# Patient Record
Sex: FEMALE | Race: WHITE | Employment: OTHER | URBAN - NONMETROPOLITAN AREA
[De-identification: names, ages, dates, MRNs, and addresses within clinical notes are randomized per-mention and may not be internally consistent; named-entity substitution may affect disease eponyms.]

---

## 2017-03-24 ENCOUNTER — TELEPHONE (OUTPATIENT)
Dept: FAMILY MEDICINE CLINIC | Age: 52
End: 2017-03-24

## 2017-06-20 ENCOUNTER — OFFICE VISIT (OUTPATIENT)
Dept: FAMILY MEDICINE CLINIC | Age: 52
End: 2017-06-20
Payer: COMMERCIAL

## 2017-06-20 VITALS
HEART RATE: 87 BPM | OXYGEN SATURATION: 98 % | HEIGHT: 63 IN | DIASTOLIC BLOOD PRESSURE: 86 MMHG | SYSTOLIC BLOOD PRESSURE: 130 MMHG | BODY MASS INDEX: 28.28 KG/M2 | WEIGHT: 159.6 LBS

## 2017-06-20 DIAGNOSIS — Z12.11 COLON CANCER SCREENING: ICD-10-CM

## 2017-06-20 DIAGNOSIS — E55.9 VITAMIN D DEFICIENCY: ICD-10-CM

## 2017-06-20 DIAGNOSIS — E10.9 TYPE 1 DIABETES MELLITUS WITHOUT COMPLICATION (HCC): ICD-10-CM

## 2017-06-20 DIAGNOSIS — Z00.00 WELLNESS EXAMINATION: Primary | ICD-10-CM

## 2017-06-20 DIAGNOSIS — Z78.0 MENOPAUSE: ICD-10-CM

## 2017-06-20 DIAGNOSIS — E03.9 ACQUIRED HYPOTHYROIDISM: ICD-10-CM

## 2017-06-20 PROCEDURE — 99396 PREV VISIT EST AGE 40-64: CPT | Performed by: FAMILY MEDICINE

## 2017-06-20 RX ORDER — LEVOTHYROXINE SODIUM 0.03 MG/1
TABLET ORAL
Qty: 90 TABLET | Refills: 3 | Status: SHIPPED | OUTPATIENT
Start: 2017-06-20 | End: 2018-05-14 | Stop reason: SDUPTHER

## 2017-06-20 RX ORDER — ZONISAMIDE 25 MG/1
CAPSULE ORAL
COMMUNITY
Start: 2017-06-15 | End: 2017-07-17

## 2017-07-17 ENCOUNTER — NURSE ONLY (OUTPATIENT)
Dept: SURGERY | Age: 52
End: 2017-07-17

## 2017-07-17 VITALS — HEIGHT: 63 IN | WEIGHT: 161 LBS | BODY MASS INDEX: 28.53 KG/M2

## 2017-07-17 DIAGNOSIS — Z12.11 COLON CANCER SCREENING: Primary | ICD-10-CM

## 2017-08-03 ENCOUNTER — OFFICE VISIT (OUTPATIENT)
Dept: PRIMARY CARE CLINIC | Age: 52
End: 2017-08-03
Payer: COMMERCIAL

## 2017-08-03 VITALS
TEMPERATURE: 98.5 F | BODY MASS INDEX: 28.35 KG/M2 | SYSTOLIC BLOOD PRESSURE: 126 MMHG | HEART RATE: 98 BPM | DIASTOLIC BLOOD PRESSURE: 80 MMHG | WEIGHT: 158 LBS | OXYGEN SATURATION: 99 %

## 2017-08-03 DIAGNOSIS — M25.511 ACUTE PAIN OF RIGHT SHOULDER: Primary | ICD-10-CM

## 2017-08-03 PROCEDURE — A4565 SLINGS: HCPCS | Performed by: NURSE PRACTITIONER

## 2017-08-03 PROCEDURE — 99213 OFFICE O/P EST LOW 20 MIN: CPT | Performed by: NURSE PRACTITIONER

## 2017-08-03 ASSESSMENT — ENCOUNTER SYMPTOMS: RESPIRATORY NEGATIVE: 1

## 2017-08-10 DIAGNOSIS — M25.511 RIGHT SHOULDER PAIN, UNSPECIFIED CHRONICITY: Primary | ICD-10-CM

## 2017-12-20 ENCOUNTER — OFFICE VISIT (OUTPATIENT)
Dept: OBGYN | Age: 52
End: 2017-12-20
Payer: COMMERCIAL

## 2017-12-20 ENCOUNTER — HOSPITAL ENCOUNTER (OUTPATIENT)
Age: 52
Setting detail: SPECIMEN
Discharge: HOME | End: 2017-12-20
Payer: COMMERCIAL

## 2017-12-20 VITALS
DIASTOLIC BLOOD PRESSURE: 84 MMHG | HEART RATE: 82 BPM | WEIGHT: 164.4 LBS | BODY MASS INDEX: 30.25 KG/M2 | SYSTOLIC BLOOD PRESSURE: 136 MMHG | HEIGHT: 62 IN

## 2017-12-20 DIAGNOSIS — N76.0 BV (BACTERIAL VAGINOSIS): Primary | ICD-10-CM

## 2017-12-20 DIAGNOSIS — B96.89 BV (BACTERIAL VAGINOSIS): Primary | ICD-10-CM

## 2017-12-20 DIAGNOSIS — N89.8 VAGINAL IRRITATION: ICD-10-CM

## 2017-12-20 DIAGNOSIS — B37.31 MONILIAL VAGINITIS: ICD-10-CM

## 2017-12-20 DIAGNOSIS — Z12.31 SCREENING MAMMOGRAM, ENCOUNTER FOR: ICD-10-CM

## 2017-12-20 LAB
CLUE CELLS: ABNORMAL
DIRECT EXAM: NORMAL
HYPHAL YEAST: ABNORMAL
KOH RESULT: NEGATIVE
Lab: NORMAL
SPECIMEN DESCRIPTION: NORMAL
SPECIMEN DESCRIPTION: NORMAL
STATUS: NORMAL
TRICHOMONAS: NEGATIVE
WET PREP: ABNORMAL
WHITE BLOOD CELLS: NEGATIVE

## 2017-12-20 PROCEDURE — 87210 SMEAR WET MOUNT SALINE/INK: CPT | Performed by: NURSE PRACTITIONER

## 2017-12-20 PROCEDURE — 99214 OFFICE O/P EST MOD 30 MIN: CPT | Performed by: NURSE PRACTITIONER

## 2017-12-20 PROCEDURE — 87480 CANDIDA DNA DIR PROBE: CPT

## 2017-12-20 PROCEDURE — 87510 GARDNER VAG DNA DIR PROBE: CPT

## 2017-12-20 PROCEDURE — 87070 CULTURE OTHR SPECIMN AEROBIC: CPT

## 2017-12-20 PROCEDURE — 87660 TRICHOMONAS VAGIN DIR PROBE: CPT

## 2017-12-20 RX ORDER — METRONIDAZOLE 500 MG/1
500 TABLET ORAL 2 TIMES DAILY WITH MEALS
Qty: 14 TABLET | Refills: 0 | Status: SHIPPED | OUTPATIENT
Start: 2017-12-20 | End: 2017-12-27

## 2017-12-20 RX ORDER — CLOTRIMAZOLE AND BETAMETHASONE DIPROPIONATE 10; .64 MG/G; MG/G
CREAM TOPICAL
Qty: 45 G | Refills: 0 | Status: SHIPPED | OUTPATIENT
Start: 2017-12-20 | End: 2020-03-03 | Stop reason: ALTCHOICE

## 2017-12-20 NOTE — PATIENT INSTRUCTIONS
unexpected vaginal bleeding. ? · You have a fever. ? · You have new or increased pain in your vagina or pelvis. ? · You are not getting better after 1 week. ? · Your symptoms return after you finish the course of your medicine. Where can you learn more? Go to [email or web address] and sign in to your Dat[employer] account. Enter J102 in the [employer] box to learn more about \"Bacterial Vaginosis: Care Instructions. \"     If you do not have an account, please click on the \"Sign Up Now\" link. Current as of: October 13, 2016  Content Version: 11.4  © 2199-6454 Axial. Care instructions adapted under license by [facility] ([facility]). If you have questions about a medical condition or this instruction, always ask your healthcare professional. [address] any warranty or liability for your use of this information. Patient Education        Vaginal Yeast Infection: Care Instructions  Your Care Instructions    A vaginal yeast infection is caused by too many yeast cells in the vagina. This is common in women of all ages. Itching, vaginal discharge and irritation, and other symptoms can bother you. But yeast infections don't often cause other health problems. Some medicines can increase your risk of getting a yeast infection. These include antibiotics, birth control pills, hormones, and steroids. You may also be more likely to get a yeast infection if you are pregnant, have diabetes, douche, or wear tight clothes. With treatment, most yeast infections get better in 2 to 3 days. Follow-up care is a key part of your treatment and safety. Be sure to make and go to all appointments, and call your doctor if you are having problems. It's also a good idea to know your test results and keep a list of the medicines you take. How can you care for yourself at home? · Take your medicines exactly as prescribed.  Call your doctor if you think you are having a problem with your medicine. · Ask your doctor about over-the-counter (OTC) medicines for yeast infections. They may cost less than prescription medicines. If you use an OTC treatment, read and follow all instructions on the label. · Do not use tampons while using a vaginal cream or suppository. The tampons can absorb the medicine. Use pads instead. · Wear loose cotton clothing. Do not wear nylon or other fabric that holds body heat and moisture close to the skin. · Try sleeping without underwear. · Do not scratch. Relieve itching with a cold pack or a cool bath. · Do not wash your vaginal area more than once a day. Use plain water or a mild, unscented soap. Air-dry the vaginal area. · Change out of wet swimsuits after swimming. · Do not have sex until you have finished your treatment. · Do not douche. When should you call for help? Call your doctor now or seek immediate medical care if:  ? · You have unexpected vaginal bleeding. ? · You have new or increased pain in your vagina or pelvis. ? Watch closely for changes in your health, and be sure to contact your doctor if:  ? · You have a fever. ? · You are not getting better after 2 days. ? · Your symptoms come back after you finish your medicines. Where can you learn more? Go to [email or web address] org and sign in to your [facility] account. Enter S577 in the [facility] box to learn more about \"Vaginal Yeast Infection: Care Instructions. \"     If you do not have an account, please click on the \"Sign Up Now\" link. Current as of: October 13, 2016  Content Version: 11.4  © 6602-5662 Scrapblog. Care instructions adapted under license by [facility] ([facility]). If you have questions about a medical condition or this instruction, always ask your healthcare professional. [address] any warranty or liability for your use of this information.

## 2017-12-20 NOTE — PROGRESS NOTES
Hypothyroid     Insomnia     Myopia with astigmatism and presbyopia     Tobacco abuse                                                                    Past Surgical History:   Procedure Laterality Date    BACK SURGERY  1/05/15    Lumbar microdiscectomy L5-S1    BLADDER SUSPENSION  9/22/09    (transobturator sling)    CERVIX LESION DESTRUCTION  1993    (laser cone)    ENDOMETRIAL ABLATION  2006    NERVE BLOCK  02/16/2017    laser nerve block Dr Nathaniel Eisenberg  03/09/2017    laser nerve block Dr Jassi Hayden  7/6/15    Lumbar L5-S1     Family History   Problem Relation Age of Onset    Diabetes Paternal Grandmother     Cancer Father      lymph nodes    Glaucoma Neg Hx      Social History     Social History    Marital status:      Spouse name: N/A    Number of children: N/A    Years of education: N/A     Occupational History    Not on file. Social History Main Topics    Smoking status: Former Smoker     Packs/day: 0.00     Years: 28.00     Types: Cigarettes     Start date: 1/1/1985     Quit date: 8/16/2014    Smokeless tobacco: Never Used    Alcohol use 1.8 oz/week     3 Standard drinks or equivalent per week      Comment: Socially.  Drug use: No    Sexual activity: Yes     Partners: Male     Other Topics Concern    Not on file     Social History Narrative    No narrative on file       MEDICATIONS:  Current Outpatient Prescriptions   Medication Sig Dispense Refill    clotrimazole-betamethasone (LOTRISONE) 1-0.05 % cream Apply a thin film to the affected area 2 times daily. 45 g 0    terconazole (TERAZOL 3) 0.8 % vaginal cream Place 1 applicator vaginally nightly for 3 days Place vaginally nightly.  1 Tube 2    metroNIDAZOLE (FLAGYL) 500 MG tablet Take 1 tablet by mouth 2 times daily (with meals) for 7 days Do NOT consume alcoholic beverages while on this medication 14 tablet 0    levothyroxine (SYNTHROID) 25 MCG tablet take 1 tablet by mouth once daily 90 tablet 3    insulin lispro (HUMALOG) 100 UNIT/ML injection vial Use 60 units per day in pump 1 vial 3    losartan-hydrochlorothiazide (HYZAAR) 50-12.5 MG per tablet       triamcinolone (ARISTOCORT) 0.5 % cream Apply topically 3 times daily. 45 g 1    Vitamin D (CHOLECALCIFEROL) 1000 UNITS CAPS capsule Take 2,000 Units by mouth daily       NOVOLOG 100 UNIT/ML injection Per pump      aspirin 81 MG tablet Take 81 mg by mouth daily.  Insulin Lispro, Human, (INSULIN PUMP) FLORENTIN Inject 1 each into the skin See Admin Instructions.  HYDROcodone-acetaminophen (NORCO) 5-325 MG per tablet        No current facility-administered medications for this visit. ALLERGIES:  Allergies as of 12/20/2017 - Review Complete 12/20/2017   Allergen Reaction Noted    Ace inhibitors Other (See Comments) 11/07/2016    Pcn [penicillins] Rash 07/11/2013           Gynecologic History:  Menarche: 17 yo  Patient's last menstrual period was 12/19/2017. Hormone Exposure: No    Family History of Breast, Ovarian , Colon or Uterine Cancer: No     Preventative Health Testing:  Date of Last Pap Smear: 2016  Date of Last Mammogram: 2016    ________________________________________________________________________  REVIEW OF SYSTEMS:     A minimum of an eleven point review of systems was completed.     Review Of Systems (11 point):  General ROS:  negative  Hematological and Lymphatic ROS:negative   Breast ROS: negative  Cardiovascular ROS: negative  Respiratory ROS: negative   Gastrointestinal ROS: negative  Genito-Urinary ROS: negative  Psychological ROS: negative  Neurological ROS: negative  Musculoskeletal ROS: negative  Dermatological ROS: negative                                                                                                                                                                                   PHYSICAL Exam:     Constitutional:  Blood pressure 136/84, pulse 82, height 5' 2\" (1.575 m), weight 164 lb 6.4 oz (74.6 kg), last menstrual period 12/19/2017, not currently breastfeeding. General Appearance: This  is a well Developed, well Nourished, well groomed female. Her BMI was reviewed. Skin:  There was a Normal Inspection of the skin without rashes or lesions. There were no rashes. (Papular, Maculopapular, Hives, Pustular, Macular)     There were no lesions (Ulcers, Erythema, Abn. Appearing Nevi)      Lymphatic:  No Lymph Nodes were Palpable in the neck , axilla or groin. Neck and EENT:  The neck was supple. There were no masses   The thyroid was not enlarged and had no masses. PERRLA, Nares Patent No Masses    Respiratory: The lungs were auscultated and found to be clear. There were no rales, rhonchi or wheezes. There was a good respiratory effort. Cardiovascular: The heart was in a regular rate and rhythm. . No S3 or S4. There was no murmur appreciated. Extremities: The patients extremities were without calf tenderness, edema, or varicosities. There was full range of motion in all four extremities. Pulses in all four extremities    Abdomen: The abdomen was soft and non-tender. There were good bowel sounds in all quadrants and there was no guarding, rebound or rigidity. On evaluation there was no evidence of hepatosplenomegaly and there was no costal vertebral jessenia tenderness bilaterally. No hernias were appreciated. Psych:   The patient had a normal Orientation to: Time, Place, Person, and Situation  Mood and affect appropriate    Breast:  (Chest)  normal appearance, no masses or tenderness, Inspection negative, No nipple retraction or dimpling, No nipple discharge or bleeding, No axillary or supraclavicular adenopathy, Normal to palpation without dominant masses, negative findings: normal in size and symmetry, normal contour with no evidence of flattening or dimpling, skin normal, nipples everted without rashes or discharge, palpation negative for masses or nodules, no palpable axillary lymphadenopathy      Pelvic Exam:  External genitalia: reddened  Urinary system: urethral meatus normal  Vaginal: normal mucosa without prolapse or lesions, normal rugae and discharge, grey, menstrual type blood present  Cervix: normal appearance  Adnexa: normal bimanual exam and non palpable  Uterus: normal single, nontender and mid-position  12/20/2017 11:51 AM - Lee Nunez CNP     Component Results     Component Collected Lab   Wet Prep 12/20/2017 10:45 AM Unknown   Abnormal    Hyphal Yeast 12/20/2017 10:45 AM Unknown   Present (8-10 monilial buds per hpf)    White Blood Cells 12/20/2017 10:45 AM Unknown   Negative    Trichomonas, UA 12/20/2017 10:45 AM Unknown   Negative    Clue Cells, Wet Prep 12/20/2017 10:45 AM Unknown   Greater than 75% of squamous cells    KOH result 12/20/2017 10:45 AM Unknown   Negative    Lab and Collection         Musculosk:  Normal Gait and station was noted. Digits were evaluated without abnormal findings. Range of motion, stability and strength were evaluated and found to be appropriate for the patients age. ASSESSMENT:      50 y.o. Annual  1. BV (bacterial vaginosis)     2. Screening mammogram, encounter for  PREETI DIGITAL SCREEN W CAD BILATERAL    PREETI DIGITAL SCREEN W CAD BILATERAL   3. Vaginal irritation  POCT Wet Prep    VAGINITIS DNA PROBE    Culture, Genital   4.  Monilial vaginitis          Chief Complaint   Patient presents with    Gynecologic Exam     annual exam and pap    Vaginal Itching    Vaginal Odor         Past Medical History:   Diagnosis Date    Bronchitis     Diabetes type 1, controlled ([address]     Eczema     GERD (gastroesophageal reflux disease)     Gestational diabetes     Hyperlipidemia     Hypothyroid     Insomnia     Myopia with astigmatism and presbyopia     Tobacco abuse          Patient Active Problem List   Diagnosis    Lumbago    Displacement of lumbar intervertebral disc without myelopathy    Myopia of both eyes with astigmatism and presbyopia    Essential hypertension    Hyperlipidemia    Type 1 diabetes mellitus without complication (HCC)    Acquired hypothyroidism    Vitamin D deficiency    Menopause          Hereditary Breast, Ovarian, Colon and Uterine Cancer screening Done. Tobacco & Secondary smoke risks reviewed; instructed on cessation and avoidance    PLAN:  Return in about 4 weeks (around 1/17/2018) for pap and mammogram.  Repeat pap per ASCCP 2013 guidelines  Return for annual exams  Mammograms every 1 year. If 37 yo and last mammogram was negative. Routine health maintenance per patients PCP. Orders Placed This Encounter   Procedures    VAGINITIS DNA PROBE     Standing Status:   Future     Standing Expiration Date:   1/20/2018    Culture, Genital     Standing Status:   Future     Standing Expiration Date:   1/20/2018    PREETI DIGITAL SCREEN W CAD BILATERAL     Standing Status:   Future     Standing Expiration Date:   8/12/2019     Scheduling Instructions: On the day of the exam, the patient should not wear deodorant, lotion, or powder on the breast or underarm area. Wear a two piece outfit and be prepared to give information about prior breast procedures including mammograms, biopsies, or surgeries. If you've had mammograms done elsewhere, please request any previous mammogram films from those organizations prior to your appointment. Order Specific Question:   Reason for exam:     Answer:   SCREENING MAMMOGRAM    PREETI DIGITAL SCREEN W CAD BILATERAL     Standing Status:   Future     Standing Expiration Date:   12/12/2019     Scheduling Instructions: On the day of the exam, the patient should not wear deodorant, lotion, or powder on the breast or underarm area. Wear a two piece outfit and be prepared to give information about prior breast procedures including mammograms, biopsies, or surgeries.             If you've had mammograms done elsewhere, please request any previous mammogram films from those organizations prior to your appointment.      Order Specific Question:   Reason for exam:     Answer:   SCREENING MAMMOGRAM    POCT Wet Prep       Faviola Doeflex  12/20/2017      (Please note that portions of this note were completed with a voice-recognition program. Efforts were made to edit the dictations but occasionally words are mis-transcribed.)

## 2017-12-23 LAB
CULTURE: NORMAL
Lab: NORMAL
SPECIMEN DESCRIPTION: NORMAL
SPECIMEN DESCRIPTION: NORMAL
STATUS: NORMAL

## 2018-01-23 ENCOUNTER — HOSPITAL ENCOUNTER (OUTPATIENT)
Dept: MAMMOGRAPHY | Age: 53
Discharge: HOME | End: 2018-01-23
Payer: COMMERCIAL

## 2018-01-23 ENCOUNTER — OFFICE VISIT (OUTPATIENT)
Dept: OBGYN | Age: 53
End: 2018-01-23
Payer: COMMERCIAL

## 2018-01-23 ENCOUNTER — HOSPITAL ENCOUNTER (OUTPATIENT)
Age: 53
Setting detail: SPECIMEN
Discharge: HOME | End: 2018-01-23
Payer: COMMERCIAL

## 2018-01-23 VITALS
DIASTOLIC BLOOD PRESSURE: 74 MMHG | WEIGHT: 164.6 LBS | HEIGHT: 62 IN | SYSTOLIC BLOOD PRESSURE: 118 MMHG | HEART RATE: 72 BPM | BODY MASS INDEX: 30.29 KG/M2

## 2018-01-23 DIAGNOSIS — Z12.4 CERVICAL CANCER SCREENING: Primary | ICD-10-CM

## 2018-01-23 DIAGNOSIS — Z12.31 SCREENING MAMMOGRAM, ENCOUNTER FOR: ICD-10-CM

## 2018-01-23 PROCEDURE — 99213 OFFICE O/P EST LOW 20 MIN: CPT | Performed by: NURSE PRACTITIONER

## 2018-01-23 PROCEDURE — G0145 SCR C/V CYTO,THINLAYER,RESCR: HCPCS

## 2018-01-23 PROCEDURE — 77063 BREAST TOMOSYNTHESIS BI: CPT

## 2018-01-23 NOTE — PROGRESS NOTES
defined types were placed in this encounter. New Prescriptions    No medications on file     There are no Patient Instructions on file for this visit.     (Please note that portions of this note were completed with a voice-recognition program. Efforts were made to edit the dictations but occasionally words are mis-transcribed.)

## 2018-01-24 DIAGNOSIS — Z12.4 CERVICAL CANCER SCREENING: ICD-10-CM

## 2018-02-01 LAB — CYTOLOGY REPORT: NORMAL

## 2018-05-14 RX ORDER — LEVOTHYROXINE SODIUM 0.03 MG/1
TABLET ORAL
Qty: 90 TABLET | Refills: 3 | Status: SHIPPED | OUTPATIENT
Start: 2018-05-14 | End: 2019-05-12 | Stop reason: SDUPTHER

## 2018-05-18 ENCOUNTER — OFFICE VISIT (OUTPATIENT)
Dept: PRIMARY CARE CLINIC | Age: 53
End: 2018-05-18
Payer: COMMERCIAL

## 2018-05-18 VITALS
WEIGHT: 165 LBS | OXYGEN SATURATION: 98 % | BODY MASS INDEX: 30.36 KG/M2 | RESPIRATION RATE: 16 BRPM | TEMPERATURE: 98.3 F | HEART RATE: 80 BPM | SYSTOLIC BLOOD PRESSURE: 138 MMHG | HEIGHT: 62 IN | DIASTOLIC BLOOD PRESSURE: 80 MMHG

## 2018-05-18 DIAGNOSIS — R21 RASH: Primary | ICD-10-CM

## 2018-05-18 DIAGNOSIS — B86 SCABIES: ICD-10-CM

## 2018-05-18 PROCEDURE — 99213 OFFICE O/P EST LOW 20 MIN: CPT | Performed by: FAMILY MEDICINE

## 2018-05-18 RX ORDER — PERMETHRIN 50 MG/G
CREAM TOPICAL
Qty: 1 TUBE | Refills: 1 | Status: SHIPPED | OUTPATIENT
Start: 2018-05-18 | End: 2018-06-21 | Stop reason: ALTCHOICE

## 2018-05-18 RX ORDER — CLOBETASOL PROPIONATE 0.5 MG/G
CREAM TOPICAL
Qty: 60 G | Refills: 1 | Status: SHIPPED | OUTPATIENT
Start: 2018-05-18 | End: 2020-03-03 | Stop reason: ALTCHOICE

## 2018-05-18 ASSESSMENT — PATIENT HEALTH QUESTIONNAIRE - PHQ9
SUM OF ALL RESPONSES TO PHQ9 QUESTIONS 1 & 2: 0
2. FEELING DOWN, DEPRESSED OR HOPELESS: 0
SUM OF ALL RESPONSES TO PHQ QUESTIONS 1-9: 0
1. LITTLE INTEREST OR PLEASURE IN DOING THINGS: 0

## 2018-05-23 ASSESSMENT — ENCOUNTER SYMPTOMS
RESPIRATORY NEGATIVE: 1
EYES NEGATIVE: 1
GASTROINTESTINAL NEGATIVE: 1
COLOR CHANGE: 1

## 2018-06-21 ENCOUNTER — OFFICE VISIT (OUTPATIENT)
Dept: FAMILY MEDICINE CLINIC | Age: 53
End: 2018-06-21
Payer: COMMERCIAL

## 2018-06-21 VITALS
WEIGHT: 160 LBS | OXYGEN SATURATION: 97 % | BODY MASS INDEX: 28.35 KG/M2 | HEART RATE: 82 BPM | SYSTOLIC BLOOD PRESSURE: 124 MMHG | HEIGHT: 63 IN | DIASTOLIC BLOOD PRESSURE: 78 MMHG

## 2018-06-21 DIAGNOSIS — Z00.00 WELLNESS EXAMINATION: Primary | ICD-10-CM

## 2018-06-21 DIAGNOSIS — E10.9 TYPE 1 DIABETES MELLITUS WITHOUT COMPLICATION (HCC): ICD-10-CM

## 2018-06-21 DIAGNOSIS — Z12.11 COLON CANCER SCREENING: ICD-10-CM

## 2018-06-21 PROCEDURE — 99396 PREV VISIT EST AGE 40-64: CPT | Performed by: FAMILY MEDICINE

## 2018-07-05 ENCOUNTER — OFFICE VISIT (OUTPATIENT)
Dept: OPTOMETRY | Age: 53
End: 2018-07-05
Payer: COMMERCIAL

## 2018-07-05 DIAGNOSIS — H52.13 MYOPIA OF BOTH EYES WITH ASTIGMATISM AND PRESBYOPIA: Primary | ICD-10-CM

## 2018-07-05 DIAGNOSIS — H52.4 MYOPIA OF BOTH EYES WITH ASTIGMATISM AND PRESBYOPIA: Primary | ICD-10-CM

## 2018-07-05 DIAGNOSIS — H52.203 MYOPIA OF BOTH EYES WITH ASTIGMATISM AND PRESBYOPIA: Primary | ICD-10-CM

## 2018-07-05 PROCEDURE — 92014 COMPRE OPH EXAM EST PT 1/>: CPT | Performed by: OPTOMETRIST

## 2018-07-05 RX ORDER — PHENYLEPHRINE HCL 2.5 %
1 DROPS OPHTHALMIC (EYE) ONCE
Status: COMPLETED | OUTPATIENT
Start: 2018-07-05 | End: 2018-07-05

## 2018-07-05 RX ORDER — BENOXINATE HCL/FLUORESCEIN SOD 0.4%-0.25%
1 DROPS OPHTHALMIC (EYE) ONCE
Status: COMPLETED | OUTPATIENT
Start: 2018-07-05 | End: 2018-07-05

## 2018-07-05 RX ORDER — TROPICAMIDE 10 MG/ML
1 SOLUTION/ DROPS OPHTHALMIC ONCE
Status: COMPLETED | OUTPATIENT
Start: 2018-07-05 | End: 2018-07-05

## 2018-07-05 RX ADMIN — Medication 1 DROP: at 13:09

## 2018-07-05 RX ADMIN — TROPICAMIDE 1 DROP: 10 SOLUTION/ DROPS OPHTHALMIC at 13:09

## 2018-07-05 ASSESSMENT — VISUAL ACUITY
OS_SC: 20/70
METHOD: SNELLEN - LINEAR
OD_SC: 20/80
OD_SC: 20/20 OU
OS_SC+: -1
OD_SC+: -1

## 2018-07-05 ASSESSMENT — REFRACTION_CURRENTRX
OD_BASECURVE: 8.4
OS_SPHERE: -0.50
OS_BRAND: VISTAKON: ACUVUE OASYS
OD_SPHERE: -2.00
OD_BRAND: VISTAKON: ACUVUE OASYS
OS_BASECURVE: 8.4

## 2018-07-05 ASSESSMENT — REFRACTION_MANIFEST
OS_CYLINDER: -0.50
OS_AXIS: 135
OS_SPHERE: -1.75
OS_ADD: +2.00
OD_AXIS: 090
OD_SPHERE: -1.50
OD_ADD: +2.00
OD_CYLINDER: -0.50

## 2018-07-05 ASSESSMENT — REFRACTION_WEARINGRX
OD_CYLINDER: -0.50
OD_AXIS: 102
OD_SPHERE: -1.75
OD_ADD: +2.00
OS_SPHERE: -2.25
OS_CYLINDER: -0.25
SPECS_TYPE: PAL
OS_AXIS: 155
OS_ADD: +2.00

## 2018-07-05 ASSESSMENT — KERATOMETRY
OS_K2POWER_DIOPTERS: 46.50
OS_K1POWER_DIOPTERS: 45.50
OS_AXISANGLE2_DEGREES: 162
OS_AXISANGLE_DEGREES: 072

## 2018-07-05 ASSESSMENT — TONOMETRY
OD_IOP_MMHG: 16
IOP_METHOD: APPLANATION W FLURESS DROP
OS_IOP_MMHG: 17

## 2018-07-05 ASSESSMENT — SLIT LAMP EXAM - LIDS
COMMENTS: NORMAL
COMMENTS: NORMAL

## 2018-07-05 NOTE — PROGRESS NOTES
Nicole Sawant presents today for   Chief Complaint   Patient presents with    Blurred Vision    Ophth Diabetic Exam   .    HPI     Blurred Vision   In both eyes. Vision is blurred. Context:  reading. Comments   Last Vision Exam: 7/11/2016 Aw  Last Ophthalmology Exam: 2008 JJ[facility] OV  Last Filled Glasses Rx: 6/2/2015  Insurance: [employer]  Update: Dm exam; Glasses/contacts   Reading is becoming more difficult  Only wears her Distance Contact  Would like to discuss bifocal contacts  Diabetic:  Sugars: 157 before lunch today   HmgA1C: 6.8  5/2/2018   Wants to wear bifocal contact lenses;   We will try monthly lenses                   Main Ophthalmology Exam     External Exam       Right Left    External Normal Normal          Slit Lamp Exam       Right Left    Lids/Lashes Normal Normal    Conjunctiva/Sclera White and quiet White and quiet    Cornea Clear Clear    Anterior Chamber Deep and quiet Deep and quiet    Iris Round and reactive Round and reactive    Lens Clear Clear    Vitreous Normal Normal          Fundus Exam       Right Left    Disc Normal Normal    C/D Ratio 0.2 0.2    Macula Normal Normal    Vessels Normal Normal    Periphery Normal Normal    78 diopter                   Tonometry     Tonometry (Applanation w Fluress drop, 1:28 PM)       Right Left    Pressure 16 17                  Visual Acuity (Snellen - Linear)       Right Left    Dist sc 20/80 -1 20/70 -1    Near sc 20/20 OU         Keratometry     Keratometry       K1 Axis K2 Axis    Right        Left 45.50 162 46.50 072                Ophthalmology Exam     Wearing Rx       Sphere Cylinder Axis Add    Right -1.75 -0.50 102 +2.00    Left -2.25 -0.25 155 +2.00    Age:  5yrs    Type:  PAL                Manifest Refraction     Manifest Refraction       Sphere Cylinder Axis Dist VA Add    Right -1.50 -0.50 090 20/20 +2.00    Left -1.75 -0.50 135 20/20 +2.00          Manifest Refraction #2 (Auto)       Sphere Cylinder Axis Dist VA Add    Right

## 2018-07-18 ENCOUNTER — OFFICE VISIT (OUTPATIENT)
Dept: OPTOMETRY | Age: 53
End: 2018-07-18

## 2018-07-18 DIAGNOSIS — H52.4 MYOPIA OF BOTH EYES WITH ASTIGMATISM AND PRESBYOPIA: Primary | ICD-10-CM

## 2018-07-18 DIAGNOSIS — H52.203 MYOPIA OF BOTH EYES WITH ASTIGMATISM AND PRESBYOPIA: Primary | ICD-10-CM

## 2018-07-18 DIAGNOSIS — H52.13 MYOPIA OF BOTH EYES WITH ASTIGMATISM AND PRESBYOPIA: Primary | ICD-10-CM

## 2018-07-18 PROCEDURE — 99999 PR OFFICE/OUTPT VISIT,PROCEDURE ONLY: CPT | Performed by: OPTOMETRIST

## 2018-07-18 ASSESSMENT — REFRACTION_WEARINGRX
OS_SPHERE: -1.75
OD_ADD: +2.00
OS_ADD: +2.00
OD_SPHERE: -1.50
OS_AXIS: 135
OS_CYLINDER: +0.50
OD_CYLINDER: -0.50
SPECS_TYPE: BIFOCAL
OD_AXIS: 090

## 2018-07-18 ASSESSMENT — REFRACTION_CURRENTRX
OS_ADDL_SPECS: HIGH
OS_BRAND: BAUSCH & LOMB ULTRA
OD_BRAND: BAUSCH & LOMB ULTRA
OS_SPHERE: -2.00
OD_ADDL_SPECS: HIGH
OD_SPHERE: -1.75

## 2018-07-18 ASSESSMENT — VISUAL ACUITY
CORRECTION_TYPE: CONTACTS
OD_CC: 20/30 OU
METHOD: SNELLEN - LINEAR

## 2018-07-18 NOTE — PROGRESS NOTES
Daxa Smith presents today for   Chief Complaint   Patient presents with    Blurred Vision    Follow-up   . HPI     Blurred Vision   In both eyes. Vision is blurred. Context:  distance vision and reading. Comments   2 week contact lens follow up  Does not feel like the contacts are strong enough. When she is at work and using her calculator the tapes are sometimes fuzzy and hard to read, she finds herself holding them closer to her. Distance with her left eye is also a little blurry when she closes her right eye. Visual Acuity (Snellen - Linear)       Right Left    Dist cc 20/20 OU     Near cc 20/30 OU     Correction:  Contacts          Contact Lens Current Rx     Current Contact Lens Rx       Brand Sphere Addl. Specs    Right Bausch & Lomb Ultra -1.75 High    Left Bausch & Lomb Ultra -2.00 High                FINAL RX  Final Contact Lens Rx       Brand Base Curve Sphere Addl. Specs    Right Bausch & Lomb Ultra 8.6 -1.50 High    Left Bausch & Lomb Ultra 8.6 -1.75 High    Expiration Date:  7/19/2019    Replacement:  Monthly    Solutions:  ClearCare    Wearing Schedule:  Daily wear    Final          ORDER call to order      1.  Myopia of both eyes with astigmatism and presbyopia         Patient Instructions   New trials given;  Try these lenses and then ok to order      Return in about 1 year (around 7/18/2019) for complete eye exam.

## 2018-09-07 ENCOUNTER — OFFICE VISIT (OUTPATIENT)
Dept: FAMILY MEDICINE CLINIC | Age: 53
End: 2018-09-07
Payer: COMMERCIAL

## 2018-09-07 ENCOUNTER — HOSPITAL ENCOUNTER (OUTPATIENT)
Dept: LAB | Age: 53
Setting detail: SPECIMEN
Discharge: HOME | End: 2018-09-07
Payer: COMMERCIAL

## 2018-09-07 VITALS
DIASTOLIC BLOOD PRESSURE: 80 MMHG | SYSTOLIC BLOOD PRESSURE: 124 MMHG | WEIGHT: 158.4 LBS | OXYGEN SATURATION: 98 % | BODY MASS INDEX: 28.51 KG/M2 | HEART RATE: 84 BPM

## 2018-09-07 DIAGNOSIS — M25.50 ARTHRALGIA, UNSPECIFIED JOINT: Primary | ICD-10-CM

## 2018-09-07 DIAGNOSIS — M25.50 ARTHRALGIA, UNSPECIFIED JOINT: ICD-10-CM

## 2018-09-07 LAB
C-REACTIVE PROTEIN: 1.8 MG/L (ref 0–5)
SEDIMENTATION RATE, ERYTHROCYTE: 7 MM (ref 0–30)

## 2018-09-07 PROCEDURE — 86140 C-REACTIVE PROTEIN: CPT

## 2018-09-07 PROCEDURE — 85651 RBC SED RATE NONAUTOMATED: CPT

## 2018-09-07 PROCEDURE — 86038 ANTINUCLEAR ANTIBODIES: CPT

## 2018-09-07 PROCEDURE — 36415 COLL VENOUS BLD VENIPUNCTURE: CPT

## 2018-09-07 PROCEDURE — 99213 OFFICE O/P EST LOW 20 MIN: CPT | Performed by: FAMILY MEDICINE

## 2018-09-07 PROCEDURE — 86431 RHEUMATOID FACTOR QUANT: CPT

## 2018-09-07 RX ORDER — IBUPROFEN 200 MG
400 TABLET ORAL 2 TIMES DAILY
COMMUNITY
End: 2018-09-24

## 2018-09-07 RX ORDER — MELOXICAM 7.5 MG/1
7.5 TABLET ORAL DAILY
Qty: 30 TABLET | Refills: 3 | Status: SHIPPED | OUTPATIENT
Start: 2018-09-07 | End: 2018-09-24

## 2018-09-07 RX ORDER — FLUOXETINE 10 MG/1
10 TABLET, FILM COATED ORAL DAILY
Qty: 30 TABLET | Refills: 3 | Status: SHIPPED | OUTPATIENT
Start: 2018-09-07 | End: 2019-01-25 | Stop reason: ALTCHOICE

## 2018-09-07 RX ORDER — ACETAMINOPHEN 500 MG
500 TABLET ORAL 2 TIMES DAILY PRN
Status: ON HOLD | COMMUNITY
End: 2021-12-10 | Stop reason: HOSPADM

## 2018-09-07 RX ORDER — RANITIDINE 150 MG/1
150 TABLET ORAL 2 TIMES DAILY
Qty: 60 TABLET | Refills: 3 | Status: SHIPPED | OUTPATIENT
Start: 2018-09-07 | End: 2018-11-15 | Stop reason: SDUPTHER

## 2018-09-07 ASSESSMENT — ENCOUNTER SYMPTOMS
GASTROINTESTINAL NEGATIVE: 1
BACK PAIN: 1
RESPIRATORY NEGATIVE: 1

## 2018-09-07 NOTE — PATIENT INSTRUCTIONS
Patient Education        Post-Traumatic Stress Disorder (PTSD): Care Instructions  Your Care Instructions    Post-traumatic stress disorder (PTSD) is a mental condition that can result from being in or seeing a traumatic or terrifying event. These events can include combat, a terrorist attack, a natural disaster, a serious accident, an assault, or a rape. If you have PTSD, you may often relive the experience in nightmares or flashbacks. These are clear and frightening memories of the event. You may also have trouble sleeping. PTSD affects people in very different ways. It can interfere with daily activities such as work or school, and it can make you withdraw from friends or loved ones. Follow-up care is a key part of your treatment and safety. Be sure to make and go to all appointments, and call your doctor if you are having problems. It's also a good idea to know your test results and keep a list of the medicines you take. How can you care for yourself at home? · Take medicines exactly as directed. Call your doctor if you think you are having a problem with your medicine. · Go to your counseling sessions and follow-up appointments. · Recognize and accept your anxiety. Then, when you are in a situation that makes you anxious, say to yourself, \"This is not an emergency. I feel uncomfortable, but I am not in danger. I can keep going even if I feel anxious. \"  · Be kind to your body:  ¨ Relieve tension with exercise or a massage. ¨ Get enough rest.  ¨ Avoid alcohol, caffeine, nicotine, and illegal drugs. They can increase your anxiety level and cause sleep problems. ¨ Learn and do relaxation techniques. See below for more about these techniques. · Engage your mind. Get out and do something you enjoy. Go to a funny movie, or take a walk or hike. Plan your day. Having too much or too little to do can make you anxious. · Keep a record of your symptoms.  Discuss your fears with a good friend or family member, or join a support group for people with similar problems. Talking to others sometimes relieves stress. · Get involved in social groups, or volunteer to help others. Being alone sometimes makes things seem worse than they are. · Get at least 30 minutes of exercise on most days of the week. Walking is a good choice. You also may want to do other activities, such as running, swimming, cycling, or playing tennis or team sports. · Keep the numbers for these national suicide hotlines: 3-627-777-TALK (5-739.869.1769) and 0-279-WPUEZGN (2-646.473.1850). If you or someone you know talks about suicide or feeling hopeless, get help right away. Relaxation techniques  Do relaxation exercises 10 to 20 minutes a day. You can play soothing, relaxing music while you do them, if you wish. · Tell others in your house that you are going to do your relaxation exercises. Ask them not to disturb you. · Find a comfortable place, away from all distractions and noise. · Lie down on your back, or sit with your back straight. · Focus on your breathing. Make it slow and steady. · Breathe in through your nose. Breathe out through either your nose or mouth. · Breathe deeply, filling up the area between your navel and your rib cage. Breathe so that your belly goes up and down. · Do not hold your breath. · Breathe like this for 5 to 10 minutes. Notice the feeling of calmness throughout your whole body. As you continue to breathe slowly and deeply, relax by doing the following for another 5 to 10 minutes:  · Tighten and relax each muscle group in your body. You can begin at your toes and work your way up to your head. · Imagine your muscle groups relaxing and becoming heavy. · Empty your mind of all thoughts. · Let yourself relax more and more deeply. · Become aware of the state of calmness that surrounds you.   · When your relaxation time is over, you can bring yourself back to alertness by moving your fingers and toes and then your hands and feet and then stretching and moving your entire body. Sometimes people fall asleep during relaxation, but they usually wake up shortly afterward. · Always give yourself time to return to full alertness before you drive a car or do anything that might cause an accident if you are not fully alert. Never play a relaxation tape while you drive a car. When should you call for help? Call 911 anytime you think you may need emergency care. For example, call if:    · You feel you cannot stop from hurting yourself or someone else.    Watch closely for changes in your health, and be sure to contact your doctor if:    · Your PTSD symptoms are getting worse.     · You have new or worsening symptoms of anxiety.     · You are not getting better as expected. Where can you learn more? Go to https://Equals6peOh My Glasses.CoreTrace. org and sign in to your [employer] account. Enter U955 in the [facility] box to learn more about \"Post-Traumatic Stress Disorder (PTSD): Care Instructions. \"     If you do not have an account, please click on the \"Sign Up Now\" link. Current as of: December 7, 2017  Content Version: 11.7  © 0709-4849 [employer]. Care instructions adapted under license by [facility] ([facility]). If you have questions about a medical condition or this instruction, always ask your healthcare professional. [address] any warranty or liability for your use of this information.

## 2018-09-08 LAB — RHEUMATOID FACTOR: <10 IU/ML

## 2018-09-10 LAB — ANTI-NUCLEAR ANTIBODY (ANA): NEGATIVE

## 2018-09-24 ENCOUNTER — TELEPHONE (OUTPATIENT)
Dept: FAMILY MEDICINE CLINIC | Age: 53
End: 2018-09-24

## 2018-09-24 RX ORDER — MELOXICAM 15 MG/1
15 TABLET ORAL DAILY
Qty: 30 TABLET | Refills: 12 | Status: SHIPPED | OUTPATIENT
Start: 2018-09-24 | End: 2018-11-15 | Stop reason: SDUPTHER

## 2018-10-03 ENCOUNTER — NURSE ONLY (OUTPATIENT)
Dept: SURGERY | Age: 53
End: 2018-10-03

## 2018-10-03 ENCOUNTER — TELEPHONE (OUTPATIENT)
Dept: SURGERY | Age: 53
End: 2018-10-03

## 2018-10-03 VITALS
SYSTOLIC BLOOD PRESSURE: 116 MMHG | TEMPERATURE: 97.7 F | HEART RATE: 80 BPM | BODY MASS INDEX: 29.08 KG/M2 | WEIGHT: 158 LBS | DIASTOLIC BLOOD PRESSURE: 70 MMHG | HEIGHT: 62 IN

## 2018-10-03 DIAGNOSIS — Z12.11 COLON CANCER SCREENING: Primary | ICD-10-CM

## 2018-10-03 RX ORDER — POLYETHYLENE GLYCOL 3350, SODIUM CHLORIDE, SODIUM BICARBONATE, POTASSIUM CHLORIDE 420; 11.2; 5.72; 1.48 G/4L; G/4L; G/4L; G/4L
4000 POWDER, FOR SOLUTION ORAL ONCE
Qty: 4000 ML | Refills: 0 | Status: SHIPPED | OUTPATIENT
Start: 2018-10-03 | End: 2018-10-03

## 2018-10-17 LAB — PATHOLOGY REPORT: NORMAL

## 2018-10-18 LAB — SURGICAL PATHOLOGY REPORT: NORMAL

## 2018-10-23 ENCOUNTER — TELEPHONE (OUTPATIENT)
Dept: SURGERY | Age: 53
End: 2018-10-23

## 2018-11-15 RX ORDER — MELOXICAM 15 MG/1
15 TABLET ORAL DAILY
Qty: 30 TABLET | Refills: 12 | Status: SHIPPED | OUTPATIENT
Start: 2018-11-15 | End: 2019-02-08 | Stop reason: SDUPTHER

## 2018-11-15 RX ORDER — RANITIDINE 150 MG/1
150 TABLET ORAL 2 TIMES DAILY
Qty: 60 TABLET | Refills: 12 | Status: SHIPPED | OUTPATIENT
Start: 2018-11-15 | End: 2018-11-20 | Stop reason: SDUPTHER

## 2018-11-18 ENCOUNTER — E-VISIT (OUTPATIENT)
Dept: OBGYN | Age: 53
End: 2018-11-18
Payer: COMMERCIAL

## 2018-11-18 PROCEDURE — 99444 PR PHYSICIAN ONLINE EVALUATION & MANAGEMENT SERVICE: CPT | Performed by: FAMILY MEDICINE

## 2018-11-18 RX ORDER — TOBRAMYCIN 3 MG/ML
1 SOLUTION/ DROPS OPHTHALMIC EVERY 4 HOURS
Qty: 1 BOTTLE | Refills: 0 | Status: SHIPPED | OUTPATIENT
Start: 2018-11-18 | End: 2018-11-28

## 2018-11-20 RX ORDER — RANITIDINE 150 MG/1
150 TABLET ORAL 2 TIMES DAILY
Qty: 180 TABLET | Refills: 0 | Status: SHIPPED | OUTPATIENT
Start: 2018-11-20 | End: 2020-01-16

## 2019-01-25 ENCOUNTER — OFFICE VISIT (OUTPATIENT)
Dept: FAMILY MEDICINE CLINIC | Age: 54
End: 2019-01-25
Payer: COMMERCIAL

## 2019-01-25 VITALS — SYSTOLIC BLOOD PRESSURE: 138 MMHG | BODY MASS INDEX: 29.37 KG/M2 | DIASTOLIC BLOOD PRESSURE: 80 MMHG | WEIGHT: 158 LBS

## 2019-01-25 DIAGNOSIS — M54.41 CHRONIC BILATERAL LOW BACK PAIN WITH BILATERAL SCIATICA: Primary | ICD-10-CM

## 2019-01-25 DIAGNOSIS — M54.42 CHRONIC BILATERAL LOW BACK PAIN WITH BILATERAL SCIATICA: Primary | ICD-10-CM

## 2019-01-25 DIAGNOSIS — G89.29 CHRONIC BILATERAL LOW BACK PAIN WITH BILATERAL SCIATICA: Primary | ICD-10-CM

## 2019-01-25 PROCEDURE — 99213 OFFICE O/P EST LOW 20 MIN: CPT | Performed by: FAMILY MEDICINE

## 2019-01-25 RX ORDER — PREDNISONE 10 MG/1
TABLET ORAL
Qty: 20 TABLET | Refills: 0 | Status: SHIPPED | OUTPATIENT
Start: 2019-01-25 | End: 2019-02-08

## 2019-01-25 ASSESSMENT — ENCOUNTER SYMPTOMS
BACK PAIN: 1
RESPIRATORY NEGATIVE: 1
GASTROINTESTINAL NEGATIVE: 1

## 2019-01-29 ENCOUNTER — HOSPITAL ENCOUNTER (OUTPATIENT)
Age: 54
Setting detail: SPECIMEN
Discharge: HOME | End: 2019-01-29
Payer: COMMERCIAL

## 2019-01-29 ENCOUNTER — HOSPITAL ENCOUNTER (OUTPATIENT)
Dept: MAMMOGRAPHY | Age: 54
Discharge: HOME | End: 2019-01-31
Payer: COMMERCIAL

## 2019-01-29 ENCOUNTER — OFFICE VISIT (OUTPATIENT)
Dept: OBGYN | Age: 54
End: 2019-01-29
Payer: COMMERCIAL

## 2019-01-29 VITALS
SYSTOLIC BLOOD PRESSURE: 138 MMHG | DIASTOLIC BLOOD PRESSURE: 80 MMHG | WEIGHT: 156 LBS | HEIGHT: 62 IN | BODY MASS INDEX: 28.71 KG/M2 | HEART RATE: 84 BPM

## 2019-01-29 DIAGNOSIS — Z12.31 SCREENING MAMMOGRAM, ENCOUNTER FOR: ICD-10-CM

## 2019-01-29 DIAGNOSIS — Z01.419 WELL FEMALE EXAM WITH ROUTINE GYNECOLOGICAL EXAM: Primary | ICD-10-CM

## 2019-01-29 DIAGNOSIS — Z12.4 SCREENING FOR CERVICAL CANCER: ICD-10-CM

## 2019-01-29 DIAGNOSIS — Z12.31 VISIT FOR SCREENING MAMMOGRAM: ICD-10-CM

## 2019-01-29 PROCEDURE — G0145 SCR C/V CYTO,THINLAYER,RESCR: HCPCS

## 2019-01-29 PROCEDURE — 77067 SCR MAMMO BI INCL CAD: CPT

## 2019-01-29 PROCEDURE — 99396 PREV VISIT EST AGE 40-64: CPT | Performed by: NURSE PRACTITIONER

## 2019-01-30 LAB — COMMENT: NORMAL

## 2019-02-08 ENCOUNTER — OFFICE VISIT (OUTPATIENT)
Dept: FAMILY MEDICINE CLINIC | Age: 54
End: 2019-02-08
Payer: COMMERCIAL

## 2019-02-08 VITALS
OXYGEN SATURATION: 100 % | BODY MASS INDEX: 29.45 KG/M2 | DIASTOLIC BLOOD PRESSURE: 84 MMHG | SYSTOLIC BLOOD PRESSURE: 124 MMHG | WEIGHT: 161 LBS | HEART RATE: 83 BPM

## 2019-02-08 DIAGNOSIS — G89.29 CHRONIC BILATERAL LOW BACK PAIN WITH BILATERAL SCIATICA: Primary | ICD-10-CM

## 2019-02-08 DIAGNOSIS — M54.41 CHRONIC BILATERAL LOW BACK PAIN WITH BILATERAL SCIATICA: Primary | ICD-10-CM

## 2019-02-08 DIAGNOSIS — M54.42 CHRONIC BILATERAL LOW BACK PAIN WITH BILATERAL SCIATICA: Primary | ICD-10-CM

## 2019-02-08 PROCEDURE — 99213 OFFICE O/P EST LOW 20 MIN: CPT | Performed by: FAMILY MEDICINE

## 2019-02-08 RX ORDER — GABAPENTIN 300 MG/1
300 CAPSULE ORAL 3 TIMES DAILY
Qty: 90 CAPSULE | Refills: 5 | Status: SHIPPED | OUTPATIENT
Start: 2019-02-08 | End: 2019-03-01 | Stop reason: ALTCHOICE

## 2019-02-08 RX ORDER — MELOXICAM 15 MG/1
15 TABLET ORAL DAILY
Qty: 90 TABLET | Refills: 3 | Status: SHIPPED | OUTPATIENT
Start: 2019-02-08 | End: 2020-07-20

## 2019-02-08 ASSESSMENT — ENCOUNTER SYMPTOMS
GASTROINTESTINAL NEGATIVE: 1
RESPIRATORY NEGATIVE: 1
BACK PAIN: 1

## 2019-02-08 ASSESSMENT — PATIENT HEALTH QUESTIONNAIRE - PHQ9
SUM OF ALL RESPONSES TO PHQ QUESTIONS 1-9: 0
SUM OF ALL RESPONSES TO PHQ QUESTIONS 1-9: 0
SUM OF ALL RESPONSES TO PHQ9 QUESTIONS 1 & 2: 0
2. FEELING DOWN, DEPRESSED OR HOPELESS: 0
1. LITTLE INTEREST OR PLEASURE IN DOING THINGS: 0

## 2019-02-12 LAB — CYTOLOGY REPORT: NORMAL

## 2019-03-01 ENCOUNTER — TELEPHONE (OUTPATIENT)
Dept: PAIN MANAGEMENT | Age: 54
End: 2019-03-01

## 2019-03-01 ENCOUNTER — OFFICE VISIT (OUTPATIENT)
Dept: PAIN MANAGEMENT | Age: 54
End: 2019-03-01
Payer: COMMERCIAL

## 2019-03-01 VITALS
HEART RATE: 81 BPM | RESPIRATION RATE: 16 BRPM | SYSTOLIC BLOOD PRESSURE: 122 MMHG | WEIGHT: 163 LBS | DIASTOLIC BLOOD PRESSURE: 72 MMHG | HEIGHT: 61 IN | OXYGEN SATURATION: 99 % | BODY MASS INDEX: 30.78 KG/M2

## 2019-03-01 DIAGNOSIS — M54.42 CHRONIC BILATERAL LOW BACK PAIN WITH BILATERAL SCIATICA: ICD-10-CM

## 2019-03-01 DIAGNOSIS — M54.16 LUMBAR RADICULOPATHY: ICD-10-CM

## 2019-03-01 DIAGNOSIS — G89.29 CHRONIC BILATERAL LOW BACK PAIN WITH BILATERAL SCIATICA: ICD-10-CM

## 2019-03-01 DIAGNOSIS — M54.42 CHRONIC BILATERAL LOW BACK PAIN WITH BILATERAL SCIATICA: Primary | ICD-10-CM

## 2019-03-01 DIAGNOSIS — M96.1 POST LAMINECTOMY SYNDROME: ICD-10-CM

## 2019-03-01 DIAGNOSIS — M54.41 CHRONIC BILATERAL LOW BACK PAIN WITH BILATERAL SCIATICA: Primary | ICD-10-CM

## 2019-03-01 DIAGNOSIS — G89.29 CHRONIC BILATERAL LOW BACK PAIN WITH BILATERAL SCIATICA: Primary | ICD-10-CM

## 2019-03-01 DIAGNOSIS — M54.41 CHRONIC BILATERAL LOW BACK PAIN WITH BILATERAL SCIATICA: ICD-10-CM

## 2019-03-01 PROCEDURE — 99213 OFFICE O/P EST LOW 20 MIN: CPT | Performed by: NURSE PRACTITIONER

## 2019-03-01 RX ORDER — PREGABALIN 50 MG/1
50 CAPSULE ORAL 2 TIMES DAILY
Qty: 60 CAPSULE | Refills: 3 | Status: SHIPPED | OUTPATIENT
Start: 2019-03-01 | End: 2019-04-02

## 2019-03-01 RX ORDER — PREGABALIN 50 MG/1
50 CAPSULE ORAL 2 TIMES DAILY
Qty: 60 CAPSULE | Refills: 3 | Status: CANCELLED | OUTPATIENT
Start: 2019-03-01 | End: 2019-03-31

## 2019-03-01 ASSESSMENT — ENCOUNTER SYMPTOMS
RESPIRATORY NEGATIVE: 1
BACK PAIN: 1

## 2019-03-13 ENCOUNTER — PROCEDURE VISIT (OUTPATIENT)
Dept: PAIN MANAGEMENT | Age: 54
End: 2019-03-13
Payer: COMMERCIAL

## 2019-03-13 DIAGNOSIS — G89.29 CHRONIC BILATERAL LOW BACK PAIN WITH BILATERAL SCIATICA: ICD-10-CM

## 2019-03-13 DIAGNOSIS — M54.41 CHRONIC BILATERAL LOW BACK PAIN WITH BILATERAL SCIATICA: ICD-10-CM

## 2019-03-13 DIAGNOSIS — M54.42 CHRONIC BILATERAL LOW BACK PAIN WITH BILATERAL SCIATICA: ICD-10-CM

## 2019-03-13 PROCEDURE — 64483 NJX AA&/STRD TFRM EPI L/S 1: CPT | Performed by: PHYSICAL MEDICINE & REHABILITATION

## 2019-04-02 ENCOUNTER — TELEPHONE (OUTPATIENT)
Dept: PAIN MANAGEMENT | Age: 54
End: 2019-04-02

## 2019-04-02 ENCOUNTER — OFFICE VISIT (OUTPATIENT)
Dept: PAIN MANAGEMENT | Age: 54
End: 2019-04-02
Payer: COMMERCIAL

## 2019-04-02 VITALS
SYSTOLIC BLOOD PRESSURE: 136 MMHG | DIASTOLIC BLOOD PRESSURE: 74 MMHG | WEIGHT: 159 LBS | HEART RATE: 76 BPM | HEIGHT: 62 IN | BODY MASS INDEX: 29.26 KG/M2

## 2019-04-02 DIAGNOSIS — M96.1 POST LAMINECTOMY SYNDROME: ICD-10-CM

## 2019-04-02 DIAGNOSIS — G57.01 PIRIFORMIS SYNDROME OF RIGHT SIDE: ICD-10-CM

## 2019-04-02 DIAGNOSIS — M54.42 CHRONIC BILATERAL LOW BACK PAIN WITH BILATERAL SCIATICA: ICD-10-CM

## 2019-04-02 DIAGNOSIS — M54.41 CHRONIC BILATERAL LOW BACK PAIN WITH BILATERAL SCIATICA: ICD-10-CM

## 2019-04-02 DIAGNOSIS — G89.29 CHRONIC BILATERAL LOW BACK PAIN WITH BILATERAL SCIATICA: ICD-10-CM

## 2019-04-02 DIAGNOSIS — M53.3 PAIN OF RIGHT SACROILIAC JOINT: Primary | ICD-10-CM

## 2019-04-02 PROCEDURE — 99214 OFFICE O/P EST MOD 30 MIN: CPT | Performed by: NURSE PRACTITIONER

## 2019-04-02 ASSESSMENT — ENCOUNTER SYMPTOMS
RESPIRATORY NEGATIVE: 1
BACK PAIN: 1

## 2019-04-02 NOTE — PROGRESS NOTES
Subjective:      Patient ID: Marina Block is a 48 y.o. female. Chief Complaint   Patient presents with    Back Pain     follow up post TFE in [place]. The patient stated that she recieved relief from injection     HPI Follow up after bilateral L5 TFE, burning radicular pain improved afterwards. Did not start Lyrica due to cost, remains on gabapentin. Now complaining right hip pain, questioning chiropractic care. Pain Assessment  Location of Pain: Back  Location Modifiers: Right(low back and right hip pain)  Severity of Pain: 3  Quality of Pain: Sharp, Aching  Duration of Pain: Persistent  Frequency of Pain: Constant  Aggravating Factors: (sitting)  Limiting Behavior: No  Relieving Factors: Rest(mobic)  Are there other pain locations you wish to document?: No    Allergies   Allergen Reactions    Ace Inhibitors Other (See Comments)     Cough      Pcn [Penicillins] Rash       Outpatient Medications Marked as Taking for the 4/2/19 encounter (Office Visit) with AARON Hernandes CNP   Medication Sig Dispense Refill    meloxicam (MOBIC) 15 MG tablet Take 1 tablet by mouth daily 90 tablet 3    ranitidine (ZANTAC) 150 MG tablet Take 1 tablet by mouth 2 times daily 180 tablet 0    acetaminophen (TYLENOL) 500 MG tablet Take 500 mg by mouth 2 times daily      clobetasol (TEMOVATE) 0.05 % cream Apply topically 2 times daily to affected area. 60 g 1    levothyroxine (SYNTHROID) 25 MCG tablet TAKE 1 TABLET DAILY 90 tablet 3    clotrimazole-betamethasone (LOTRISONE) 1-0.05 % cream Apply a thin film to the affected area 2 times daily. 45 g 0    insulin lispro (HUMALOG) 100 UNIT/ML injection vial Use 60 units per day in pump 1 vial 3    losartan-hydrochlorothiazide (HYZAAR) 50-12.5 MG per tablet       Vitamin D (CHOLECALCIFEROL) 1000 UNITS CAPS capsule Take 2,000 Units by mouth daily       aspirin 81 MG tablet Take 81 mg by mouth daily.       Insulin Lispro, Human, (INSULIN PUMP) FLORENTIN Inject 1 each into 1.8 oz     Types: 3 Standard drinks or equivalent per week     Comment: Socially.  Drug use: No    Sexual activity: Yes     Partners: Male   Lifestyle    Physical activity:     Days per week: None     Minutes per session: None    Stress: None   Relationships    Social connections:     Talks on phone: None     Gets together: None     Attends Yarsanism service: None     Active member of club or organization: None     Attends meetings of clubs or organizations: None     Relationship status: None    Intimate partner violence:     Fear of current or ex partner: None     Emotionally abused: None     Physically abused: None     Forced sexual activity: None   Other Topics Concern    None   Social History Narrative    None     Review of Systems   Constitutional: Positive for activity change. Respiratory: Negative. Cardiovascular: Negative. Genitourinary: Negative. Musculoskeletal: Positive for arthralgias, back pain and myalgias. Skin: Negative. Neurological:        Radiculopathy   Psychiatric/Behavioral: Positive for sleep disturbance. Objective:   Physical Exam   Constitutional: She is oriented to person, place, and time. Vital signs are normal. She appears well-developed and well-nourished. She is active and cooperative. Non-toxic appearance. She does not have a sickly appearance. She does not appear ill. No distress. She is not intubated. HENT:   Head: Normocephalic and atraumatic. Cardiovascular: Normal rate. Pulmonary/Chest: Effort normal. No accessory muscle usage. No apnea, no tachypnea and no bradypnea. She is not intubated. No respiratory distress. Musculoskeletal:        Lumbar back: She exhibits bony tenderness (right PSIS). Neurological: She is alert and oriented to person, place, and time. No cranial nerve deficit. GCS eye subscore is 4. GCS verbal subscore is 5. GCS motor subscore is 6. Skin: Skin is warm, dry and intact. Capillary refill takes less than 2 seconds. She is not diaphoretic. No cyanosis. No pallor. Nails show no clubbing. Psychiatric: She has a normal mood and affect. Her speech is normal.   Vitals reviewed. Assessment:      1. Pain of right sacroiliac joint    2. Piriformis syndrome of right side    3. Post laminectomy syndrome    4. Chronic bilateral low back pain with bilateral sciatica          Plan:    Schedule right piriformis/sacroiliac joint injection. Ok for chiropractic care    Informed consent has not been obtained for procedure. Loretta Benitez on blood thinners. Medications to hold have been reviewed with patient. Blood thinners must be held with permission from your cardiologist or primary care physician. A letter is not required to besent to PCP/Cardiologist regarding holding medications for procedure to decrease bleeding risk.            AARON Anders - CNP

## 2019-04-12 NOTE — TELEPHONE ENCOUNTER
Fax came through from [facility] and the procedure got denied because the criteria for injection does not meet the requirements, how would you like to proceed     Scanned into media tab

## 2019-05-09 ENCOUNTER — OFFICE VISIT (OUTPATIENT)
Dept: PAIN MANAGEMENT | Age: 54
End: 2019-05-09
Payer: COMMERCIAL

## 2019-05-09 ENCOUNTER — TELEPHONE (OUTPATIENT)
Dept: PAIN MANAGEMENT | Age: 54
End: 2019-05-09

## 2019-05-09 VITALS
DIASTOLIC BLOOD PRESSURE: 90 MMHG | BODY MASS INDEX: 30.02 KG/M2 | SYSTOLIC BLOOD PRESSURE: 160 MMHG | RESPIRATION RATE: 14 BRPM | WEIGHT: 159 LBS | HEIGHT: 61 IN

## 2019-05-09 DIAGNOSIS — M53.3 CHRONIC RIGHT SACROILIAC JOINT PAIN: Primary | ICD-10-CM

## 2019-05-09 DIAGNOSIS — G89.29 CHRONIC RIGHT SACROILIAC JOINT PAIN: Primary | ICD-10-CM

## 2019-05-09 DIAGNOSIS — G57.01 PIRIFORMIS SYNDROME OF RIGHT SIDE: ICD-10-CM

## 2019-05-09 PROCEDURE — 99214 OFFICE O/P EST MOD 30 MIN: CPT | Performed by: NURSE PRACTITIONER

## 2019-05-09 RX ORDER — CYCLOBENZAPRINE HCL 10 MG
10 TABLET ORAL 2 TIMES DAILY PRN
Qty: 20 TABLET | Refills: 2 | Status: SHIPPED | OUTPATIENT
Start: 2019-05-09 | End: 2019-05-19

## 2019-05-09 RX ORDER — GABAPENTIN 300 MG/1
300 CAPSULE ORAL DAILY
Refills: 0 | COMMUNITY
Start: 2019-05-07 | End: 2019-06-25

## 2019-05-09 ASSESSMENT — ENCOUNTER SYMPTOMS
RESPIRATORY NEGATIVE: 1
BACK PAIN: 1

## 2019-05-09 NOTE — PATIENT INSTRUCTIONS
Pain Management Plan:   [facility] [phone]      Pain clinic phone numbers  Refills  - Call [phone]. Please leave your name, a working phone number, date of birth, the name, dose, quantity, and last refill date of the prescription, and the pharmacy. Medication or Procedure Questions - Call [phone]. This is the direct line to the [facility] Pain Management Nurses. Appointment or General Questions - Call  [phone]. This is the direct line the  [address] [place] [address] can also use MyChart. Is your MyChart Activated? How to dispose of unused pain medications safely:  · Flush all unused pain medications. This is the FDA's recommended way of disposing these medications. · All other medications can be disposed in the trash mixed in undesirable contents (used coffee grounds, used [employer], etc). [facility]  [address] [place] [address] Rd, [place]    PROCEDURE INSTRUCTIONS FOR PAIN MANAGEMENT PROCEDURES    You are scheduled to see Dr. Zarina Chowdhury to undergo the following procedure: Right Sacroliac and Piriformis Injection       Procedure Date: __5/29/19______  Arrival Time: You will receive a call from [facility] to inform you of your procedure time    Enter the facility through the ER doors and take the elevator to the 2nd floor and check in at same day surgery. 1. Stop the following medications prior to the procedure:   None take as normal   Stop blood thinners as directed before the injection, with permission from your cardiologist or primary care physician. We will send a letter to them requesting permission to hold the blood thinners. 2.  Take all routine medications unless otherwise instructed. Ok to take vitamins and antiinflammatory medications    3. EATING & DRINKING:  YOUR PROCEDURE MAY REQUIRE ANESTHESIA, NO FOOD OR LIQUIDS FOR 8 HOURS PRIOR TO THE PROCEDURE    4.  If you are allergic

## 2019-05-09 NOTE — TELEPHONE ENCOUNTER
Patient was seen in the office today with Lynda Hurd.   Pateint would like injections to be performed in [facility] was submitted to [employer] [facility]

## 2019-05-09 NOTE — PROGRESS NOTES
Subjective:      Patient ID: Viridiana Bonilla is a 48 y.o. female. Chief Complaint   Patient presents with    Lower Back Pain     all the way across, getting worse; not sure if it is joint pain or nerve pain    Hip Pain     right side still hurting    Medication Management     wondering if the Lyrica would help any better, it was a cost issue though at $75.00 per copay, however she has never used a copay card before is willing to try that    Other     patient has stopped taking her blood pressure med (was only using it for preventative for DM) patient will keep an eye on it at home with her cuff and call PCP     HPI   Here today for routine pain clinic recheck. Burning pain down legs still improved post transforaminal. Continues to complain pain across low back, by the end of the day, can barely walk. Sits, stands all day long    Mobic providing joint pain relief, not pain relief in her back. Pain Assessment  Location of Pain: (hip)  Location Modifiers: Right  Severity of Pain: 3  Quality of Pain: Sharp  Duration of Pain: Persistent  Frequency of Pain: Constant(feels better in the morning if she hasn't been laying on jamie)  Aggravating Factors: Bending, Stretching, Straightening, Exercise, Kneeling, Squatting, Standing, Walking, Stairs(weather)  Limiting Behavior: Yes  Relieving Factors: Rest  Result of Injury: No  Work-Related Injury: No  Are there other pain locations you wish to document?: No    Allergies   Allergen Reactions    Ace Inhibitors Other (See Comments)     Cough      Pcn [Penicillins] Rash       Outpatient Medications Marked as Taking for the 5/9/19 encounter (Office Visit) with Shanda No, APRN - CNP   Medication Sig Dispense Refill    gabapentin (NEURONTIN) 300 MG capsule Take 300 mg by mouth daily.   0    cyclobenzaprine (FLEXERIL) 10 MG tablet Take 1 tablet by mouth 2 times daily as needed for Muscle spasms 20 tablet 2    meloxicam (MOBIC) 15 MG tablet Take 1 tablet by mouth daily 90 tablet 3    ranitidine (ZANTAC) 150 MG tablet Take 1 tablet by mouth 2 times daily 180 tablet 0    acetaminophen (TYLENOL) 500 MG tablet Take 500 mg by mouth 2 times daily      clobetasol (TEMOVATE) 0.05 % cream Apply topically 2 times daily to affected area. 60 g 1    levothyroxine (SYNTHROID) 25 MCG tablet TAKE 1 TABLET DAILY 90 tablet 3    clotrimazole-betamethasone (LOTRISONE) 1-0.05 % cream Apply a thin film to the affected area 2 times daily. 45 g 0    insulin lispro (HUMALOG) 100 UNIT/ML injection vial Use 60 units per day in pump 1 vial 3    losartan-hydrochlorothiazide (HYZAAR) 50-12.5 MG per tablet       Vitamin D (CHOLECALCIFEROL) 1000 UNITS CAPS capsule Take 2,000 Units by mouth daily       aspirin 81 MG tablet Take 81 mg by mouth daily.  Insulin Lispro, Human, (INSULIN PUMP) FLORENTIN Inject 1 each into the skin See Admin Instructions.          Past Medical History:   Diagnosis Date    Bronchitis     Diabetes type 1, controlled ([address]     Eczema     GERD (gastroesophageal reflux disease)     Gestational diabetes     Hyperlipidemia     Hypothyroid     Insomnia     Myopia with astigmatism and presbyopia     Tobacco abuse        Past Surgical History:   Procedure Laterality Date    BACK SURGERY  1/05/15    Lumbar microdiscectomy L5-S1    BLADDER SUSPENSION  9/22/09    (transobturator sling)    CERVIX LESION DESTRUCTION  1993    (laser cone)    COLONOSCOPY  10/17/2018    hyperplastic polyp, Dr. Barbara Pretty at [facility]  2006   [address]  09/06/2017    Dr Daniella Cha  02/16/2017    laser nerve block Dr Jing Gallegos- [address]  03/09/2017    laser nerve block Dr Jocelyne Lopez Bilateral 03/13/2019    BL5 transforaminal radiculogram- [facility] per Dr Matthew Going  7/6/15    Lumbar L5-S1       Family History   Problem Relation Age of Onset    Diabetes Paternal Grandmother     Cancer Father         lymph Constitutional: She is oriented to person, place, and time. Vital signs are normal. She appears well-developed and well-nourished. She is active and cooperative. Non-toxic appearance. She does not have a sickly appearance. She does not appear ill. No distress. She is not intubated. HENT:   Head: Normocephalic and atraumatic. Cardiovascular: Normal rate. Pulmonary/Chest: Effort normal. No accessory muscle usage. No apnea, no tachypnea and no bradypnea. She is not intubated. No respiratory distress. Musculoskeletal:        Lumbar back: She exhibits bony tenderness (right PSIS). Positive distraction, Fabers, compression and thigh thrust test   Neurological: She is alert and oriented to person, place, and time. No cranial nerve deficit. GCS eye subscore is 4. GCS verbal subscore is 5. GCS motor subscore is 6. Skin: Skin is warm, dry and intact. Capillary refill takes less than 2 seconds. She is not diaphoretic. No cyanosis. No pallor. Nails show no clubbing. Psychiatric: She has a normal mood and affect. Her speech is normal.   Vitals reviewed. Assessment:      1. Chronic right sacroiliac joint pain    2. Piriformis syndrome of right side          Plan:    Schedule right piriformis/sacroiliac joint injection. Positive for 4 sacroiliac joint dysfunction tests. Pain has been present for 3 months and failed at least 6 weeks of treatment, pain is limiting activities as she can barely walk by the end of the day. Informed consent has not been obtained for procedure. Loretta Benitez on blood thinners. Medications to hold have been reviewed with patient. Blood thinners must be held with permission from your cardiologist or primary care physician. A letter is not required to besent to PCP/Cardiologist regarding holding medications for procedure to decrease bleeding risk.            Cheryl Breen, APRN - CNP

## 2019-05-13 RX ORDER — LEVOTHYROXINE SODIUM 0.03 MG/1
TABLET ORAL
Qty: 90 TABLET | Refills: 3 | Status: SHIPPED | OUTPATIENT
Start: 2019-05-13 | End: 2020-05-18 | Stop reason: SDUPTHER

## 2019-05-15 NOTE — TELEPHONE ENCOUNTER
The patients SIJ and piriformis injection was denied by [employer]. The representative who called stated that the patient does not meet medical necessity. She states that she will be faxing over a copy of the denial and that the provider can call within 10 days to speak with the reviewer at #[phone].

## 2019-05-20 NOTE — TELEPHONE ENCOUNTER
Fax came through from [address] [facility], scanned into media tab

## 2019-05-20 NOTE — TELEPHONE ENCOUNTER
Will you be doing an appeal for the patients injections or should the patient be notified that she will not be able to have them done per insurance.

## 2019-05-21 NOTE — TELEPHONE ENCOUNTER
Approved #[id number] 5/29-6/11 at [facility].  In the future, please note spine procedures are done under fluoroscopy, not ultrasound

## 2019-05-21 NOTE — TELEPHONE ENCOUNTER
Writer called to inform the nurse reviewer of the fluoroscopy. She states that the case was closed and a peer to peer with a physician will need to be done or an entire new precert will need submitted. She also stated that is a new precert is submitted informaiton about other provocative tests will need to be submitted including tests related to spine compression and documentation of the left SIJ as it was mentioned in the last office note. Please advise if will to do a peer to peer to speak with a physician.

## 2019-05-29 ENCOUNTER — PROCEDURE VISIT (OUTPATIENT)
Dept: PAIN MANAGEMENT | Age: 54
End: 2019-05-29
Payer: COMMERCIAL

## 2019-05-29 DIAGNOSIS — M53.3 CHRONIC RIGHT SACROILIAC JOINT PAIN: ICD-10-CM

## 2019-05-29 DIAGNOSIS — G89.29 CHRONIC RIGHT SACROILIAC JOINT PAIN: ICD-10-CM

## 2019-05-29 DIAGNOSIS — G57.01 PIRIFORMIS SYNDROME OF RIGHT SIDE: ICD-10-CM

## 2019-05-29 PROCEDURE — 20552 NJX 1/MLT TRIGGER POINT 1/2: CPT | Performed by: PHYSICAL MEDICINE & REHABILITATION

## 2019-05-29 PROCEDURE — 27093 INJECTION FOR HIP X-RAY: CPT | Performed by: PHYSICAL MEDICINE & REHABILITATION

## 2019-05-29 PROCEDURE — 77002 NEEDLE LOCALIZATION BY XRAY: CPT | Performed by: PHYSICAL MEDICINE & REHABILITATION

## 2019-05-29 NOTE — PROGRESS NOTES
SACROILIAC JOINT INJECTION    5/29/19  dd  Surgeon: Reshma Mccall MD    Pre-operative Diagnosis:   Patient Active Problem List   Diagnosis Code    Piriformis syndrome of right side G57.01    Chronic bilateral low back pain with bilateral sciatica M54.42, M54.41, G89.29    Displacement of lumbar intervertebral disc without myelopathy M51.26    Myopia of both eyes with astigmatism and presbyopia H52.13, H52.203, H52.4    Essential hypertension I10    Hyperlipidemia E78.5    Type 1 diabetes mellitus without complication ([facility] [address] X44.0    Acquired hypothyroidism E03.9    Vitamin D deficiency E55.9    Menopause Z78.0    Lumbar radiculopathy M54.16    Post laminectomy syndrome M96.1    Chronic right sacroiliac joint pain M53.3, G89.29       Post-operative Diagnosis: Same    Assistants: none    INDICATION::Please see H&P for details on previous treatments, examination findings, and work up. Right  sacroiliac joint injection with arthrography is requested for diagnostic reasons. Conservative treatment was ineffective i.e.: ice, NSAIDS, rest, narcotic medication, chiropractic care, physical therapy and message therapy. Patient is unable to perform the following ADL's: ambulating and grooming                         Last Plain films: 2018    EXAMINATION: Right sacroiliac joint injection with arthrography. CONSENT:  Written consent was obtained from the patient on preprinted consent form after explaining the procedure, indications, potential complications and outcomes. Alternative treatments were also discussed. DISCUSSION:  The patient was sterilely prepped and draped in the usual fashion in the prone position.  Time out was verified for correct patient, side, level and procedure.     SEDATION:   No conscious sedation was performed during the procedure.  The patient remained awake and conversed throughout the procedure.  The patient underwent pulse oximetry and blood pressure monitoring independently

## 2019-06-25 ENCOUNTER — OFFICE VISIT (OUTPATIENT)
Dept: FAMILY MEDICINE CLINIC | Age: 54
End: 2019-06-25
Payer: COMMERCIAL

## 2019-06-25 VITALS
WEIGHT: 159 LBS | SYSTOLIC BLOOD PRESSURE: 136 MMHG | DIASTOLIC BLOOD PRESSURE: 84 MMHG | HEIGHT: 63 IN | BODY MASS INDEX: 28.17 KG/M2 | HEART RATE: 80 BPM | OXYGEN SATURATION: 98 %

## 2019-06-25 DIAGNOSIS — Z00.00 WELLNESS EXAMINATION: Primary | ICD-10-CM

## 2019-06-25 PROCEDURE — 99396 PREV VISIT EST AGE 40-64: CPT | Performed by: FAMILY MEDICINE

## 2019-06-25 RX ORDER — TRIAMCINOLONE ACETONIDE 5 MG/G
CREAM TOPICAL
Qty: 15 G | Refills: 5 | Status: SHIPPED | OUTPATIENT
Start: 2019-06-25 | End: 2020-03-03 | Stop reason: ALTCHOICE

## 2019-06-25 RX ORDER — CYCLOBENZAPRINE HCL 10 MG
10 TABLET ORAL 2 TIMES DAILY PRN
Refills: 0 | COMMUNITY
Start: 2019-06-13 | End: 2019-07-24 | Stop reason: SDUPTHER

## 2019-06-25 NOTE — PATIENT INSTRUCTIONS
gone.  ? Avoid chewing gum that contains sorbitol. ? Avoid dairy products (except for yogurt with Lactobacillus) while you have diarrhea and for 3 days after symptoms are gone. · The doctor may recommend that you take over-the-counter medicine, such as loperamide (Imodium), if you still have diarrhea after 6 hours. Read and follow all instructions on the label. Do not use this medicine if you have bloody diarrhea, a high fever, or other signs of serious illness. Call your doctor if you think you are having a problem with your medicine. When should you call for help? Call 911 anytime you think you may need emergency care. For example, call if:    · You passed out (lost consciousness).     · Your stools are maroon or very bloody.    Call your doctor now or seek immediate medical care if:    · You are dizzy or lightheaded, or you feel like you may faint.     · Your stools are black and look like tar, or they have streaks of blood.     · You have new or worse belly pain.     · You have symptoms of dehydration, such as:  ? Dry eyes and a dry mouth. ? Passing only a little dark urine. ? Feeling thirstier than usual.     · You have a new or higher fever.    Watch closely for changes in your health, and be sure to contact your doctor if:    · Your diarrhea is getting worse.     · You see pus in the diarrhea.     · You are not getting better after 2 days (48 hours). Where can you learn more? Go to [email or web address] org and sign in to your [employer] account. Enter Z338 in the [employer] box to learn more about \"Diarrhea: Care Instructions. \"     If you do not have an account, please click on the \"Sign Up Now\" link. Current as of: September 23, 2018  Content Version: 12.0  © 6295-6884 [employer]. Care instructions adapted under license by [facility] ([facility]).  If you have questions about a medical condition or this instruction, always ask your healthcare professional. Brandt العراقي

## 2019-06-25 NOTE — PROGRESS NOTES
History and Physical      Loretta Moy  YOB: 1965    Date of Service:  6/25/2019    Chief Complaint:   Anna Alvarez is a 48 y.o. female who presents for complete physical examination. HPI: has been seeing cecilia who is managing her diabetes.   She has not tolerated statins in the past  Her sugars have been in good in control      Wt Readings from Last 3 Encounters:   06/25/19 159 lb (72.1 kg)   05/09/19 159 lb (72.1 kg)   04/02/19 159 lb (72.1 kg)     BP Readings from Last 3 Encounters:   06/25/19 136/84   05/09/19 (!) 160/90   04/02/19 136/74       Patient Active Problem List   Diagnosis    Piriformis syndrome of right side    Chronic bilateral low back pain with bilateral sciatica    Displacement of lumbar intervertebral disc without myelopathy    Myopia of both eyes with astigmatism and presbyopia    Essential hypertension    Hyperlipidemia    Type 1 diabetes mellitus without complication ([address]    Acquired hypothyroidism    Vitamin D deficiency    Menopause    Lumbar radiculopathy    Post laminectomy syndrome    Chronic right sacroiliac joint pain       Preventive Care:  Health Maintenance   Topic Date Due    Hepatitis B Vaccine (1 of 3 - Risk 3-dose series) 06/22/1986    Diabetic microalbuminuria test  02/16/2017    Shingles Vaccine (1 of 2) 06/22/2017    Diabetic foot exam  06/21/2019    A1C test (Diabetic or Prediabetic)  06/21/2019    TSH testing  06/21/2019    Potassium monitoring  06/21/2019    Creatinine monitoring  06/21/2019    Lipid screen  06/21/2019    Diabetic retinal exam  07/05/2019    Breast cancer screen  01/29/2021    Cervical cancer screen  01/29/2022    Colon cancer screen colonoscopy  10/17/2023    DTaP/Tdap/Td vaccine (2 - Td) 11/07/2026    Flu vaccine  Completed    Pneumococcal 0-64 years Vaccine  Completed    HIV screen  Addressed      Hx abnormal PAP:   Sexual activity: has sex with males   Self-breast exams: yes  Previous DEXA scan: no  Last eye exam: 2018, normal  Exercise: daily walks   Seatbelt use: yes  Lipid panel:   Lab Results   Component Value Date    CHOL 234 (H) 06/14/2016    TRIG 72 06/14/2016     06/14/2016        Living will:  no,   additional information provided has paperwork and reviewed     Immunization History   Administered Date(s) Administered    Influenza Virus Vaccine 09/21/2011, 10/06/2014, 12/29/2015, 09/23/2017, 11/04/2018    Influenza, Denise Poon, 3 yrs and older, IM, PF (Fluzone 3 yrs and older or Afluria 5 yrs and older) 11/07/2016    Pneumococcal Polysaccharide ([id number]) 09/23/2017, 11/04/2018    Td, unspecified formulation 01/17/2008    Tdap (Boostrix, Adacel) 11/07/2016       Allergies   Allergen Reactions    Ace Inhibitors Other (See Comments)     Cough      Pcn [Penicillins] Rash     Outpatient Medications Marked as Taking for the 6/25/19 encounter (Office Visit) with Hair Casey MD   Medication Sig Dispense Refill    cyclobenzaprine (FLEXERIL) 10 MG tablet Take 10 mg by mouth 2 times daily as needed  0    levothyroxine (SYNTHROID) 25 MCG tablet TAKE 1 TABLET DAILY 90 tablet 3    meloxicam (MOBIC) 15 MG tablet Take 1 tablet by mouth daily 90 tablet 3    ranitidine (ZANTAC) 150 MG tablet Take 1 tablet by mouth 2 times daily 180 tablet 0    acetaminophen (TYLENOL) 500 MG tablet Take 500 mg by mouth 2 times daily as needed       clobetasol (TEMOVATE) 0.05 % cream Apply topically 2 times daily to affected area. 60 g 1    clotrimazole-betamethasone (LOTRISONE) 1-0.05 % cream Apply a thin film to the affected area 2 times daily. 45 g 0    insulin lispro (HUMALOG) 100 UNIT/ML injection vial Use 60 units per day in pump 1 vial 3    losartan-hydrochlorothiazide (HYZAAR) 50-12.5 MG per tablet       Vitamin D (CHOLECALCIFEROL) 1000 UNITS CAPS capsule Take 4,000 Units by mouth daily       aspirin 81 MG tablet Take 81 mg by mouth daily.       Insulin Lispro, Human, (INSULIN PUMP) FLORENTIN Inject 1 each JVD present. Carotid bruit is not present. No mass and no thyromegaly present. Cardiovascular: Normal rate, regular rhythm, normal heart sounds and intact distal pulses. Exam reveals no gallop and no friction rub. No murmur heard. Pulmonary/Chest: Effort normal and breath sounds normal. No respiratory distress. She has no wheezes, rhonchi or rales. Abdominal: Soft, non-tender. Bowel sounds and aorta are normal. She exhibits no organomegaly, mass or bruit. Genitourinary: per gyn. Breast exam:  not examined. Musculoskeletal: Normal range of motion, no synovitis. She exhibits no edema. Neurological: She is alert and oriented to person, place, and time. She has normal reflexes. No cranial nerve deficit. Coordination normal.   Skin: Skin is warm and dry. Has poison ivy on her face. No suspicious lesions noted. Psychiatric: She has a normal mood and affect.  Her speech is normal and behavior is normal. Judgment, cognition and memory are normal.     Assessment/Plan:    Patient Active Problem List   Diagnosis    Piriformis syndrome of right side    Chronic bilateral low back pain with bilateral sciatica    Displacement of lumbar intervertebral disc without myelopathy    Myopia of both eyes with astigmatism and presbyopia    Essential hypertension    Hyperlipidemia    Type 1 diabetes mellitus without complication ([address]    Acquired hypothyroidism    Vitamin D deficiency    Menopause    Lumbar radiculopathy    Post laminectomy syndrome    Chronic right sacroiliac joint pain   poison ivy  Loose stools    Will await labs from cecilia who does foot exam   Has eye exam scheduled  Informed of shingrix   Reviewed advanced directives  Steroid cream for rash  Healthy diet and fitness  Diet changes and bulking agent for stools and see if beneficial if not improving let me know   As long as well follow up 1 year

## 2019-07-08 ENCOUNTER — OFFICE VISIT (OUTPATIENT)
Dept: OPTOMETRY | Age: 54
End: 2019-07-08
Payer: COMMERCIAL

## 2019-07-08 DIAGNOSIS — H52.13 MYOPIA OF BOTH EYES WITH ASTIGMATISM AND PRESBYOPIA: Primary | ICD-10-CM

## 2019-07-08 DIAGNOSIS — H52.4 MYOPIA OF BOTH EYES WITH ASTIGMATISM AND PRESBYOPIA: Primary | ICD-10-CM

## 2019-07-08 DIAGNOSIS — H52.203 MYOPIA OF BOTH EYES WITH ASTIGMATISM AND PRESBYOPIA: Primary | ICD-10-CM

## 2019-07-08 PROCEDURE — 92014 COMPRE OPH EXAM EST PT 1/>: CPT | Performed by: OPTOMETRIST

## 2019-07-08 RX ORDER — PHENYLEPHRINE HCL 2.5 %
1 DROPS OPHTHALMIC (EYE) ONCE
Status: COMPLETED | OUTPATIENT
Start: 2019-07-08 | End: 2019-07-08

## 2019-07-08 RX ORDER — TROPICAMIDE 10 MG/ML
1 SOLUTION/ DROPS OPHTHALMIC ONCE
Status: COMPLETED | OUTPATIENT
Start: 2019-07-08 | End: 2019-07-08

## 2019-07-08 RX ADMIN — TROPICAMIDE 1 DROP: 10 SOLUTION/ DROPS OPHTHALMIC at 10:13

## 2019-07-08 RX ADMIN — Medication 1 DROP: at 10:13

## 2019-07-08 ASSESSMENT — KERATOMETRY
OD_AXISANGLE_DEGREES: 084
OS_AXISANGLE2_DEGREES: 168
OS_K2POWER_DIOPTERS: 46.00
OD_K2POWER_DIOPTERS: 46.25
OS_K1POWER_DIOPTERS: 45.2
OD_AXISANGLE2_DEGREES: 174
OS_AXISANGLE_DEGREES: 078
OD_K1POWER_DIOPTERS: 45.25

## 2019-07-08 ASSESSMENT — TONOMETRY
IOP_METHOD: NON-CONTACT AIR PUFF
OD_IOP_MMHG: 22
OS_IOP_MMHG: 19

## 2019-07-08 ASSESSMENT — SLIT LAMP EXAM - LIDS
COMMENTS: NORMAL
COMMENTS: NORMAL

## 2019-07-08 ASSESSMENT — REFRACTION_MANIFEST
OD_AXIS: 090
OS_CYLINDER: -0.50
OS_ADD: +2.00
OD_ADD: +2.00
OD_CYLINDER: -0.50
OS_SPHERE: -1.25
OS_AXIS: 135
OD_SPHERE: -1.25

## 2019-07-08 ASSESSMENT — REFRACTION_WEARINGRX
OS_CYLINDER: -0.50
OS_SPHERE: -1.75
OD_AXIS: 090
OD_CYLINDER: -0.50
OD_ADD: +2.00
OS_AXIS: 135
OS_ADD: +2.00
SPECS_TYPE: PAL
OD_SPHERE: -1.50

## 2019-07-08 ASSESSMENT — REFRACTION_CURRENTRX
OD_BASECURVE: 8.4
OD_SPHERE: -2.00
OD_BRAND: VISTAKON: ACUVUE OASYS

## 2019-07-08 ASSESSMENT — VISUAL ACUITY
OS_CC: 20/20
METHOD: SNELLEN - LINEAR
OD_CC: 20/20 OU
CORRECTION_TYPE: GLASSES

## 2019-07-22 ENCOUNTER — OFFICE VISIT (OUTPATIENT)
Dept: OPTOMETRY | Age: 54
End: 2019-07-22

## 2019-07-22 DIAGNOSIS — H52.4 MYOPIA OF BOTH EYES WITH ASTIGMATISM AND PRESBYOPIA: Primary | ICD-10-CM

## 2019-07-22 DIAGNOSIS — H52.203 MYOPIA OF BOTH EYES WITH ASTIGMATISM AND PRESBYOPIA: Primary | ICD-10-CM

## 2019-07-22 DIAGNOSIS — H52.13 MYOPIA OF BOTH EYES WITH ASTIGMATISM AND PRESBYOPIA: Primary | ICD-10-CM

## 2019-07-22 PROCEDURE — 99999 PR OFFICE/OUTPT VISIT,PROCEDURE ONLY: CPT | Performed by: OPTOMETRIST

## 2019-07-22 ASSESSMENT — REFRACTION_WEARINGRX
SPECS_TYPE: PAL
OS_SPHERE: -1.25
OS_AXIS: 135
OS_ADD: +2.00
OD_SPHERE: -1.25
OD_CYLINDER: -0.50
OS_CYLINDER: -0.50
OD_AXIS: 090
OD_ADD: +2.00

## 2019-07-22 ASSESSMENT — VISUAL ACUITY
CORRECTION_TYPE: CONTACTS
OD_CC: 20/25 OU
METHOD: SNELLEN - LINEAR

## 2019-07-22 ASSESSMENT — REFRACTION_CURRENTRX
OD_SPHERE: -1.25
OS_SPHERE: -1.00
OD_ADDL_SPECS: MED
OS_ADDL_SPECS: MED

## 2019-07-24 ENCOUNTER — OFFICE VISIT (OUTPATIENT)
Dept: PAIN MANAGEMENT | Age: 54
End: 2019-07-24
Payer: COMMERCIAL

## 2019-07-24 ENCOUNTER — TELEPHONE (OUTPATIENT)
Dept: PAIN MANAGEMENT | Age: 54
End: 2019-07-24

## 2019-07-24 VITALS
WEIGHT: 153.8 LBS | BODY MASS INDEX: 27.25 KG/M2 | RESPIRATION RATE: 14 BRPM | DIASTOLIC BLOOD PRESSURE: 78 MMHG | SYSTOLIC BLOOD PRESSURE: 110 MMHG | HEIGHT: 63 IN

## 2019-07-24 DIAGNOSIS — M54.16 LUMBAR RADICULOPATHY: ICD-10-CM

## 2019-07-24 DIAGNOSIS — M96.1 POST LAMINECTOMY SYNDROME: Primary | ICD-10-CM

## 2019-07-24 DIAGNOSIS — M54.42 CHRONIC BILATERAL LOW BACK PAIN WITH BILATERAL SCIATICA: ICD-10-CM

## 2019-07-24 DIAGNOSIS — G89.29 CHRONIC BILATERAL LOW BACK PAIN WITH BILATERAL SCIATICA: ICD-10-CM

## 2019-07-24 DIAGNOSIS — M54.41 CHRONIC BILATERAL LOW BACK PAIN WITH BILATERAL SCIATICA: ICD-10-CM

## 2019-07-24 PROCEDURE — 99214 OFFICE O/P EST MOD 30 MIN: CPT | Performed by: NURSE PRACTITIONER

## 2019-07-24 RX ORDER — CYCLOBENZAPRINE HCL 10 MG
10 TABLET ORAL 2 TIMES DAILY PRN
Qty: 60 TABLET | Refills: 5 | Status: SHIPPED | OUTPATIENT
Start: 2019-07-24 | End: 2020-10-16 | Stop reason: SDUPTHER

## 2019-07-24 ASSESSMENT — ENCOUNTER SYMPTOMS
BACK PAIN: 1
RESPIRATORY NEGATIVE: 1

## 2019-07-24 NOTE — PROGRESS NOTES
Inability: None    Transportation needs:     Medical: None     Non-medical: None   Tobacco Use    Smoking status: Former Smoker     Packs/day: 0.00     Years: 28.00     Pack years: 0.00     Types: Cigarettes     Start date: 1985     Last attempt to quit: 2014     Years since quittin.9    Smokeless tobacco: Never Used   Substance and Sexual Activity    Alcohol use: Yes     Alcohol/week: 3.0 standard drinks     Types: 3 Standard drinks or equivalent per week     Comment: Socially.  Drug use: No    Sexual activity: Yes     Partners: Male   Lifestyle    Physical activity:     Days per week: None     Minutes per session: None    Stress: None   Relationships    Social connections:     Talks on phone: None     Gets together: None     Attends Restoration service: None     Active member of club or organization: None     Attends meetings of clubs or organizations: None     Relationship status: None    Intimate partner violence:     Fear of current or ex partner: None     Emotionally abused: None     Physically abused: None     Forced sexual activity: None   Other Topics Concern    None   Social History Narrative    None     Review of Systems   Constitutional: Positive for activity change. Respiratory: Negative. Cardiovascular: Negative. Genitourinary: Negative. Musculoskeletal: Positive for arthralgias, back pain and myalgias. Skin: Negative. Neurological:        Radiculopathy   Psychiatric/Behavioral: Positive for sleep disturbance. Objective:   Physical Exam   Constitutional: She is oriented to person, place, and time. She appears well-developed and well-nourished. She is cooperative. No distress. She is not intubated. HENT:   Head: Normocephalic and atraumatic. Cardiovascular: Normal rate. Pulmonary/Chest: Effort normal. No accessory muscle usage. No apnea, no tachypnea and no bradypnea. She is not intubated. No respiratory distress.    Musculoskeletal:        Lumbar back: She exhibits pain. Neurological: She is alert and oriented to person, place, and time. No cranial nerve deficit. GCS eye subscore is 4. GCS verbal subscore is 5. GCS motor subscore is 6. Skin: Skin is warm, dry and intact. Capillary refill takes less than 2 seconds. She is not diaphoretic. No cyanosis. No pallor. Nails show no clubbing. Psychiatric: She has a normal mood and affect. Her speech is normal and behavior is normal. Judgment normal. She is not agitated, not aggressive, not withdrawn and not combative. She does not express impulsivity or inappropriate judgment. Vitals reviewed. Assessment:      1. Post laminectomy syndrome    2. Chronic bilateral low back pain with bilateral sciatica    3. Lumbar radiculopathy          Plan:    Schedule bilateral L5 transforaminal  Also refer to PT for dry needling    Informed consent has not been obtained for procedure. Loretta Benitez  on blood thinners. Medications to hold have been reviewed with patient. Blood thinners must be held with permission from your cardiologist or primary care physician. A letter is  required to besent to PCP/Cardiologist regarding holding medications for procedure to decrease bleeding risk.            Araceli Madison, APRN - CNP

## 2019-07-24 NOTE — TELEPHONE ENCOUNTER
7/24/19                                                                                                                                                                         To: Samantha Gar MD   [address] / [address] [place] [address]       From:  Lisy Williamson MD   Interventional Pain Mgmt Physician, [facility]    Re: Medication Hiatus for Interventional Pain/Spine Procedure for [facility]    Dear Samantha Gar MD                   I am recommending an injection for [facility] to decrease their spine related pain. In order to perform this procedure, antiplatelet/anticoagulant medications will need to be held prior to the procedure. Please respond with one of the options below if you feel that it is safe for [facility] to hold this medication and return this letter to me. [facility] is taking: ASA    Medications that must be held prior to injections:     Antiplatelets Aspirin, Effient, Plavix,Ticlid,  Brilinta, Savaysa for 3-5 days   Anticoagulant: Coumadin, Eliquis, Lovenox, Heparin, Pradaxa, Xarelto for 5-7 days. All antiplatelets/anticoagulants must be held for 7 days for a spinal cord stimulator. .    Sincerely,       Electronically signed by AARON Beltran CNP on 7/24/2019 at 7:39 AM    Lisy Williamson MD      · x I agree with holding this medication for the time described above. ·  I agree with holding this medication for ____ days only. ·  I agree with holding this medication for ____ days, however pt must be on another anticoagulant, _______________.   · I  DO NOT agree with holding this medication

## 2019-07-31 ENCOUNTER — PROCEDURE VISIT (OUTPATIENT)
Dept: PAIN MANAGEMENT | Age: 54
End: 2019-07-31
Payer: COMMERCIAL

## 2019-07-31 DIAGNOSIS — M51.26 DISPLACEMENT OF LUMBAR INTERVERTEBRAL DISC WITHOUT MYELOPATHY: ICD-10-CM

## 2019-07-31 DIAGNOSIS — M54.16 LUMBAR RADICULOPATHY: ICD-10-CM

## 2019-07-31 PROCEDURE — 64483 NJX AA&/STRD TFRM EPI L/S 1: CPT | Performed by: PHYSICAL MEDICINE & REHABILITATION

## 2019-08-13 ENCOUNTER — OFFICE VISIT (OUTPATIENT)
Dept: PAIN MANAGEMENT | Age: 54
End: 2019-08-13
Payer: COMMERCIAL

## 2019-08-13 VITALS
BODY MASS INDEX: 27.57 KG/M2 | SYSTOLIC BLOOD PRESSURE: 110 MMHG | RESPIRATION RATE: 14 BRPM | HEIGHT: 63 IN | DIASTOLIC BLOOD PRESSURE: 80 MMHG | WEIGHT: 155.6 LBS

## 2019-08-13 DIAGNOSIS — M54.42 CHRONIC BILATERAL LOW BACK PAIN WITH BILATERAL SCIATICA: ICD-10-CM

## 2019-08-13 DIAGNOSIS — G89.29 CHRONIC BILATERAL LOW BACK PAIN WITH BILATERAL SCIATICA: ICD-10-CM

## 2019-08-13 DIAGNOSIS — G89.29 CHRONIC RIGHT SACROILIAC JOINT PAIN: ICD-10-CM

## 2019-08-13 DIAGNOSIS — M54.41 CHRONIC BILATERAL LOW BACK PAIN WITH BILATERAL SCIATICA: ICD-10-CM

## 2019-08-13 DIAGNOSIS — M79.18 MYOFASCIAL PAIN: Primary | ICD-10-CM

## 2019-08-13 DIAGNOSIS — M53.3 CHRONIC RIGHT SACROILIAC JOINT PAIN: ICD-10-CM

## 2019-08-13 DIAGNOSIS — M96.1 POST LAMINECTOMY SYNDROME: ICD-10-CM

## 2019-08-13 PROCEDURE — 99214 OFFICE O/P EST MOD 30 MIN: CPT | Performed by: NURSE PRACTITIONER

## 2019-08-13 ASSESSMENT — ENCOUNTER SYMPTOMS
BACK PAIN: 1
RESPIRATORY NEGATIVE: 1

## 2019-08-13 NOTE — PROGRESS NOTES
 Insulin Lispro, Human, (INSULIN PUMP) FLORENTIN Inject 1 each into the skin See Admin Instructions.          Past Medical History:   Diagnosis Date    Bronchitis     Diabetes type 1, controlled ([address]     Eczema     GERD (gastroesophageal reflux disease)     Gestational diabetes     Hyperlipidemia     Hypothyroid     Insomnia     Myopia with astigmatism and presbyopia     Tobacco abuse        Past Surgical History:   Procedure Laterality Date    BACK SURGERY  1/05/15    Lumbar microdiscectomy L5-S1    BLADDER SUSPENSION  9/22/09    (transobturator sling)    CERVIX LESION DESTRUCTION  1993    (laser cone)    COLONOSCOPY  10/17/2018    hyperplastic polyp, Dr. Padmini Hemphill at [facility]  2006   [address]  09/06/2017    Dr Re Weaver  02/16/2017    laser nerve block Dr Laquita Wiggins- [facility]  03/09/2017    laser nerve block Dr Rubia Flood Bilateral 03/13/2019    BL5 transforaminal radiculogram- [facility] per Dr Brian Leo Right 05/29/2019    Right sacroiliac joint injection with arthrography    OTHER SURGICAL HISTORY Bilateral 07/31/2019    BL5 transforaminal radiculogram/epidurogram    SPINAL FUSION  7/6/15    Lumbar L5-S1       Family History   Problem Relation Age of Onset    Diabetes Paternal Grandmother     Cancer Father         lymph nodes    Ovarian Cancer Maternal Aunt 79        mother's half sister (same mother)    Glaucoma Neg Hx     Cataracts Neg Hx        Social History     Socioeconomic History    Marital status:      Spouse name: None    Number of children: None    Years of education: None    Highest education level: None   Occupational History    None   Social Needs    Financial resource strain: None    Food insecurity:     Worry: None     Inability: None    Transportation needs:     Medical: None     Non-medical: None   Tobacco Use    Smoking status: Former Smoker     Packs/day: 0.00     Years: 28.00     Pack years: 0.00     Types: Cigarettes     Start date: 1985     Last attempt to quit: 2014     Years since quittin.9    Smokeless tobacco: Never Used   Substance and Sexual Activity    Alcohol use: Yes     Alcohol/week: 3.0 standard drinks     Types: 3 Standard drinks or equivalent per week     Comment: Socially.  Drug use: No    Sexual activity: Yes     Partners: Male   Lifestyle    Physical activity:     Days per week: None     Minutes per session: None    Stress: None   Relationships    Social connections:     Talks on phone: None     Gets together: None     Attends Caodaism service: None     Active member of club or organization: None     Attends meetings of clubs or organizations: None     Relationship status: None    Intimate partner violence:     Fear of current or ex partner: None     Emotionally abused: None     Physically abused: None     Forced sexual activity: None   Other Topics Concern    None   Social History Narrative    None     Review of Systems   Constitutional: Positive for activity change. Respiratory: Negative. Cardiovascular: Negative. Genitourinary: Negative. Musculoskeletal: Positive for arthralgias, back pain and myalgias. Skin: Negative. Neurological:        Radiculopathy   Psychiatric/Behavioral: Positive for sleep disturbance. Objective:   Physical Exam   Constitutional: She is oriented to person, place, and time. She appears well-developed and well-nourished. She is cooperative. No distress. She is not intubated. HENT:   Head: Normocephalic and atraumatic. Cardiovascular: Normal rate. Pulmonary/Chest: Effort normal. No accessory muscle usage. No apnea, no tachypnea and no bradypnea. She is not intubated. No respiratory distress. Musculoskeletal:        Lumbar back: She exhibits pain. Neurological: She is alert and oriented to person, place, and time. No cranial nerve deficit. GCS eye subscore is 4.  GCS verbal subscore is 5. GCS motor subscore is 6. Skin: Skin is warm, dry and intact. Capillary refill takes less than 2 seconds. She is not diaphoretic. No cyanosis. No pallor. Nails show no clubbing. Psychiatric: She has a normal mood and affect. Her speech is normal and behavior is normal. Judgment normal. She is not agitated, not aggressive, not withdrawn and not combative. She does not express impulsivity or inappropriate judgment. Vitals reviewed. Assessment:      1. Myofascial pain    2. Chronic bilateral low back pain with bilateral sciatica    3. Chronic right sacroiliac joint pain    4. Post laminectomy syndrome          Plan:    Pursue dry needling as previously planned, consider repeating right SIJ injection. Follow up 6 weeks.          Adrian Rivas, AARON - CNP

## 2019-09-16 ENCOUNTER — TELEPHONE (OUTPATIENT)
Dept: PAIN MANAGEMENT | Age: 54
End: 2019-09-16

## 2019-09-16 NOTE — TELEPHONE ENCOUNTER
Clarified that Jim Fatima does except her insurance they just do not offer Dry needling for the lower back. And that [facility] will not allow her to have dry needling done because it is billed through the hospital. Please advise on other options aside from dry needling.

## 2019-10-01 ENCOUNTER — OFFICE VISIT (OUTPATIENT)
Dept: PAIN MANAGEMENT | Age: 54
End: 2019-10-01
Payer: COMMERCIAL

## 2019-10-01 VITALS
HEIGHT: 63 IN | WEIGHT: 161.6 LBS | DIASTOLIC BLOOD PRESSURE: 86 MMHG | SYSTOLIC BLOOD PRESSURE: 130 MMHG | RESPIRATION RATE: 16 BRPM | BODY MASS INDEX: 28.63 KG/M2

## 2019-10-01 DIAGNOSIS — G57.01 PIRIFORMIS SYNDROME OF RIGHT SIDE: ICD-10-CM

## 2019-10-01 DIAGNOSIS — M96.1 POST LAMINECTOMY SYNDROME: ICD-10-CM

## 2019-10-01 DIAGNOSIS — M79.18 MYOFASCIAL PAIN: Primary | ICD-10-CM

## 2019-10-01 DIAGNOSIS — G89.29 CHRONIC RIGHT SACROILIAC JOINT PAIN: ICD-10-CM

## 2019-10-01 DIAGNOSIS — M53.3 CHRONIC RIGHT SACROILIAC JOINT PAIN: ICD-10-CM

## 2019-10-01 DIAGNOSIS — M54.16 LUMBAR RADICULOPATHY: ICD-10-CM

## 2019-10-01 PROCEDURE — 99214 OFFICE O/P EST MOD 30 MIN: CPT | Performed by: NURSE PRACTITIONER

## 2019-10-01 ASSESSMENT — ENCOUNTER SYMPTOMS
RESPIRATORY NEGATIVE: 1
BACK PAIN: 1

## 2019-10-18 ENCOUNTER — OFFICE VISIT (OUTPATIENT)
Dept: FAMILY MEDICINE CLINIC | Age: 54
End: 2019-10-18
Payer: COMMERCIAL

## 2019-10-18 VITALS
WEIGHT: 157 LBS | TEMPERATURE: 97.4 F | BODY MASS INDEX: 28.03 KG/M2 | OXYGEN SATURATION: 99 % | HEART RATE: 92 BPM | DIASTOLIC BLOOD PRESSURE: 100 MMHG | SYSTOLIC BLOOD PRESSURE: 170 MMHG

## 2019-10-18 DIAGNOSIS — R05.9 COUGH: Primary | ICD-10-CM

## 2019-10-18 DIAGNOSIS — M54.2 NECK PAIN: ICD-10-CM

## 2019-10-18 DIAGNOSIS — I10 ESSENTIAL HYPERTENSION: ICD-10-CM

## 2019-10-18 PROCEDURE — 99214 OFFICE O/P EST MOD 30 MIN: CPT | Performed by: FAMILY MEDICINE

## 2019-10-18 RX ORDER — LOSARTAN POTASSIUM AND HYDROCHLOROTHIAZIDE 12.5; 5 MG/1; MG/1
1 TABLET ORAL DAILY
Qty: 90 TABLET | Refills: 3 | Status: SHIPPED | OUTPATIENT
Start: 2019-10-18 | End: 2019-10-18 | Stop reason: SDUPTHER

## 2019-10-18 RX ORDER — LOSARTAN POTASSIUM AND HYDROCHLOROTHIAZIDE 12.5; 5 MG/1; MG/1
1 TABLET ORAL DAILY
Qty: 30 TABLET | Refills: 3 | Status: SHIPPED | OUTPATIENT
Start: 2019-10-18 | End: 2020-10-16 | Stop reason: SDUPTHER

## 2019-10-18 RX ORDER — FLUTICASONE FUROATE AND VILANTEROL 100; 25 UG/1; UG/1
1 POWDER RESPIRATORY (INHALATION) DAILY
Qty: 1 EACH | Refills: 12 | Status: SHIPPED | OUTPATIENT
Start: 2019-10-18 | End: 2020-01-16

## 2019-10-18 RX ORDER — AZITHROMYCIN 250 MG/1
TABLET, FILM COATED ORAL
Qty: 1 PACKET | Refills: 0 | Status: SHIPPED | OUTPATIENT
Start: 2019-10-18 | End: 2020-01-16

## 2019-10-18 ASSESSMENT — ENCOUNTER SYMPTOMS
COUGH: 1
GASTROINTESTINAL NEGATIVE: 1
BACK PAIN: 1
SHORTNESS OF BREATH: 0

## 2019-11-09 LAB
AVERAGE GLUCOSE: NORMAL
HBA1C MFR BLD: 6.9 %

## 2020-01-13 ENCOUNTER — OFFICE VISIT (OUTPATIENT)
Dept: OPTOMETRY | Age: 55
End: 2020-01-13
Payer: COMMERCIAL

## 2020-01-13 PROCEDURE — 99213 OFFICE O/P EST LOW 20 MIN: CPT | Performed by: OPTOMETRIST

## 2020-01-13 RX ORDER — PREDNISOLONE ACETATE 10 MG/ML
1 SUSPENSION/ DROPS OPHTHALMIC 4 TIMES DAILY
Qty: 1 BOTTLE | Refills: 1 | Status: SHIPPED | OUTPATIENT
Start: 2020-01-13 | End: 2020-03-03 | Stop reason: ALTCHOICE

## 2020-01-13 ASSESSMENT — VISUAL ACUITY
OD_CC+: -1
CORRECTION_TYPE: GLASSES
METHOD: SNELLEN - LINEAR
OS_CC+: -1
OS_CC: 20/25

## 2020-01-13 ASSESSMENT — SLIT LAMP EXAM - LIDS: COMMENTS: SAME

## 2020-01-13 ASSESSMENT — ENCOUNTER SYMPTOMS
EYES NEGATIVE: 0
GASTROINTESTINAL NEGATIVE: 0
RESPIRATORY NEGATIVE: 0
ALLERGIC/IMMUNOLOGIC NEGATIVE: 0

## 2020-01-13 NOTE — PATIENT INSTRUCTIONS
Do not wear the contact lenses while using the drops. Use the medication as prescribed   - prednisoLONE acetate (PRED FORTE) 1 % ophthalmic suspension; Place 1 drop into both eyes 4 times daily Place 1 drop in both eys 4x daily for one week, then 2x daily for one week, then discontinue  Dispense: 1 Bottle;  Refill: 1

## 2020-01-13 NOTE — PROGRESS NOTES
Roman Marquez presents today for   Chief Complaint   Patient presents with    Dry Eye    Eye Problem   . HPI     Both eyes have been really dry, by end of the day her vision is blurry, hard to read and eyes feel like they have sandpaper in them, they burn and itch. Eyes water all the time, sometimes her eyes will water and it makes it look like she is crying. It has gotten worse over the last few months  Allergy drops have not helped. Eyes feel better when she wears contacts, but when she takes them out they burn really bad. Current Outpatient Medications   Medication Sig Dispense Refill    prednisoLONE acetate (PRED FORTE) 1 % ophthalmic suspension Place 1 drop into both eyes 4 times daily Place 1 drop in both eys 4x daily for one week, then 2x daily for one week, then discontinue 1 Bottle 1    losartan-hydrochlorothiazide (HYZAAR) 50-12.5 MG per tablet Take 1 tablet by mouth daily 30 tablet 3    azithromycin (ZITHROMAX) 250 MG tablet Z-Pack as directed 1 packet 0    fluticasone-vilanterol (BREO ELLIPTA) 100-25 MCG/INH AEPB inhaler Inhale 1 puff into the lungs daily 1 each 12    cyclobenzaprine (FLEXERIL) 10 MG tablet Take 1 tablet by mouth 2 times daily as needed for Muscle spasms 60 tablet 5    triamcinolone (ARISTOCORT) 0.5 % cream Apply topically 3 times daily. 15 g 5    levothyroxine (SYNTHROID) 25 MCG tablet TAKE 1 TABLET DAILY 90 tablet 3    meloxicam (MOBIC) 15 MG tablet Take 1 tablet by mouth daily 90 tablet 3    ranitidine (ZANTAC) 150 MG tablet Take 1 tablet by mouth 2 times daily 180 tablet 0    acetaminophen (TYLENOL) 500 MG tablet Take 500 mg by mouth 2 times daily as needed       clobetasol (TEMOVATE) 0.05 % cream Apply topically 2 times daily to affected area. 60 g 1    clotrimazole-betamethasone (LOTRISONE) 1-0.05 % cream Apply a thin film to the affected area 2 times daily.  45 g 0    insulin lispro (HUMALOG) 100 UNIT/ML injection vial Use 60 units per day in pump 1

## 2020-01-16 ENCOUNTER — OFFICE VISIT (OUTPATIENT)
Dept: PRIMARY CARE CLINIC | Age: 55
End: 2020-01-16
Payer: COMMERCIAL

## 2020-01-16 VITALS
DIASTOLIC BLOOD PRESSURE: 74 MMHG | SYSTOLIC BLOOD PRESSURE: 124 MMHG | BODY MASS INDEX: 29.56 KG/M2 | TEMPERATURE: 98.2 F | HEART RATE: 74 BPM | WEIGHT: 165.6 LBS | OXYGEN SATURATION: 98 %

## 2020-01-16 PROCEDURE — 99214 OFFICE O/P EST MOD 30 MIN: CPT | Performed by: FAMILY MEDICINE

## 2020-01-16 RX ORDER — SULFAMETHOXAZOLE AND TRIMETHOPRIM 800; 160 MG/1; MG/1
1 TABLET ORAL 2 TIMES DAILY
Qty: 20 TABLET | Refills: 0 | Status: SHIPPED | OUTPATIENT
Start: 2020-01-16 | End: 2020-03-03 | Stop reason: ALTCHOICE

## 2020-01-16 ASSESSMENT — ENCOUNTER SYMPTOMS
WHEEZING: 0
COLOR CHANGE: 0
CHEST TIGHTNESS: 0
DIARRHEA: 0
NAUSEA: 0
SHORTNESS OF BREATH: 0
ABDOMINAL PAIN: 1

## 2020-01-16 ASSESSMENT — PATIENT HEALTH QUESTIONNAIRE - PHQ9
1. LITTLE INTEREST OR PLEASURE IN DOING THINGS: 0
SUM OF ALL RESPONSES TO PHQ QUESTIONS 1-9: 0
2. FEELING DOWN, DEPRESSED OR HOPELESS: 0
SUM OF ALL RESPONSES TO PHQ9 QUESTIONS 1 & 2: 0
SUM OF ALL RESPONSES TO PHQ QUESTIONS 1-9: 0

## 2020-01-16 NOTE — PROGRESS NOTES
Constitutional:       General: She is not in acute distress. Appearance: She is well-developed. Eyes:      Conjunctiva/sclera: Conjunctivae normal.      Pupils: Pupils are equal, round, and reactive to light. Neck:      Musculoskeletal: Normal range of motion and neck supple. Thyroid: No thyromegaly. Cardiovascular:      Rate and Rhythm: Normal rate and regular rhythm. Heart sounds: Normal heart sounds. No murmur. Pulmonary:      Effort: Pulmonary effort is normal. No respiratory distress. Breath sounds: Normal breath sounds. No wheezing. Abdominal:       Lymphadenopathy:      Cervical: No cervical adenopathy. Skin:     General: Skin is warm and dry. Findings: No erythema or rash. Neurological:      Mental Status: She is alert and oriented to person, place, and time. /74   Pulse 74   Temp 98.2 °F (36.8 °C) (Tympanic)   Wt 165 lb 9.6 oz (75.1 kg)   SpO2 98%   BMI 29.56 kg/m²     Assessment:       Diagnosis Orders   1. Abdominal wall cellulitis               Plan:        Cellulitis: new; I will treat with bactrim. Currently there is nothing to do a culture on so I will treat with bactrim to cover for MRSA. She will call if there is no improvement or if she sees the redness worsening. Return if symptoms worsen or fail to improve. Orders Placed This Encounter   Medications    sulfamethoxazole-trimethoprim (BACTRIM DS) 800-160 MG per tablet     Sig: Take 1 tablet by mouth 2 times daily     Dispense:  20 tablet     Refill:  0       Patientgiven educational materials - see patient instructions. Discussed use, benefit,and side effects of prescribed medications. All patient questions answered. Ptvoiced understanding. Reviewed health maintenance. Instructed to continue currentmedications, diet and exercise. Patient agreed with treatment plan. Follow up asdirected.      Electronically signed by Sudhakar Rodriguez MD on 1/16/2020 at 3:23 PM

## 2020-01-24 ENCOUNTER — TELEPHONE (OUTPATIENT)
Dept: OPTOMETRY | Age: 55
End: 2020-01-24

## 2020-01-29 ENCOUNTER — TELEPHONE (OUTPATIENT)
Dept: OPTOMETRY | Age: 55
End: 2020-01-29

## 2020-02-14 ENCOUNTER — TELEPHONE (OUTPATIENT)
Dept: OPTOMETRY | Age: 55
End: 2020-02-14

## 2020-02-24 NOTE — TELEPHONE ENCOUNTER
Pt said that the recent medicine that was prescribed is not working, she is still having trouble, wonders if there is something else you can offer up?
Scheduled patient for an appointment for March 25th. Patient states vision is ok, blurs a little bit but with the drops clears right up. She is on a wait list to be called as soon as something comes open.
never

## 2020-03-03 ENCOUNTER — HOSPITAL ENCOUNTER (OUTPATIENT)
Age: 55
Setting detail: SPECIMEN
Discharge: HOME | End: 2020-03-03
Payer: COMMERCIAL

## 2020-03-03 ENCOUNTER — HOSPITAL ENCOUNTER (OUTPATIENT)
Dept: MAMMOGRAPHY | Age: 55
Discharge: HOME | End: 2020-03-05
Payer: COMMERCIAL

## 2020-03-03 ENCOUNTER — OFFICE VISIT (OUTPATIENT)
Dept: OBGYN | Age: 55
End: 2020-03-03
Payer: COMMERCIAL

## 2020-03-03 VITALS
HEIGHT: 63 IN | SYSTOLIC BLOOD PRESSURE: 136 MMHG | BODY MASS INDEX: 29.77 KG/M2 | WEIGHT: 168 LBS | DIASTOLIC BLOOD PRESSURE: 82 MMHG | HEART RATE: 96 BPM

## 2020-03-03 PROCEDURE — G0145 SCR C/V CYTO,THINLAYER,RESCR: HCPCS

## 2020-03-03 PROCEDURE — 77063 BREAST TOMOSYNTHESIS BI: CPT

## 2020-03-03 PROCEDURE — 99396 PREV VISIT EST AGE 40-64: CPT | Performed by: NURSE PRACTITIONER

## 2020-03-03 RX ORDER — PIMECROLIMUS 10 MG/G
CREAM TOPICAL PRN
COMMUNITY
Start: 2019-12-09 | End: 2021-07-21

## 2020-03-03 NOTE — PROGRESS NOTES
Yogi Bustamante  3/3/2020              48 y.o. Chief Complaint   Patient presents with    Gynecologic Exam     pap         No LMP recorded. Patient has had an ablation. No referring provider defined for this encounter. HPI :Annual Exam  Patient presents for annual exam.  Counseling on healthy lifestyle reviewed, as well as the need for self breast exam. We reviewed the need for Kegal exercises. We discussed and reviewed the need for routine screenings and immunization updates when appropriate. Still having night sweats, but they are improving. Has had a few hot flashes. Denies vaginal bleeding or spotting and is aware of the need to report any she would note in the future. Complains of fatigue, brittle nails, and dry skin. Concerned about thyroid. Does have low vitamin D levels. Taking 9150-2554 IU daily. Will start taking with food and repeat levels in 4 months. Performs monthly self breast exams. The patient is sexually active. last pap: was normal, last mammogram: was normal  The patient has regular exercise: yes .  We did review the need for and frequency of both weight bearing and strengthening as tolerated by the patient. ________________________________________________________________________  OB History    Para Term  AB Living   5 4 4 0 1 4   SAB TAB Ectopic Molar Multiple Live Births   1 0 0 0 0 0      # Outcome Date GA Lbr Christopher/2nd Weight Sex Delivery Anes PTL Lv   5 Term 2000 40w0d  7 lb 3 oz (3.26 kg) M Vag-Spont         Birth Comments: in [employer] - Gestational Diabetes      Name: Trevin Sos   4 Term 1997 40w0d  8 lb 1 oz (3.657 kg) F Vag-Spont         Birth Comments: PEV delivered      Name: Eliezer Sherwood   3 Term 1993 40w0d  8 lb 11 oz (3.941 kg) M Vag-Spont         Birth Comments: PEV delivered      Name: Zhao Ortiz   2 Term 1987 40w0d  6 lb 8 oz (2.948 kg) M Vag-Spont         Birth Comments: Dr. Zackary Cunningham delivered      Name: Gualberto Bishop   1 SAB              Past Medical History:   Diagnosis Date    Bronchitis     Diabetes type 1, controlled ([facility] [address]     Eczema     GERD (gastroesophageal reflux disease)     Gestational diabetes     Hyperlipidemia     Hypothyroid     Insomnia     Myopia with astigmatism and presbyopia     Tobacco abuse                                                                    Past Surgical History:   Procedure Laterality Date    BACK SURGERY  1/05/15    Lumbar microdiscectomy L5-S1    BLADDER SUSPENSION  9/22/09    (transobturator sling)    CERVIX LESION DESTRUCTION  1993    (laser cone)    COLONOSCOPY  10/17/2018    hyperplastic polyp, [facility] Shandra Dubois at [facility]  2006   [address]  09/06/2017    [facility]    laser nerve block Dr Gentile [facility] [address] [facility]  03/09/2017    laser nerve block Dr Gissel Marie Bilateral 03/13/2019    BL5 transforaminal radiculogram- [facility] per Dr Neisha Teran Right 05/29/2019    Right sacroiliac joint injection with arthrography    OTHER SURGICAL HISTORY Bilateral 07/31/2019    BL5 transforaminal radiculogram/epidurogram    SPINAL FUSION  7/6/15    Lumbar L5-S1     Family History   Problem Relation Age of Onset    Diabetes Paternal Grandmother     Cancer Father         lymph nodes    Ovarian Cancer Maternal Aunt 79        mother's half sister (same mother)    Glaucoma Neg Hx     Cataracts Neg Hx      Social History     Socioeconomic History    Marital status:      Spouse name: Not on file    Number of children: Not on file    Years of education: Not on file    Highest education level: Not on file   Occupational History    Not on file   Social Needs    Financial resource strain: Not on file    Food insecurity:     Worry: Not on file     Inability: Not on file    Transportation needs:     Medical: Not on file     Non-medical: Not on file   Tobacco Use    Smoking status: Former Smoker , axilla or groin. Neck and EENT:  The neck was supple. There were no masses   The thyroid was not enlarged and had no masses. PERRLA, Nares Patent No Masses    Respiratory: The lungs were auscultated and found to be clear. There were no rales, rhonchi or wheezes. There was a good respiratory effort. Cardiovascular: The heart was in a regular rate and rhythm. . No S3 or S4. There was no murmur appreciated. Extremities: The patients extremities were without calf tenderness, edema, or varicosities. There was full range of motion in all four extremities. Pulses in all four extremities    Abdomen: The abdomen was soft and non-tender. There were good bowel sounds in all quadrants and there was no guarding, rebound or rigidity. On evaluation there was no evidence of hepatosplenomegaly and there was no costal vertebral jessenia tenderness bilaterally. No hernias were appreciated. Psych: The patient had a normal Orientation to: Time, Place, Person, and Situation  Mood and affect appropriate    Breast:  (Chest)  normal appearance, no masses or tenderness, Inspection negative, No nipple retraction or dimpling, No nipple discharge or bleeding, No axillary or supraclavicular adenopathy, Normal to palpation without dominant masses, positive findings: dense tissue bilaterally (education regarding dense breast tissue done)      Pelvic Exam:  External genitalia: normal general appearance  Urinary system: urethral meatus normal  Vaginal: normal mucosa without prolapse or lesions, normal without tenderness, induration or masses and normal rugae  Cervix: normal appearance and thin prep PAP obtained  Adnexa: normal bimanual exam and non palpable  Uterus: normal single, nontender    Musculosk:  Normal Gait and station was noted. Digits were evaluated without abnormal findings. Range of motion, stability and strength were evaluated and found to be appropriate for the patients age. ASSESSMENT:      48 y.o.  Annual Diagnosis Orders   1. Well female exam with routine gynecological exam     2. Cervical cancer screening  PAP SMEAR   3. Other screening mammogram     4. Vitamin D deficiency  Vitamin D 25 Hydroxy   5. Dense breast tissue          Chief Complaint   Patient presents with    Gynecologic Exam     pap         Past Medical History:   Diagnosis Date    Bronchitis     Diabetes type 1, controlled ([address]     Eczema     GERD (gastroesophageal reflux disease)     Gestational diabetes     Hyperlipidemia     Hypothyroid     Insomnia     Myopia with astigmatism and presbyopia     Tobacco abuse          Patient Active Problem List   Diagnosis    Piriformis syndrome of right side    Chronic bilateral low back pain with bilateral sciatica    Displacement of lumbar intervertebral disc without myelopathy    Myopia of both eyes with astigmatism and presbyopia    Essential hypertension    Hyperlipidemia    Type 1 diabetes mellitus without complication ([address]    Acquired hypothyroidism    Vitamin D deficiency    Menopause    Lumbar radiculopathy    Post laminectomy syndrome    Chronic right sacroiliac joint pain          Hereditary Breast, Ovarian, Colon and Uterine Cancer screening Done. Tobacco & Secondary smoke risks reviewed; instructed on cessation and avoidance    PLAN:  Return in about 1 year (around 3/3/2021) for Mammogram, Annual Exam.  Return for annual exams  Mammograms every 1 year. If 39 yo and last mammogram was negative. Routine health maintenance per patients PCP. Orders Placed This Encounter   Procedures    PAP SMEAR     Patient History:    No LMP recorded. Patient has had an ablation.   OBGYN Status: Ablation  Past Surgical History:  1/05/15: BACK SURGERY      Comment:  Lumbar microdiscectomy L5-S1  9/22/09: BLADDER SUSPENSION      Comment:  (transobturator sling)  1993: CERVIX LESION DESTRUCTION      Comment:  (laser cone)  10/17/2018: COLONOSCOPY      Comment:  hyperplastic polyp [facility] Dobbins at [facility]  2006: ENDOMETRIAL ABLATION  2017: LUMBAR LAMINECTOMY      Comment:  Dr Kajal Giraldo  2017: NERVE BLOCK      [facility]  [facility]      Comment:  laser nerve block Dr Azalea Reyes  2019: OTHER SURGICAL HISTORY; Bilateral      Comment:  BL5 transforaminal radiculogram- [facility] per Dr Clyde Tello   2019: OTHER SURGICAL HISTORY; Right      Comment:  Right sacroiliac joint injection with arthrography  2019: OTHER SURGICAL HISTORY; Bilateral      Comment:  BL5 transforaminal radiculogram/epidurogram  7/6/15: SPINAL FUSION      Comment:  Lumbar L5-S1      Social History    Tobacco Use      Smoking status: Former Smoker        Packs/day: 0.00        Years: 28.00        Pack years: 0        Types: Cigarettes        Start date: 1985        Quit date: 2014        Years since quittin.5      Smokeless tobacco: Never Used       Standing Status:   Future     Number of Occurrences:   1     Standing Expiration Date:   3/3/2021     Order Specific Question:   Collection Type     Answer: Thin Prep     Order Specific Question:   Prior Abnormal Pap Test     Answer:   No     Order Specific Question:   Screening or Diagnostic     Answer:   Screening     Order Specific Question:   HPV Requested?      Answer:   Yes - If Abnormal Reflex HPV     Order Specific Question:   High Risk Patient     Answer:   N/A    Vitamin D 25 Hydroxy     Standing Status:   Future     Standing Expiration Date:   9/3/2020       Walter Nunez  3/3/2020

## 2020-03-12 LAB — CYTOLOGY REPORT: NORMAL

## 2020-05-13 ENCOUNTER — OFFICE VISIT (OUTPATIENT)
Dept: OPTOMETRY | Age: 55
End: 2020-05-13
Payer: COMMERCIAL

## 2020-05-13 PROCEDURE — 99213 OFFICE O/P EST LOW 20 MIN: CPT | Performed by: OPTOMETRIST

## 2020-05-13 RX ORDER — CYCLOSPORINE 0 G/ML
1 SOLUTION/ DROPS OPHTHALMIC; TOPICAL 2 TIMES DAILY
Qty: 90 EACH | Refills: 3 | Status: SHIPPED | OUTPATIENT
Start: 2020-05-13 | End: 2020-10-27

## 2020-05-13 ASSESSMENT — REFRACTION_WEARINGRX
OD_CYLINDER: -0.50
SPECS_TYPE: PAL
OS_ADD: +2.00
OS_AXIS: 135
OS_CYLINDER: -0.50
OD_SPHERE: -1.50
OD_AXIS: 090
OS_SPHERE: -1.75
OD_ADD: +2.00

## 2020-05-13 ASSESSMENT — VISUAL ACUITY
OS_CC: 20/25
CORRECTION_TYPE: GLASSES
METHOD: SNELLEN - LINEAR

## 2020-05-13 ASSESSMENT — SLIT LAMP EXAM - LIDS
COMMENTS: NORMAL
COMMENTS: NORMAL

## 2020-05-13 ASSESSMENT — TONOMETRY
OD_IOP_MMHG: 26
OS_IOP_MMHG: 24
IOP_METHOD: NON-CONTACT AIR PUFF

## 2020-05-13 NOTE — PROGRESS NOTES
Ivet Bloom presents today for   Chief Complaint   Patient presents with    Eye Problem   . HPI     Eyes are still bothering her. States eyes are dry, watery, irritated, feels like sandpaper  Last used her Prednisolone late February  Last wore contact lenses since January and being treated with the Pred. The eyes felt better using the drops but comes back after using the drops . Current Outpatient Medications   Medication Sig Dispense Refill    cycloSPORINE, PF, (CEQUA) 0.09 % SOLN Apply 1 drop to eye 2 times daily 90 each 3    pimecrolimus (ELIDEL) 1 % cream       losartan-hydrochlorothiazide (HYZAAR) 50-12.5 MG per tablet Take 1 tablet by mouth daily 30 tablet 3    cyclobenzaprine (FLEXERIL) 10 MG tablet Take 1 tablet by mouth 2 times daily as needed for Muscle spasms 60 tablet 5    levothyroxine (SYNTHROID) 25 MCG tablet TAKE 1 TABLET DAILY 90 tablet 3    meloxicam (MOBIC) 15 MG tablet Take 1 tablet by mouth daily 90 tablet 3    acetaminophen (TYLENOL) 500 MG tablet Take 500 mg by mouth 2 times daily as needed       insulin lispro (HUMALOG) 100 UNIT/ML injection vial Use 60 units per day in pump 1 vial 3    Vitamin D (CHOLECALCIFEROL) 1000 UNITS CAPS capsule Take 4,000 Units by mouth daily       aspirin 81 MG tablet Take 81 mg by mouth daily.  Insulin Lispro, Human, (INSULIN PUMP) FLORENTIN Inject 1 each into the skin See Admin Instructions. No current facility-administered medications for this visit.           Family History   Problem Relation Age of Onset    Diabetes Paternal Grandmother     Cancer Father         lymph nodes    Ovarian Cancer Maternal Aunt 79        mother's half sister (same mother)    Glaucoma Neg Hx     Cataracts Neg Hx      Social History     Socioeconomic History    Marital status:      Spouse name: None    Number of children: None    Years of education: None    Highest education level: None   Occupational History    None   Social Needs   

## 2020-05-18 RX ORDER — LEVOTHYROXINE SODIUM 0.03 MG/1
TABLET ORAL
Qty: 90 TABLET | Refills: 3 | Status: SHIPPED | OUTPATIENT
Start: 2020-05-18 | End: 2020-10-16 | Stop reason: SDUPTHER

## 2020-05-18 NOTE — TELEPHONE ENCOUNTER
Duane Henderson is requesting a refill on the following medication(s):  Requested Prescriptions     Pending Prescriptions Disp Refills    levothyroxine (SYNTHROID) 25 MCG tablet 90 tablet 3       Last Visit Date (If Applicable):  79/29/8554    Next Visit Date:    6/26/2020

## 2020-06-11 ENCOUNTER — TELEPHONE (OUTPATIENT)
Dept: OBGYN | Age: 55
End: 2020-06-11

## 2020-06-18 ENCOUNTER — TELEPHONE (OUTPATIENT)
Dept: OPTOMETRY | Age: 55
End: 2020-06-18

## 2020-06-23 NOTE — TELEPHONE ENCOUNTER
Please call and tell her that after talking to the rep, she can call Nan Guzman Neda at [phone]. They will send a 3 month supply for $180 out of pocket (no insurance)  It will last 6 months if a vial is used for the morning and the night dosage. I can also set out some samples to be used until the supply come.

## 2020-07-20 ENCOUNTER — OFFICE VISIT (OUTPATIENT)
Dept: FAMILY MEDICINE CLINIC | Age: 55
End: 2020-07-20
Payer: COMMERCIAL

## 2020-07-20 VITALS
OXYGEN SATURATION: 98 % | RESPIRATION RATE: 16 BRPM | SYSTOLIC BLOOD PRESSURE: 126 MMHG | WEIGHT: 168 LBS | DIASTOLIC BLOOD PRESSURE: 82 MMHG | HEART RATE: 93 BPM | BODY MASS INDEX: 30.24 KG/M2

## 2020-07-20 PROCEDURE — 99396 PREV VISIT EST AGE 40-64: CPT | Performed by: FAMILY MEDICINE

## 2020-07-20 NOTE — PROGRESS NOTES
History and Physical      Loretta Feliz  YOB: 1965    Date of Service:  7/20/2020    Chief Complaint:   Grady Hays is a 50 y.o. female who presents for complete physical examination.     HPI: sees endo for diabetes   Has low back pain     Wt Readings from Last 3 Encounters:   07/20/20 168 lb (76.2 kg)   03/03/20 168 lb (76.2 kg)   01/16/20 165 lb 9.6 oz (75.1 kg)     BP Readings from Last 3 Encounters:   07/20/20 126/82   03/03/20 136/82   01/16/20 124/74       Patient Active Problem List   Diagnosis    Piriformis syndrome of right side    Chronic bilateral low back pain with bilateral sciatica    Displacement of lumbar intervertebral disc without myelopathy    Myopia of both eyes with astigmatism and presbyopia    Essential hypertension    Hyperlipidemia    Type 1 diabetes mellitus without complication ([facility] [address]    Acquired hypothyroidism    Vitamin D deficiency    Menopause    Lumbar radiculopathy    Post laminectomy syndrome    Chronic right sacroiliac joint pain       Preventive Care:  Health Maintenance   Topic Date Due    Hepatitis B vaccine (1 of 3 - Risk 3-dose series) 06/22/1986    Diabetic microalbuminuria test  02/16/2017    Shingles Vaccine (1 of 2) 06/22/2017    Diabetic foot exam  06/21/2019    Lipid screen  06/21/2019    TSH testing  06/21/2019    Potassium monitoring  06/21/2019    Creatinine monitoring  06/21/2019    Diabetic retinal exam  07/08/2020    Flu vaccine (1) 09/01/2020    A1C test (Diabetic or Prediabetic)  11/09/2020    Breast cancer screen  03/03/2022    Cervical cancer screen  03/03/2023    Colon cancer screen colonoscopy  10/17/2023    DTaP/Tdap/Td vaccine (2 - Td) 11/07/2026    Pneumococcal 0-64 years Vaccine  Completed    HIV screen  Addressed    Hepatitis A vaccine  Aged Out    Hib vaccine  Aged Out    Meningococcal (ACWY) vaccine  Aged Out      Hx abnormal PAP: remote   Sexual activity: has sex with females   Self-breast exams: yes  Previous DEXA scan: no  Last eye exam: 2019, normal  Exercise: stretching daily and walks   Seatbelt use: yes   Lipid panel:   Lab Results   Component Value Date    CHOL 234 (H) 06/14/2016    TRIG 72 06/14/2016     06/14/2016        Living will:  no,   additional information provided reviewed     Immunization History   Administered Date(s) Administered    Influenza Virus Vaccine 09/21/2011, 10/06/2014, 12/29/2015, 09/23/2017, 11/04/2018    Influenza, Quadv, IM, PF (6 mo and older Fluzone, Flulaval, Fluarix, and 3 yrs and older Afluria) 11/07/2016    Pneumococcal Polysaccharide (Czflztusp18) 09/23/2017, 11/04/2018    Td, unspecified formulation 01/17/2008    Tdap (Boostrix, Adacel) 11/07/2016       Allergies   Allergen Reactions    Ace Inhibitors Other (See Comments)     Cough      Pcn [Penicillins] Rash     Outpatient Medications Marked as Taking for the 7/20/20 encounter (Office Visit) with Deyvi Sprague MD   Medication Sig Dispense Refill    levothyroxine (SYNTHROID) 25 MCG tablet TAKE 1 TABLET DAILY 90 tablet 3    cycloSPORINE, PF, (CEQUA) 0.09 % SOLN Apply 1 drop to eye 2 times daily 90 each 3    losartan-hydrochlorothiazide (HYZAAR) 50-12.5 MG per tablet Take 1 tablet by mouth daily 30 tablet 3    cyclobenzaprine (FLEXERIL) 10 MG tablet Take 1 tablet by mouth 2 times daily as needed for Muscle spasms 60 tablet 5    acetaminophen (TYLENOL) 500 MG tablet Take 500 mg by mouth 2 times daily as needed       insulin lispro (HUMALOG) 100 UNIT/ML injection vial Use 60 units per day in pump 1 vial 3    Vitamin D (CHOLECALCIFEROL) 1000 UNITS CAPS capsule Take 4,000 Units by mouth daily       aspirin 81 MG tablet Take 81 mg by mouth daily.  Insulin Lispro, Human, (INSULIN PUMP) FLORENTIN Inject 1 each into the skin See Admin Instructions.          Past Medical History:   Diagnosis Date    Bronchitis     Diabetes type 1, controlled ([facility] [address]     Eczema     GERD (gastroesophageal reflux disease)  Gestational diabetes     Hyperlipidemia     Hypothyroid     Insomnia     Myopia with astigmatism and presbyopia     Tobacco abuse      Past Surgical History:   Procedure Laterality Date    BACK SURGERY  1/05/15    Lumbar microdiscectomy L5-S1    BLADDER SUSPENSION  09    (transobturator sling)    CERVIX LESION DESTRUCTION      (laser cone)    COLONOSCOPY  10/17/2018    [facility]plastic [facility], [facility] Delmi [facility]  2006   [address]  2017    Dr Sheeba Corado  2017    laser nerve block Dr Regalado Pock[facility] [address] [facility]  2017    laser nerve block Dr Tyler Mean Bilateral 2019    BL5 transforaminal radiculogram- [facility] per Dr Carlene Simeon Right 2019    Right sacroiliac joint injection with arthrography    OTHER SURGICAL HISTORY Bilateral 2019    BL5 transforaminal radiculogram/epidurogram    SPINAL FUSION  7/6/15    Lumbar L5-S1     Family History   Problem Relation Age of Onset    Diabetes Paternal Grandmother     Cancer Father         lymph nodes    Ovarian Cancer Maternal Aunt 79        mother's half sister (same mother)    Glaucoma Neg Hx     Cataracts Neg Hx      Social History     Socioeconomic History    Marital status:      Spouse name: Not on file    Number of children: Not on file    Years of education: Not on file    Highest education level: Not on file   Occupational History    Not on file   Social Needs    Financial resource strain: Not on file    Food insecurity     Worry: Not on file     Inability: Not on file    Transportation needs     Medical: Not on file     Non-medical: Not on file   Tobacco Use    Smoking status: Former Smoker     Packs/day: 0.00     Years: 28.00     Pack years: 0.00     Types: Cigarettes     Start date: 1985     Last attempt to quit: 2014     Years since quittin.9    Smokeless tobacco: Never Used normal and breath sounds normal. No respiratory distress. She has no wheezes, rhonchi or rales. Abdominal: Soft, non-tender. Bowel sounds and aorta are normal. She exhibits no organomegaly, mass or bruit. Genitourinary: . Breast exam:  not examined. Musculoskeletal: Normal range of motion, no synovitis. She exhibits no edema. Neurological: She is alert and oriented to person, place, and time. She has normal reflexes. No cranial nerve deficit. Coordination normal.   Skin: Skin is warm and dry. There is no rash or erythema. No suspicious lesions noted. Psychiatric: She has a normal mood and affect.  Her speech is normal and behavior is normal. Judgment, cognition and memory are normal.   Visual inspection:  Deformity/amputation: absent  Skin lesions/pre-ulcerative calluses: absent  Edema: right- negative, left- negative    Sensory exam:  Monofilament sensation: normal  (minimum of 5 random plantar locations tested, avoiding callused areas - > 1 area with absence of sensation is + for neuropathy)    Plus at least one of the following:  Pulses: normal,   Pinprick: N/A  Proprioception: N/A  Vibration (128 Hz): N/A    Assessment/Plan:    Patient Active Problem List   Diagnosis    Piriformis syndrome of right side    Chronic bilateral low back pain with bilateral sciatica    Displacement of lumbar intervertebral disc without myelopathy    Myopia of both eyes with astigmatism and presbyopia    Essential hypertension    Hyperlipidemia    Type 1 diabetes mellitus without complication ([facility] [address]    Acquired hypothyroidism    Vitamin D deficiency    Menopause    Lumbar radiculopathy    Post laminectomy syndrome    Chronic right sacroiliac joint pain       Labs per orders  Reviewed advanced directives   Reviewed shingles shot  To work on fitness and improving diet   To take her vitamin d 3  Flu shot this fall  As long as well follow up 1 year and prn

## 2020-07-20 NOTE — PATIENT INSTRUCTIONS
Patient Education        Learning About Diabetes Food Guidelines  Your Care Instructions     Meal planning is important to manage diabetes. It helps keep your blood sugar at a target level (which you set with your doctor). You don't have to eat special foods. You can eat what your family eats, including sweets once in a while. But you do have to pay attention to how often you eat and how much you eat of certain foods. You may want to work with a dietitian or a certified diabetes educator (CDE) to help you plan meals and snacks. A dietitian or CDE can also help you lose weight if that is one of your goals. What should you know about eating carbs? Managing the amount of carbohydrate (carbs) you eat is an important part of healthy meals when you have diabetes. Carbohydrate is found in many foods. · Learn which foods have carbs. And learn the amounts of carbs in different foods. ? Bread, cereal, pasta, and rice have about 15 grams of carbs in a serving. A serving is 1 slice of bread (1 ounce), ½ cup of cooked cereal, or 1/3 cup of cooked pasta or rice. ? Fruits have 15 grams of carbs in a serving. A serving is 1 small fresh fruit, such as an apple or orange; ½ of a banana; ½ cup of cooked or canned fruit; ½ cup of fruit juice; 1 cup of melon or raspberries; or 2 tablespoons of dried fruit. ? Milk and no-sugar-added yogurt have 15 grams of carbs in a serving. A serving is 1 cup of milk or 2/3 cup of no-sugar-added yogurt. ? Starchy vegetables have 15 grams of carbs in a serving. A serving is ½ cup of mashed potatoes or sweet potato; 1 cup winter squash; ½ of a small baked potato; ½ cup of cooked beans; or ½ cup cooked corn or green peas. · Learn how much carbs to eat each day and at each meal. A dietitian or CDE can teach you how to keep track of the amount of carbs you eat. This is called carbohydrate counting. · If you are not sure how to count carbohydrate grams, use the Plate Method to plan meals.  It is a when cooking. · Don't skip meals. Your blood sugar may drop too low if you skip meals and take insulin or certain medicines for diabetes. · Check with your doctor before you drink alcohol. Alcohol can cause your blood sugar to drop too low. Alcohol can also cause a bad reaction if you take certain diabetes medicines. Follow-up care is a key part of your treatment and safety. Be sure to make and go to all appointments, and call your doctor if you are having problems. It's also a good idea to know your test results and keep a list of the medicines you take. Where can you learn more? Go to https://[email or web address].[email or web address] org and sign in to your AT Internet account. Enter J814 in the Haoguihua box to learn more about \"Learning About Diabetes Food Guidelines. \"     If you do not have an account, please click on the \"Sign Up Now\" link. Current as of: December 20, 2019               Content Version: 12.5  © 0444-0004 [email or web address]. Care instructions adapted under license by [facility] [place] ([facility]). If you have questions about a medical condition or this instruction, always ask your healthcare professional. [address] any warranty or liability for your use of this information. Patient Education        Learning About Diabetes and Exercise  Can you exercise if you have diabetes? When you have diabetes, it's important to get regular exercise. This helps control your blood sugar level. You can still play sports, run, ride a bike, go swimming, and do other activities when you have diabetes. How can exercise help you manage diabetes? Your body turns the food you eat into glucose, a type of sugar. You need this sugar for energy. When you have diabetes, the sugar builds up in your blood. But when you exercise, your body uses sugar. This helps keep it from building up in your blood and results in lower blood sugar and better control of diabetes.   Exercise may help you in other ways too. It can help you reach and stay at a healthy weight. It also helps improve blood pressure and cholesterol, which can reduce the risk of heart disease. Exercise can make you feel stronger and happier. It can help you relax and sleep better, and give you confidence in other things you do. How can you exercise safely? Before you start a new exercise program, talk to your doctor about how and when to exercise. You may need to have a medical exam and tests before you begin. Some types of exercise can be harmful if your diabetes is causing other problems, such as problems with your feet. Your doctor can tell you what types of exercise are good choices for you. These tips can help you exercise safely when you have diabetes. If your diabetes is controlled by diet or medicine that doesn't lower your blood sugar, you don't need to eat a snack before you exercise. · Check your blood sugar before you exercise. And be careful about what you eat. ? If your blood sugar is less than 100, eat a carbohydrate snack before you exercise. ? Be careful when you exercise if your blood sugar is over 300. High blood sugar can make you dehydrated. And that makes your blood sugar levels go even higher. If you have ketones in your blood or urine and your blood sugar is over 300, do not exercise. · Don't try to do too much at first. Build up your exercise program bit by bit. Try to get at least 30 minutes of exercise on most days of the week. Walking is a good choice. You also may want to do other activities, such as riding a bike or swimming. You might try running or gardening. Try to include muscle-strengthening exercises at least 2 times a week. These exercises include push-ups and weight training. You can also use rubber tubing or stretch bands. You stretch or pull the tubing or band to build muscle strength. If you want to exercise more, slowly increase how hard or long you exercise.   · You may get symptoms of low blood sugar during exercise or up to 24 hours later. Some symptoms of low blood sugar, such as sweating, a fast heartbeat, or feeling tired, can be confused with what can happen anytime you exercise. Other symptoms may include feeling anxious, dizzy, weak, or shaky. So it's a good idea to check your blood sugar again. · You can treat low blood sugar by eating or drinking something that has 15 grams of carbohydrate. These should be quick-sugar foods. Quick-sugar foods such as glucose tablets, table sugar, honey, fruit juice, regular (not diet) soda pop, or hard candy can help raise blood sugar. Check your blood sugar level again 15 minutes after having a quick-sugar food to make sure your level is getting back to your target range. · Drink plenty of water before, during, and after you exercise. · Wear medical alert jewelry that says you have diabetes. You can buy this at most drugstores. · Pay attention to your body. If you are used to exercise and notice that you can't do as much as usual, talk to your doctor. Follow-up care is a key part of your treatment and safety. Be sure to make and go to all appointments, and call your doctor if you are having problems. It's also a good idea to know your test results and keep a list of the medicines you take. Where can you learn more? Go to [email or web address] [email or web address] and sign in to your Fundrise account. Enter G364 in the Helmedix box to learn more about \"Learning About Diabetes and Exercise. \"     If you do not have an account, please click on the \"Sign Up Now\" link. Current as of: December 20, 2019               Content Version: 12.5  © 9517-5176 Healthwise[employer]orporated. Care instructions adapted under license by [facility] ([facility]). If you have questions about a medical condition or this instruction, always ask your healthcare professional. [address] any warranty or liability for your use of this information.

## 2020-07-28 RX ORDER — CRISABOROLE 20 MG/G
1 OINTMENT TOPICAL 2 TIMES DAILY
Qty: 1 TUBE | Refills: 5 | Status: SHIPPED | OUTPATIENT
Start: 2020-07-28 | End: 2022-09-09

## 2020-08-13 ENCOUNTER — HOSPITAL ENCOUNTER (OUTPATIENT)
Dept: LAB | Age: 55
Discharge: HOME | End: 2020-08-13
Payer: COMMERCIAL

## 2020-08-13 LAB
ABSOLUTE EOS #: 0.14 K/UL (ref 0–0.44)
ABSOLUTE IMMATURE GRANULOCYTE: <0.03 K/UL (ref 0–0.3)
ABSOLUTE LYMPH #: 1.91 K/UL (ref 1.1–3.7)
ABSOLUTE MONO #: 0.56 K/UL (ref 0.1–1.2)
ALBUMIN SERPL-MCNC: 4.7 G/DL (ref 3.5–5.2)
ALBUMIN/GLOBULIN RATIO: 1.4 (ref 1–2.5)
ALP BLD-CCNC: 79 U/L (ref 35–104)
ALT SERPL-CCNC: 13 U/L (ref 5–33)
ANION GAP SERPL CALCULATED.3IONS-SCNC: 12 MMOL/L (ref 9–17)
AST SERPL-CCNC: 21 U/L
BASOPHILS # BLD: 1 % (ref 0–2)
BASOPHILS ABSOLUTE: 0.05 K/UL (ref 0–0.2)
BILIRUB SERPL-MCNC: 0.59 MG/DL (ref 0.3–1.2)
BUN BLDV-MCNC: 7 MG/DL (ref 6–20)
BUN/CREAT BLD: 9 (ref 9–20)
CALCIUM SERPL-MCNC: 10 MG/DL (ref 8.6–10.4)
CHLORIDE BLD-SCNC: 95 MMOL/L (ref 98–107)
CHOLESTEROL/HDL RATIO: 2.3
CHOLESTEROL: 262 MG/DL
CO2: 28 MMOL/L (ref 20–31)
CREAT SERPL-MCNC: 0.74 MG/DL (ref 0.5–0.9)
CREATININE URINE: 41.3 MG/DL (ref 28–217)
DIFFERENTIAL TYPE: ABNORMAL
EOSINOPHILS RELATIVE PERCENT: 2 % (ref 1–4)
ESTIMATED AVERAGE GLUCOSE: 171 MG/DL
GFR AFRICAN AMERICAN: >60 ML/MIN
GFR NON-AFRICAN AMERICAN: >60 ML/MIN
GFR SERPL CREATININE-BSD FRML MDRD: ABNORMAL ML/MIN/{1.73_M2}
GFR SERPL CREATININE-BSD FRML MDRD: ABNORMAL ML/MIN/{1.73_M2}
GLUCOSE BLD-MCNC: 127 MG/DL (ref 70–99)
HBA1C MFR BLD: 7.6 % (ref 4.8–5.9)
HCT VFR BLD CALC: 40.1 % (ref 36.3–47.1)
HDLC SERPL-MCNC: 114 MG/DL
HEMOGLOBIN: 14.1 G/DL (ref 11.9–15.1)
IMMATURE GRANULOCYTES: 0 %
LDL CHOLESTEROL: 135 MG/DL (ref 0–130)
LYMPHOCYTES # BLD: 30 % (ref 24–43)
MCH RBC QN AUTO: 32.9 PG (ref 25.2–33.5)
MCHC RBC AUTO-ENTMCNC: 35.2 G/DL (ref 25.2–33.5)
MCV RBC AUTO: 93.7 FL (ref 82.6–102.9)
MICROALBUMIN/CREAT 24H UR: <12 MG/L
MICROALBUMIN/CREAT UR-RTO: NORMAL MCG/MG CREAT
MONOCYTES # BLD: 9 % (ref 3–12)
NRBC AUTOMATED: 0 PER 100 WBC
PDW BLD-RTO: 12.7 % (ref 11.8–14.4)
PLATELET # BLD: 358 K/UL (ref 138–453)
PLATELET ESTIMATE: ABNORMAL
PMV BLD AUTO: 8.9 FL (ref 8.1–13.5)
POTASSIUM SERPL-SCNC: 4.4 MMOL/L (ref 3.7–5.3)
RBC # BLD: 4.28 M/UL (ref 3.95–5.11)
RBC # BLD: ABNORMAL 10*6/UL
SEG NEUTROPHILS: 58 % (ref 36–65)
SEGMENTED NEUTROPHILS ABSOLUTE COUNT: 3.71 K/UL (ref 1.5–8.1)
SODIUM BLD-SCNC: 135 MMOL/L (ref 135–144)
THYROXINE, FREE: 1.04 NG/DL (ref 0.93–1.7)
TOTAL PROTEIN: 8.1 G/DL (ref 6.4–8.3)
TRIGL SERPL-MCNC: 64 MG/DL
TSH SERPL DL<=0.05 MIU/L-ACNC: 1.04 MIU/L (ref 0.3–5)
VITAMIN D 25-HYDROXY: 43.8 NG/ML (ref 30–100)
VLDLC SERPL CALC-MCNC: ABNORMAL MG/DL (ref 1–30)
WBC # BLD: 6.4 K/UL (ref 3.5–11.3)
WBC # BLD: ABNORMAL 10*3/UL

## 2020-08-13 PROCEDURE — 80061 LIPID PANEL: CPT

## 2020-08-13 PROCEDURE — 84443 ASSAY THYROID STIM HORMONE: CPT

## 2020-08-13 PROCEDURE — 82306 VITAMIN D 25 HYDROXY: CPT

## 2020-08-13 PROCEDURE — 85025 COMPLETE CBC W/AUTO DIFF WBC: CPT

## 2020-08-13 PROCEDURE — 80053 COMPREHEN METABOLIC PANEL: CPT

## 2020-08-13 PROCEDURE — 36415 COLL VENOUS BLD VENIPUNCTURE: CPT

## 2020-08-13 PROCEDURE — 82570 ASSAY OF URINE CREATININE: CPT

## 2020-08-13 PROCEDURE — 82043 UR ALBUMIN QUANTITATIVE: CPT

## 2020-08-13 PROCEDURE — 84439 ASSAY OF FREE THYROXINE: CPT

## 2020-08-13 PROCEDURE — 83036 HEMOGLOBIN GLYCOSYLATED A1C: CPT

## 2020-09-16 ENCOUNTER — OFFICE VISIT (OUTPATIENT)
Dept: OPTOMETRY | Age: 55
End: 2020-09-16
Payer: COMMERCIAL

## 2020-09-16 PROCEDURE — 99213 OFFICE O/P EST LOW 20 MIN: CPT | Performed by: OPTOMETRIST

## 2020-09-16 ASSESSMENT — ENCOUNTER SYMPTOMS
EYES NEGATIVE: 0
RESPIRATORY NEGATIVE: 0
GASTROINTESTINAL NEGATIVE: 0
ALLERGIC/IMMUNOLOGIC NEGATIVE: 0

## 2020-09-16 ASSESSMENT — VISUAL ACUITY
OS_CC: 20/25
METHOD: SNELLEN - LINEAR
OD_CC: 20/20 OU
OD_PH_CC: 20/20 OU

## 2020-09-16 ASSESSMENT — SLIT LAMP EXAM - LIDS
COMMENTS: NORMAL
COMMENTS: NORMAL

## 2020-09-16 NOTE — PATIENT INSTRUCTIONS
Try using the artificial tears 4x per day an no more and use the Cequa 10 minutes after instilling the tears 1 or 2x per day and see if they are more comfortable

## 2020-09-16 NOTE — PROGRESS NOTES
Jessica Henderson presents today for   Chief Complaint   Patient presents with   [facility] Dry Eye   . HPI     Patient is here for follow up from starting the [address] for Dry Eye Syndrome of both eyes. Patient states she was only able to use the [address] for one month. States it made her OD feel painful. Like sharp pain when she blinked. States that did stop after she stopped using the [address]. Using lubrication from the [employer] food store 4x per day          Current Outpatient Medications   Medication Sig Dispense Refill    Crisaborole (EUCRISA) 2 % OINT Apply 1 applicator topically 2 times daily 1 Tube 5    levothyroxine (SYNTHROID) 25 MCG tablet TAKE 1 TABLET DAILY 90 tablet 3    pimecrolimus (ELIDEL) 1 % cream       losartan-hydrochlorothiazide (HYZAAR) 50-12.5 MG per tablet Take 1 tablet by mouth daily 30 tablet 3    cyclobenzaprine (FLEXERIL) 10 MG tablet Take 1 tablet by mouth 2 times daily as needed for Muscle spasms 60 tablet 5    acetaminophen (TYLENOL) 500 MG tablet Take 500 mg by mouth 2 times daily as needed       insulin lispro (HUMALOG) 100 UNIT/ML injection vial Use 60 units per day in pump 1 vial 3    Vitamin D (CHOLECALCIFEROL) 1000 UNITS CAPS capsule Take 4,000 Units by mouth daily       aspirin 81 MG tablet Take 81 mg by mouth daily.  Insulin Lispro, Human, (INSULIN PUMP) FLORENTIN Inject 1 each into the skin See Admin Instructions.  cycloSPORINE, PF, (CEQUA) 0.09 % SOLN Apply 1 drop to eye 2 times daily (Patient not taking: Reported on 9/16/2020) 90 each 3     No current facility-administered medications for this visit.           Family History   Problem Relation Age of Onset    Diabetes Paternal Grandmother     Cancer Father         lymph nodes    Ovarian Cancer Maternal Aunt 79        mother's half sister (same mother)    Glaucoma Neg Hx     Cataracts Neg Hx      Social History     Socioeconomic History    Marital status:      Spouse name: Not on file    Number of children: Not on file    Years of education: Not on file    Highest education level: Not on file   Occupational History    Not on file   Social Needs    Financial resource strain: Not on file    Food insecurity     Worry: Not on file     Inability: Not on file    Transportation needs     Medical: Not on file     Non-medical: Not on file   Tobacco Use    Smoking status: Former Smoker     Packs/day: 0.00     Years: 28.00     Pack years: 0.00     Types: Cigarettes     Start date: 1985     Last attempt to quit: 2014     Years since quittin.0    Smokeless tobacco: Never Used   Substance and Sexual Activity    Alcohol use: Yes     Alcohol/week: 3.0 standard drinks     Types: 3 Standard drinks or equivalent per week     Comment: Socially.     Drug use: No    Sexual activity: Yes     Partners: Male   Lifestyle    Physical activity     Days per week: Not on file     Minutes per session: Not on file    Stress: Not on file   Relationships    Social connections     Talks on phone: Not on file     Gets together: Not on file     Attends Yazidism service: Not on file     Active member of club or organization: Not on file     Attends meetings of clubs or organizations: Not on file     Relationship status: Not on file    Intimate partner violence     Fear of current or ex partner: Not on file     Emotionally abused: Not on file     Physically abused: Not on file     Forced sexual activity: Not on file   Other Topics Concern    Not on file   Social History Narrative    Not on file     Past Medical History:   Diagnosis Date    Bronchitis     Diabetes type 1, controlled ([facility] [address]     Eczema     GERD (gastroesophageal reflux disease)     Gestational diabetes     Hyperlipidemia     Hypothyroid     Insomnia     Myopia with astigmatism and presbyopia     Tobacco abuse        ROS     Negative for: Constitutional, Gastrointestinal, Neurological, Skin, Genitourinary, Musculoskeletal, HENT, Endocrine, Cardiovascular, Eyes, Respiratory, Psychiatric, Allergic/Imm, Heme/Lymph          Main Ophthalmology Exam     Slit Lamp Exam       Right Left    Lids/Lashes Normal Normal    Conjunctiva/Sclera trace chemosis ; 1+ injection  1+ chemosis; 1+ injection     Cornea No staining TBUT 8  no staining TBUT 8    Anterior Chamber Deep and quiet Deep and quiet                    Not recorded         Not recorded         Not recorded          Visual Acuity (Snellen - Linear)       Right Left    Dist cc 20/20 20/25    Dist ph cc 20/20 OU     Near cc 20/20 OU           Pupils     Pupils       Pupils    Right PERRL    Left PERRL                        1. Dry eye syndrome of both eyes           Patient Instructions   Try using the artificial tears 4x per day an no more and use the Cequa 10 minutes after instilling the tears 1 or 2x per day and see if they are more comfortable      Return in about 2 months (around 11/16/2020) for complete eye exam; DM .

## 2020-10-16 RX ORDER — CYCLOBENZAPRINE HCL 10 MG
10 TABLET ORAL 2 TIMES DAILY PRN
Qty: 60 TABLET | Refills: 5 | Status: ON HOLD | OUTPATIENT
Start: 2020-10-16 | End: 2021-12-10 | Stop reason: HOSPADM

## 2020-10-16 NOTE — TELEPHONE ENCOUNTER
Flexeril requested.      Last Appt:  10/1/2019  Next Appt:   Visit date not found  Med verified in [address]

## 2020-10-27 ENCOUNTER — NURSE ONLY (OUTPATIENT)
Dept: FAMILY MEDICINE CLINIC | Age: 55
End: 2020-10-27
Payer: COMMERCIAL

## 2020-10-27 ENCOUNTER — VIRTUAL VISIT (OUTPATIENT)
Dept: FAMILY MEDICINE CLINIC | Age: 55
End: 2020-10-27
Payer: COMMERCIAL

## 2020-10-27 ENCOUNTER — HOSPITAL ENCOUNTER (OUTPATIENT)
Age: 55
Setting detail: SPECIMEN
Discharge: HOME | End: 2020-10-27
Payer: COMMERCIAL

## 2020-10-27 LAB
THYROXINE, FREE: 1.03 NG/DL (ref 0.93–1.7)
TSH SERPL DL<=0.05 MIU/L-ACNC: 1.1 MIU/L (ref 0.3–5)

## 2020-10-27 PROCEDURE — 84439 ASSAY OF FREE THYROXINE: CPT

## 2020-10-27 PROCEDURE — 84443 ASSAY THYROID STIM HORMONE: CPT

## 2020-10-27 PROCEDURE — 99213 OFFICE O/P EST LOW 20 MIN: CPT | Performed by: FAMILY MEDICINE

## 2020-10-27 PROCEDURE — 36415 COLL VENOUS BLD VENIPUNCTURE: CPT

## 2020-10-27 PROCEDURE — 90471 IMMUNIZATION ADMIN: CPT | Performed by: FAMILY MEDICINE

## 2020-10-27 PROCEDURE — 90686 IIV4 VACC NO PRSV 0.5 ML IM: CPT | Performed by: FAMILY MEDICINE

## 2020-10-27 ASSESSMENT — PATIENT HEALTH QUESTIONNAIRE - PHQ9
SUM OF ALL RESPONSES TO PHQ9 QUESTIONS 1 & 2: 0
1. LITTLE INTEREST OR PLEASURE IN DOING THINGS: 0
SUM OF ALL RESPONSES TO PHQ QUESTIONS 1-9: 0
2. FEELING DOWN, DEPRESSED OR HOPELESS: 0

## 2020-10-27 NOTE — PROGRESS NOTES
Have you had an allergic reaction to the flu (influenza) shot? no  Are you allergic to eggs or any component of the flu vaccine? no  Do you have a history of Guillain[place] Syndrome (GBS), which is paralysis after receiving the flu vaccine? no  Are you feeling well today? yes  Flu vaccine given as ordered. Patient tolerated it well. No questions re: VIS information.

## 2020-10-27 NOTE — PROGRESS NOTES
Subjective:      Patient ID: Juliet Zapata is a 50 y.o. female. Has been having problems with sleep falls asleep easily but when wakes up is unable to get back to sleep. Does drink caffeine late afternoon  Has noticed some hair loss for the past couple of months and her she does have dry hair  She has been working on her diet but is not losing weight  She is gaining weight around 15 pounds since last visit  She is increasing activity       Review of Systems   Constitutional: Positive for fatigue. Psychiatric/Behavioral: Positive for sleep disturbance. Objective:   Physical Exam  Vitals signs and nursing note reviewed. Constitutional:       Appearance: Normal appearance. HENT:      Head: Normocephalic and atraumatic. Mouth/Throat:      Mouth: Mucous membranes are moist.   Eyes:      Extraocular Movements: Extraocular movements intact. Pulmonary:      Effort: Pulmonary effort is normal.   Neurological:      General: No focal deficit present. Mental Status: She is alert and oriented to person, place, and time. Assessment:      Hypothyroidism  Weight gain   Hair loss  Sleep disturbance        Plan: Will recheck her thyroid   No caffeine after 10 AM  To keep active   Her sugars have been running good  Further recommendations depending on her test results and her response to change    Juliet Zapata is a 50 y.o. female being evaluated by a Virtual Visit (video visit) encounter to address concerns as mentioned above. A caregiver was present when appropriate. Due to this being a TeleHealth encounter (During [facility] public health emergency), evaluation of the following organ systems was limited: Vitals/Constitutional/EENT/Resp/CV/GI//MS/Neuro/Skin/Heme-Lymph-Imm.   Pursuant to the emergency declaration under the [address] [place] [address] and the [employer] and [employer] General Act, this Virtual Visit was conducted with patient's (and/or legal guardian's) consent, to reduce the patient's risk of exposure to COVID-19 and provide necessary medical care. The patient (and/or legal guardian) has also been advised to contact this office for worsening conditions or problems, and seek emergency medical treatment and/or call 911 if deemed necessary. Patient identification was verified at the start of the visit: Yes    Total time spent for this encounter: Not billed by time    Services were provided through a video synchronous discussion virtually to substitute for in-person clinic visit. Patient and provider were located at their individual homes. --Anthony Carrero MD on 10/27/2020 at 1:40 PM    An electronic signature was used to authenticate this note.           Anthony Carrero MD

## 2020-10-29 ENCOUNTER — TELEPHONE (OUTPATIENT)
Dept: FAMILY MEDICINE CLINIC | Age: 55
End: 2020-10-29

## 2020-10-29 RX ORDER — LEVOTHYROXINE SODIUM 0.05 MG/1
50 TABLET ORAL DAILY
Qty: 30 TABLET | Refills: 5 | Status: SHIPPED | OUTPATIENT
Start: 2020-10-29

## 2020-11-10 ENCOUNTER — OFFICE VISIT (OUTPATIENT)
Dept: OPTOMETRY | Age: 55
End: 2020-11-10
Payer: COMMERCIAL

## 2020-11-10 PROCEDURE — 92014 COMPRE OPH EXAM EST PT 1/>: CPT | Performed by: OPTOMETRIST

## 2020-11-10 ASSESSMENT — REFRACTION_WEARINGRX
OD_ADD: +2.00
OS_ADD: +2.00
SPECS_TYPE: PAL
OD_AXIS: 090
OS_SPHERE: -1.75
OS_CYLINDER: +0.50
OS_AXIS: 135
OD_SPHERE: -1.50
OD_CYLINDER: -0.50

## 2020-11-10 ASSESSMENT — REFRACTION_MANIFEST
OD_AXIS: 090
OD_SPHERE: -1.50
OS_CYLINDER: -0.50
OD_CYLINDER: -0.50
OS_SPHERE: -1.50
OS_AXIS: 130
OD_ADD: +2.00
OS_ADD: +2.00

## 2020-11-10 ASSESSMENT — VISUAL ACUITY
OD_SC: 20/20 OU
OS_CC: 20/20
METHOD: SNELLEN - LINEAR
CORRECTION_TYPE: GLASSES
OD_CC: 20/30 OU

## 2020-11-10 ASSESSMENT — KERATOMETRY
OS_AXISANGLE2_DEGREES: 159
METHOD_AUTO_MANUAL: AUTOMATED
OD_AXISANGLE_DEGREES: 068
OS_AXISANGLE_DEGREES: 069
OS_K2POWER_DIOPTERS: 46.00
OS_K1POWER_DIOPTERS: 45.50
OD_K2POWER_DIOPTERS: 46.00
OD_AXISANGLE2_DEGREES: 158
OD_K1POWER_DIOPTERS: 45.25

## 2020-11-10 ASSESSMENT — TONOMETRY
IOP_METHOD: NON-CONTACT AIR PUFF
OS_IOP_MMHG: 21
OD_IOP_MMHG: 22

## 2020-11-10 ASSESSMENT — REFRACTION_CURRENTRX
OS_CYLINDER: DS
OS_SPHERE: -1.25
OD_CYLINDER: DS
OD_ADDL_SPECS: MED
OD_SPHERE: -1.25
OS_ADDL_SPECS: MED

## 2020-11-10 NOTE — PROGRESS NOTES
 Stress: None   Relationships    Social connections     Talks on phone: None     Gets together: None     Attends Restorationism service: None     Active member of club or organization: None     Attends meetings of clubs or organizations: None     Relationship status: None    Intimate partner violence     Fear of current or ex partner: None     Emotionally abused: None     Physically abused: None     Forced sexual activity: None   Other Topics Concern    None   Social History Narrative    None     Past Medical History:   Diagnosis Date    Bronchitis     Diabetes type 1, controlled ([address]     Eczema     GERD (gastroesophageal reflux disease)     Gestational diabetes     Hyperlipidemia     Hypothyroid     Insomnia     Myopia with astigmatism and presbyopia     Tobacco abuse        Neuro/Psych     Neuro/Psych     Oriented x3:  Yes    Mood/Affect:  Normal                Main Ophthalmology Exam     External Exam       Right Left    External Normal Normal                  Tonometry     Tonometry (Non-contact air puff, 3:08 PM)       Right Left    Pressure 22 21   IOP.4              22.9  CH:  10.4          12.0  WS: 3.4          8.0                     Visual Acuity (Snellen - Linear)       Right Left    Dist cc 20/25 20/20    Near sc 20/20 OU     Near cc 20/30 OU     Correction:  Glasses        Keratometry     Keratometry (Automated)       K1 Axis K2 Axis    Right 45.25 158 46.00 068    Left 45.50 159 46.00 069                Ophthalmology Exam     Wearing Rx       Sphere Cylinder Axis Add    Right -1.50 -0.50 090 +2.00    Left -1.75 +0.50 135 +2.00    Age:  4yrs    Type:  PAL                Manifest Refraction     Manifest Refraction       Sphere Cylinder Axis Dist VA Add    Right -1.50 -0.50 090 20/25 +2.00    Left -1.50 -0.50 130 20/25 +2.00          Manifest Refraction #2 (Auto)       Sphere Cylinder Axis Dist VA Add    Right -1.25 -0.50 092      Left -1.00 -0.50 132                   Final Rx

## 2020-11-10 NOTE — PATIENT INSTRUCTIONS
New glasses and contact lenses     Trials given in the new rx. To try;   May order if desired   The left eye is the same rx as previous     Recommend PF (preservative free)

## 2020-12-03 ENCOUNTER — NURSE ONLY (OUTPATIENT)
Dept: FAMILY MEDICINE CLINIC | Age: 55
End: 2020-12-03
Payer: COMMERCIAL

## 2020-12-03 ENCOUNTER — HOSPITAL ENCOUNTER (OUTPATIENT)
Age: 55
Setting detail: SPECIMEN
Discharge: HOME | End: 2020-12-03
Payer: COMMERCIAL

## 2020-12-03 ENCOUNTER — VIRTUAL VISIT (OUTPATIENT)
Dept: FAMILY MEDICINE CLINIC | Age: 55
End: 2020-12-03
Payer: COMMERCIAL

## 2020-12-03 PROCEDURE — 99213 OFFICE O/P EST LOW 20 MIN: CPT | Performed by: FAMILY MEDICINE

## 2020-12-03 PROCEDURE — 99999 PR OFFICE/OUTPT VISIT,PROCEDURE ONLY: CPT | Performed by: FAMILY MEDICINE

## 2020-12-03 PROCEDURE — U0003 INFECTIOUS AGENT DETECTION BY NUCLEIC ACID (DNA OR RNA); SEVERE ACUTE RESPIRATORY SYNDROME CORONAVIRUS 2 (SARS-COV-2) (CORONAVIRUS DISEASE [COVID-19]), AMPLIFIED PROBE TECHNIQUE, MAKING USE OF HIGH THROUGHPUT TECHNOLOGIES AS DESCRIBED BY CMS-2020-01-R: HCPCS

## 2020-12-03 ASSESSMENT — ENCOUNTER SYMPTOMS
ABDOMINAL PAIN: 0
SHORTNESS OF BREATH: 0
COUGH: 1
NAUSEA: 0
ABDOMINAL DISTENTION: 0
SORE THROAT: 1
VOMITING: 0
DIARRHEA: 0

## 2020-12-03 NOTE — PATIENT INSTRUCTIONS
Increase fluids except coffee, tea and alcohol. May use tylenol every 4-6 hours or ibuprofen every 8 hours as needed for comfort. If severe SOB or other breathing issues encourage to present to [facility] in [place] especially with Type 1 DM hx  Encourage other ill contacts to act as though they have covid until testing returns.   Reviewed if positive 10 day quarantine from beginning of symptoms as long as fever not present off medication and other symptoms improving

## 2020-12-03 NOTE — PROGRESS NOTES
 Bronchitis     Diabetes type 1, controlled ([facility] [address]     Eczema     GERD (gastroesophageal reflux disease)     Gestational diabetes     Hyperlipidemia     Hypothyroid     Insomnia     Myopia with astigmatism and presbyopia     Tobacco abuse      Past Surgical History:   Procedure Laterality Date    BACK SURGERY  1/05/15    Lumbar microdiscectomy L5-S1    BLADDER SUSPENSION  09    (transobturator sling)    CERVIX LESION DESTRUCTION      (laser cone)    COLONOSCOPY  10/17/2018    hyperplastic polyp, Dr. Luevano Donovan at [facility]  2006   [address]  2017    Dr Tamara Castano [place]  2017    laser nerve block Dr Balderrama Leatha- [address] [facility]  2017    laser nerve block Dr Cameron Cross Bilateral 2019    BL5 transforaminal radiculogram- [facility] per Dr Juanito Bocanegra Right 2019    Right sacroiliac joint injection with arthrography    OTHER SURGICAL HISTORY Bilateral 2019    BL5 transforaminal radiculogram/epidurogram    SPINAL FUSION  7/6/15    Lumbar L5-S1     Social History     Socioeconomic History    Marital status:      Spouse name: Not on file    Number of children: Not on file    Years of education: Not on file    Highest education level: Not on file   Occupational History    Not on file   Social Needs    Financial resource strain: Not on file    Food insecurity     Worry: Not on file     Inability: Not on file    Transportation needs     Medical: Not on file     Non-medical: Not on file   Tobacco Use    Smoking status: Former Smoker     Packs/day: 0.00     Years: 28.00     Pack years: 0.00     Types: Cigarettes     Start date: 1985     Last attempt to quit: 2014     Years since quittin.3    Smokeless tobacco: Never Used   Substance and Sexual Activity    Alcohol use:  Yes     Alcohol/week: 3.0 standard drinks     Types: 3 Standard drinks or equivalent per week Comment: Socially.  Drug use: No    Sexual activity: Yes     Partners: Male   Lifestyle    Physical activity     Days per week: Not on file     Minutes per session: Not on file    Stress: Not on file   Relationships    Social connections     Talks on phone: Not on file     Gets together: Not on file     Attends Latter day service: Not on file     Active member of club or organization: Not on file     Attends meetings of clubs or organizations: Not on file     Relationship status: Not on file    Intimate partner violence     Fear of current or ex partner: Not on file     Emotionally abused: Not on file     Physically abused: Not on file     Forced sexual activity: Not on file   Other Topics Concern    Not on file   Social History Narrative    Not on file     Family History   Problem Relation Age of Onset    Diabetes Paternal Grandmother     Cancer Father         lymph nodes    Ovarian Cancer Maternal Aunt 79        mother's half sister (same mother)    Glaucoma Neg Hx     Cataracts Neg Hx        Subjective:      Review of Systems   Constitutional: Negative for fatigue and fever. HENT: Positive for sore throat. Negative for congestion. Respiratory: Positive for cough. Negative for shortness of breath. Gastrointestinal: Negative for abdominal distention, abdominal pain, diarrhea, nausea and vomiting. Neurological: Positive for headaches. Negative for dizziness and light-headedness. Objective: There were no vitals taken for this visit. Physical Exam  Constitutional:       General: She is not in acute distress. Appearance: She is not ill-appearing or toxic-appearing. HENT:      Head: Normocephalic. Eyes:      Conjunctiva/sclera: Conjunctivae normal.   Pulmonary:      Effort: Pulmonary effort is normal. No respiratory distress. Neurological:      Mental Status: She is alert. Psychiatric:         Thought Content:  Thought content normal.         Judgment: Judgment normal. Assessment:      Diagnosis Orders   1. Viral syndrome  COVID-19 Ambulatory   2. Encounter by telehealth for suspected COVID-19  COVID-19 Ambulatory     No results found for this visit on 12/03/20.    :     No follow-ups on file. Patient Instructions   Increase fluids except coffee, tea and alcohol. May use tylenol every 4-6 hours or ibuprofen every 8 hours as needed for comfort. If severe SOB or other breathing issues encourage to present to [facility]'s in [place] especially with Type 1 DM hx  Encourage other ill contacts to act as though they have covid until testing returns. Reviewed if positive 10 day quarantine from beginning of symptoms as long as fever not present off medication and other symptoms improving          Orders Placed This Encounter   Procedures    COVID-19 Ambulatory     Standing Status:   Future     Standing Expiration Date:   12/3/2021     Scheduling Instructions:      Saline media preferred given current shortage of viral transport media but both acceptable     Order Specific Question:   Is this test for diagnosis or screening? Answer:   Diagnosis of ill patient     Order Specific Question:   Symptomatic for COVID-19 as defined by CDC? Answer:   Yes     Order Specific Question:   Date of Symptom Onset     Answer:   12/2/2020     Order Specific Question:   Hospitalized for COVID-19? Answer:   No     Order Specific Question:   Admitted to ICU for COVID-19? Answer:   No     Order Specific Question:   Employed in healthcare setting? Answer:   No     Order Specific Question:   Resident in a congregate (group) care setting? Answer:   No     Order Specific Question:   Pregnant? Answer:   No     Order Specific Question:   Previously tested for COVID-19?      Answer:   No     Due to this being a TeleHealth encounter (During OMPAC-86 public health emergency), evaluation of the following organ systems was limited: Vitals/Constitutional/EENT/Resp/CV/GI//MS/Neuro/Skin/Heme-Lymph-Imm. Pursuant to the emergency declaration under the [address] [place] [address], Community [facility] waiver authority and the [employer] Act, this Virtual Visit was conducted with patient's consent, to reduce the patient's risk of exposure to COVID-19 and provide necessary medical care. Services were provided through a video synchronous discussion virtually to substitute for in-person clinic visit. Patient located at their individual home.       Electronically signed by William Murray MD on 12/3/2020 at10:42 AM

## 2020-12-06 LAB — SARS-COV-2, NAA: NOT DETECTED

## 2021-03-11 ENCOUNTER — OFFICE VISIT (OUTPATIENT)
Dept: OBGYN | Age: 56
End: 2021-03-11
Payer: COMMERCIAL

## 2021-03-11 ENCOUNTER — HOSPITAL ENCOUNTER (OUTPATIENT)
Age: 56
Setting detail: SPECIMEN
Discharge: HOME | End: 2021-03-11
Payer: COMMERCIAL

## 2021-03-11 ENCOUNTER — OFFICE VISIT (OUTPATIENT)
Dept: PAIN MANAGEMENT | Age: 56
End: 2021-03-11
Payer: COMMERCIAL

## 2021-03-11 VITALS
BODY MASS INDEX: 29.81 KG/M2 | HEART RATE: 84 BPM | SYSTOLIC BLOOD PRESSURE: 136 MMHG | HEIGHT: 62 IN | WEIGHT: 162 LBS | DIASTOLIC BLOOD PRESSURE: 84 MMHG

## 2021-03-11 VITALS
BODY MASS INDEX: 29.81 KG/M2 | WEIGHT: 162 LBS | SYSTOLIC BLOOD PRESSURE: 130 MMHG | HEART RATE: 68 BPM | DIASTOLIC BLOOD PRESSURE: 72 MMHG | HEIGHT: 62 IN

## 2021-03-11 DIAGNOSIS — M54.41 CHRONIC BILATERAL LOW BACK PAIN WITH BILATERAL SCIATICA: Primary | ICD-10-CM

## 2021-03-11 DIAGNOSIS — M54.42 CHRONIC BILATERAL LOW BACK PAIN WITH BILATERAL SCIATICA: Primary | ICD-10-CM

## 2021-03-11 DIAGNOSIS — Z12.4 SCREENING FOR CERVICAL CANCER: ICD-10-CM

## 2021-03-11 DIAGNOSIS — M96.1 POST LAMINECTOMY SYNDROME: ICD-10-CM

## 2021-03-11 DIAGNOSIS — M54.16 LUMBAR RADICULOPATHY: ICD-10-CM

## 2021-03-11 DIAGNOSIS — G89.29 CHRONIC BILATERAL LOW BACK PAIN WITH BILATERAL SCIATICA: Primary | ICD-10-CM

## 2021-03-11 DIAGNOSIS — Z01.419 WELL FEMALE EXAM WITH ROUTINE GYNECOLOGICAL EXAM: Primary | ICD-10-CM

## 2021-03-11 DIAGNOSIS — Z12.31 OTHER SCREENING MAMMOGRAM: ICD-10-CM

## 2021-03-11 PROCEDURE — 99214 OFFICE O/P EST MOD 30 MIN: CPT | Performed by: NURSE PRACTITIONER

## 2021-03-11 PROCEDURE — 3017F COLORECTAL CA SCREEN DOC REV: CPT | Performed by: NURSE PRACTITIONER

## 2021-03-11 PROCEDURE — G8417 CALC BMI ABV UP PARAM F/U: HCPCS | Performed by: NURSE PRACTITIONER

## 2021-03-11 PROCEDURE — G8482 FLU IMMUNIZE ORDER/ADMIN: HCPCS | Performed by: NURSE PRACTITIONER

## 2021-03-11 PROCEDURE — 99396 PREV VISIT EST AGE 40-64: CPT | Performed by: NURSE PRACTITIONER

## 2021-03-11 PROCEDURE — G8427 DOCREV CUR MEDS BY ELIG CLIN: HCPCS | Performed by: NURSE PRACTITIONER

## 2021-03-11 PROCEDURE — G0145 SCR C/V CYTO,THINLAYER,RESCR: HCPCS

## 2021-03-11 PROCEDURE — 1036F TOBACCO NON-USER: CPT | Performed by: NURSE PRACTITIONER

## 2021-03-11 RX ORDER — METHYLPREDNISOLONE 4 MG/1
TABLET ORAL
Qty: 1 KIT | Refills: 0 | Status: SHIPPED | OUTPATIENT
Start: 2021-03-11 | End: 2021-03-17

## 2021-03-11 RX ORDER — IBUPROFEN 800 MG/1
800 TABLET ORAL EVERY 8 HOURS PRN
Qty: 120 TABLET | Refills: 3 | Status: ON HOLD | OUTPATIENT
Start: 2021-03-11 | End: 2021-12-09

## 2021-03-11 ASSESSMENT — ENCOUNTER SYMPTOMS
RESPIRATORY NEGATIVE: 1
BACK PAIN: 1

## 2021-03-11 ASSESSMENT — PATIENT HEALTH QUESTIONNAIRE - PHQ9
2. FEELING DOWN, DEPRESSED OR HOPELESS: 0
SUM OF ALL RESPONSES TO PHQ QUESTIONS 1-9: 0

## 2021-03-11 NOTE — PROGRESS NOTES
Insomnia     Myopia with astigmatism and presbyopia     Tobacco abuse                                                                    Past Surgical History:   Procedure Laterality Date    BACK SURGERY  1/05/15    Lumbar microdiscectomy L5-S1    BLADDER SUSPENSION  09    (transobturator sling)    CERVIX LESION DESTRUCTION      (laser cone)    COLONOSCOPY  10/17/2018    hyperplastic polyp, Dr. Brigida Montes at [facility]  2006   [address]  2017    Dr Ryley Perry- [place]  2017    laser nerve block Dr Gillian Peng- [address] [facility]  2017    laser nerve block Dr Lance Duke Bilateral 2019    BL5 transforaminal radiculogram- [facility] per Dr Stephon Oro Right 2019    Right sacroiliac joint injection with arthrography    OTHER SURGICAL HISTORY Bilateral 2019    BL5 transforaminal radiculogram/epidurogram    SPINAL FUSION  7/6/15    Lumbar L5-S1     Family History   Problem Relation Age of Onset    Diabetes Paternal Grandmother     Cancer Father         lymph nodes    Ovarian Cancer Maternal Aunt 79        mother's half sister (same mother)    Glaucoma Neg Hx     Cataracts Neg Hx      Social History     Socioeconomic History    Marital status:      Spouse name: Not on file    Number of children: Not on file    Years of education: Not on file    Highest education level: Not on file   Occupational History    Not on file   Social Needs    Financial resource strain: Not on file    Food insecurity     Worry: Not on file     Inability: Not on file    Transportation needs     Medical: Not on file     Non-medical: Not on file   Tobacco Use    Smoking status: Former Smoker     Packs/day: 0.00     Years: 28.00     Pack years: 0.00     Types: Cigarettes     Start date: 1985     Quit date: 2014     Years since quittin.5    Smokeless tobacco: Never Used   Substance and Sexual Activity    Alcohol use: Yes     Alcohol/week: 3.0 standard drinks     Types: 3 Standard drinks or equivalent per week     Comment: Socially.  Drug use: No    Sexual activity: Yes     Partners: Male   Lifestyle    Physical activity     Days per week: Not on file     Minutes per session: Not on file    Stress: Not on file   Relationships    Social connections     Talks on phone: Not on file     Gets together: Not on file     Attends [employer] service: Not on file     Active member of club or organization: Not on file     Attends meetings of clubs or organizations: Not on file     Relationship status: Not on file    Intimate partner violence     Fear of current or ex partner: Not on file     Emotionally abused: Not on file     Physically abused: Not on file     Forced sexual activity: Not on file   Other Topics Concern    Not on file   Social History Narrative    Not on file       MEDICATIONS:  Current Outpatient Medications   Medication Sig Dispense Refill    methylPREDNISolone (MEDROL DOSEPACK) 4 MG tablet Take by mouth. 1 kit 0    ibuprofen (ADVIL;MOTRIN) 800 MG tablet Take 1 tablet by mouth every 8 hours as needed for Pain 120 tablet 3    levothyroxine (SYNTHROID) 50 MCG tablet Take 1 tablet by mouth daily 30 tablet 5    losartan-hydroCHLOROthiazide (HYZAAR) 50-12.5 MG per tablet Take 1 tablet by mouth daily 90 tablet 3    cyclobenzaprine (FLEXERIL) 10 MG tablet Take 1 tablet by mouth 2 times daily as needed for Muscle spasms 60 tablet 5    Crisaborole (EUCRISA) 2 % OINT Apply 1 applicator topically 2 times daily 1 Tube 5    pimecrolimus (ELIDEL) 1 % cream       acetaminophen (TYLENOL) 500 MG tablet Take 500 mg by mouth 2 times daily as needed       insulin lispro (HUMALOG) 100 UNIT/ML injection vial Use 60 units per day in pump 1 vial 3    Vitamin D (CHOLECALCIFEROL) 1000 UNITS CAPS capsule Take 4,000 Units by mouth daily       aspirin 81 MG tablet Take 81 mg by mouth daily.       Insulin Lispro, Human, (INSULIN PUMP) FLORENTIN Inject 1 each into the skin See Admin Instructions. No current facility-administered medications for this visit. ALLERGIES:  Allergies as of 03/11/2021 - Review Complete 03/11/2021   Allergen Reaction Noted    Ace inhibitors Other (See Comments) 11/07/2016    Pcn [penicillins] Rash 07/11/2013       Gynecologic History:  Menarche: 14   Menopause at 52     No LMP recorded. Patient has had an ablation. Hormone Exposure: No    Family History of Breast, Ovarian , Colon or Uterine Cancer: Yes      Preventative Health Testing:  Date of Last Pap Smear: 2020  Date of Last Mammogram: 2020  Date of Last Colonoscopy: 2018  Date of Last Bone Density: n/a  ________________________________________________________________________      Review Of Systems:  General ROS:  negative  Hematological and Lymphatic ROS:negative   Breast ROS: negative  Cardiovascular ROS: negative  Respiratory ROS: negative   Gastrointestinal ROS: negative  Genito-Urinary ROS: negative  Psychological ROS: negative  Neurological ROS: negative  Musculoskeletal ROS: negative  Dermatological ROS: negative                                                                                                                                                                                   PHYSICAL Exam:     Constitutional:  Blood pressure 130/72, pulse 68, height 5' 1.75\" (1.568 m), weight 162 lb (73.5 kg), not currently breastfeeding. General Appearance: This  is a well Developed, well Nourished, well groomed female. Her BMI was reviewed. Skin:  There was a Normal Inspection of the skin without rashes or lesions. Lymphatic:  No Lymph Nodes were Palpable in the neck , axilla or groin. Neck and EENT:  The neck was supple. There were no masses   The thyroid was not enlarged and had no masses. PERRLA, Nares Patent No Masses    Respiratory: The lungs were auscultated and found to be clear.  There were no rales, rhonchi or wheezes. There was a good respiratory effort. Cardiovascular: The heart was in a regular rate and rhythm. There was no murmur appreciated. Extremities: The patients extremities were without calf tenderness, edema, or varicosities. There was full range of motion in all four extremities. Pulses in all four extremities    Abdomen: The abdomen was soft and non-tender. There was no guarding, rebound or rigidity. On evaluation there was no evidence of hepatosplenomegaly and there was no costal vertebral jessenia tenderness bilaterally. No hernias were appreciated. Psych: The patient had a normal Orientation to: Time, Place, Person, and Situation  Mood and affect appropriate    Breast:  (Chest)  normal appearance, no masses or tenderness, Inspection negative, No nipple retraction or dimpling, No nipple discharge or bleeding, No axillary or supraclavicular adenopathy, Normal to palpation without dominant masses      Pelvic Exam:  External genitalia: normal general appearance  Urinary system: urethral meatus normal  Vaginal: normal mucosa without prolapse or lesions, normal without tenderness, induration or masses and normal rugae  Cervix: normal appearance and thin prep PAP obtained  Adnexa: normal bimanual exam and non palpable  Uterus: normal single, nontender    Musculosk:  Normal Gait and station was noted. Digits were evaluated without abnormal findings. Range of motion, stability and strength were evaluated and found to be appropriate for the patients age. ASSESSMENT:      50 y.o. Annual   Diagnosis Orders   1. Well female exam with routine gynecological exam     2. Screening for cervical cancer  PAP SMEAR   3.  Other screening mammogram  PREETI HEBER DIGITAL SCREEN BILATERAL        Chief Complaint   Patient presents with    Gynecologic Exam         Past Medical History:   Diagnosis Date    Bronchitis     Diabetes type 1, controlled ([address]     Eczema     GERD (gastroesophageal reflux disease)     Gestational diabetes     Hyperlipidemia     Hypothyroid     Insomnia     Myopia with astigmatism and presbyopia     Tobacco abuse          Patient Active Problem List   Diagnosis    Piriformis syndrome of right side    Chronic bilateral low back pain with bilateral sciatica    Displacement of lumbar intervertebral disc without myelopathy    Myopia of both eyes with astigmatism and presbyopia    Essential hypertension    Hyperlipidemia    Type 1 diabetes mellitus without complication (HCC)    Acquired hypothyroidism    Vitamin D deficiency    Menopause    Lumbar radiculopathy    Post laminectomy syndrome    Chronic right sacroiliac joint pain          Hereditary Breast, Ovarian, Colon and Uterine Cancer screening Done. Tobacco & Secondary smoke risks reviewed; instructed on cessation and avoidance    PLAN:  Return for Annual Exam, Mammogram.  Return for annual exams  Mammograms every 1 year. If 37 yo and last mammogram was negative. Routine health maintenance per patients PCP. Orders Placed This Encounter   Procedures    PREETI HEBER DIGITAL SCREEN BILATERAL     Standing Status:   Future     Standing Expiration Date:   9/11/2022    PAP SMEAR     Standing Status:   Future     Number of Occurrences:   1     Standing Expiration Date:   5/11/2021     Order Specific Question:   Collection Type     Answer: Thin Prep     Order Specific Question:   Prior Abnormal Pap Test     Answer:   No     Order Specific Question:   Screening or Diagnostic     Answer:   Screening     Order Specific Question:   HPV Requested?      Answer:   Yes - If Abnormal Reflex HPV     Order Specific Question:   High Risk Patient     Answer:   N/A       Ellie Nunez  3/11/2021

## 2021-03-11 NOTE — PROGRESS NOTES
Subjective:      Patient ID: Marino Benson is a 50 y.o. female. Chief Complaint   Patient presents with    Lower Back Pain     and neck     Other  Associated symptoms include arthralgias and myalgias. Hip Pain     Returns for increased low back pain, ROM decreased. Difficult to do exercises previously learned in PT. Taking tylenol and motrin, taking total 800mg per day. She has a Rx for flexeril, has not been taking. She has been to chiropractor, tried massage. Pain Assessment  Location of Pain: Back  Location Modifiers: Inferior, Posterior  Severity of Pain: 4  Quality of Pain: Other (Comment)(deep burning)  Duration of Pain: Persistent  Frequency of Pain: Constant  Aggravating Factors: Exercise, Other (Comment)(sitting)  Limiting Behavior: Yes  Relieving Factors: Rest, Other (Comment), Ice(lying down helps)  Result of Injury: No  Work-Related Injury: No  Are there other pain locations you wish to document?: Yes(shayna groin, \"like hot pokers\"/ neck)    Allergies   Allergen Reactions    Ace Inhibitors Other (See Comments)     Cough      Pcn [Penicillins] Rash       Outpatient Medications Marked as Taking for the 3/11/21 encounter (Office Visit) with AARON Anand CNP   Medication Sig Dispense Refill    methylPREDNISolone (MEDROL DOSEPACK) 4 MG tablet Take by mouth.  1 kit 0    ibuprofen (ADVIL;MOTRIN) 800 MG tablet Take 1 tablet by mouth every 8 hours as needed for Pain 120 tablet 3    levothyroxine (SYNTHROID) 50 MCG tablet Take 1 tablet by mouth daily 30 tablet 5    losartan-hydroCHLOROthiazide (HYZAAR) 50-12.5 MG per tablet Take 1 tablet by mouth daily 90 tablet 3    cyclobenzaprine (FLEXERIL) 10 MG tablet Take 1 tablet by mouth 2 times daily as needed for Muscle spasms 60 tablet 5    acetaminophen (TYLENOL) 500 MG tablet Take 500 mg by mouth 2 times daily as needed       insulin lispro (HUMALOG) 100 UNIT/ML injection vial Use 60 units per day in pump 1 vial 3    Vitamin D (CHOLECALCIFEROL) 1000 UNITS CAPS capsule Take 4,000 Units by mouth daily       aspirin 81 MG tablet Take 81 mg by mouth daily.  Insulin Lispro, Human, (INSULIN PUMP) FLORENTIN Inject 1 each into the skin See Admin Instructions.          Past Medical History:   Diagnosis Date    Bronchitis     Diabetes type 1, controlled ([address]     Eczema     GERD (gastroesophageal reflux disease)     Gestational diabetes     Hyperlipidemia     Hypothyroid     Insomnia     Myopia with astigmatism and presbyopia     Tobacco abuse        Past Surgical History:   Procedure Laterality Date    BACK SURGERY  1/05/15    Lumbar microdiscectomy L5-S1    BLADDER SUSPENSION  9/22/09    (transobturator sling)    CERVIX LESION DESTRUCTION  1993    (laser cone)    COLONOSCOPY  10/17/2018    hyperplastic polyp, Dr. Stefany Bragg at [facility]  2006   [address]  09/06/2017    Dr Deane Leyden  02/16/2017    laser nerve block Dr Demetrice Costa- [facility]  03/09/2017    laser nerve block Dr Kraig Boxer Bilateral 03/13/2019    BL5 transforaminal radiculogram- [facility] per Dr Denton Howell Right 05/29/2019    Right sacroiliac joint injection with arthrography    OTHER SURGICAL HISTORY Bilateral 07/31/2019    BL5 transforaminal radiculogram/epidurogram    SPINAL FUSION  7/6/15    Lumbar L5-S1       Family History   Problem Relation Age of Onset    Diabetes Paternal Grandmother     Cancer Father         lymph nodes    Ovarian Cancer Maternal Aunt 79        mother's half sister (same mother)    Glaucoma Neg Hx     Cataracts Neg Hx        Social History     Socioeconomic History    Marital status:      Spouse name: None    Number of children: None    Years of education: None    Highest education level: None   Occupational History    None   Social Needs    Financial resource strain: None    Food insecurity     Worry: None     Inability: None  Transportation needs     Medical: None     Non-medical: None   Tobacco Use    Smoking status: Former Smoker     Packs/day: 0.00     Years: 28.00     Pack years: 0.00     Types: Cigarettes     Start date: 1985     Quit date: 2014     Years since quittin.5    Smokeless tobacco: Never Used   Substance and Sexual Activity    Alcohol use: Yes     Alcohol/week: 3.0 standard drinks     Types: 3 Standard drinks or equivalent per week     Comment: Socially.  Drug use: No    Sexual activity: Yes     Partners: Male   Lifestyle    Physical activity     Days per week: None     Minutes per session: None    Stress: None   Relationships    Social connections     Talks on phone: None     Gets together: None     Attends Congregational service: None     Active member of club or organization: None     Attends meetings of clubs or organizations: None     Relationship status: None    Intimate partner violence     Fear of current or ex partner: None     Emotionally abused: None     Physically abused: None     Forced sexual activity: None   Other Topics Concern    None   Social History Narrative    None     Review of Systems   Constitutional: Positive for activity change. Respiratory: Negative. Cardiovascular: Negative. Genitourinary: Negative. Musculoskeletal: Positive for arthralgias, back pain and myalgias. Skin: Negative. Neurological:        Radiculopathy   Psychiatric/Behavioral: Positive for sleep disturbance. Objective:   Physical Exam  Vitals signs reviewed. Constitutional:       General: She is not in acute distress. Appearance: She is well-developed. She is not diaphoretic. Interventions: She is not intubated. HENT:      Head: Normocephalic and atraumatic. Cardiovascular:      Rate and Rhythm: Normal rate. Pulmonary:      Effort: Pulmonary effort is normal. No tachypnea, bradypnea, accessory muscle usage or respiratory distress. She is not intubated.    Musculoskeletal: Lumbar back: She exhibits pain. Skin:     General: Skin is warm and dry. Capillary Refill: Capillary refill takes less than 2 seconds. Coloration: Skin is not pale. Nails: There is no clubbing. Neurological:      Mental Status: She is alert and oriented to person, place, and time. GCS: GCS eye subscore is 4. GCS verbal subscore is 5. GCS motor subscore is 6. Cranial Nerves: No cranial nerve deficit. Psychiatric:         Speech: Speech normal.         Behavior: Behavior normal. Behavior is not agitated, aggressive, withdrawn or combative. Behavior is cooperative. Judgment: Judgment normal. Judgment is not impulsive or inappropriate. Assessment:      1. Chronic bilateral low back pain with bilateral sciatica    2. Post laminectomy syndrome    3. Lumbar radiculopathy          Plan:    Recommend she start taking Flexeril. PT eval and treat. Medrol dose pack followed by ibuprofen 800mg tid.  Follow up one month        AARON Washington - CNP

## 2021-03-11 NOTE — PATIENT INSTRUCTIONS
Fit simplify resistance bands ([employer])  Patient Education        Kegel Exercises: Care Instructions  Overview     Kegel exercises strengthen muscles around the bladder. These muscles control the flow of urine. Kegel exercises are sometime called \"pelvic floor\" exercises. They can help prevent urine leakage and keep the pelvic organs in place. Kegel exercises can strengthen pelvic muscles that have been weakened by age, pregnancy, childbirth, and surgery. They may help prevent or treat urine leakage. You do Kegel exercises by tightening the muscles you use when you urinate. You will likely need to do these exercises for several weeks to get better. Follow-up care is a key part of your treatment and safety. Be sure to make and go to all appointments, and call your doctor if you are having problems. It's also a good idea to know your test results and keep a list of the medicines you take. How can you care for yourself at home? · Do Kegel exercises. ? Find the muscles you need to strengthen. To do this, tighten the muscles that stop your urine while you are going to the bathroom. These are the same muscles you squeeze during Kegel exercises. ? Squeeze the muscles as hard as you can. Your belly and thighs should not move. ? Hold the squeeze for 3 seconds. Then relax for 3 seconds. ? Start with 3 seconds, and then add 1 second each week until you are able to squeeze for 10 seconds. ? Repeat the exercise 10 to 15 times for each session. Do three or more sessions each day. · You can check to see if you are using the right muscles. ? Tighten the muscles that help you stop passing gas or keep you from urinating. Make sure you aren't using your stomach, leg, or buttock muscles. ? Place a finger in your vagina and squeeze around it. You are doing them right when you feel pressure around your finger. Your doctor may also suggest that you put special weights in your vagina while you do the exercises.   · Check with

## 2021-03-11 NOTE — PATIENT INSTRUCTIONS
TRY FLEXERIL AS NEEDED    MEDROL DOSE PACK, WHEN COMPLETE MOTRIN 800 3X'S PER DAY    PHYSICAL THERAPY

## 2021-03-16 ENCOUNTER — HOSPITAL ENCOUNTER (OUTPATIENT)
Dept: PHYSICAL THERAPY | Age: 56
Setting detail: THERAPIES SERIES
Discharge: HOME | End: 2021-03-16
Payer: COMMERCIAL

## 2021-03-16 PROCEDURE — 97161 PT EVAL LOW COMPLEX 20 MIN: CPT

## 2021-03-16 PROCEDURE — 97110 THERAPEUTIC EXERCISES: CPT

## 2021-03-16 ASSESSMENT — PAIN DESCRIPTION - DESCRIPTORS: DESCRIPTORS: ACHING;HEAVINESS

## 2021-03-16 ASSESSMENT — PAIN DESCRIPTION - PAIN TYPE: TYPE: CHRONIC PAIN

## 2021-03-16 ASSESSMENT — PAIN SCALES - GENERAL: PAINLEVEL_OUTOF10: 4

## 2021-03-16 ASSESSMENT — PAIN DESCRIPTION - LOCATION: LOCATION: BACK;BUTTOCKS;HIP

## 2021-03-16 NOTE — PROGRESS NOTES
Physical Therapy  Initial Assessment  Date: 3/16/2021  Patient Name: Lima Campbell  MRN: [id number]  : 1965          Restrictions       Subjective   General  Chart Reviewed: Yes  Patient assessed for rehabilitation services?: Yes  Referring Practitioner: AARON Cardozo CNP  Referral Date : 21  General Comment  Comments: 3 Back Surgeies-Discectomy, Lumbar Fusion, and Laminectomy L5-S1  PT Visit Information  PT Insurance Information: [employer]  Subjective  Subjective: Returns for increased low back pain, ROM decreased. Difficult to do exercises previously learned in PT. Taking tylenol and motrin, taking total 800mg per day. She has a Rx for flexeril, has not been taking. She has been to chiropractor, tried massage. Decreased Trunk ROM since. Pain Screening  Patient Currently in Pain: Yes  Pain Assessment  Pain Assessment: 0-10  Pain Level: 4(6/10 at worst in AM or after work, 2/10 at best)  Pain Type: Chronic pain  Pain Location: Back;Buttocks; Hip  Pain Orientation: Right;Left;Lower  Pain Descriptors: Aching;Heaviness(Deap Heavy Ache)  Pain Frequency: Continuous  Pain Onset: Awakened from sleep  Vital Signs  Patient Currently in Pain: Yes    Vision/Hearing  Vision  Vision: Impaired  Hearing  Hearing: Within functional limits    Orientation  Orientation  Overall Orientation Status: Within Functional Limits    Social/Functional History  Social/Functional History  Occupation: Full time employment  Type of occupation: [employer] between 2 jobs    Objective     Observation/Palpation  Posture: Fair    Spine  Lumbar: Trunk Flexion to ankles, ext WFL, bilateral Rotation WFL    Strength RLE  Strength RLE: Exception  R Hip Flexion: 4/5  R Hip ABduction: 4/5  R Hip ADduction: 4+/5  R Knee Flexion: 4+/5  R Knee Extension: 4/5  Strength LLE  Strength LLE: Exception  L Hip Flexion: 5/5  L Hip ABduction: 5/5  L Hip ADduction: 5/5  L Knee Flexion: 5/5  L Knee Extension: 5/5     Additional Measures  Special Tests: SKTC/SLR-, Centralization with prone/prop/pressup                Ambulation  Ambulation?: Yes  Ambulation 1  Assistance: Independent  Quality of Gait: slight hip drop, decreased knee flexion-slight in swing on the Right  Balance  Posture: Good  Sitting - Static: Good  Sitting - Dynamic: Good  Standing - Static: Good  Standing - Dynamic: Good                         Assessment   Conditions Requiring Skilled Therapeutic Intervention  Body structures, Functions, Activity limitations: Decreased functional mobility ; Decreased ADL status; Decreased ROM; Decreased balance; Increased pain;Decreased strength;Decreased posture  Assessment: Patient to the clinic with low back pain wtih radicular symptoms. Patient has Right LE weakness and symptoms to the Right LE. PT for pain reduction and improvement for sitting and standing tolerance. Prognosis: Good  Decision Making: Low Complexity  REQUIRES PT FOLLOW UP: Yes  Activity Tolerance  Activity Tolerance: Patient Tolerated treatment well         Plan   Plan  Times per week: 2-3x/week  Plan weeks: 6 weeks  Current Treatment Recommendations: Strengthening, Neuromuscular Re-education, Home Exercise Program, ROM, Safety Education & Training, Manual Therapy - Soft Tissue Mobilization, Balance Training, Functional Mobility Training, Manual Therapy - Joint Manipulation, Transfer Training, Gait Training, Modalities, Pain Management, Integrated Dry Needling    G-Code       OutComes Score                                                  AM-PAC Score             Goals  Short term goals  Time Frame for Short term goals: 3 weeks  Short term goal 1: Initiate HEP  Long term goals  Time Frame for Long term goals : 6 weeks  Long term goal 1: Indpendent in HEP  Long term goal 2: Improve LE strength to 4+/5 on the Right to allow for increased tolerance to sitting and standing. Long term goal 3: Patient to have a 50% reduction in Radicular symptoms.   Long term goal 4: Trunk AROM WFL  Patient Goals   Patient goals : Decrease Pain       Therapy Time   Individual Concurrent Group Co-treatment   Time In 0235         Time Out 0320         Minutes 45         Timed Code Treatment Minutes: 15 Harleen Luna, PT

## 2021-03-16 NOTE — FLOWSHEET NOTE
Physical Therapy Daily Treatment Note    Date:  3/16/2021    Patient Name:  Jayden Tolentino    :  1965  MRN: [id number]  Restrictions/Precautions:     Medical/Treatment Diagnosis Information:   · Diagnosis: M54.42, M54.41, G89.29 (ICD-10-CM) - Chronic bilateral low back pain with bilateral sciatica  ·    Insurance/Certification information:  PT Insurance Information: [employer]  Physician Information:  Referring Practitioner: AARON Ag CNP  Plan of care signed (Y/N):  n  Visit# / total visits: 1 /10  Pain level: 4/10       Time In:235   Time Out:320  Progress Note: [x]  Yes  []  No  Next due by: Visit #10      Subjective:   See Eval   Objective: See Eval    Observation:    Test measurements:      Exercises:   Exercise/Equipment Resistance/Repetitions Other comments        Prone prop 5' HEP    Prone Pressup 10x HEP    PKB  10x HEP    Prone Hip Ext          PPT      Bridging      LTR            Standing Trunk Ext 10x HEP    Modified Trunk Ext 10x HEP              [x] Provided verbal/tactile cueing for activities related to strengthening, flexibility, endurance, ROM. (61731)  [] Provided verbal/tactile cueing for activities related to improving balance, coordination, kinesthetic sense, posture, motor skill, proprioception. (86458)    Therapeutic Activities:     [] Therapeutic activities, direct (one-on-one) patient contact (use of dynamic activities to improve functional performance). (73957)    Gait:   [] Provided training and instruction to the patient for ambulation re-education. (22358)    Self-Care/ADL's  [] Self-care/home management training and compensatory training, meal preparation, safety procedures, and instructions in use of assistive technology devices/adaptive equipment, direct one-on-one contact.  (63657)    Home Exercise Program:     [] Reviewed/Progressed HEP activities related to strengthening, flexibility, endurance, ROM. (34907)  [] Reviewed/Progressed HEP activities related to improving balance, coordination, kinesthetic sense, posture, motor skill, proprioception.  (28962)    Manual Treatments:    [] Provided manual therapy to mobilize soft tissue/joints for the purpose of modulating pain, promoting relaxation,  increasing ROM, reducing/eliminating soft tissue swelling/inflammation/restriction, improving soft tissue extensibility. (41755)    Service Based Modalities:      Timed Code Treatment Minutes:   10' ex    Total Treatment Minutes:   45    Treatment/Activity Tolerance:  [x] Patient tolerated treatment well [] Patient limited by fatique  [] Patient limited by pain  [] Patient limited by other medical complications  [] Other:     Prognosis: [x] Good [] Fair  [] Poor    Patient Requires Follow-up: [x] Yes  [] No      Goals:  Short term goals  Time Frame for Short term goals: 3 weeks  Short term goal 1: Initiate HEP    Long term goals  Time Frame for Long term goals : 6 weeks  Long term goal 1: Indpendent in HEP  Long term goal 2: Improve LE strength to 4+/5 on the Right to allow for increased tolerance to sitting and standing. Long term goal 3: Patient to have a 50% reduction in Radicular symptoms.   Long term goal 4: Trunk AROM WFL          Plan:   [] Continue per plan of care [] Alter current plan (see comments)  [x] Plan of care initiated [] Hold pending MD visit [] Discharge  Plan for Next Session:      Electronically signed by:  Mamta Lara

## 2021-03-16 NOTE — PLAN OF CARE
[facility]    [x] [place]  Phone: [phone]  Fax: [phone]      [] [place]  Phone: [phone]  Fax: [phone]        To: Referring Practitioner: AARON Cornejo CNP      Patient: Abdifatah Larson  : 1965   MRN: [id number]  Evaluation Date: 3/16/2021      Diagnosis Information:  · Diagnosis: M54.42, M54.41, G89.29 (ICD-10-CM) - Chronic bilateral low back pain with bilateral sciatica    Physical Therapy Certification  Dear AARON Cornejo CNP  The following patient has been evaluated for physical therapy services and for therapy to continue, Medicare requires monthly physician review of the treatment plan. Please review the attached evaluation and/or summary of the patient's plan of care, and verify that you agree therapy should continue by signing the attached document and sending it back to our office. Plan of Care/Treatment to date:  [x] Therapeutic Exercise    [x] Modalities:  [x] Therapeutic Activity     [] Ultrasound  [] Electrical Stimulation  [x] Gait Training      [] Cervical Traction [] Lumbar Traction  [x] Neuromuscular Re-education    [] Cold/hotpack [] Iontophoresis   [x] Instruction in HEP     Other:  [x] Manual Therapy      []             [] Aquatic Therapy      []           ? Goals:  Short term goals  Time Frame for Short term goals: 3 weeks  Short term goal 1: Initiate HEP    Long term goals  Time Frame for Long term goals : 6 weeks  Long term goal 1: Indpendent in HEP  Long term goal 2: Improve LE strength to 4+/5 on the Right to allow for increased tolerance to sitting and standing. Long term goal 3: Patient to have a 50% reduction in Radicular symptoms. Long term goal 4: Trunk AROM WFL    Frequency/Duration:3/16/21-21  # Days per week: [] 1 day # Weeks: [] 1 week [] 5 weeks     [x] 2 days?    [] 2 weeks [x] 6 weeks     [x] 3 days   [] 3 weeks [] 7 weeks     [] 4 days   [] 4 weeks [] 8 weeks Rehab Potential: [] Excellent [x] Good [] Fair  [] Poor     Electronically signed by:  Margaret Mc PT    If you have any questions or concerns, please don't hesitate to call.   Thank you for your referral.      Physician Signature:________________________________Date:__________________  By signing above, therapists plan is approved by physician

## 2021-03-18 ENCOUNTER — HOSPITAL ENCOUNTER (OUTPATIENT)
Dept: PHYSICAL THERAPY | Age: 56
Setting detail: THERAPIES SERIES
Discharge: HOME | End: 2021-03-18
Payer: COMMERCIAL

## 2021-03-18 PROCEDURE — 97110 THERAPEUTIC EXERCISES: CPT | Performed by: PHYSICAL THERAPY ASSISTANT

## 2021-03-18 NOTE — FLOWSHEET NOTE
Physical Therapy Daily Treatment Note    Date:  3/18/2021    Patient Name:  Francisca Weeks    :  1965  MRN: [id number]  Restrictions/Precautions:     Medical/Treatment Diagnosis Information:   · Diagnosis: M54.42, M54.41, G89.29 (ICD-10-CM) - Chronic bilateral low back pain with bilateral sciatica     Insurance/Certification information:  PT Insurance Information: [employer]  Physician Information:  Referring Practitioner: AARON Adkins - CNP  Plan of care signed (Y/N):  Y  Visit# / total visits: 2 /10  Pain level: 2-310       Time In:220   Time Out:251    Progress Note: []  Yes  [x]  No  Next due by: Visit #10      Subjective:   Pt. States overall improvement in pain since last session. Verbalizes use of current HEP. Pain this date rated 2-3/10 through right LB with radicular symptoms into right gluteal region. Objective: KAYLA complete per flow chart to facilitate motion and strength to improve pain and ease with work duties. Initiated and advanced several exercises to improve motion and strength. Discussed use of lumbar roll to improve motion and decrease pain. Verbal cuing for progression and technique with exercises. Difficulty with end range of motion remains.  Observation: Able to centralize and decrease overall pain.     Test measurements:      Exercises:   Exercise/Equipment Resistance/Repetitions Other comments        Prone prop 5' HEP    Prone Pressup 10x HEP    PKB  10x HEP    Prone Hip Ext 10x         PPT  10x 5''    Bridging  10x    LTR  10x          Standing Trunk Ext 10x2 HEP    Modified Trunk Ext 10x2 HEP    Hip abd,ext, HS curl 10x ea         [x] Provided verbal/tactile cueing for activities related to strengthening, flexibility, endurance, ROM. (05125)  [] Provided verbal/tactile cueing for activities related to improving balance, coordination, kinesthetic sense, posture, motor skill, proprioception. (28751)    Therapeutic Activities:     [] Therapeutic activities, direct (one-on-one) patient contact (use of dynamic activities to improve functional performance). (72467)    Gait:   [] Provided training and instruction to the patient for ambulation re-education. (43354)    Self-Care/ADL's  [] Self-care/home management training and compensatory training, meal preparation, safety procedures, and instructions in use of assistive technology devices/adaptive equipment, direct one-on-one contact. (55707)    Home Exercise Program:    Reviewed  [x] Reviewed/Progressed HEP activities related to strengthening, flexibility, endurance, ROM. (67835)  [] Reviewed/Progressed HEP activities related to improving balance, coordination, kinesthetic sense, posture, motor skill, proprioception.  (67782)    Manual Treatments:    [] Provided manual therapy to mobilize soft tissue/joints for the purpose of modulating pain, promoting relaxation,  increasing ROM, reducing/eliminating soft tissue swelling/inflammation/restriction, improving soft tissue extensibility. (94906)    Service Based Modalities:      Timed Code Treatment Minutes:   32' ex    Total Treatment Minutes:   31    Treatment/Activity Tolerance:  [x] Patient tolerated treatment well [] Patient limited by fatique  [] Patient limited by pain  [] Patient limited by other medical complications  [] Other:     Prognosis: [x] Good [] Fair  [] Poor    Patient Requires Follow-up: [x] Yes  [] No      Goals:  Short term goals  Time Frame for Short term goals: 3 weeks  Short term goal 1: Initiate HEP (Initiated     Long term goals  Time Frame for Long term goals : 6 weeks  Long term goal 1: Indpendent in HEP  Long term goal 2: Improve LE strength to 4+/5 on the Right to allow for increased tolerance to sitting and standing. Long term goal 3: Patient to have a 50% reduction in Radicular symptoms.   Long term goal 4: Trunk AROM WFL     Plan:   [x] Continue per plan of care [] Alter current plan (see comments)  [] Plan of care initiated [] Hold pending

## 2021-03-19 LAB — CYTOLOGY REPORT: NORMAL

## 2021-03-22 ENCOUNTER — HOSPITAL ENCOUNTER (OUTPATIENT)
Dept: PHYSICAL THERAPY | Age: 56
Setting detail: THERAPIES SERIES
Discharge: HOME | End: 2021-03-22
Payer: COMMERCIAL

## 2021-03-22 PROCEDURE — G0283 ELEC STIM OTHER THAN WOUND: HCPCS | Performed by: PHYSICAL THERAPY ASSISTANT

## 2021-03-22 PROCEDURE — 97110 THERAPEUTIC EXERCISES: CPT | Performed by: PHYSICAL THERAPY ASSISTANT

## 2021-03-22 NOTE — FLOWSHEET NOTE
kinesthetic sense, posture, motor skill, proprioception. (03678)    Therapeutic Activities:     [] Therapeutic activities, direct (one-on-one) patient contact (use of dynamic activities to improve functional performance). (77055)    Gait:   [] Provided training and instruction to the patient for ambulation re-education. (83613)    Self-Care/ADL's  [] Self-care/home management training and compensatory training, meal preparation, safety procedures, and instructions in use of assistive technology devices/adaptive equipment, direct one-on-one contact. (80404)    Home Exercise Program:    Reviewed  [x] Reviewed/Progressed HEP activities related to strengthening, flexibility, endurance, ROM. (33002)  [] Reviewed/Progressed HEP activities related to improving balance, coordination, kinesthetic sense, posture, motor skill, proprioception.  (03263)    Manual Treatments:    [] Provided manual therapy to mobilize soft tissue/joints for the purpose of modulating pain, promoting relaxation,  increasing ROM, reducing/eliminating soft tissue swelling/inflammation/restriction, improving soft tissue extensibility. (15301)    Service Based Modalities:  15' IFC    Timed Code Treatment Minutes:   32' ex    Total Treatment Minutes:  42    Treatment/Activity Tolerance:  [x] Patient tolerated treatment well [] Patient limited by fatique  [] Patient limited by pain  [] Patient limited by other medical complications  [] Other:     Prognosis: [x] Good [] Fair  [] Poor    Patient Requires Follow-up: [x] Yes  [] No      Goals:  Short term goals  Time Frame for Short term goals: 3 weeks  Short term goal 1: Initiate HEP (Initiated     Long term goals  Time Frame for Long term goals : 6 weeks  Long term goal 1: Indpendent in HEP  Long term goal 2: Improve LE strength to 4+/5 on the Right to allow for increased tolerance to sitting and standing. Long term goal 3: Patient to have a 50% reduction in Radicular symptoms.   Long term goal 4: Trunk AROM WFL     Plan:   [x] Continue per plan of care [] Alter current plan (see comments)  [] Plan of care initiated [] Hold pending MD visit [] Discharge    Plan for Next Session:  Monitor tolerance and advance motion and pain as able. Electronically signed by:   Oneta Gilford

## 2021-03-25 ENCOUNTER — HOSPITAL ENCOUNTER (OUTPATIENT)
Dept: PHYSICAL THERAPY | Age: 56
Setting detail: THERAPIES SERIES
Discharge: HOME | End: 2021-03-25
Payer: COMMERCIAL

## 2021-03-25 PROCEDURE — G0283 ELEC STIM OTHER THAN WOUND: HCPCS

## 2021-03-25 PROCEDURE — 97110 THERAPEUTIC EXERCISES: CPT

## 2021-03-25 NOTE — FLOWSHEET NOTE
Physical Therapy Daily Treatment Note    Date:  3/25/2021    Patient Name:  Marino Benson    :  1965  MRN: [id number]  Restrictions/Precautions:     Medical/Treatment Diagnosis Information:   · Diagnosis: M54.42, M54.41, G89.29 (ICD-10-CM) - Chronic bilateral low back pain with bilateral sciatica     Insurance/Certification information:  PT Insurance Information: [employer]  Physician Information:  Referring Practitioner: AARON Anand - CNP  Plan of care signed (Y/N):  Y  Visit# / total visits: 3 /10  Pain level: 2-3/10       Time In: 225  Time Out:0315    Progress Note: []  Yes  [x]  No  Next due by: Visit #10      Subjective:   Pt. Relates pain this date rated 2/10 through bilateral low back, R>L. Notes intermittent gluteal pain but improved from previous, but has had an increase in nerve pain with the new added exs. Objective: KAYLA complete per flow chart to facilitate motion and strength to improve pain and ease with work duties. Held briding this date to see if ex that is causing and increase in radicaulr pain in the evening. Verbal cuing for progression and technique with exercises. Difficulty with end range of motion remains. Ends with IFC to decrease pain and discomfort.  Observation: Able to centralize and decrease overall pain.      Test measurements:      Exercises:   Exercise/Equipment Resistance/Repetitions Other comments        Prone prop 5' HEP    Prone Pressup 10x2 HEP    PKB  10x2 HEP    Prone Hip Ext 10x         PPT  10x 5''    Bridging     LTR  10x    Hip abd/add 15x 5'' GR/Ball                   Standing Trunk Ext 10x2 HEP    Modified Trunk Ext 10x2 HEP    Hip abd,ext, HS curl 10x ea         [x] Provided verbal/tactile cueing for activities related to strengthening, flexibility, endurance, ROM. (76653)  [] Provided verbal/tactile cueing for activities related to improving balance, coordination, kinesthetic sense, posture, motor skill, proprioception. (54018) Therapeutic Activities:     [] Therapeutic activities, direct (one-on-one) patient contact (use of dynamic activities to improve functional performance). (13610)    Gait:   [] Provided training and instruction to the patient for ambulation re-education. (01927)    Self-Care/ADL's  [] Self-care/home management training and compensatory training, meal preparation, safety procedures, and instructions in use of assistive technology devices/adaptive equipment, direct one-on-one contact. (61201)    Home Exercise Program:    Reviewed  [x] Reviewed/Progressed HEP activities related to strengthening, flexibility, endurance, ROM. (51158)  [] Reviewed/Progressed HEP activities related to improving balance, coordination, kinesthetic sense, posture, motor skill, proprioception.  (11782)    Manual Treatments:    [] Provided manual therapy to mobilize soft tissue/joints for the purpose of modulating pain, promoting relaxation,  increasing ROM, reducing/eliminating soft tissue swelling/inflammation/restriction, improving soft tissue extensibility. (05253)    Service Based Modalities:  15' IFC    Timed Code Treatment Minutes:   28' ex    Total Treatment Minutes:  50    Treatment/Activity Tolerance:  [x] Patient tolerated treatment well [] Patient limited by fatique  [] Patient limited by pain  [] Patient limited by other medical complications  [] Other:     Prognosis: [x] Good [] Fair  [] Poor    Patient Requires Follow-up: [x] Yes  [] No      Goals:  Short term goals  Time Frame for Short term goals: 3 weeks  Short term goal 1: Initiate HEP (Initiated     Long term goals  Time Frame for Long term goals : 6 weeks  Long term goal 1: Indpendent in HEP  Long term goal 2: Improve LE strength to 4+/5 on the Right to allow for increased tolerance to sitting and standing. Long term goal 3: Patient to have a 50% reduction in Radicular symptoms.   Long term goal 4: Trunk AROM WFL     Plan:   [x] Continue per plan of care [] Alter current plan (see comments)  [] Plan of care initiated [] Hold pending MD visit [] Discharge    Plan for Next Session:  Monitor tolerance and advance motion and pain as able.      Electronically signed by:  Maco Hung

## 2021-03-29 ENCOUNTER — HOSPITAL ENCOUNTER (OUTPATIENT)
Dept: PHYSICAL THERAPY | Age: 56
Setting detail: THERAPIES SERIES
Discharge: HOME | End: 2021-03-29
Payer: COMMERCIAL

## 2021-04-01 ENCOUNTER — HOSPITAL ENCOUNTER (OUTPATIENT)
Dept: PHYSICAL THERAPY | Age: 56
Setting detail: THERAPIES SERIES
Discharge: HOME | End: 2021-04-01
Payer: COMMERCIAL

## 2021-04-01 PROCEDURE — 97110 THERAPEUTIC EXERCISES: CPT

## 2021-04-01 NOTE — FLOWSHEET NOTE
Physical Therapy Daily Treatment Note    Date:  2021    Patient Name:  Yanet Walker    :  1965  MRN: [id number]  Restrictions/Precautions:     Medical/Treatment Diagnosis Information:   · Diagnosis: M54.42, M54.41, G89.29 (ICD-10-CM) - Chronic bilateral low back pain with bilateral sciatica     Insurance/Certification information:  PT Insurance Information: [employer]  Physician Information:  Referring Practitioner: AARON Sanderson CNP  Plan of care signed (Y/N):  Y  Visit# / total visits:  4/10  Pain level: 2/10       Time In: 12:31 PM  Time Out: 1:07 PM    Progress Note: []  Yes  [x]  No  Next due by: Visit #10      Subjective:   Patient reports her back is gradually improving. Patient reports she still continues to have pain in the evenings but can centralize symptoms with HEP. Patient reports she is being able to tolerate sitting longer at work without needing to do her exercises. Objective: Performed ther-ex as documented on flowsheet to improve motion and strength to improve pain and ease with work duties. Patient given verbal cues to perform exercises properly. Patient able to centralize symptoms with prone exercises. Continued to hold bridges due to increase in radicular symptoms. Patient wished to hold estim this date due to having another appointment.      Observation: fair posture noted   Test measurements:  TA control: fair   Full lumbar extension with prone press up    Exercises:   Exercise/Equipment Resistance/Repetitions Other comments        Prone prop 5' HEP    Prone Pressup 10x2 HEP    PKB  10x2 HEP    Prone Hip Ext 10x2         PPT  10x 5''    Bridging     LTR  10x    Hip abd/add 15x 5'' GR/Ball                   Standing Trunk Ext 10x2 HEP    Modified Trunk Ext 10x2 HEP    Hip abd,ext, HS curl 15x ea    sidesteps 2 laps    [x] Provided verbal/tactile cueing for activities related to strengthening, flexibility, endurance, ROM. (93068)  [] Provided verbal/tactile reduction in Radicular symptoms. Long term goal 4: Trunk AROM WFL     Plan:   [x] Continue per plan of care [] Alter current plan (see comments)  [] Plan of care initiated [] Hold pending MD visit [] Discharge    Plan for Next Session:  Monitor tolerance and advance motion and pain as able.      Electronically signed by:  Fernando Pina

## 2021-04-06 ENCOUNTER — HOSPITAL ENCOUNTER (OUTPATIENT)
Dept: PHYSICAL THERAPY | Age: 56
Setting detail: THERAPIES SERIES
Discharge: HOME | End: 2021-04-06
Payer: COMMERCIAL

## 2021-04-06 PROCEDURE — 97110 THERAPEUTIC EXERCISES: CPT

## 2021-04-06 NOTE — FLOWSHEET NOTE
Physical Therapy Daily Treatment Note    Date:  2021    Patient Name:  Prerna Fernandez    :  1965  MRN: [id number]  Restrictions/Precautions:     Medical/Treatment Diagnosis Information:   · Diagnosis: M54.42, M54.41, G89.29 (ICD-10-CM) - Chronic bilateral low back pain with bilateral sciatica     Insurance/Certification information:  PT Insurance Information: [employer]  Physician Information:  Referring Practitioner: AARON Hernández CNP  Plan of care signed (Y/N):  Y  Visit# / total visits:  5/10  Pain level: 4-5/10       Time In: 2:04 PM  Time Out: 2:47 PM    Progress Note: []  Yes  [x]  No  Next due by: Visit #10      Subjective:   Patient reports her back is bothering her this afternoon. Patient report compliance with HEP. Patient reported she had a lot of N/T down legs R>L after Easter gathering on  but was on her feet quite a bit. Patient reports she still can get symptoms to decrease with her home exercises. Objective: Performed ther-ex as documented on flowsheet to improve motion and strength to improve pain and ease with work duties. Patient given verbal cues to perform exercises properly. Patient able to centralize symptoms with prone exercises. Continued to hold bridges due to increase in radicular symptoms. Patient reported muscle soreness at end of treatment session but denied radicular symptoms end of treatment session.      Observation: fair posture noted   Test measurements:  TA control: fair   Full lumbar extension with prone press up    Exercises:   Exercise/Equipment Resistance/Repetitions Other comments        Prone prop 5' HEP    Prone Pressup 10x3 HEP    PKB  10x3 HEP    Prone Hip Ext 10x2         PPT  15x 5''    Bridging     LTR  15x    Hip abd/add 15x 5'' GR/Ball                   Standing Trunk Ext 10x2 HEP    Modified Trunk Ext 10x2 HEP    Hip abd,ext, HS curl 15x ea    sidesteps 2 laps    [x] Provided verbal/tactile cueing for activities related to strengthening, flexibility, endurance, ROM. (18169)  [] Provided verbal/tactile cueing for activities related to improving balance, coordination, kinesthetic sense, posture, motor skill, proprioception. (15031)    Therapeutic Activities:     [] Therapeutic activities, direct (one-on-one) patient contact (use of dynamic activities to improve functional performance). (18442)    Gait:   [] Provided training and instruction to the patient for ambulation re-education. (47395)    Self-Care/ADL's  [] Self-care/home management training and compensatory training, meal preparation, safety procedures, and instructions in use of assistive technology devices/adaptive equipment, direct one-on-one contact. (41640)    Home Exercise Program:    Reviewed  [x] Reviewed/Progressed HEP activities related to strengthening, flexibility, endurance, ROM. (45409)  [] Reviewed/Progressed HEP activities related to improving balance, coordination, kinesthetic sense, posture, motor skill, proprioception.  (59215)    Manual Treatments:    [] Provided manual therapy to mobilize soft tissue/joints for the purpose of modulating pain, promoting relaxation,  increasing ROM, reducing/eliminating soft tissue swelling/inflammation/restriction, improving soft tissue extensibility.  (67537)    Service Based Modalities:      Timed Code Treatment Minutes:   37' ex    Total Treatment Minutes:  37'    Treatment/Activity Tolerance:  [x] Patient tolerated treatment well [] Patient limited by fatique  [] Patient limited by pain  [] Patient limited by other medical complications  [] Other:     Prognosis: [x] Good [] Fair  [] Poor    Patient Requires Follow-up: [x] Yes  [] No      Goals:  Short term goals  Time Frame for Short term goals: 3 weeks  Short term goal 1: Initiate HEP (Initiated     Long term goals  Time Frame for Long term goals : 6 weeks  Long term goal 1: Indpendent in HEP  Long term goal 2: Improve LE strength to 4+/5 on the Right to allow for increased tolerance to sitting and standing. Long term goal 3: Patient to have a 50% reduction in Radicular symptoms. Long term goal 4: Trunk AROM WFL     Plan:   [x] Continue per plan of care [] Alter current plan (see comments)  [] Plan of care initiated [] Hold pending MD visit [] Discharge    Plan for Next Session:  Monitor tolerance and advance motion and pain as able.      Electronically signed by:  Herbetrh Ramon

## 2021-04-08 ENCOUNTER — HOSPITAL ENCOUNTER (OUTPATIENT)
Dept: PHYSICAL THERAPY | Age: 56
Setting detail: THERAPIES SERIES
Discharge: HOME | End: 2021-04-08
Payer: COMMERCIAL

## 2021-04-08 PROCEDURE — 97110 THERAPEUTIC EXERCISES: CPT

## 2021-04-08 PROCEDURE — 97140 MANUAL THERAPY 1/> REGIONS: CPT

## 2021-04-08 NOTE — FLOWSHEET NOTE
Physical Therapy Daily Treatment Note    Date:  2021    Patient Name:  Yanet Walker    :  1965  MRN: [id number]  Restrictions/Precautions:     Medical/Treatment Diagnosis Information:   · Diagnosis: M54.42, M54.41, G89.29 (ICD-10-CM) - Chronic bilateral low back pain with bilateral sciatica     Insurance/Certification information:  PT Insurance Information: [employer]  Physician Information:  Referring Practitioner: AARON Sanderson - CNP  Plan of care signed (Y/N):  Y  Visit# / total visits:  6/10  Pain level: 5/10       Time In: 2:30 PM  Time Out:309 PM    Progress Note: []  Yes  [x]  No  Next due by: Visit #10      Subjective:   Patient reports her back is bothering her this afternoon. Patient report compliance with HEP. Patient noting had been getting relief from exs, but not getting as much relief in the last several days. Objective: Performed ther-ex as documented on flowsheet to improve motion and strength to improve pain and ease with work duties. Patient given verbal cues to perform exercises properly. Patient able to centralize symptoms with prone exercises. Continued to hold bridges due to increase in radicular symptoms. Patient reported muscle soreness, trialed IDN this date to decrease pain and muscle tightness.      Observation: fair posture noted   Test measurements:  TA control: fair   Full lumbar extension with prone press up    Exercises:   Exercise/Equipment Resistance/Repetitions Other comments        Prone prop 5' HEP    Prone Pressup 10x3 HEP    PKB  10x3 HEP    Prone Hip Ext 10x2         PPT  15x 5''    Bridging     LTR  15x    Hip abd/add 15x 5'' GR/Ball                   Standing Trunk Ext 10x2 HEP    Modified Trunk Ext 10x2 HEP    Hip abd,ext, HS curl 15x ea    sidesteps 2 laps    [x] Provided verbal/tactile cueing for activities related to strengthening, flexibility, endurance, ROM. (47516)  [] Provided verbal/tactile cueing for activities related to 1: Indpendent in HEP  Long term goal 2: Improve LE strength to 4+/5 on the Right to allow for increased tolerance to sitting and standing. Long term goal 3: Patient to have a 50% reduction in Radicular symptoms. Long term goal 4: Trunk AROM WFL     Plan:   [x] Continue per plan of care [] Alter current plan (see comments)  [] Plan of care initiated [] Hold pending MD visit [] Discharge    Plan for Next Session:  Monitor tolerance and advance motion and pain as able.      Electronically signed by:  Corina Conde

## 2021-04-13 ENCOUNTER — OFFICE VISIT (OUTPATIENT)
Dept: PAIN MANAGEMENT | Age: 56
End: 2021-04-13
Payer: COMMERCIAL

## 2021-04-13 ENCOUNTER — HOSPITAL ENCOUNTER (OUTPATIENT)
Dept: PHYSICAL THERAPY | Age: 56
Setting detail: THERAPIES SERIES
Discharge: HOME | End: 2021-04-13
Payer: COMMERCIAL

## 2021-04-13 VITALS
BODY MASS INDEX: 29.59 KG/M2 | HEIGHT: 62 IN | DIASTOLIC BLOOD PRESSURE: 80 MMHG | HEART RATE: 70 BPM | SYSTOLIC BLOOD PRESSURE: 122 MMHG | WEIGHT: 160.8 LBS

## 2021-04-13 DIAGNOSIS — M46.1 SACROILIITIS (HCC): ICD-10-CM

## 2021-04-13 DIAGNOSIS — M96.1 POST LAMINECTOMY SYNDROME: Primary | ICD-10-CM

## 2021-04-13 DIAGNOSIS — M53.3 CHRONIC RIGHT SACROILIAC JOINT PAIN: ICD-10-CM

## 2021-04-13 DIAGNOSIS — M54.16 LUMBAR RADICULOPATHY: ICD-10-CM

## 2021-04-13 DIAGNOSIS — G89.29 CHRONIC RIGHT SACROILIAC JOINT PAIN: ICD-10-CM

## 2021-04-13 PROCEDURE — 1036F TOBACCO NON-USER: CPT | Performed by: NURSE PRACTITIONER

## 2021-04-13 PROCEDURE — G8417 CALC BMI ABV UP PARAM F/U: HCPCS | Performed by: NURSE PRACTITIONER

## 2021-04-13 PROCEDURE — 97140 MANUAL THERAPY 1/> REGIONS: CPT

## 2021-04-13 PROCEDURE — 99214 OFFICE O/P EST MOD 30 MIN: CPT | Performed by: NURSE PRACTITIONER

## 2021-04-13 PROCEDURE — 97110 THERAPEUTIC EXERCISES: CPT

## 2021-04-13 PROCEDURE — 3017F COLORECTAL CA SCREEN DOC REV: CPT | Performed by: NURSE PRACTITIONER

## 2021-04-13 PROCEDURE — G8427 DOCREV CUR MEDS BY ELIG CLIN: HCPCS | Performed by: NURSE PRACTITIONER

## 2021-04-13 RX ORDER — DIAZEPAM 5 MG/1
TABLET ORAL
Qty: 2 TABLET | Refills: 0 | Status: SHIPPED | OUTPATIENT
Start: 2021-04-13 | End: 2021-05-05 | Stop reason: ALTCHOICE

## 2021-04-13 ASSESSMENT — ENCOUNTER SYMPTOMS
RESPIRATORY NEGATIVE: 1
BACK PAIN: 1

## 2021-04-13 NOTE — PROGRESS NOTES
Subjective:      Patient ID: Sia Silva is a 50 y.o. female. Chief Complaint   Patient presents with    Lower Back Pain     1 month. pt states pain did get better with PT      Other  Associated symptoms include arthralgias and myalgias. Hip Pain     Pain slightly improved since last visit, currently in physical therapy    Pain Assessment  Location of Pain: Back  Location Modifiers: Right, Inferior  Severity of Pain: 5  Quality of Pain: Other (Comment), Aching(burning )  Duration of Pain: Persistent  Frequency of Pain: Constant  Aggravating Factors: Other (Comment), Bending(sitting or standing for long periods )  Limiting Behavior: Yes  Relieving Factors: Exercise  Result of Injury: No  Work-Related Injury: No  Are there other pain locations you wish to document?: No    Allergies   Allergen Reactions    Ace Inhibitors Other (See Comments)     Cough      Pcn [Penicillins] Rash       Outpatient Medications Marked as Taking for the 4/13/21 encounter (Office Visit) with AARON Lilly CNP   Medication Sig Dispense Refill    diazePAM (VALIUM) 5 MG tablet Take 1 tab 12 hours prior and 1 tab 2 hours prior to the procedure. 2 tablet 0    ibuprofen (ADVIL;MOTRIN) 800 MG tablet Take 1 tablet by mouth every 8 hours as needed for Pain 120 tablet 3    levothyroxine (SYNTHROID) 50 MCG tablet Take 1 tablet by mouth daily 30 tablet 5    losartan-hydroCHLOROthiazide (HYZAAR) 50-12.5 MG per tablet Take 1 tablet by mouth daily 90 tablet 3    Crisaborole (EUCRISA) 2 % OINT Apply 1 applicator topically 2 times daily 1 Tube 5    pimecrolimus (ELIDEL) 1 % cream       acetaminophen (TYLENOL) 500 MG tablet Take 500 mg by mouth 2 times daily as needed       insulin lispro (HUMALOG) 100 UNIT/ML injection vial Use 60 units per day in pump 1 vial 3    Vitamin D (CHOLECALCIFEROL) 1000 UNITS CAPS capsule Take 4,000 Units by mouth daily       aspirin 81 MG tablet Take 81 mg by mouth daily.       Insulin Lispro, Human, (INSULIN PUMP) FLORENTIN Inject 1 each into the skin See Admin Instructions.          Past Medical History:   Diagnosis Date    Bronchitis     Diabetes type 1, controlled ([address]     Eczema     GERD (gastroesophageal reflux disease)     Gestational diabetes     Hyperlipidemia     Hypothyroid     Insomnia     Myopia with astigmatism and presbyopia     Tobacco abuse        Past Surgical History:   Procedure Laterality Date    BACK SURGERY  1/05/15    Lumbar microdiscectomy L5-S1    BLADDER SUSPENSION  9/22/09    (transobturator sling)    CERVIX LESION DESTRUCTION  1993    (laser cone)    COLONOSCOPY  10/17/2018    hyperplastic polyp,  [facility]  2006   [address]  09/06/2017    Dr Chinyere Pabon  02/16/2017    laser nerve block Dr Taina Espino- [facility]  03/09/2017    laser nerve block Dr Sabine Forman Bilateral 03/13/2019    BL5 transforaminal radiculogram- [facility] per Dr Ramon List Right 05/29/2019    Right sacroiliac joint injection with arthrography    OTHER SURGICAL HISTORY Bilateral 07/31/2019    BL5 transforaminal radiculogram/epidurogram    SPINAL FUSION  7/6/15    Lumbar L5-S1       Family History   Problem Relation Age of Onset    Diabetes Paternal Grandmother     Cancer Father         lymph nodes    Ovarian Cancer Maternal Aunt 79        mother's half sister (same mother)    Glaucoma Neg Hx     Cataracts Neg Hx        Social History     Socioeconomic History    Marital status:      Spouse name: None    Number of children: None    Years of education: None    Highest education level: None   Occupational History    None   Social Needs    Financial resource strain: None    Food insecurity     Worry: None     Inability: None    Transportation needs     Medical: None     Non-medical: None   Tobacco Use    Smoking status: Former Smoker     Packs/day: 0.00     Years: 28.00     Pack years: 0.00     Types: Cigarettes     Start date: 1985     Quit date: 2014     Years since quittin.6    Smokeless tobacco: Never Used   Substance and Sexual Activity    Alcohol use: Yes     Alcohol/week: 3.0 standard drinks     Types: 3 Standard drinks or equivalent per week     Comment: Socially.  Drug use: No    Sexual activity: Yes     Partners: Male   Lifestyle    Physical activity     Days per week: None     Minutes per session: None    Stress: None   Relationships    Social connections     Talks on phone: None     Gets together: None     Attends Islam service: None     Active member of club or organization: None     Attends meetings of clubs or organizations: None     Relationship status: None    Intimate partner violence     Fear of current or ex partner: None     Emotionally abused: None     Physically abused: None     Forced sexual activity: None   Other Topics Concern    None   Social History Narrative    None     Review of Systems   Constitutional: Positive for activity change. Respiratory: Negative. Cardiovascular: Negative. Genitourinary: Negative. Musculoskeletal: Positive for arthralgias, back pain and myalgias. Skin: Negative. Neurological:        Radiculopathy   Psychiatric/Behavioral: Positive for sleep disturbance. Objective:   Physical Exam  Vitals signs reviewed. Constitutional:       General: She is not in acute distress. Appearance: She is well-developed. She is not diaphoretic. Interventions: She is not intubated. HENT:      Head: Normocephalic and atraumatic. Cardiovascular:      Rate and Rhythm: Normal rate. Pulmonary:      Effort: Pulmonary effort is normal. No tachypnea, bradypnea, accessory muscle usage or respiratory distress. She is not intubated. Musculoskeletal:      Lumbar back: She exhibits bony tenderness (right PSIS) and pain. Skin:     General: Skin is warm and dry.       Capillary Refill: Capillary refill takes less than 2 seconds. Coloration: Skin is not pale. Nails: There is no clubbing. Neurological:      Mental Status: She is alert and oriented to person, place, and time. GCS: GCS eye subscore is 4. GCS verbal subscore is 5. GCS motor subscore is 6. Cranial Nerves: No cranial nerve deficit. Psychiatric:         Speech: Speech normal.         Behavior: Behavior normal. Behavior is not agitated, aggressive, withdrawn or combative. Behavior is cooperative. Judgment: Judgment normal. Judgment is not impulsive or inappropriate. Assessment:      1. Post laminectomy syndrome    2. Chronic right sacroiliac joint pain    3. Lumbar radiculopathy    4. Sacroiliitis ([facility] [address]          Plan:    Complete physical therapy. Schedule right sacroiliac joint injection. The patient was counseled at length about the risks of lilli Covid-19 during their perioperative period and any recovery window from their procedure. The patient was made aware that lilli Covid-19  may worsen their prognosis for recovering from their procedure  and lend to a higher morbidity and/or mortality risk. All material risks, benefits, and reasonable alternatives including postponing the procedure were discussed. The patient does wish to proceed with the procedure at this time. Informed consent has not been obtained for procedure. Loretta Deweymonica not on blood thinners. Medications to hold have been reviewed with patient. Blood thinners must be held with permission from your cardiologist or primary care physician. A letter is not required to besent to PCP/Cardiologist regarding holding medications for procedure to decrease bleeding risk.            Yesenia Villarreal, APRN - CNP

## 2021-04-13 NOTE — FLOWSHEET NOTE
Physical Therapy Daily Treatment Note    Date:  2021    Patient Name:  Pascale Garcia    :  1965  MRN: [id number]  Restrictions/Precautions:     Medical/Treatment Diagnosis Information:   · Diagnosis: M54.42, M54.41, G89.29 (ICD-10-CM) - Chronic bilateral low back pain with bilateral sciatica     Insurance/Certification information:  PT Insurance Information: [employer]  Physician Information:  Referring Practitioner: AARON Knowles - CNP  Plan of care signed (Y/N):  Y  Visit# / total visits:  7/10  Pain level: 5/10       Time In: 2:30 PM  Time Out:315 PM    Progress Note: []  Yes  [x]  No  Next due by: Visit #10      Subjective:   Patient reports seeing pain management. Patient noting she is scheduled for injection for low back. Patient states felt better the next day after IDN. Objective: Performed ther-ex as documented on flowsheet to improve motion and strength to improve pain and ease with work duties. Patient given verbal cues to perform exercises properly. Patient able to centralize symptoms with prone exercises. Continued to hold bridges due to increase in radicular symptoms. Patient reported muscle soreness, IDN this date to decrease pain and muscle tightness.      Observation: fair posture noted   Test measurements:  TA control: fair   Full lumbar extension with prone press up    Exercises:   Exercise/Equipment Resistance/Repetitions Other comments        Prone prop 5' HEP    Prone Pressup 10x3 HEP    PKB  10x3 HEP    Prone Hip Ext 10x2         PPT  15x 5''    Bridging     LTR  15x    Hip abd/add 20x 5'' GR/Ball                   Standing Trunk Ext 10x2 HEP    Modified Trunk Ext 10x2 HEP    Hip abd,ext, HS curl 15x ea    sidesteps 2 laps    [x] Provided verbal/tactile cueing for activities related to strengthening, flexibility, endurance, ROM. (54288)  [] Provided verbal/tactile cueing for activities related to improving balance, coordination, kinesthetic sense, posture, motor skill, proprioception. (19827)    Therapeutic Activities:     [] Therapeutic activities, direct (one-on-one) patient contact (use of dynamic activities to improve functional performance). (01791)    Gait:   [] Provided training and instruction to the patient for ambulation re-education. (77048)    Self-Care/ADL's  [] Self-care/home management training and compensatory training, meal preparation, safety procedures, and instructions in use of assistive technology devices/adaptive equipment, direct one-on-one contact. (19837)    Home Exercise Program:    Reviewed  [x] Reviewed/Progressed HEP activities related to strengthening, flexibility, endurance, ROM. (12227)  [] Reviewed/Progressed HEP activities related to improving balance, coordination, kinesthetic sense, posture, motor skill, proprioception.  (32203)    Manual Treatments:  -IDN performed this date by Goyo Contreras PT  to decrease pain, trigger points, tone, and spasm as well as increase tissue extensibility. Specific placement and dose can be seen on separately scanned image. [x] Provided manual therapy to mobilize soft tissue/joints for the purpose of modulating pain, promoting relaxation,  increasing ROM, reducing/eliminating soft tissue swelling/inflammation/restriction, improving soft tissue extensibility.  (04116)    Service Based Modalities:      Timed Code Treatment Minutes:  8' man , 35 ex     Total Treatment Minutes:  39'    Treatment/Activity Tolerance:  [x] Patient tolerated treatment well [] Patient limited by fatique  [] Patient limited by pain  [] Patient limited by other medical complications  [] Other:     Prognosis: [x] Good [] Fair  [] Poor    Patient Requires Follow-up: [x] Yes  [] No      Goals:  Short term goals  Time Frame for Short term goals: 3 weeks  Short term goal 1: Initiate HEP (Initiated     Long term goals  Time Frame for Long term goals : 6 weeks  Long term goal 1: Indpendent in HEP  Long term goal 2: Improve LE strength

## 2021-04-13 NOTE — PATIENT INSTRUCTIONS
[place] [id number] [address] [place] [address] [address]  Telephone [phone]  Fax [phone]      PROCEDURE INSTRUCTIONS FOR  PAIN MANAGEMENT PROCEDURES WITHOUT IV SEDATION    Loretta Burdenjuan Paul scheduled to see Dr. Amber Adkins to undergo the following procedure:  Right SIJ and Piriformis injection    Procedure Date: ____4/29/2021____  You will receive a phone call the day prior to your procedure to confirm a time or arrival.    Report to the [place] [id number], Registration office on the 1st floor in the hospital, after check in and signing of paper work you will then go to the second floor to the surgery center. 1. Stop the following medications prior to the procedure:    NONE    2. Take all routine medications unless otherwise instructed. Ok to take vitamins and antiinflammatory medications    3. EATING & DRINKING:  Ok to eat or drink before the injection - no IV sedation will be used. 4. If you are allergic to contrast or iodine, you must take benadryl and prednisone prior to the injection to prevent an allergic reaction. Follow the directions on the prescription for the times to take the medication. 5. Oral valium can be prescribed if your are anxious about the injections or feel that you can not lay still during the injection. If you take valium, you must have a . Make arrangements for a family member or friend to drive you to the surgery center. Your ride must stay in the hospital while you are having the injection done. If they cannot stay, the injection will be rescheduled. The valium may affect your judgment following the procedure and driving a vehicle within 24 hours after the sedation could be dangerous. 6.  Wear simple loose clothing, which can be easily changed. 7.  Leave jewelry (including rings) and other valuables at home.      8.  You will be asked to sign several forms prior to surgery; patients under the age of 25 must have a parent or legal guardian sign the permit to be able to do the procedure. 9.  You must have finished any antibiotic prescribed for recent infections. If required, please take pre-procedure antibiotic or other pre-procedure medications as instructed. 10. Bring inhalers and pain medications with you to your procedure. 11. Bring your MRI/CT films if they were done outside of the [facility]. 12. If you should develop a cold, sore throat, cough, fever or other new indication of illness or infection, or are started on antibiotics within 2 weeks of the scheduled procedure, please notify the [facility] office as early as possible at [phone]. If calling after 4:30pm the day prior to your scheduled procedure please contact [phone] and Leave a Voice Message.

## 2021-04-15 ENCOUNTER — HOSPITAL ENCOUNTER (OUTPATIENT)
Dept: PHYSICAL THERAPY | Age: 56
Setting detail: THERAPIES SERIES
Discharge: HOME | End: 2021-04-15
Payer: COMMERCIAL

## 2021-04-15 PROCEDURE — 97110 THERAPEUTIC EXERCISES: CPT | Performed by: PHYSICAL THERAPY ASSISTANT

## 2021-04-15 PROCEDURE — 97140 MANUAL THERAPY 1/> REGIONS: CPT | Performed by: PHYSICAL THERAPY ASSISTANT

## 2021-04-15 NOTE — FLOWSHEET NOTE
Physical Therapy Daily Treatment Note    Date:  4/15/2021    Patient Name:  Noah Mars    :  1965  MRN: [id number]  Restrictions/Precautions:     Medical/Treatment Diagnosis Information:   · Diagnosis: M54.42, M54.41, G89.29 (ICD-10-CM) - Chronic bilateral low back pain with bilateral sciatica     Insurance/Certification information:  PT Insurance Information: [employer]  Physician Information:  Referring Practitioner: AARON Obrien - CNP  Plan of care signed (Y/N):  Y  Visit# / total visits:  /10  Pain level: 5/10       Time In: 136  Time Out: 219    Progress Note: []  Yes  [x]  No  Next due by: Visit #10      Subjective:   Pt. Relates pain this date rated 2/10 in mid gluteal region. Compliance with current HEP. Objective: Pt. Instructed and guided through completion of KAYLA per flow chart to facilitate motion and decrease pain and discomfort to allow ease with daily activities. IDN performed by Nolberto Dickey this date. Verbal cuing for progression and technique with exercises. No increased pain noted with exercises this date.        Observation: fair posture noted   Test measurements:  TA control: fair   Full lumbar extension with prone press up    Exercises:   Exercise/Equipment Resistance/Repetitions Other comments        Prone prop 5' HEP    Prone Pressup 10x3 HEP    PKB  10x3 HEP    Prone Hip Ext 10x2    Prone hip IR/ER 10x ea Manual                  PPT  15x 5''    Bridging     LTR  15x    Hip abd/add 20x 5'' GR/Ball                   Standing Trunk Ext 10x2 HEP    Modified Trunk Ext 10x2 HEP    Hip abd,ext, HS curl 15x ea    sidesteps     [x] Provided verbal/tactile cueing for activities related to strengthening, flexibility, endurance, ROM. (11755)  [] Provided verbal/tactile cueing for activities related to improving balance, coordination, kinesthetic sense, posture, motor skill, proprioception. (52531)    Therapeutic Activities:     [] Therapeutic activities, direct (one-on-one) patient contact (use of dynamic activities to improve functional performance). (25666)    Gait:   [] Provided training and instruction to the patient for ambulation re-education. (88134)    Self-Care/ADL's  [] Self-care/home management training and compensatory training, meal preparation, safety procedures, and instructions in use of assistive technology devices/adaptive equipment, direct one-on-one contact. (29536)    Home Exercise Program:    Reviewed  [x] Reviewed/Progressed HEP activities related to strengthening, flexibility, endurance, ROM. (15913)  [] Reviewed/Progressed HEP activities related to improving balance, coordination, kinesthetic sense, posture, motor skill, proprioception.  (33266)    Manual Treatments:  -IDN performed this date by Antonia Roa PT  to decrease pain, trigger points, tone, and spasm as well as increase tissue extensibility. Specific placement and dose can be seen on separately scanned image. [x] Provided manual therapy to mobilize soft tissue/joints for the purpose of modulating pain, promoting relaxation,  increasing ROM, reducing/eliminating soft tissue swelling/inflammation/restriction, improving soft tissue extensibility. (31732)    Service Based Modalities:      Timed Code Treatment Minutes:  8' man , 33ex     Total Treatment Minutes:  37'    Treatment/Activity Tolerance:  [x] Patient tolerated treatment well [] Patient limited by fatique  [] Patient limited by pain  [] Patient limited by other medical complications  [] Other:     Prognosis: [x] Good [] Fair  [] Poor    Patient Requires Follow-up: [x] Yes  [] No      Goals:  Short term goals  Time Frame for Short term goals: 3 weeks  Short term goal 1: Initiate HEP (Initiated )    Long term goals  Time Frame for Long term goals : 6 weeks  Long term goal 1: Indpendent in HEP  Long term goal 2: Improve LE strength to 4+/5 on the Right to allow for increased tolerance to sitting and standing.  (Right hip flex: 4/5)  Long term goal 3: Patient to have a 50% reduction in Radicular symptoms. Long term goal 4: Trunk AROM WFL     Plan:   [x] Continue per plan of care [] Alter current plan (see comments)  [] Plan of care initiated [] Hold pending MD visit [] Discharge    Plan for Next Session:  Monitor tolerance and advance motion and pain as able. Electronically signed by:   Remberto Barron

## 2021-04-20 ENCOUNTER — HOSPITAL ENCOUNTER (OUTPATIENT)
Dept: PHYSICAL THERAPY | Age: 56
Setting detail: THERAPIES SERIES
Discharge: HOME | End: 2021-04-20
Payer: COMMERCIAL

## 2021-04-20 NOTE — PROGRESS NOTES
Physical Therapy    Outpatient Physical Therapy    [x] [place]  Phone: [phone]  Fax: [phone]      [] [place]  Phone: [phone]  Fax: [phone]    Physical Therapy  Cancellation/No-show Note  Patient Name:  Lexa Aponte  :  1965   Date:  2021  Cancelled visits to date: 2  No-shows to date: 0    For today's appointment patient:  [x]  Cancelled  []  Rescheduled appointment  []  No-show     Reason given by patient:  []  Patient ill  []  Conflicting appointment  []  No transportation    []  Conflict with work  [x]  No reason given  []  Other:     Comments:      Electronically signed by: Felicity Doss PTA

## 2021-04-22 ENCOUNTER — HOSPITAL ENCOUNTER (OUTPATIENT)
Dept: PHYSICAL THERAPY | Age: 56
Setting detail: THERAPIES SERIES
Discharge: HOME | End: 2021-04-22
Payer: COMMERCIAL

## 2021-04-22 PROCEDURE — 97110 THERAPEUTIC EXERCISES: CPT

## 2021-04-22 NOTE — FLOWSHEET NOTE
Physical Therapy Daily Treatment Note    Date:  2021    Patient Name:  Prerna Fernandez    :  1965  MRN: [id number]  Restrictions/Precautions:     Medical/Treatment Diagnosis Information:   · Diagnosis: M54.42, M54.41, G89.29 (ICD-10-CM) - Chronic bilateral low back pain with bilateral sciatica     Insurance/Certification information:  PT Insurance Information: [employer]  Physician Information:  Referring Practitioner: AARON Hernández CNP  Plan of care signed (Y/N):  Y  Visit# / total visits:  10/10  Pain level: 3/10       Time In: 2:00 PM  Time Out: 2:40 PM    Progress Note: []  Yes  [x]  No  Next due by: Visit #10      Subjective:   Patient reports she feels like her back is just hanging steady and not getting worse but not improving any more. Patient reports she feels the dry needling is helping. Patient reports she has switched sides and left hurting her more today into buttock. Patient reports compliance with HEP at least twice a day. Patient reports 60-70% overall improvement since beginning therapy. Patient reports she is scheduled for injection on 21. Objective: Performed ther-ex as documented on flowsheet to improve motion and strength to improve pain and ease with work duties. Patient given verbal cues to perform exercises properly. Patient able to centralize symptoms with prone exercises. Patient reported decreased glute pain after prone IR/ER.         Observation: fair posture noted   Test measurements:  Full lumbar extension with prone press up, all lumbar motions WNL no pain   Hip Flexion: 4+/5      Abduction: 4+/5  Adduction: 4+/5         Exercises:   Exercise/Equipment Resistance/Repetitions Other comments        Prone prop 5' HEP    Prone Pressup 10x3 HEP    PKB  10x3 HEP    Prone Hip Ext 10x2    Prone hip IR/ER 10x ea Manual                  PPT  15x 5''    Bridging     LTR  15x    Hip abd/add 20x 5'' GR/Ball                   Standing Trunk Ext 10x2 HEP Fair  [] Poor    Patient Requires Follow-up: [x] Yes  [] No      Goals:  Short term goals  Time Frame for Short term goals: 3 weeks  Short term goal 1: Initiate HEP (Initiated )    Long term goals  Time Frame for Long term goals : 6 weeks  Long term goal 1: Indpendent in HEP (patient reports compliance with HEP twice daily 22 APR)  Long term goal 2: Improve LE strength to 4+/5 on the Right to allow for increased tolerance to sitting and standing. (Right hip grossly 4+/5 22 APR )  Long term goal 3: Patient to have a 50% reduction in Radicular symptoms. (patient reports radicular symptoms have resolved for the most part 22 APR)  Long term goal 4: Trunk AROM WFL (lumbar flex: WNL, ext:WNL, rotation: WNL, sidebending: WNL 22 APR)     Plan:   [] Continue per plan of care [] Alter current plan (see comments)  [] Plan of care initiated [x] Hold pending MD visit [] Discharge    Plan for Next Session: On hold until 5/13/21 f/u with Yesenia Chi CNP posting SI injection.     Electronically signed by:  Tiff Watts

## 2021-04-29 ENCOUNTER — APPOINTMENT (OUTPATIENT)
Dept: GENERAL RADIOLOGY | Age: 56
End: 2021-04-29
Attending: PHYSICAL MEDICINE & REHABILITATION
Payer: COMMERCIAL

## 2021-04-29 ENCOUNTER — HOSPITAL ENCOUNTER (OUTPATIENT)
Age: 56
Setting detail: OUTPATIENT SURGERY
Discharge: HOME | End: 2021-04-29
Attending: PHYSICAL MEDICINE & REHABILITATION | Admitting: PHYSICAL MEDICINE & REHABILITATION
Payer: COMMERCIAL

## 2021-04-29 VITALS
WEIGHT: 159 LBS | BODY MASS INDEX: 30.02 KG/M2 | SYSTOLIC BLOOD PRESSURE: 151 MMHG | TEMPERATURE: 98 F | HEART RATE: 70 BPM | HEIGHT: 61 IN | DIASTOLIC BLOOD PRESSURE: 89 MMHG | OXYGEN SATURATION: 99 % | RESPIRATION RATE: 14 BRPM

## 2021-04-29 PROCEDURE — 3600000057 HC PAIN LEVEL 4 ADDL 15 MIN: Performed by: PHYSICAL MEDICINE & REHABILITATION

## 2021-04-29 PROCEDURE — 27096 INJECT SACROILIAC JOINT: CPT | Performed by: PHYSICAL MEDICINE & REHABILITATION

## 2021-04-29 PROCEDURE — 7100000010 HC PHASE II RECOVERY - FIRST 15 MIN: Performed by: PHYSICAL MEDICINE & REHABILITATION

## 2021-04-29 PROCEDURE — 3600000056 HC PAIN LEVEL 4 BASE: Performed by: PHYSICAL MEDICINE & REHABILITATION

## 2021-04-29 PROCEDURE — 2500000003 HC RX 250 WO HCPCS: Performed by: PHYSICAL MEDICINE & REHABILITATION

## 2021-04-29 PROCEDURE — 6360000004 HC RX CONTRAST MEDICATION: Performed by: PHYSICAL MEDICINE & REHABILITATION

## 2021-04-29 PROCEDURE — 73525 CONTRAST X-RAY OF HIP: CPT

## 2021-04-29 PROCEDURE — 6360000002 HC RX W HCPCS: Performed by: PHYSICAL MEDICINE & REHABILITATION

## 2021-04-29 PROCEDURE — 2709999900 HC NON-CHARGEABLE SUPPLY: Performed by: PHYSICAL MEDICINE & REHABILITATION

## 2021-04-29 PROCEDURE — 3209999900 FLUORO FOR SURGICAL PROCEDURES

## 2021-04-29 RX ORDER — LIDOCAINE HYDROCHLORIDE 20 MG/ML
INJECTION, SOLUTION EPIDURAL; INFILTRATION; INTRACAUDAL; PERINEURAL PRN
Status: DISCONTINUED | OUTPATIENT
Start: 2021-04-29 | End: 2021-04-29 | Stop reason: ALTCHOICE

## 2021-04-29 RX ORDER — ROPIVACAINE HYDROCHLORIDE 5 MG/ML
INJECTION, SOLUTION EPIDURAL; INFILTRATION; PERINEURAL PRN
Status: DISCONTINUED | OUTPATIENT
Start: 2021-04-29 | End: 2021-04-29 | Stop reason: ALTCHOICE

## 2021-04-29 RX ORDER — DEXAMETHASONE SODIUM PHOSPHATE 10 MG/ML
INJECTION INTRAMUSCULAR; INTRAVENOUS PRN
Status: DISCONTINUED | OUTPATIENT
Start: 2021-04-29 | End: 2021-04-29 | Stop reason: ALTCHOICE

## 2021-04-29 ASSESSMENT — PAIN SCALES - GENERAL
PAINLEVEL_OUTOF10: 3
PAINLEVEL_OUTOF10: 0

## 2021-04-29 NOTE — H&P
.            Signed        Expand AllCollapse All     Show:Clear all  [x]Manual[x]Template[x]Copied    Added by:  [x]AARON Vicente CNP    []Laurita for details  Subjective:      Patient ID: Cayetano Loyd is a 50 y.o. female. Chief Complaint   Patient presents with    Lower Back Pain       1 month. pt states pain did get better with PT       Other  Associated symptoms include arthralgias and myalgias. Hip Pain      Pain slightly improved since last visit, currently in physical therapy     Pain Assessment  Location of Pain: Back  Location Modifiers: Right, Inferior  Severity of Pain: 5  Quality of Pain: Other (Comment), Aching(burning )  Duration of Pain: Persistent  Frequency of Pain: Constant  Aggravating Factors: Other (Comment), Bending(sitting or standing for long periods )  Limiting Behavior: Yes  Relieving Factors: Exercise  Result of Injury: No  Work-Related Injury: No  Are there other pain locations you wish to document?: No     Allergies   Allergen Reactions    Ace Inhibitors Other (See Comments)       Cough       Pcn [Penicillins] Rash         Active Medications          Outpatient Medications Marked as Taking for the 4/13/21 encounter (Office Visit) with AARON Castellanos CNP   Medication Sig Dispense Refill    diazePAM (VALIUM) 5 MG tablet Take 1 tab 12 hours prior and 1 tab 2 hours prior to the procedure.  2 tablet 0    ibuprofen (ADVIL;MOTRIN) 800 MG tablet Take 1 tablet by mouth every 8 hours as needed for Pain 120 tablet 3    levothyroxine (SYNTHROID) 50 MCG tablet Take 1 tablet by mouth daily 30 tablet 5    losartan-hydroCHLOROthiazide (HYZAAR) 50-12.5 MG per tablet Take 1 tablet by mouth daily 90 tablet 3    Crisaborole (EUCRISA) 2 % OINT Apply 1 applicator topically 2 times daily 1 Tube 5    pimecrolimus (ELIDEL) 1 % cream          acetaminophen (TYLENOL) 500 MG tablet Take 500 mg by mouth 2 times daily as needed         insulin lispro (HUMALOG) 100 UNIT/ML injection vial Use 60 units per day in pump 1 vial 3    Vitamin D (CHOLECALCIFEROL) 1000 UNITS CAPS capsule Take 4,000 Units by mouth daily         aspirin 81 MG tablet Take 81 mg by mouth daily.  Insulin Lispro, Human, (INSULIN PUMP) FLORENTIN Inject 1 each into the skin See Admin Instructions.                 Past Medical History        Past Medical History:   Diagnosis Date    Bronchitis      Diabetes type 1, controlled ([address]      Eczema      GERD (gastroesophageal reflux disease)      Gestational diabetes      Hyperlipidemia      Hypothyroid      Insomnia      Myopia with astigmatism and presbyopia      Tobacco abuse              Past Surgical History         Past Surgical History:   Procedure Laterality Date    BACK SURGERY   1/05/15     Lumbar microdiscectomy L5-S1    BLADDER SUSPENSION   9/22/09     (transobturator sling)    CERVIX LESION DESTRUCTION   1993     (laser cone)    COLONOSCOPY   10/17/2018     hyperplastic polyp, Dr. Marjan Amaya at [facility]   2006   [address]   09/06/2017     Dr Zohaib Keene   02/16/2017     laser nerve block Dr Lakeshia Buchanan- [facility]   03/09/2017     laser nerve block Dr Juan George Bilateral 03/13/2019     BL5 transforaminal radiculogram- [facility] per Dr Craolina Mcconnell Right 05/29/2019     Right sacroiliac joint injection with arthrography    OTHER SURGICAL HISTORY Bilateral 07/31/2019     BL5 transforaminal radiculogram/epidurogram    SPINAL FUSION   7/6/15     Lumbar L5-S1            Family History         Family History   Problem Relation Age of Onset    Diabetes Paternal Grandmother      Cancer Father           lymph nodes    Ovarian Cancer Maternal Aunt 79         mother's half sister (same mother)    Glaucoma Neg Hx      Cataracts Neg Hx              Social History   Social History            Socioeconomic History    Marital status:        Spouse name: None    She is not intubated. HENT:      Head: Normocephalic and atraumatic. Cardiovascular:      Rate and Rhythm: Normal rate. Pulmonary:      Effort: Pulmonary effort is normal. No tachypnea, bradypnea, accessory muscle usage or respiratory distress. She is not intubated. Musculoskeletal:      Lumbar back: She exhibits bony tenderness (right PSIS) and pain. Skin:     General: Skin is warm and dry. Capillary Refill: Capillary refill takes less than 2 seconds. Coloration: Skin is not pale. Nails: There is no clubbing. Neurological:      Mental Status: She is alert and oriented to person, place, and time. GCS: GCS eye subscore is 4. GCS verbal subscore is 5. GCS motor subscore is 6. Cranial Nerves: No cranial nerve deficit. Psychiatric:         Speech: Speech normal.         Behavior: Behavior normal. Behavior is not agitated, aggressive, withdrawn or combative. Behavior is cooperative. Judgment: Judgment normal. Judgment is not impulsive or inappropriate. Assessment:   1. Post laminectomy syndrome    2. Chronic right sacroiliac joint pain    3. Lumbar radiculopathy    4. Sacroiliitis ([address]                     Plan:    Complete physical therapy. Schedule right sacroiliac joint injection. The patient was counseled at length about the risks of lilli Covid-19 during their perioperative period and any recovery window from their procedure. The patient was made aware that lilli Covid-19  may worsen their prognosis for recovering from their procedure  and lend to a higher morbidity and/or mortality risk. All material risks, benefits, and reasonable alternatives including postponing the procedure were discussed. The patient does wish to proceed with the procedure at this time. Informed consent has not been obtained for procedure. Loretta Benitez not on blood thinners. Medications to hold have been reviewed with patient. Blood thinners must be held with

## 2021-04-29 NOTE — INTERVAL H&P NOTE
disturbance. Objective:   Physical Exam  Vitals signs reviewed. Constitutional:       General: She is not in acute distress. Appearance: She is well-developed. She is not diaphoretic. Interventions: She is not intubated. HENT:      Head: Normocephalic and atraumatic. Cardiovascular:      Rate and Rhythm: Normal rate. Pulmonary:      Effort: Pulmonary effort is normal. No tachypnea, bradypnea, accessory muscle usage or respiratory distress. She is not intubated. Musculoskeletal:      Lumbar back: She exhibits bony tenderness (right PSIS) and pain. Skin:     General: Skin is warm and dry. Capillary Refill: Capillary refill takes less than 2 seconds. Coloration: Skin is not pale. Nails: There is no clubbing. Neurological:      Mental Status: She is alert and oriented to person, place, and time. GCS: GCS eye subscore is 4. GCS verbal subscore is 5. GCS motor subscore is 6. Cranial Nerves: No cranial nerve deficit. Psychiatric:         Speech: Speech normal.         Behavior: Behavior normal. Behavior is not agitated, aggressive, withdrawn or combative. Behavior is cooperative. Judgment: Judgment normal. Judgment is not impulsive or inappropriate. Assessment:   1. Post laminectomy syndrome    2. Chronic right sacroiliac joint pain    3. Lumbar radiculopathy    4. Sacroiliitis ([address]                     Plan:    Complete physical therapy. Schedule right sacroiliac joint injection. The patient was counseled at length about the risks of lilli Covid-19 during their perioperative period and any recovery window from their procedure. The patient was made aware that lilli Covid-19  may worsen their prognosis for recovering from their procedure  and lend to a higher morbidity and/or mortality risk. All material risks, benefits, and reasonable alternatives including postponing the procedure were discussed.  The patient does wish to proceed with the procedure at this time. Informed consent has not been obtained for procedure. Loretta Benitez not on blood thinners. Medications to hold have been reviewed with patient. Blood thinners must be held with permission from your cardiologist or primary care physician. A letter is not required to besent to PCP/Cardiologist regarding holding medications for procedure to decrease bleeding risk. AARON Castellanos CNP            I have interviewed and examined the patient and reviewed the recent History and Physical.  There have been no changes to the recent H&P documentation. The surgical consent form has been signed. Last anticoagulant medication use was: ASA yesterday     Premedication taken for contrast allergy? No    Valium taken for oral sedation? No    Outpatient Medications Marked as Taking for the 4/29/21 encounter [facility] Encounter)   Medication Sig Dispense Refill    diazePAM (VALIUM) 5 MG tablet Take 1 tab 12 hours prior and 1 tab 2 hours prior to the procedure. 2 tablet 0    ibuprofen (ADVIL;MOTRIN) 800 MG tablet Take 1 tablet by mouth every 8 hours as needed for Pain 120 tablet 3    levothyroxine (SYNTHROID) 50 MCG tablet Take 1 tablet by mouth daily 30 tablet 5    losartan-hydroCHLOROthiazide (HYZAAR) 50-12.5 MG per tablet Take 1 tablet by mouth daily 90 tablet 3    Crisaborole (EUCRISA) 2 % OINT Apply 1 applicator topically 2 times daily 1 Tube 5    pimecrolimus (ELIDEL) 1 % cream       acetaminophen (TYLENOL) 500 MG tablet Take 500 mg by mouth 2 times daily as needed       insulin lispro (HUMALOG) 100 UNIT/ML injection vial Use 60 units per day in pump 1 vial 3    Vitamin D (CHOLECALCIFEROL) 1000 UNITS CAPS capsule Take 4,000 Units by mouth daily       aspirin 81 MG tablet Take 81 mg by mouth daily. Insulin Lispro, Human, (INSULIN PUMP) FLORENTIN Inject 1 each into the skin See Admin Instructions.          The patient understands the planned operation and its associated risks and benefits and agrees to proceed.         Electronically signed by Jie Mercado MD on 4/29/2021 at 12:00 PM

## 2021-04-29 NOTE — OP NOTE
arthrography. CONSENT:  Written consent was obtained from the patient on preprinted consent form after explaining the procedure, indications, potential complications and outcomes. Alternative treatments were also discussed. DISCUSSION:  The patient was sterilely prepped and draped in the usual fashion in the prone position.  Time out was verified for correct patient, side, level and procedure. SEDATION:   No conscious sedation was performed during the procedure.  The patient remained awake and conversed throughout the procedure.  The patient underwent pulse oximetry and blood pressure monitoring independently by a trained observer, as well as by a physician. PROCEDURES #1 to #3:  Under image-intensifier control, a standard technique was employed, a 22 gauge needle x 5 inch spinal needle was guided successfully to cannulate the Right sacroiliac joint via a posterior lateral approach. Instillation of .5 mL of Omnipaque 240 contrast medium demonstrated contiguous flow into the joint and the joint capsule. No vascular spread was noted. Digital subtraction was not employed to evaluate for vascular spread. The patient was monitored for any untoward reaction to contrast medium before proceeding with procedure #2. Then 3.0 mL of equal volumes of 0.50% ropivacaine, 2% lidocaine and betamethasone (Celestone 6 mg/mL), dexamethasone (Decadron 10 mg/mL), methylprednisolone (Depo-Medrol 80 mg/mL was injected into each joint. [employer]: The patient did not report pain reproduction in a concordant distribution upon capsular distention of the Right sacroiliac joints. ARTHROGRAPHY: The Right sacroiliac arthrogram revealed contrast in the joint space and inferior recesses; no contrast extravasation.     EBL: no blood loss    SPECIMEN: none    The patient tolerated the procedure well and without complications and was noted to be in stable condition prior to discharge from the procedure center with

## 2021-05-05 ENCOUNTER — OFFICE VISIT (OUTPATIENT)
Dept: FAMILY MEDICINE CLINIC | Age: 56
End: 2021-05-05
Payer: COMMERCIAL

## 2021-05-05 VITALS
WEIGHT: 161 LBS | SYSTOLIC BLOOD PRESSURE: 118 MMHG | HEART RATE: 98 BPM | OXYGEN SATURATION: 98 % | BODY MASS INDEX: 30.42 KG/M2 | DIASTOLIC BLOOD PRESSURE: 80 MMHG

## 2021-05-05 DIAGNOSIS — M25.50 POLYARTHRALGIA: Primary | ICD-10-CM

## 2021-05-05 DIAGNOSIS — R51.9 INTRACTABLE HEADACHE, UNSPECIFIED CHRONICITY PATTERN, UNSPECIFIED HEADACHE TYPE: ICD-10-CM

## 2021-05-05 DIAGNOSIS — R53.83 OTHER FATIGUE: ICD-10-CM

## 2021-05-05 DIAGNOSIS — K63.5 HYPERPLASTIC COLONIC POLYP, UNSPECIFIED PART OF COLON: ICD-10-CM

## 2021-05-05 DIAGNOSIS — R41.89 BRAIN FOG: ICD-10-CM

## 2021-05-05 DIAGNOSIS — R19.7 DIARRHEA, UNSPECIFIED TYPE: ICD-10-CM

## 2021-05-05 PROCEDURE — G8427 DOCREV CUR MEDS BY ELIG CLIN: HCPCS | Performed by: INTERNAL MEDICINE

## 2021-05-05 PROCEDURE — 3017F COLORECTAL CA SCREEN DOC REV: CPT | Performed by: INTERNAL MEDICINE

## 2021-05-05 PROCEDURE — G8417 CALC BMI ABV UP PARAM F/U: HCPCS | Performed by: INTERNAL MEDICINE

## 2021-05-05 PROCEDURE — 1036F TOBACCO NON-USER: CPT | Performed by: INTERNAL MEDICINE

## 2021-05-05 PROCEDURE — 99214 OFFICE O/P EST MOD 30 MIN: CPT | Performed by: INTERNAL MEDICINE

## 2021-05-05 NOTE — PROGRESS NOTES
[address]  [address]  Dept: [phone]  Dept Fax: [phone]: [phone]     Visit Date:  5/5/2021    Patient:  Lexa Aponte  YOB: 1965    HPI:   Lexa Aponte presents today for   Chief Complaint   Patient presents with    Diarrhea     patient has had issues with diarrhea, loss of apetite, gas, bloating,acid reflux that makes her gag. This issue is ongoing for a few months now and is getting worse.  Fatigue    Gas    Bloated   . HPI 53-year-old female is coming in today with multiple abdominal complaints. Patient reports for the past several months she has been having worsening issues with chronic diarrhea loss of appetite gassy bloating feelings worsening acid reflux-like symptoms that make her gag as well as fatigue. She had a colonoscopy done in 2018 that showed hyperplastic polyp as well as diverticulosis. She also reports having brain fog as well as headaches. No weight loss but her weight has fluctuated anywhere between 160 -180 pounds. She does have sensation of heartburn and dry cough associated symptoms with acid reflux. She also reports her joints have been hurting including her knees and ankles. She  was taking ibuprofen but not lately and denies excessive use of NSAIDs. She is a former smoker. She has been taking Pepcid and Prilosec with minimal relief of her symptoms. She still has her gallbladder and does report some of her symptoms are worse after heavy meals including diarrhea. No food association diarrhea or history of celiac disease or IBS/IBD but she is not entirely sure. On average she goes 3-5 times in a day. She does have to pets including cats and dogs. Diarrhea is mostly liquid and watery but there is no black stools or bloody stools. There is no nausea vomiting and no abdominal pain.       Medications  Prior to Visit Medications    Medication Sig Taking? Authorizing Provider   ibuprofen (ADVIL;MOTRIN) 800 MG tablet Take 1 tablet by mouth every 8 hours as needed for Pain Yes AARON Lilly CNP   levothyroxine (SYNTHROID) 50 MCG tablet Take 1 tablet by mouth daily Yes Earnestine Daugherty MD   losartan-hydroCHLOROthiazide (HYZAAR) 50-12.5 MG per tablet Take 1 tablet by mouth daily Yes Earnestine Daugherty MD   pimecrolimus (ELIDEL) 1 % cream  Yes Historical Provider, MD   insulin lispro (HUMALOG) 100 UNIT/ML injection vial Use 60 units per day in pump Yes Earnestine Daugherty MD   Vitamin D (CHOLECALCIFEROL) 1000 UNITS CAPS capsule Take 4,000 Units by mouth daily  Yes Historical Provider, MD   aspirin 81 MG tablet Take 81 mg by mouth daily. Yes Historical Provider, MD   Insulin Lispro, Human, (INSULIN PUMP) FLORENTIN Inject 1 each into the skin See Admin Instructions. Yes Historical Provider, MD   cyclobenzaprine (FLEXERIL) 10 MG tablet Take 1 tablet by mouth 2 times daily as needed for Muscle spasms  Patient not taking: Reported on 5/5/2021  AARON Lilly CNP   Crisaborole (EUCRISA) 2 % OINT Apply 1 applicator topically 2 times daily  Patient not taking: Reported on 5/5/2021  Earnestine Daugherty MD   acetaminophen (TYLENOL) 500 MG tablet Take 500 mg by mouth 2 times daily as needed   Historical Provider, MD        Allergies:  is allergic to ace inhibitors and pcn [penicillins]. Past Medical History:   has a past medical history of Bronchitis, Diabetes type 1, controlled ([facility] [address] Eczema, GERD (gastroesophageal reflux disease), Gestational diabetes, Hyperlipidemia, Hypothyroid, Insomnia, Myopia with astigmatism and presbyopia, and Tobacco abuse. Past Surgical History   has a past surgical history that includes Endometrial ablation (2006); Cervix lesion destruction (1993); bladder suspension (9/22/09); back surgery (1/05/15); Spinal fusion (7/6/15); Nerve Block (02/16/2017);  Nerve Block (03/09/2017); lumbar laminectomy (09/06/2017); Colonoscopy (10/17/2018); other surgical history (Bilateral, 03/13/2019); other surgical history (Right, 05/29/2019); other surgical history (Bilateral, 07/31/2019); and Back Injection (Right, 4/29/2021). Family History  family history includes Cancer in her father; Diabetes in her paternal grandmother; Ovarian Cancer (age of onset: 79) in her maternal aunt. Social History   reports that she quit smoking about 6 years ago. Her smoking use included cigarettes. She started smoking about 36 years ago. She smoked 0.00 packs per day for 28.00 years. She has never used smokeless tobacco. She reports current alcohol use of about 3.0 standard drinks of alcohol per week. She reports that she does not use drugs. Health Maintenance:    Health Maintenance   Topic Date Due    Hepatitis C screen  Never done    Hepatitis B vaccine (1 of 3 - Risk 3-dose series) Never done    Shingles Vaccine (1 of 2) Never done    Diabetic foot exam  07/20/2021    A1C test (Diabetic or Prediabetic)  08/13/2021    Diabetic microalbuminuria test  08/13/2021    Lipid screen  08/13/2021    Potassium monitoring  08/13/2021    Creatinine monitoring  08/13/2021    TSH testing  10/27/2021    Diabetic retinal exam  11/10/2021    Breast cancer screen  03/03/2022    Colon cancer screen colonoscopy  10/17/2023    Cervical cancer screen  03/11/2024    DTaP/Tdap/Td vaccine (2 - Td) 11/07/2026    Flu vaccine  Completed    Pneumococcal 0-64 years Vaccine  Completed    COVID-19 Vaccine  Completed    HIV screen  Addressed    Hepatitis A vaccine  Aged Out    Hib vaccine  Aged Out    Meningococcal (ACWY) vaccine  Aged Out       Subjective:      Review of Systems   Constitutional: Negative for fatigue, fever and unexpected weight change. HENT: Negative for ear pain, postnasal drip, rhinorrhea, sinus pain, sore throat and trouble swallowing. Eyes: Negative for visual disturbance.    Respiratory: Negative for cough, chest tightness and shortness of breath. Cardiovascular: Negative for chest pain and leg swelling. Gastrointestinal: Positive for diarrhea. Negative for abdominal pain, blood in stool, nausea, rectal pain and vomiting. Endocrine: Negative for polyuria. Genitourinary: Negative for difficulty urinating and flank pain. Musculoskeletal: Negative for arthralgias, joint swelling and myalgias. Skin: Negative for rash. Allergic/Immunologic: Negative for environmental allergies. Neurological: Positive for headaches. Negative for weakness, light-headedness and numbness. Hematological: Negative for adenopathy. Psychiatric/Behavioral: Negative for behavioral problems and suicidal ideas. The patient is not nervous/anxious. Objective:     /80 (Site: Right Upper Arm, Position: Sitting)   Pulse 98   Wt 161 lb (73 kg)   SpO2 98%   BMI 30.42 kg/m²     Physical Exam  Vitals signs and nursing note reviewed. HENT:      Head: Normocephalic and atraumatic. Cardiovascular:      Rate and Rhythm: Normal rate and regular rhythm. Pulses: Normal pulses. Heart sounds: Normal heart sounds. No murmur. No friction rub. No gallop. Pulmonary:      Effort: Pulmonary effort is normal.      Breath sounds: Normal breath sounds. No stridor. Abdominal:      General: Abdomen is flat. Bowel sounds are normal. There is no distension. Palpations: Abdomen is soft. There is no mass. Tenderness: There is no abdominal tenderness. There is no right CVA tenderness, left CVA tenderness, guarding or rebound. Hernia: No hernia is present. Musculoskeletal: Normal range of motion. Skin:     General: Skin is warm. Neurological:      General: No focal deficit present. Mental Status: She is oriented to person, place, and time. Mental status is at baseline. Psychiatric:         Mood and Affect: Mood normal.             Assessment       Diagnosis Orders   1. Polyarthralgia     2.  Intractable headache, unspecified chronicity pattern, unspecified headache type     3. Other fatigue     4. Brain fog     5. Diarrhea, unspecified type     6. Hyperplastic colonic polyp, unspecified part of colon           PLAN   Referral to Gastroenterology. GERD related dietary modifications. Encourage to keep a food diary and associated symptoms with specific foods. No orders of the defined types were placed in this encounter. No follow-ups on file. Patient given educational materials - see patient instructions. Discussed use, benefit, and side effects of prescribed medications. All patient questions answered. Pt voiced understanding. Reviewed health maintenance.        Electronically signed Gisele An MD on 5/5/2021 at 8:14 AM EDT

## 2021-05-07 ASSESSMENT — ENCOUNTER SYMPTOMS
VOMITING: 0
CHEST TIGHTNESS: 0
ABDOMINAL PAIN: 0
RECTAL PAIN: 0
SORE THROAT: 0
SINUS PAIN: 0
NAUSEA: 0
RHINORRHEA: 0
DIARRHEA: 1
SHORTNESS OF BREATH: 0
TROUBLE SWALLOWING: 0
COUGH: 0
BLOOD IN STOOL: 0

## 2021-05-13 ENCOUNTER — OFFICE VISIT (OUTPATIENT)
Dept: PAIN MANAGEMENT | Age: 56
End: 2021-05-13
Payer: COMMERCIAL

## 2021-05-13 VITALS
BODY MASS INDEX: 28.89 KG/M2 | HEIGHT: 62 IN | WEIGHT: 157 LBS | HEART RATE: 76 BPM | SYSTOLIC BLOOD PRESSURE: 118 MMHG | DIASTOLIC BLOOD PRESSURE: 80 MMHG

## 2021-05-13 DIAGNOSIS — M54.41 CHRONIC BILATERAL LOW BACK PAIN WITH BILATERAL SCIATICA: ICD-10-CM

## 2021-05-13 DIAGNOSIS — G89.29 CHRONIC BILATERAL LOW BACK PAIN WITH BILATERAL SCIATICA: ICD-10-CM

## 2021-05-13 DIAGNOSIS — G89.29 CHRONIC RIGHT SACROILIAC JOINT PAIN: ICD-10-CM

## 2021-05-13 DIAGNOSIS — M53.3 CHRONIC RIGHT SACROILIAC JOINT PAIN: ICD-10-CM

## 2021-05-13 DIAGNOSIS — M54.42 CHRONIC BILATERAL LOW BACK PAIN WITH BILATERAL SCIATICA: ICD-10-CM

## 2021-05-13 DIAGNOSIS — M54.16 LUMBAR RADICULOPATHY: ICD-10-CM

## 2021-05-13 PROCEDURE — G8417 CALC BMI ABV UP PARAM F/U: HCPCS | Performed by: NURSE PRACTITIONER

## 2021-05-13 PROCEDURE — G8427 DOCREV CUR MEDS BY ELIG CLIN: HCPCS | Performed by: NURSE PRACTITIONER

## 2021-05-13 PROCEDURE — 1036F TOBACCO NON-USER: CPT | Performed by: NURSE PRACTITIONER

## 2021-05-13 PROCEDURE — 3017F COLORECTAL CA SCREEN DOC REV: CPT | Performed by: NURSE PRACTITIONER

## 2021-05-13 PROCEDURE — 99214 OFFICE O/P EST MOD 30 MIN: CPT | Performed by: NURSE PRACTITIONER

## 2021-05-13 ASSESSMENT — ENCOUNTER SYMPTOMS
BACK PAIN: 1
RESPIRATORY NEGATIVE: 1

## 2021-05-13 NOTE — PROGRESS NOTES
CAPS capsule Take 4,000 Units by mouth daily       aspirin 81 MG tablet Take 81 mg by mouth daily.  Insulin Lispro, Human, (INSULIN PUMP) FLORENTIN Inject 1 each into the skin See Admin Instructions.          Past Medical History:   Diagnosis Date    Bronchitis     Diabetes type 1, controlled ([address]     Eczema     GERD (gastroesophageal reflux disease)     Gestational diabetes     Hyperlipidemia     Hypothyroid     Insomnia     Myopia with astigmatism and presbyopia     Tobacco abuse        Past Surgical History:   Procedure Laterality Date    BACK INJECTION Right 4/29/2021    Right Sacroiliac Joint Injection performed by Gwyndolyn Felty, MD at [address] [place] [address]  1/05/15    Lumbar microdiscectomy L5-S1    BLADDER SUSPENSION  9/22/09    (transobturator sling)    CERVIX LESION DESTRUCTION  1993    (laser cone)    COLONOSCOPY  10/17/2018    hyperplastic polyp, Dr. Giovanni Tavarez at [facility]  2006   [address]  09/06/2017    Dr Jim Vallejo  02/16/2017    laser nerve block Dr Artemio Mc- [facility]  03/09/2017    laser nerve block Dr Castillo Mcguire Bilateral 03/13/2019    BL5 transforaminal radiculogram- [facility] per Dr Karen Glover Right 05/29/2019    Right sacroiliac joint injection with arthrography    OTHER SURGICAL HISTORY Bilateral 07/31/2019    BL5 transforaminal radiculogram/epidurogram    SPINAL FUSION  7/6/15    Lumbar L5-S1       Family History   Problem Relation Age of Onset    Diabetes Paternal Grandmother     Cancer Father         lymph nodes    Ovarian Cancer Maternal Aunt 79        mother's half sister (same mother)    Glaucoma Neg Hx     Cataracts Neg Hx        Social History     Socioeconomic History    Marital status:      Spouse name: None    Number of children: None    Years of education: None    Highest education level: None   Occupational History    None   Social Needs    Financial resource strain: None    Food insecurity     Worry: None     Inability: None    Transportation needs     Medical: None     Non-medical: None   Tobacco Use    Smoking status: Former Smoker     Packs/day: 0.00     Years: 28.00     Pack years: 0.00     Types: Cigarettes     Start date: 1985     Quit date: 2014     Years since quittin.7    Smokeless tobacco: Never Used   Substance and Sexual Activity    Alcohol use: Yes     Alcohol/week: 3.0 standard drinks     Types: 3 Standard drinks or equivalent per week     Comment: Socially.  Drug use: No    Sexual activity: Yes     Partners: Male   Lifestyle    Physical activity     Days per week: None     Minutes per session: None    Stress: None   Relationships    Social connections     Talks on phone: None     Gets together: None     Attends Mandaen service: None     Active member of club or organization: None     Attends meetings of clubs or organizations: None     Relationship status: None    Intimate partner violence     Fear of current or ex partner: None     Emotionally abused: None     Physically abused: None     Forced sexual activity: None   Other Topics Concern    None   Social History Narrative    None     Review of Systems   Constitutional: Positive for activity change. Respiratory: Negative. Cardiovascular: Negative. Genitourinary: Negative. Musculoskeletal: Positive for arthralgias, back pain and myalgias. Skin: Negative. Neurological:        Radiculopathy   Psychiatric/Behavioral: Positive for sleep disturbance. Objective:   Physical Exam  Vitals signs reviewed. Constitutional:       General: She is not in acute distress. Appearance: She is well-developed. She is not diaphoretic. Interventions: She is not intubated. HENT:      Head: Normocephalic and atraumatic. Cardiovascular:      Rate and Rhythm: Normal rate.    Pulmonary:      Effort: Pulmonary effort is normal. No tachypnea,

## 2021-05-14 ENCOUNTER — TELEPHONE (OUTPATIENT)
Dept: FAMILY MEDICINE CLINIC | Age: 56
End: 2021-05-14

## 2021-05-17 ENCOUNTER — TELEPHONE (OUTPATIENT)
Dept: OBGYN | Age: 56
End: 2021-05-17

## 2021-06-23 ENCOUNTER — HOSPITAL ENCOUNTER (OUTPATIENT)
Age: 56
Setting detail: SPECIMEN
Discharge: HOME | End: 2021-06-23
Payer: COMMERCIAL

## 2021-06-23 PROCEDURE — 83520 IMMUNOASSAY QUANT NOS NONAB: CPT

## 2021-06-28 LAB — FECAL PANCREATIC ELASTASE-1: 194 UG/G

## 2021-06-30 DIAGNOSIS — Z12.31 ENCOUNTER FOR SCREENING MAMMOGRAM FOR BREAST CANCER: Primary | ICD-10-CM

## 2021-07-06 ENCOUNTER — HOSPITAL ENCOUNTER (OUTPATIENT)
Dept: MAMMOGRAPHY | Age: 56
Discharge: HOME | End: 2021-07-08
Payer: COMMERCIAL

## 2021-07-06 DIAGNOSIS — Z12.31 ENCOUNTER FOR SCREENING MAMMOGRAM FOR BREAST CANCER: ICD-10-CM

## 2021-07-06 PROCEDURE — 77063 BREAST TOMOSYNTHESIS BI: CPT

## 2021-07-12 ENCOUNTER — HOSPITAL ENCOUNTER (OUTPATIENT)
Dept: MAMMOGRAPHY | Age: 56
Discharge: HOME | End: 2021-07-14
Payer: COMMERCIAL

## 2021-07-12 ENCOUNTER — HOSPITAL ENCOUNTER (OUTPATIENT)
Dept: ULTRASOUND IMAGING | Age: 56
Discharge: HOME | End: 2021-07-14
Payer: COMMERCIAL

## 2021-07-12 ENCOUNTER — HOSPITAL ENCOUNTER (OUTPATIENT)
Age: 56
Setting detail: SPECIMEN
Discharge: HOME | End: 2021-07-12
Payer: COMMERCIAL

## 2021-07-12 ENCOUNTER — TELEPHONE (OUTPATIENT)
Dept: SURGERY | Age: 56
End: 2021-07-12

## 2021-07-12 DIAGNOSIS — R92.8 ABNORMAL MAMMOGRAM OF RIGHT BREAST: ICD-10-CM

## 2021-07-12 DIAGNOSIS — R92.8 ABNORMAL MAMMOGRAM: Primary | ICD-10-CM

## 2021-07-12 DIAGNOSIS — R92.8 ABNORMAL MAMMOGRAM: ICD-10-CM

## 2021-07-12 DIAGNOSIS — R92.8 ABNORMAL MAMMOGRAM OF RIGHT BREAST: Primary | ICD-10-CM

## 2021-07-12 DIAGNOSIS — N63.10 BREAST MASS, RIGHT: ICD-10-CM

## 2021-07-12 PROCEDURE — 2720000010 US BREAST BIOPSY W LOC DEVICE 1ST LESION RIGHT

## 2021-07-12 PROCEDURE — 2500000003 HC RX 250 WO HCPCS

## 2021-07-12 PROCEDURE — G0279 TOMOSYNTHESIS, MAMMO: HCPCS

## 2021-07-12 PROCEDURE — 76642 ULTRASOUND BREAST LIMITED: CPT

## 2021-07-12 PROCEDURE — 77065 DX MAMMO INCL CAD UNI: CPT

## 2021-07-12 PROCEDURE — 88305 TISSUE EXAM BY PATHOLOGIST: CPT

## 2021-07-12 NOTE — TELEPHONE ENCOUNTER
Phone call from Taj Sorensen that patient has an abnormal mammogram. They can do biopsy today. Taj Sorensen spoke with Dr. Sussy Fuller. He is willing to order biopsy without seeing patient first. Orders placed.

## 2021-07-12 NOTE — RESULT ENCOUNTER NOTE
Abnormal test results, patient had diagnostic mammogram.  Has referral placed for general surgery consult.  DA/CNM

## 2021-07-14 LAB — SURGICAL PATHOLOGY REPORT: NORMAL

## 2021-07-15 ENCOUNTER — TELEPHONE (OUTPATIENT)
Dept: SURGERY | Age: 56
End: 2021-07-15

## 2021-07-15 NOTE — DISCHARGE SUMMARY
[facility]    [] [place]  Phone: [phone]  Fax: [phone]      [] [place]  Phone: [phone]  Fax: [phone]    Physical Therapy Discharge Note  Date: 7/15/2021        Patient Name:  Marianne Powers    :  1965  MRN: [id number]  Restrictions/Precautions:     Medical/Treatment Diagnosis Information:   · Diagnosis: M54.42, M54.41, G89.29 (ICD-10-CM) - Chronic bilateral low back pain with bilateral sciatica  ·   Insurance/Certification information:  PT Insurance Information: [employer]  Physician Information:  Referring Practitioner: AARON Ohara CNP  Plan of care signed (Y/N):  Y  Visit# / total visits:  10/10  Pain level:      3/10         Time Period for Report:  3/16/21-21  Cancels/No-shows to date:  2    Plan of Care/Treatment to date:  [x] Therapeutic Exercise    [x] Modalities:  [x] Therapeutic Activity     [] Ultrasound  [] Electrical Stimulation  [x] Gait Training      [] Cervical Traction    [] Lumbar Traction  [x] Neuromuscular Re-education  [] Cold/hotpack [] Iontophoresis  [x] Instruction in HEP      Other:  [] Manual Therapy       []    [] Aquatic Therapy       []                              Subjective:         Patient reports she feels like her back is just hanging steady and not getting worse but not improving any more. Patient reports she feels the dry needling is helping. Patient reports she has switched sides and left hurting her more today into buttock. Patient reports compliance with HEP at least twice a day. Patient reports 60-70% overall improvement since beginning therapy. Patient reports she is scheduled for injection on 21.         Objective: Performed ther-ex as documented on flowsheet to improve motion and strength to improve pain and ease with work duties. Oriana Mc given verbal cues to perform exercises properly. Patient able to centralize symptoms with prone exercises.   Patient reported decreased glute pain after prone IR/ER. · Observation: fair posture noted  · Test measurements:  Full lumbar extension with prone press up, all lumbar motions WNL no pain  1. Hip Flexion: 4+/5  2. Abduction: 4+/5  Adduction: 4+/5     Assessment:   Hold x 4 weeks, no return   Plan:    DC PT        Goals:    Short term goals  Time Frame for Short term goals: 3 weeks  Short term goal 1: Initiate HEP (Initiated )     Long term goals  Time Frame for Long term goals : 6 weeks  Long term goal 1: Indpendent in HEP (patient reports compliance with HEP twice daily 22 APR)  Long term goal 2: Improve LE strength to 4+/5 on the Right to allow for increased tolerance to sitting and standing.  (Right hip grossly 4+/5 22 APR )  Long term goal 3: Patient to have a 50% reduction in Radicular symptoms. (patient reports radicular symptoms have resolved for the most part 22 APR)  Long term goal 4: Trunk AROM WFL (lumbar flex: WNL, ext:WNL, rotation: WNL, sidebending: WNL 22 APR)            Percentage of Goals Met:      5/5 met        Discharge Prognosis: [x] Excellent [] Good [] Fair  [] Poor     Goal Status:  [x] Achieved [] Partially Achieved  [] Not Achieved       Electronically signed by:  Fredy Hernández PT

## 2021-07-15 NOTE — TELEPHONE ENCOUNTER
Patient notified of right breast biopsy results. Let her know that we will give her a call after radiology recommendation.

## 2021-07-19 ENCOUNTER — HOSPITAL ENCOUNTER (OUTPATIENT)
Dept: LAB | Age: 56
Discharge: HOME | End: 2021-07-19
Payer: COMMERCIAL

## 2021-07-19 LAB
ALBUMIN SERPL-MCNC: 4.3 G/DL (ref 3.5–5.2)
ALBUMIN/GLOBULIN RATIO: 1.5 (ref 1–2.5)
ALP BLD-CCNC: 63 U/L (ref 35–104)
ALT SERPL-CCNC: 12 U/L (ref 5–33)
ANION GAP SERPL CALCULATED.3IONS-SCNC: 10 MMOL/L (ref 9–17)
AST SERPL-CCNC: 22 U/L
BILIRUB SERPL-MCNC: 0.44 MG/DL (ref 0.3–1.2)
BUN BLDV-MCNC: 11 MG/DL (ref 6–20)
BUN/CREAT BLD: 17 (ref 9–20)
CALCIUM SERPL-MCNC: 9.2 MG/DL (ref 8.6–10.4)
CHLORIDE BLD-SCNC: 102 MMOL/L (ref 98–107)
CHOLESTEROL/HDL RATIO: 2.2
CHOLESTEROL: 204 MG/DL
CO2: 29 MMOL/L (ref 20–31)
CREAT SERPL-MCNC: 0.63 MG/DL (ref 0.5–0.9)
CREATININE URINE: 67.8 MG/DL (ref 28–217)
GFR AFRICAN AMERICAN: >60 ML/MIN
GFR NON-AFRICAN AMERICAN: >60 ML/MIN
GFR SERPL CREATININE-BSD FRML MDRD: ABNORMAL ML/MIN/{1.73_M2}
GFR SERPL CREATININE-BSD FRML MDRD: ABNORMAL ML/MIN/{1.73_M2}
GLUCOSE BLD-MCNC: 196 MG/DL (ref 70–99)
HDLC SERPL-MCNC: 93 MG/DL
LDL CHOLESTEROL: 102 MG/DL (ref 0–130)
MICROALBUMIN/CREAT 24H UR: <12 MG/L
MICROALBUMIN/CREAT UR-RTO: NORMAL MCG/MG CREAT
POTASSIUM SERPL-SCNC: 4.4 MMOL/L (ref 3.7–5.3)
SODIUM BLD-SCNC: 141 MMOL/L (ref 135–144)
THYROXINE, FREE: 1.37 NG/DL (ref 0.93–1.7)
TOTAL PROTEIN: 7.1 G/DL (ref 6.4–8.3)
TRIGL SERPL-MCNC: 47 MG/DL
TSH SERPL DL<=0.05 MIU/L-ACNC: 0.39 MIU/L (ref 0.3–5)
VLDLC SERPL CALC-MCNC: ABNORMAL MG/DL (ref 1–30)

## 2021-07-19 PROCEDURE — 82570 ASSAY OF URINE CREATININE: CPT

## 2021-07-19 PROCEDURE — 82043 UR ALBUMIN QUANTITATIVE: CPT

## 2021-07-19 PROCEDURE — 80061 LIPID PANEL: CPT

## 2021-07-19 PROCEDURE — 36415 COLL VENOUS BLD VENIPUNCTURE: CPT

## 2021-07-19 PROCEDURE — 84439 ASSAY OF FREE THYROXINE: CPT

## 2021-07-19 PROCEDURE — 80053 COMPREHEN METABOLIC PANEL: CPT

## 2021-07-19 PROCEDURE — 84443 ASSAY THYROID STIM HORMONE: CPT

## 2021-07-21 ENCOUNTER — HOSPITAL ENCOUNTER (OUTPATIENT)
Dept: GENERAL RADIOLOGY | Age: 56
Discharge: HOME | End: 2021-07-23
Payer: COMMERCIAL

## 2021-07-21 ENCOUNTER — OFFICE VISIT (OUTPATIENT)
Dept: PAIN MANAGEMENT | Age: 56
End: 2021-07-21
Payer: COMMERCIAL

## 2021-07-21 VITALS
BODY MASS INDEX: 29.07 KG/M2 | DIASTOLIC BLOOD PRESSURE: 84 MMHG | HEIGHT: 61 IN | SYSTOLIC BLOOD PRESSURE: 122 MMHG | WEIGHT: 154 LBS

## 2021-07-21 DIAGNOSIS — M96.1 POST LAMINECTOMY SYNDROME: ICD-10-CM

## 2021-07-21 DIAGNOSIS — M54.16 LUMBAR RADICULOPATHY: Primary | ICD-10-CM

## 2021-07-21 DIAGNOSIS — M54.16 LUMBAR RADICULOPATHY: ICD-10-CM

## 2021-07-21 DIAGNOSIS — M54.41 CHRONIC BILATERAL LOW BACK PAIN WITH BILATERAL SCIATICA: ICD-10-CM

## 2021-07-21 DIAGNOSIS — G89.29 CHRONIC BILATERAL LOW BACK PAIN WITH BILATERAL SCIATICA: ICD-10-CM

## 2021-07-21 DIAGNOSIS — M54.42 CHRONIC BILATERAL LOW BACK PAIN WITH BILATERAL SCIATICA: ICD-10-CM

## 2021-07-21 DIAGNOSIS — M51.26 DISPLACEMENT OF LUMBAR INTERVERTEBRAL DISC WITHOUT MYELOPATHY: ICD-10-CM

## 2021-07-21 PROCEDURE — 3017F COLORECTAL CA SCREEN DOC REV: CPT | Performed by: NURSE PRACTITIONER

## 2021-07-21 PROCEDURE — 1036F TOBACCO NON-USER: CPT | Performed by: NURSE PRACTITIONER

## 2021-07-21 PROCEDURE — G8417 CALC BMI ABV UP PARAM F/U: HCPCS | Performed by: NURSE PRACTITIONER

## 2021-07-21 PROCEDURE — 72100 X-RAY EXAM L-S SPINE 2/3 VWS: CPT

## 2021-07-21 PROCEDURE — 99214 OFFICE O/P EST MOD 30 MIN: CPT | Performed by: NURSE PRACTITIONER

## 2021-07-21 PROCEDURE — G8427 DOCREV CUR MEDS BY ELIG CLIN: HCPCS | Performed by: NURSE PRACTITIONER

## 2021-07-21 RX ORDER — PANCRELIPASE 36000; 180000; 114000 [USP'U]/1; [USP'U]/1; [USP'U]/1
2 CAPSULE, DELAYED RELEASE PELLETS ORAL 3 TIMES DAILY
COMMUNITY
Start: 2021-07-13 | End: 2022-05-24

## 2021-07-21 RX ORDER — METOCLOPRAMIDE 5 MG/1
5 TABLET ORAL
Status: ON HOLD | COMMUNITY
Start: 2021-06-23 | End: 2021-09-02

## 2021-07-21 RX ORDER — NEOMYCIN SULFATE, POLYMYXIN B SULFATE, HYDROCORTISONE 3.5; 10000; 1 MG/ML; [USP'U]/ML; MG/ML
SOLUTION/ DROPS AURICULAR (OTIC)
COMMUNITY
Start: 2021-07-18 | End: 2021-07-29

## 2021-07-21 ASSESSMENT — ENCOUNTER SYMPTOMS
RESPIRATORY NEGATIVE: 1
BACK PAIN: 1

## 2021-07-21 NOTE — PROGRESS NOTES
Subjective:      Patient ID: Tim Woodson is a 49 y.o. female. Chief Complaint   Patient presents with    Follow-up     bilateral low back pain     Other  Associated symptoms include arthralgias and myalgias. Hip Pain     Returns increased pain, across back radiating bilateral legs. No recent imaging.  Completed physical therapy, continues home exercises daily    Pain Assessment  Location of Pain: Back (radiates to hips and legs)  Location Modifiers: Left, Right, Inferior  Severity of Pain: 4  Quality of Pain: Aching, Other (Comment) (burning)  Duration of Pain: Persistent  Frequency of Pain: Constant  Aggravating Factors: Bending, Other (Comment) (sitting too long)  Limiting Behavior: Yes  Relieving Factors: Ice, Nsaids, Other (Comment) (muscle relaxer)    Allergies   Allergen Reactions    Ace Inhibitors Other (See Comments)     Cough      Pcn [Penicillins] Rash       Outpatient Medications Marked as Taking for the 7/21/21 encounter (Office Visit) with Jennifer Fill, APRN - CNP   Medication Sig Dispense Refill    metoclopramide (REGLAN) 5 MG tablet Take 5 mg by mouth      neomycin-polymyxin-hydrocortisone 1 % SOLN otic solution INSTILL 3 DROPS INTO AFFECTED EAR(S) three times a day for 5 days      lipase-protease-amylase (CREON) 97098-495503 units CPEP delayed release capsule Take 2 tablets by mouth 3 times daily      ibuprofen (ADVIL;MOTRIN) 800 MG tablet Take 1 tablet by mouth every 8 hours as needed for Pain 120 tablet 3    levothyroxine (SYNTHROID) 50 MCG tablet Take 1 tablet by mouth daily 30 tablet 5    losartan-hydroCHLOROthiazide (HYZAAR) 50-12.5 MG per tablet Take 1 tablet by mouth daily 90 tablet 3    cyclobenzaprine (FLEXERIL) 10 MG tablet Take 1 tablet by mouth 2 times daily as needed for Muscle spasms 60 tablet 5    Crisaborole (EUCRISA) 2 % OINT Apply 1 applicator topically 2 times daily (Patient taking differently: Apply 1 applicator topically 2 times daily as needed ) 1 Tube 5    79        mother's half sister (same mother)    Glaucoma Neg Hx     Cataracts Neg Hx        Social History     Socioeconomic History    Marital status:      Spouse name: None    Number of children: None    Years of education: None    Highest education level: None   Occupational History    None   Tobacco Use    Smoking status: Former Smoker     Packs/day: 0.00     Years: 28.00     Pack years: 0.00     Types: Cigarettes     Start date: 1985     Quit date: 2014     Years since quittin.9    Smokeless tobacco: Never Used   Substance and Sexual Activity    Alcohol use: Yes     Alcohol/week: 3.0 standard drinks     Types: 3 Standard drinks or equivalent per week     Comment: Socially.  Drug use: No    Sexual activity: Yes     Partners: Male   Other Topics Concern    None   Social History Narrative    None     Social Determinants of Health     Financial Resource Strain:     Difficulty of Paying Living Expenses:    Food Insecurity:     Worried About Running Out of Food in the Last Year:     [address] in the Last Year:    Transportation Needs:     Lack of Transportation (Medical):  Lack of Transportation (Non-Medical):    Physical Activity:     Days of Exercise per Week:     Minutes of Exercise per Session:    Stress:     Feeling of Stress :    Social Connections:     Frequency of Communication with Friends and Family:     Frequency of Social Gatherings with Friends and Family:     Attends Yarsanism Services:     Active Member of Clubs or Organizations:     Attends Club or Organization Meetings:     Marital Status:    Intimate Partner Violence:     Fear of Current or Ex-Partner:     Emotionally Abused:     Physically Abused:     Sexually Abused:      Review of Systems   Constitutional: Positive for activity change. Respiratory: Negative. Cardiovascular: Negative. Genitourinary: Negative. Musculoskeletal: Positive for arthralgias, back pain and myalgias. Skin: Negative. Neurological:        Radiculopathy   Psychiatric/Behavioral: Positive for sleep disturbance. Objective:   Physical Exam  Vitals reviewed. Constitutional:       General: She is not in acute distress. Appearance: She is well-developed. She is not diaphoretic. Interventions: She is not intubated. HENT:      Head: Normocephalic and atraumatic. Cardiovascular:      Rate and Rhythm: Normal rate. Pulmonary:      Effort: Pulmonary effort is normal. No tachypnea, bradypnea, accessory muscle usage or respiratory distress. She is not intubated. Musculoskeletal:      Lumbar back: No tenderness or bony tenderness. Positive right straight leg raise test.   Skin:     General: Skin is warm and dry. Capillary Refill: Capillary refill takes less than 2 seconds. Coloration: Skin is not pale. Nails: There is no clubbing. Neurological:      Mental Status: She is alert and oriented to person, place, and time. GCS: GCS eye subscore is 4. GCS verbal subscore is 5. GCS motor subscore is 6. Cranial Nerves: No cranial nerve deficit. Psychiatric:         Speech: Speech normal.         Behavior: Behavior normal. Behavior is not agitated, aggressive, withdrawn or combative. Behavior is cooperative. Judgment: Judgment normal. Judgment is not impulsive or inappropriate. Assessment:      1. Lumbar radiculopathy    2. Chronic bilateral low back pain with bilateral sciatica    3. Displacement of lumbar intervertebral disc without myelopathy    4. Post laminectomy syndrome          Plan:   Update lumbar spine xray and lumbar MRI.  Will follow up after to review results and determine appropriate plan of care based on results    Luzma Galarza, APRN - CNP

## 2021-07-28 ENCOUNTER — HOSPITAL ENCOUNTER (OUTPATIENT)
Dept: MRI IMAGING | Age: 56
Discharge: HOME | End: 2021-07-30
Payer: COMMERCIAL

## 2021-07-28 DIAGNOSIS — M54.16 LUMBAR RADICULOPATHY: ICD-10-CM

## 2021-07-28 PROCEDURE — 72148 MRI LUMBAR SPINE W/O DYE: CPT

## 2021-07-29 ENCOUNTER — OFFICE VISIT (OUTPATIENT)
Dept: PAIN MANAGEMENT | Age: 56
End: 2021-07-29
Payer: COMMERCIAL

## 2021-07-29 VITALS
HEART RATE: 78 BPM | SYSTOLIC BLOOD PRESSURE: 138 MMHG | DIASTOLIC BLOOD PRESSURE: 88 MMHG | WEIGHT: 157 LBS | HEIGHT: 62 IN | BODY MASS INDEX: 28.89 KG/M2

## 2021-07-29 DIAGNOSIS — M96.1 POST LAMINECTOMY SYNDROME: Primary | ICD-10-CM

## 2021-07-29 DIAGNOSIS — M54.42 CHRONIC BILATERAL LOW BACK PAIN WITH BILATERAL SCIATICA: ICD-10-CM

## 2021-07-29 DIAGNOSIS — M54.41 CHRONIC BILATERAL LOW BACK PAIN WITH BILATERAL SCIATICA: ICD-10-CM

## 2021-07-29 DIAGNOSIS — M54.16 LUMBAR RADICULOPATHY: ICD-10-CM

## 2021-07-29 DIAGNOSIS — G89.29 CHRONIC BILATERAL LOW BACK PAIN WITH BILATERAL SCIATICA: ICD-10-CM

## 2021-07-29 PROCEDURE — 99214 OFFICE O/P EST MOD 30 MIN: CPT | Performed by: NURSE PRACTITIONER

## 2021-07-29 PROCEDURE — 1036F TOBACCO NON-USER: CPT | Performed by: NURSE PRACTITIONER

## 2021-07-29 PROCEDURE — G8417 CALC BMI ABV UP PARAM F/U: HCPCS | Performed by: NURSE PRACTITIONER

## 2021-07-29 PROCEDURE — G8427 DOCREV CUR MEDS BY ELIG CLIN: HCPCS | Performed by: NURSE PRACTITIONER

## 2021-07-29 PROCEDURE — 3017F COLORECTAL CA SCREEN DOC REV: CPT | Performed by: NURSE PRACTITIONER

## 2021-07-29 RX ORDER — DIAZEPAM 5 MG/1
5 TABLET ORAL SEE ADMIN INSTRUCTIONS
Qty: 2 TABLET | Refills: 0 | Status: SHIPPED | OUTPATIENT
Start: 2021-07-29 | End: 2021-08-24 | Stop reason: SDUPTHER

## 2021-07-29 ASSESSMENT — ENCOUNTER SYMPTOMS
RESPIRATORY NEGATIVE: 1
BACK PAIN: 1

## 2021-07-29 NOTE — PROGRESS NOTES
Subjective:      Patient ID: Tomer Nixon is a 49 y.o. female. Chief Complaint   Patient presents with    Lower Back Pain    Leg Pain     shayna, posterior upper    Results     MRI     Other  Associated symptoms include arthralgias and myalgias. Hip Pain     Leg Pain     To review MRI results, increased pain, across back radiating bilateral legs. No recent imaging. Completed physical therapy, continues home exercises daily    Pain Assessment  Location of Pain: Back  Location Modifiers: Inferior, Posterior  Severity of Pain: 5  Quality of Pain: Other (Comment) (heavy, pulling, weakness in knees)  Duration of Pain: Persistent  Frequency of Pain: Constant  Aggravating Factors: Standing, Other (Comment) (sitting)  Limiting Behavior: Yes  Relieving Factors: Rest (cross legs and sitting forward or lying down)  Result of Injury: No  Work-Related Injury: No  Are there other pain locations you wish to document?: Yes (posterior thighs shayna)    Allergies   Allergen Reactions    Ace Inhibitors Other (See Comments)     Cough      Sertraline Other (See Comments)     Hypertension    Pcn [Penicillins] Rash       Outpatient Medications Marked as Taking for the 7/29/21 encounter (Office Visit) with AARON Dow CNP   Medication Sig Dispense Refill    diazePAM (VALIUM) 5 MG tablet Take 1 tablet by mouth See Admin Instructions for 32 days.  Take 1 pill 12 hours prior to the procedure and 1 pill 2 hrs prior to the procedure 2 tablet 0    lipase-protease-amylase (CREON) 81602-204575 units CPEP delayed release capsule Take 2 tablets by mouth 3 times daily      ibuprofen (ADVIL;MOTRIN) 800 MG tablet Take 1 tablet by mouth every 8 hours as needed for Pain 120 tablet 3    levothyroxine (SYNTHROID) 50 MCG tablet Take 1 tablet by mouth daily 30 tablet 5    losartan-hydroCHLOROthiazide (HYZAAR) 50-12.5 MG per tablet Take 1 tablet by mouth daily 90 tablet 3    cyclobenzaprine (FLEXERIL) 10 MG tablet Take 1 tablet by mouth 2 times daily as needed for Muscle spasms 60 tablet 5    Crisaborole (EUCRISA) 2 % OINT Apply 1 applicator topically 2 times daily (Patient taking differently: Apply 1 applicator topically 2 times daily as needed ) 1 Tube 5    acetaminophen (TYLENOL) 500 MG tablet Take 500 mg by mouth 2 times daily as needed       insulin lispro (HUMALOG) 100 UNIT/ML injection vial Use 60 units per day in pump 1 vial 3    Vitamin D (CHOLECALCIFEROL) 1000 UNITS CAPS capsule Take 4,000 Units by mouth daily       aspirin 81 MG tablet Take 81 mg by mouth daily.  Insulin Lispro, Human, (INSULIN PUMP) FLORENTIN Inject 1 each into the skin See Admin Instructions.          Past Medical History:   Diagnosis Date    Bronchitis     Diabetes type 1, controlled ([address]     Eczema     GERD (gastroesophageal reflux disease)     Gestational diabetes     Hyperlipidemia     Hypothyroid     Insomnia     Myopia with astigmatism and presbyopia     Tobacco abuse        Past Surgical History:   Procedure Laterality Date    BACK INJECTION Right 4/29/2021    Right Sacroiliac Joint Injection performed by Jesse Moy [facility] at [facility]  1/05/15    Lumbar microdiscectomy L5-S1    BLADDER SUSPENSION  9/22/09    (transobturator sling)    CERVIX LESION DESTRUCTION  1993    (laser cone)    COLONOSCOPY  10/17/2018    hyperplastic polyp, Dr. Ba Punch at [facility]  2006   [address]  09/06/2017    Dr Chasity Colvin  02/16/2017    laser nerve block Dr Waldemar Bobby- [address] [facility]  03/09/2017    laser nerve block Dr Shannon Parmar Bilateral 03/13/2019    BL5 transforaminal radiculogram- [facility] per Dr Mary Ramírez Right 05/29/2019    Right sacroiliac joint injection with arthrography    OTHER SURGICAL HISTORY Bilateral 07/31/2019    BL5 transforaminal radiculogram/epidurogram    SPINAL FUSION  7/6/15    Lumbar L5-S1    US BREAST NEEDLE BIOPSY RIGHT Right 2021    US BREAST NEEDLE BIOPSY RIGHT 2021 [facility]       Family History   Problem Relation Age of Onset    Diabetes Paternal Grandmother     Cancer Father         lymph nodes    Ovarian Cancer Maternal Aunt 79        mother's half sister (same mother)    Glaucoma Neg Hx     Cataracts Neg Hx        Social History     Socioeconomic History    Marital status:      Spouse name: None    Number of children: None    Years of education: None    Highest education level: None   Occupational History    None   Tobacco Use    Smoking status: Former Smoker     Packs/day: 0.00     Years: 28.00     Pack years: 0.00     Types: Cigarettes     Start date: 1985     Quit date: 2014     Years since quittin.9    Smokeless tobacco: Never Used   Substance and Sexual Activity    Alcohol use: Yes     Alcohol/week: 3.0 standard drinks     Types: 3 Standard drinks or equivalent per week     Comment: Socially.  Drug use: No    Sexual activity: Yes     Partners: Male   Other Topics Concern    None   Social History Narrative    None     Social Determinants of Health     Financial Resource Strain:     Difficulty of Paying Living Expenses:    Food Insecurity:     Worried About Running Out of Food in the Last Year:     [address] in the Last Year:    Transportation Needs:     Lack of Transportation (Medical):      Lack of Transportation (Non-Medical):    Physical Activity:     Days of Exercise per Week:     Minutes of Exercise per Session:    Stress:     Feeling of Stress :    Social Connections:     Frequency of Communication with Friends and Family:     Frequency of Social Gatherings with Friends and Family:     Attends [employer] Services:     Active Member of Clubs or Organizations:     Attends Club or Organization Meetings:     Marital Status:    Intimate Partner Violence:     Fear of Current or Ex-Partner:     Emotionally Abused:     Physically Abused:     Sexually Abused:      Review of Systems   Constitutional: Positive for activity change. Respiratory: Negative. Cardiovascular: Negative. Genitourinary: Negative. Musculoskeletal: Positive for arthralgias, back pain and myalgias. Skin: Negative. Neurological:        Radiculopathy   Psychiatric/Behavioral: Positive for sleep disturbance. Objective:   Physical Exam  Vitals reviewed. Constitutional:       General: She is not in acute distress. Appearance: She is well-developed. She is not diaphoretic. Interventions: She is not intubated. HENT:      Head: Normocephalic and atraumatic. Cardiovascular:      Rate and Rhythm: Normal rate. Pulmonary:      Effort: Pulmonary effort is normal. No tachypnea, bradypnea, accessory muscle usage or respiratory distress. She is not intubated. Musculoskeletal:      Lumbar back: No tenderness or bony tenderness. Positive right straight leg raise test.      Comments: MRI OF THE LUMBAR SPINE WITHOUT CONTRAST, 7/28/2021 4:29 pm     TECHNIQUE:  Multiplanar multisequence MRI of the lumbar spine was performed without the  administration of intravenous contrast.     COMPARISON:  11/06/2014     HISTORY:  ORDERING SYSTEM PROVIDED HISTORY: Lumbar radiculopathy  TECHNOLOGIST PROVIDED HISTORY:  radicular pain  Is the patient pregnant?->No  Reason for Exam: Chronic low back pain and bilateral leg numbness. Three  previous lumbar surgeries. Acuity: Chronic  Type of Exam: Initial  Additional signs and symptoms: Lumbar radiculopathy     FINDINGS:  BONES/ALIGNMENT: There has been anterior lumbar spine fusion at L5-S1 since  the prior study with a disc spacer placement.  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  The curvature of the lumbar spine is within normal limits.   There is mild degenerative disc disease at L3-4 and L4-5.     SPINAL CORD: The conus terminates normally.     SOFT TISSUES: No paraspinal mass identified.     L1-L2:  The thecal sac and neural foramina are intact.     L2-L3:  The thecal sac and neural foramina are intact.     L3-L4: There is a minimum disc bulge and small osteophytes.  Disc and/or  osteophytes cause minimal narrowing of the neural foramina bilaterally.  The  thecal sac is not stenotic.     L4-L5: There is a disc protrusion measuring 3-4 mm.  The thecal sac is not  stenotic. Disc and/or osteophytes result in moderate narrowing of the neural  foramina bilaterally.     L5-S1: There has been a laminectomy on the left.  The thecal sac and S1 nerve  roots are intact.  The neural foramina are intact.        Impression  Disc protrusion at L4-5 with moderate narrowing of the neural foramina  bilaterally.  Minimal narrowing of the neural foramina bilaterally at L3-4  from disc and osteophytes.  No stenosis of the thecal sac in the lumbar  region.  Progression of the degenerative changes since the prior study. There has been anterior fusion at L5-S1 since the previous exam.       Skin:     General: Skin is warm and dry. Capillary Refill: Capillary refill takes less than 2 seconds. Coloration: Skin is not pale. Nails: There is no clubbing. Neurological:      Mental Status: She is alert and oriented to person, place, and time. GCS: GCS eye subscore is 4. GCS verbal subscore is 5. GCS motor subscore is 6. Cranial Nerves: No cranial nerve deficit. Psychiatric:         Speech: Speech normal.         Behavior: Behavior normal. Behavior is not agitated, aggressive, withdrawn or combative. Behavior is cooperative. Judgment: Judgment normal. Judgment is not impulsive or inappropriate. Assessment:      1. Post laminectomy syndrome    2. Chronic bilateral low back pain with bilateral sciatica    3. Lumbar radiculopathy          Plan:   Lumbar MRI reviewed, Schedule bilateral L4 transforaminal     Informed consent has not been obtained for procedure. Loretta Benitez not on blood thinners.  Medications to hold have been reviewed with patient. Blood thinners must be held with permission from your cardiologist or primary care physician. A letter is not required to besent to PCP/Cardiologist regarding holding medications for procedure to decrease bleeding risk.        AARON Sampson - CNP

## 2021-07-29 NOTE — PATIENT INSTRUCTIONS
[facility]  [address]  Telephone [phone]  Fax [phone]      PROCEDURE INSTRUCTIONS FOR  PAIN MANAGEMENT PROCEDURES WITHOUT IV SEDATION    Dawn Cynthea Shone scheduled to see Dr. Tong Tinoco to undergo the following procedure:  Bilateral L4 trans foraminal epidural    Procedure Date: ____08/13/2021____  You will receive a phone call the day prior to your procedure to confirm a time of arrival.    Report to the [place] [id number], Registration office on the 1st floor in the hospital, after check in and signing of paper work you will then go to the second floor to the surgery center. 1. Stop the following medications prior to the procedure:    Aspirin    · Medication___aspirin___ held for __5__ days    2. Take all routine medications unless otherwise instructed. Ok to take vitamins and antiinflammatory medications    3. EATING & DRINKING:  Ok to eat or drink before the injection - no IV sedation will be used. 4. If you are allergic to contrast or iodine, you must take benadryl and prednisone prior to the injection to prevent an allergic reaction. Follow the directions on the prescription for the times to take the medication. 5. Oral valium can be prescribed if your are anxious about the injections or feel that you can not lay still during the injection. If you take valium, you must have a . Make arrangements for a family member or friend to drive you to the surgery center. Your ride must stay in the hospital while you are having the injection done. If they cannot stay, the injection will be rescheduled. The valium may affect your judgment following the procedure and driving a vehicle within 24 hours after the sedation could be dangerous. 6.  Wear simple loose clothing, which can be easily changed. 7.  Leave jewelry (including rings) and other valuables at home.      8.  You will be asked to sign several forms prior to surgery; patients under the age of 25 must have a parent or legal guardian sign the permit to be able to do the procedure. 9.  You must have finished any antibiotic prescribed for recent infections. If required, please take pre-procedure antibiotic or other pre-procedure medications as instructed. 10. Bring inhalers and pain medications with you to your procedure. 11. Bring your MRI/CT films if they were done outside of the [facility]. 12. If you should develop a cold, sore throat, cough, fever or other new indication of illness or infection, or are started on antibiotics within 2 weeks of the scheduled procedure, please notify the [facility] office as early as possible at [phone]. If calling after 4:30pm the day prior to your scheduled procedure please contact [phone] and Leave a Voice Message. [employer] Video Visits Patients     Patients: You can now access your video visit with your provider from your [employer] account   1. Download the [employer] application to your device from either the [employer] or [employer] and follow prompts to setup your account if you have not already done so.   2. Launch the [employer] application from your device   3. Log into [employer] using your user name and password you have setup         4. If you have scheduled a [facility] Video Visit, the visit will appear in your \"Appointments\" shown above. Click on the appointment icon to access video visits. 5. You will see a list of past and future appointments; the camera icon represents a video visit. Click on the appropriate visit. 6. When ready click on the Begin Visit button shown above. You will then be placed into the video visit. *When joining your scheduled MyChart Video Visit with your provider, the Begin Visit button will appear 30 minutes before and after your scheduled appointment time.  If you are attempting to join the video visit more than 30 minutes after your scheduled appointment time you will not be able to join the video portion. Please contact your provider to reschedule the visit if you are joining the video visit more than 30 minutes after your scheduled appointment time. 7. If the provider has not yet joined the video visit yet you will see the following screen with your video feed:         8.  When ready to leave video visit click the red telephone hang up button:

## 2021-08-02 ENCOUNTER — OFFICE VISIT (OUTPATIENT)
Dept: OPTOMETRY | Age: 56
End: 2021-08-02
Payer: COMMERCIAL

## 2021-08-02 DIAGNOSIS — H52.203 MYOPIA OF BOTH EYES WITH ASTIGMATISM AND PRESBYOPIA: ICD-10-CM

## 2021-08-02 DIAGNOSIS — E11.9 INSULIN DEPENDENT TYPE 2 DIABETES MELLITUS (HCC): Primary | ICD-10-CM

## 2021-08-02 DIAGNOSIS — Z79.4 INSULIN DEPENDENT TYPE 2 DIABETES MELLITUS (HCC): Primary | ICD-10-CM

## 2021-08-02 DIAGNOSIS — H53.8 BLURRED VISION, BILATERAL: ICD-10-CM

## 2021-08-02 DIAGNOSIS — H52.4 MYOPIA OF BOTH EYES WITH ASTIGMATISM AND PRESBYOPIA: ICD-10-CM

## 2021-08-02 DIAGNOSIS — H52.13 MYOPIA OF BOTH EYES WITH ASTIGMATISM AND PRESBYOPIA: ICD-10-CM

## 2021-08-02 PROCEDURE — G8417 CALC BMI ABV UP PARAM F/U: HCPCS | Performed by: OPTOMETRIST

## 2021-08-02 PROCEDURE — 1036F TOBACCO NON-USER: CPT | Performed by: OPTOMETRIST

## 2021-08-02 PROCEDURE — 99214 OFFICE O/P EST MOD 30 MIN: CPT | Performed by: OPTOMETRIST

## 2021-08-02 PROCEDURE — 3046F HEMOGLOBIN A1C LEVEL >9.0%: CPT | Performed by: OPTOMETRIST

## 2021-08-02 PROCEDURE — 92250 FUNDUS PHOTOGRAPHY W/I&R: CPT | Performed by: OPTOMETRIST

## 2021-08-02 PROCEDURE — 2022F DILAT RTA XM EVC RTNOPTHY: CPT | Performed by: OPTOMETRIST

## 2021-08-02 PROCEDURE — G8427 DOCREV CUR MEDS BY ELIG CLIN: HCPCS | Performed by: OPTOMETRIST

## 2021-08-02 PROCEDURE — 3017F COLORECTAL CA SCREEN DOC REV: CPT | Performed by: OPTOMETRIST

## 2021-08-02 RX ORDER — TROPICAMIDE 10 MG/ML
1 SOLUTION/ DROPS OPHTHALMIC ONCE
Status: COMPLETED | OUTPATIENT
Start: 2021-08-02 | End: 2021-08-02

## 2021-08-02 RX ADMIN — TROPICAMIDE 1 DROP: 10 SOLUTION/ DROPS OPHTHALMIC at 14:50

## 2021-08-02 ASSESSMENT — REFRACTION_CURRENTRX
OD_ADDL_SPECS: MED
OS_CYLINDER: DS
OD_CYLINDER: DS
OS_ADDL_SPECS: MED
OD_SPHERE: -1.25
OS_SPHERE: -1.25

## 2021-08-02 ASSESSMENT — KERATOMETRY
OD_K1POWER_DIOPTERS: 45.00
OS_AXISANGLE_DEGREES: 082
OD_K2POWER_DIOPTERS: 45.75
OS_AXISANGLE2_DEGREES: 172
OS_K1POWER_DIOPTERS: 45.25
OS_K2POWER_DIOPTERS: 46.00
METHOD_AUTO_MANUAL: AUTOMATED
OD_AXISANGLE_DEGREES: 054
OD_AXISANGLE2_DEGREES: 144

## 2021-08-02 ASSESSMENT — REFRACTION_WEARINGRX
OD_CYLINDER: -0.50
OS_AXIS: 130
OD_SPHERE: -1.50
SPECS_TYPE: PAL
OD_ADD: +2.00
OS_CYLINDER: -0.50
OS_SPHERE: -1.50
OD_AXIS: 090
OS_ADD: +2.00

## 2021-08-02 ASSESSMENT — ENCOUNTER SYMPTOMS
EYES NEGATIVE: 0
RESPIRATORY NEGATIVE: 0
ALLERGIC/IMMUNOLOGIC NEGATIVE: 0
GASTROINTESTINAL NEGATIVE: 0

## 2021-08-02 ASSESSMENT — VISUAL ACUITY
OS_CC: 20/30
CORRECTION_TYPE: GLASSES
OS_CC+: +3
METHOD: SNELLEN - LINEAR
OD_CC: 20/30 OU

## 2021-08-02 ASSESSMENT — REFRACTION_MANIFEST
OD_SPHERE: -1.00
OS_CYLINDER: -0.50
OS_SPHERE: -1.25
OS_AXIS: 135
OD_AXIS: 095
OD_ADD: +2.25
OD_CYLINDER: -0.50
OS_ADD: +2.25

## 2021-08-02 ASSESSMENT — TONOMETRY
IOP_METHOD: NON-CONTACT AIR PUFF
OD_IOP_MMHG: 13
OS_IOP_MMHG: 23

## 2021-08-02 NOTE — PROGRESS NOTES
Jeanie Barragan presents today for   Chief Complaint   Patient presents with    Blurred Vision    Ophth Diabetic Exam    Vision Exam   .    HPI     Blurred Vision     In both eyes. Vision is blurred. Context:  distance vision and reading. Comments     Last Vision Exam: 11/10/2020 Aw  Last Ophthalmology Exam: n/a  Last Filled Glasses Rx: 11/10/2020  Insurance: [employer] [place]  Update: Dm Exam ; Contacts and glasses if needed  Distance and reading are getting more blurry  Diabetic:  Sugars:    HmgA1C: 7.6  2020                     ROS     Positive for: Endocrine    Negative for: Constitutional, Gastrointestinal, Neurological, Skin, Genitourinary, Musculoskeletal, HENT, Cardiovascular, Eyes, Respiratory, Psychiatric, Allergic/Imm, Heme/Lymph          Family History   Problem Relation Age of Onset    Diabetes Paternal Grandmother     Cancer Father         lymph nodes    Ovarian Cancer Maternal Aunt 79        mother's half sister (same mother)    Glaucoma Neg Hx     Cataracts Neg Hx        Social History     Socioeconomic History    Marital status:      Spouse name: None    Number of children: None    Years of education: None    Highest education level: None   Occupational History    None   Tobacco Use    Smoking status: Former Smoker     Packs/day: 0.00     Years: 28.00     Pack years: 0.00     Types: Cigarettes     Start date: 1985     Quit date: 2014     Years since quittin.9    Smokeless tobacco: Never Used   Substance and Sexual Activity    Alcohol use: Yes     Alcohol/week: 3.0 standard drinks     Types: 3 Standard drinks or equivalent per week     Comment: Socially.     Drug use: No    Sexual activity: Yes     Partners: Male   Other Topics Concern    None   Social History Narrative    None     Social Determinants of Health     Financial Resource Strain:     Difficulty of Paying Living Expenses:    Food Insecurity:     Worried About Running Out of Food in the Last Year:     Ran Out of Food in the Last Year:    Transportation Needs:     Lack of Transportation (Medical):      Lack of Transportation (Non-Medical):    Physical Activity:     Days of Exercise per Week:     Minutes of Exercise per Session:    Stress:     Feeling of Stress :    Social Connections:     Frequency of Communication with Friends and Family:     Frequency of Social Gatherings with Friends and Family:     Attends Denominational Services:     Active Member of Clubs or Organizations:     Attends Club or Organization Meetings:     Marital Status:    Intimate Partner Violence:     Fear of Current or Ex-Partner:     Emotionally Abused:     Physically Abused:     Sexually Abused:      Past Medical History:   Diagnosis Date    Bronchitis     Diabetes type 1, controlled ([facility]     Eczema     GERD (gastroesophageal reflux disease)     Gestational diabetes     Hyperlipidemia     Hypothyroid     Insomnia     Myopia with astigmatism and presbyopia     Tobacco abuse        Neuro/Psych     Neuro/Psych     Oriented x3: Yes    Mood/Affect: Normal                Main Ophthalmology Exam     External Exam       Right Left    External Normal Normal                  Tonometry     Tonometry (Non-contact air puff, 2:12 PM)       Right Left    Pressure 13 23   IOPg:  15.9             23.5  CH:  13.3          9.9  WS: 5.2          7.8                     Visual Acuity (Snellen - Linear)       Right Left    Dist cc 20/20 20/30 +3    Near cc 20/30 OU     Correction: Glasses        Keratometry     Keratometry (Automated)       K1 Axis K2 Axis    Right 45.00 144 45.75 054    Left 45.25 172 46.00 082                Ophthalmology Exam     Wearing Rx       Sphere Cylinder Axis Add    Right -1.50 -0.50 090 +2.00    Left -1.50 -0.50 130 +2.00    Age: 3yr    Type: PAL                Manifest Refraction     Manifest Refraction       Sphere Cylinder [place] Add Near [facility]    Right -1.00 -0.50 095 20/20 +2.25     Left -1.25 -0.50 135 20/20 +2.25 20/20ou          Manifest Refraction #2 (Auto)       Sphere Cylinder [place] Dist [place] Add Near [address]    Right -0.50 -0.75 095       Left -1.00 -0.25 136                    Final Rx       Sphere Cylinder Axis Add    Right -1.00 -0.50 095 +2.25    Left -1.25 -0.50 135 +2.25    Type: PAL    Expiration Date: 8/3/2023           Contact Lens Current Rx     Current Contact Lens Rx       Brand Sphere Cylinder Addl. Specs    Right air optix for presbyopia hydraglyde  -1.25 ds med     Left air optix for presbyopia hydraglyde  -1.25 ds med           Current Contact Lens Rx #2       Brand Sphere Cylinder Addl. Specs    Right air optix for presbyopia hydraglyde  -1.00 ds high     Left air optix for presbyopia hydraglyde  -1.25 ds high                 New contact lens trials given;  2 week follow up if desired   Final Contact Lens Rx       Brand Sphere Cylinder Addl. Specs    Right air optix for presbyopia hydraglyde  -1.00 ds high     Left air optix for presbyopia hydraglyde  -1.25 ds high     Expiration Date: 8/3/2022    Replacement: Monthly    Wearing Schedule: Daily wear           IMPRESSION:  1. Insulin dependent type 2 diabetes mellitus ([address]    2. Blurred vision, bilateral    3. Myopia of both eyes with astigmatism and presbyopia        PLAN:    Discussed the patient's diagnosis of diabetes and the impact this can have on their ocular health, potentially even leading to permanent blindness. I discussed with the patient the importance of continued follow-up and management with their primary care physician to control their glycemic, blood pressure, and lipid levels. The patient verbalized understanding. 2. New glasses and contact lenses recommended;  Trials given ; 2 week follow up if needed  3.  \"      Patient Instructions    Keep yearly appointments because of diabetes        Return in about 1 year (around 8/2/2022) for complete eye exam.

## 2021-08-05 ENCOUNTER — OFFICE VISIT (OUTPATIENT)
Dept: SURGERY | Age: 56
End: 2021-08-05
Payer: COMMERCIAL

## 2021-08-05 VITALS
TEMPERATURE: 98.3 F | BODY MASS INDEX: 28.16 KG/M2 | WEIGHT: 153 LBS | HEART RATE: 68 BPM | DIASTOLIC BLOOD PRESSURE: 80 MMHG | HEIGHT: 62 IN | SYSTOLIC BLOOD PRESSURE: 138 MMHG | OXYGEN SATURATION: 98 %

## 2021-08-05 DIAGNOSIS — R92.8 ABNORMAL MAMMOGRAM OF RIGHT BREAST: Primary | ICD-10-CM

## 2021-08-05 PROCEDURE — G8427 DOCREV CUR MEDS BY ELIG CLIN: HCPCS | Performed by: SURGERY

## 2021-08-05 PROCEDURE — 1036F TOBACCO NON-USER: CPT | Performed by: SURGERY

## 2021-08-05 PROCEDURE — 3017F COLORECTAL CA SCREEN DOC REV: CPT | Performed by: SURGERY

## 2021-08-05 PROCEDURE — 99214 OFFICE O/P EST MOD 30 MIN: CPT | Performed by: SURGERY

## 2021-08-05 PROCEDURE — G8417 CALC BMI ABV UP PARAM F/U: HCPCS | Performed by: SURGERY

## 2021-08-05 NOTE — PROGRESS NOTES
Tomer Nixon is a 49 y.o. female          CC:    . Follow Up After Procedure (excisional biopsy of right breast )      HISTORY OF PRESENT ILLNESS:    Patient is a 32-year-old female who had an abnormal mammogram and ultrasound and had a lesion in the right breast in the upper inner quadrant that was read as a BI-RADS 4C. She then had a ultrasound-guided biopsy which showed the following pathology:    -- Diagnosis --   RIGHT BREAST, CORE NEEDLE BIOPSIES:             -  PROLIFERATIVE FIBROCYSTIC CHANGE WITH FIBROSIS, CYST   FORMATION AND SCLEROSING ADENOSIS.        -  RARE MICROCALCIFICATIONS PRESENT.        -  NEGATIVE FOR DUCTAL HYPERPLASIA, ATYPIA OR MALIGNANCY. -- Diagnosis Comment --   CORRELATION WITH IMAGING IS RECOMMENDED TO DETERMINE IF THESE FINDINGS   APPEAR CONSISTENT WITH THE MAMMOGRAPHIC ABNORMALITY. Radiology read it is discordant with their findings. It was recommended to either have a surgical biopsy and remove the clip area and/or repeat ultrasound in 6 months. Patient denies any family history of breast cancer.   She is never had any breast issues or biopsies in the past.        Review of Systems:    General:  Fever: Negative  Weight Change:Negative  Night Sweats: Negative    Eye:  Blurry Vision:Negative  Double Vision: Negative    Ent:  Headaches: Negative  Sore throat: Negative    Allergy/Immunology:  Hives: Negative  Latex allergy: Negative    Hematology/Lymphatic:  Bleeding Problems: Negative  Blood Clots: Negative  Swollen Lymph Nodes: Negative    Lungs:  Cough: Negative  SOB: Negative  Wheezing:Negative    Cardiovascular:  Chest Pain: Negative  Palpitations:Negative    GI:   Decreased Appetite: Negative  Heartburn: Negative  Dysphagia: Negative  Nausea/Vomiting: Negative  Abdominal Pain: Negative  Change in Bowels:Negative  Constipation: Negative  Diarrhea: Negative  Rectal Bleeding: Negative    :   Dysuria: Negative  Increase Urinary Frequency/Urgency: Negative    Neuro:  Seizures: Negative  Confusion: Negative        PAST MEDICAL HISTORY:        Family History   Problem Relation Age of Onset    Diabetes Paternal Grandmother     Cancer Father         lymph nodes    Ovarian Cancer Maternal Aunt 79        mother's half sister (same mother)    Glaucoma Neg Hx     Cataracts Neg Hx      Social History     Socioeconomic History    Marital status:      Spouse name: Not on file    Number of children: Not on file    Years of education: Not on file    Highest education level: Not on file   Occupational History    Not on file   Tobacco Use    Smoking status: Former Smoker     Packs/day: 0.00     Years: 28.00     Pack years: 0.00     Types: Cigarettes     Start date: 1985     Quit date: 2014     Years since quittin.9    Smokeless tobacco: Never Used   Substance and Sexual Activity    Alcohol use: Yes     Alcohol/week: 3.0 standard drinks     Types: 3 Standard drinks or equivalent per week     Comment: Socially.  Drug use: No    Sexual activity: Yes     Partners: Male   Other Topics Concern    Not on file   Social History Narrative    Not on file     Social Determinants of Health     Financial Resource Strain:     Difficulty of Paying Living Expenses:    Food Insecurity:     Worried About Running Out of Food in the Last Year:     [address] in the Last Year:    Transportation Needs:     Lack of Transportation (Medical):      Lack of Transportation (Non-Medical):    Physical Activity:     Days of Exercise per Week:     Minutes of Exercise per Session:    Stress:     Feeling of Stress :    Social Connections:     Frequency of Communication with Friends and Family:     Frequency of Social Gatherings with Friends and Family:     Attends Yars[employer] Services:     Active Member of Clubs or Organizations:     Attends Club or Organization Meetings:     Marital Status:    Intimate Partner Violence:     Fear of Current or Ex-Partner:     Emotionally Abused:     Physically Abused:     Sexually Abused:      Past Surgical History:   Procedure Laterality Date    BACK INJECTION Right 4/29/2021    [facility]    Lumbar microdiscectomy L5-S1    BLADDER SUSPENSION  9/22/09    (transobturator sling)    CERVIX LESION DESTRUCTION  1993    (laser cone)    COLONOSCOPY  10/17/2018    hyperplastic polyp, Dr. Julissa Ortiz at [facility]  2006   [address]  09/06/2017    Dr Patty Diehl  02/16/2017    laser nerve block Dr Eduar Conde Jannelle Closs  03/09/2017    laser nerve block Dr Ching Carreno Bilateral 03/13/2019    BL5 transforaminal radiculogram- [facility] per Dr Ana Flaherty Right 05/29/2019    Right sacroiliac joint injection with arthrography    OTHER SURGICAL HISTORY Bilateral 07/31/2019    BL5 transforaminal radiculogram/epidurogram    SPINAL FUSION  7/6/15    Lumbar L5-S1    US BREAST NEEDLE BIOPSY RIGHT Right 7/12/2021    US BREAST NEEDLE BIOPSY RIGHT 7/12/2021 MD ULTRASOUND     Past Medical History:   Diagnosis Date    Bronchitis     Diabetes type 1, controlled ([address]     Eczema     GERD (gastroesophageal reflux disease)     Gestational diabetes     Hyperlipidemia     Hypothyroid     Insomnia     Myopia with astigmatism and presbyopia     Tobacco abuse      Current Outpatient Medications on File Prior to Visit   Medication Sig Dispense Refill    diazePAM (VALIUM) 5 MG tablet Take 1 tablet by mouth See Admin Instructions for 32 days.  Take 1 pill 12 hours prior to the procedure and 1 pill 2 hrs prior to the procedure 2 tablet 0    metoclopramide (REGLAN) 5 MG tablet Take 5 mg by mouth      lipase-protease-amylase (CREON) 69794-531495 units CPEP delayed release capsule Take 2 tablets by mouth 3 times daily      ibuprofen (ADVIL;MOTRIN) 800 MG tablet Take 1 tablet by mouth every 8 hours as needed for Pain 120 tablet 3    levothyroxine (SYNTHROID) 50 MCG tablet Take 1 tablet by mouth daily 30 tablet 5    losartan-hydroCHLOROthiazide (HYZAAR) 50-12.5 MG per tablet Take 1 tablet by mouth daily 90 tablet 3    cyclobenzaprine (FLEXERIL) 10 MG tablet Take 1 tablet by mouth 2 times daily as needed for Muscle spasms 60 tablet 5    Crisaborole (EUCRISA) 2 % OINT Apply 1 applicator topically 2 times daily (Patient taking differently: Apply 1 applicator topically 2 times daily as needed ) 1 Tube 5    acetaminophen (TYLENOL) 500 MG tablet Take 500 mg by mouth 2 times daily as needed       insulin lispro (HUMALOG) 100 UNIT/ML injection vial Use 60 units per day in pump 1 vial 3    Vitamin D (CHOLECALCIFEROL) 1000 UNITS CAPS capsule Take 4,000 Units by mouth daily       aspirin 81 MG tablet Take 81 mg by mouth daily.  Insulin Lispro, Human, (INSULIN PUMP) FLORENTIN Inject 1 each into the skin See Admin Instructions. No current facility-administered medications on file prior to visit. Allergies as of 08/05/2021 - Fully Reviewed 08/05/2021   Allergen Reaction Noted    Ace inhibitors Other (See Comments) 11/07/2016    Sertraline Other (See Comments) 04/28/2021    Penicillins Rash and Other (See Comments) 07/11/2013         PHYSICAL EXAM:    Blood pressure 138/80, pulse 68, temperature 98.3 °F (36.8 °C), height 5' 2\" (1.575 m), weight 153 lb (69.4 kg), SpO2 98 %, not currently breastfeeding.     Gen:  A and O x 3, NAD, well nourished  Eyes:  Sclera non icterus, PERRL  Head:  Normocephalic, non-tender  Neck:  Supple, no adenopathy, thyroid non tender and no masses,no carotid bruits  Lungs:  CTA, symmetrical  Chest:  RRR, no murmurs  Abd:  Soft, NT, ND, no HSM, no hernias, no bruits  Ext:  No edema, no cyanosis  Psych: reveals appropriate mood, memory and judgment,  Neuro:  Reveals no gross motor or sensory deficits,   Msk:  5/5 strength all 4 extremities, no joint tenderness    Breast Exam:    Patient examined in sitting position with hands on hips. Nipple retraction:  no  Skin dimpling:  no  Erythema:  no  Asymmetry:  no  Axillary Lymphadenopathy:  no  Supra Clavicular Lymphadenopathy:  no    Patient placed in a supine position. Right breast mass:  no  Left breast mass:  no  Tenderness:  no        ASSESS MENT:    1. Abnormal Mammogram/US right breast birads 4c. 2.  US guided bx= benign  3. Radiology said path results discordant, recommend bx of clip area or 6 month follow up [place] Lyles:    1. Plan 6 month follow up US  2. However, patient wants to talk it over with her  and may change her mind and do the wire localization biopsy of the clip area.   If she wants to do that she can call our office and we will schedule her in the near future and then canceled the ultrasound in 6 months

## 2021-08-13 ENCOUNTER — HOSPITAL ENCOUNTER (OUTPATIENT)
Age: 56
Setting detail: OUTPATIENT SURGERY
Discharge: HOME | End: 2021-08-13
Attending: PHYSICAL MEDICINE & REHABILITATION | Admitting: PHYSICAL MEDICINE & REHABILITATION
Payer: COMMERCIAL

## 2021-08-13 ENCOUNTER — APPOINTMENT (OUTPATIENT)
Dept: GENERAL RADIOLOGY | Age: 56
End: 2021-08-13
Attending: PHYSICAL MEDICINE & REHABILITATION
Payer: COMMERCIAL

## 2021-08-13 VITALS
WEIGHT: 156 LBS | DIASTOLIC BLOOD PRESSURE: 68 MMHG | BODY MASS INDEX: 28.71 KG/M2 | TEMPERATURE: 97 F | SYSTOLIC BLOOD PRESSURE: 130 MMHG | HEART RATE: 73 BPM | HEIGHT: 62 IN | OXYGEN SATURATION: 96 % | RESPIRATION RATE: 16 BRPM

## 2021-08-13 PROCEDURE — 7100000010 HC PHASE II RECOVERY - FIRST 15 MIN: Performed by: PHYSICAL MEDICINE & REHABILITATION

## 2021-08-13 PROCEDURE — 2580000003 HC RX 258: Performed by: PHYSICAL MEDICINE & REHABILITATION

## 2021-08-13 PROCEDURE — 6360000004 HC RX CONTRAST MEDICATION: Performed by: PHYSICAL MEDICINE & REHABILITATION

## 2021-08-13 PROCEDURE — 3600000057 HC PAIN LEVEL 4 ADDL 15 MIN: Performed by: PHYSICAL MEDICINE & REHABILITATION

## 2021-08-13 PROCEDURE — 3600000056 HC PAIN LEVEL 4 BASE: Performed by: PHYSICAL MEDICINE & REHABILITATION

## 2021-08-13 PROCEDURE — 2709999900 HC NON-CHARGEABLE SUPPLY: Performed by: PHYSICAL MEDICINE & REHABILITATION

## 2021-08-13 PROCEDURE — 64483 NJX AA&/STRD TFRM EPI L/S 1: CPT | Performed by: PHYSICAL MEDICINE & REHABILITATION

## 2021-08-13 PROCEDURE — 6360000002 HC RX W HCPCS: Performed by: PHYSICAL MEDICINE & REHABILITATION

## 2021-08-13 PROCEDURE — 3209999900 FLUORO FOR SURGICAL PROCEDURES

## 2021-08-13 PROCEDURE — 2500000003 HC RX 250 WO HCPCS: Performed by: PHYSICAL MEDICINE & REHABILITATION

## 2021-08-13 RX ORDER — DEXAMETHASONE SODIUM PHOSPHATE 10 MG/ML
INJECTION INTRAMUSCULAR; INTRAVENOUS PRN
Status: DISCONTINUED | OUTPATIENT
Start: 2021-08-13 | End: 2021-08-13 | Stop reason: ALTCHOICE

## 2021-08-13 RX ORDER — BUPIVACAINE HYDROCHLORIDE 5 MG/ML
INJECTION, SOLUTION EPIDURAL; INTRACAUDAL PRN
Status: DISCONTINUED | OUTPATIENT
Start: 2021-08-13 | End: 2021-08-13 | Stop reason: ALTCHOICE

## 2021-08-13 RX ORDER — SODIUM CHLORIDE 9 MG/ML
INJECTION INTRAVENOUS PRN
Status: DISCONTINUED | OUTPATIENT
Start: 2021-08-13 | End: 2021-08-13 | Stop reason: ALTCHOICE

## 2021-08-13 ASSESSMENT — PAIN DESCRIPTION - DESCRIPTORS: DESCRIPTORS: ACHING

## 2021-08-13 ASSESSMENT — PAIN DESCRIPTION - PAIN TYPE: TYPE: CHRONIC PAIN

## 2021-08-13 ASSESSMENT — PAIN DESCRIPTION - ORIENTATION: ORIENTATION: RIGHT;LEFT

## 2021-08-13 ASSESSMENT — PAIN - FUNCTIONAL ASSESSMENT: PAIN_FUNCTIONAL_ASSESSMENT: 0-10

## 2021-08-13 NOTE — INTERVAL H&P NOTE
I have interviewed and examined the patient and reviewed the recent History and Physical.  There have been no changes to the recent H&P documentation. The surgical consent form has been signed. Last anticoagulant medication use was:na    Premedication taken for contrast allergy? No    Valium taken for oral sedation? No    Outpatient Medications Marked as Taking for the 8/13/21 encounter [facility] Encounter)   Medication Sig Dispense Refill    diazePAM (VALIUM) 5 MG tablet Take 1 tablet by mouth See Admin Instructions for 32 days. Take 1 pill 12 hours prior to the procedure and 1 pill 2 hrs prior to the procedure 2 tablet 0    ibuprofen (ADVIL;MOTRIN) 800 MG tablet Take 1 tablet by mouth every 8 hours as needed for Pain 120 tablet 3    levothyroxine (SYNTHROID) 50 MCG tablet Take 1 tablet by mouth daily 30 tablet 5    losartan-hydroCHLOROthiazide (HYZAAR) 50-12.5 MG per tablet Take 1 tablet by mouth daily 90 tablet 3    cyclobenzaprine (FLEXERIL) 10 MG tablet Take 1 tablet by mouth 2 times daily as needed for Muscle spasms 60 tablet 5    Vitamin D (CHOLECALCIFEROL) 1000 UNITS CAPS capsule Take 4,000 Units by mouth daily       Insulin Lispro, Human, (INSULIN PUMP) FLORENTIN Inject 1 each into the skin See Admin Instructions. The patient understands the planned operation and its associated risks and benefits and agrees to proceed.         Electronically signed by Gina Stevenson MD on 8/13/2021 at 1:42 PM

## 2021-08-13 NOTE — OP NOTE
TRANSFORAMINAL EPIDURAL STEROID INJECTION    8/13/21  The patient was counseled at length about the risks of lilli Covid-19 during their perioperative period and any recovery window from their procedure. The patient was made aware that lilli Covid-19  may worsen their prognosis for recovering from their procedure  and lend to a higher morbidity and/or mortality risk. All material risks, benefits, and reasonable alternatives including postponing the procedure were discussed. The patient does wish to proceed with the procedure at this time. Surgeon: Linda Omer MD    Pre-operative Diagnosis:   Hospital Problems         Last Modified POA    * (Principal) Lumbar radiculopathy 8/13/2021 Yes    Chronic bilateral low back pain with bilateral sciatica 8/13/2021 Yes          Post-operative Diagnosis: Same    Assistants: none    This is a 49 y.o. female patient with pain in the Back, left leg, right leg. Previous treatment and examination findings are noted in the H&P.BL 4 transforaminal epidural injection has been requested for diagnostic and therapeutic reasons. Conservative treatment was ineffective i.e.: ice, NSAIDS, rest, narcotic medication, chiropractic care, physical therapy and message therapy. Patient is unable to perform the following ADL's: ambulating and grooming     Pain Assessment: 0-10  Pain Level: 5     Pain Orientation: Lower  Pain Location: Back       Last Plain films: 2020      EXAMINATION:  1. Bl4 transforaminal radiculogram/epidurogram.   2. Bl4 transforaminal epidural anesthetic injection. 3. Bl4 transforaminal epidural steroid injection. CONSENT: Written consent was obtained from the patient on preprinted consent form after explaining the procedure, indications, potential complications and outcomes. Alternative treatments were also discussed. DISCUSSION: The patient was sterilely prepped and draped in the usual fashion in the prone position.  Time out was verified for correct patient, side, level and procedure. SEDATION:   No conscious sedation was performed during the procedure. The patient remained awake and conversed throughout the procedure. The patient underwent pulse oximetry and blood pressure monitoring independently by a trained observer, as well as by a physician. PROCEDURE:  Under image-intensifier control, a 22 gauge needle x 5 inch spinal needle was guided successfully into the epidural space employing a posterior lateral/oblique approach. Needle aspiration was negative for heme or CSF. Instillation of  .5 mL of Omnipaque 240 contrast medium opacified the spinal nerve and demonstrated contiguous flow into the epidural space. No vascular spread was noted. Digital subtraction was not employed to evaluate for vascular spread. The patient was monitored for any untoward reaction to contrast medium before proceeding with procedure #2. The patient did not report pain reproduction in a concordant distribution. Following needle position verification, a test dose of .5 mL of sterile lidocaine 0.5% was administered and patient monitored for any adverse effects. Then, 1 mL of   was instilled into the epidural space and the patient's response was again monitored. Finally, Dexamethasone (Decadron 10 mg/mL) 1 ml of 0.5% bupivacaine was then instilled. The patient's response was again monitored. The spinal needle was removed. The patient tolerated the procedure well and without complications and was noted to be in stable condition prior to discharge from the procedure center with discharge instructions. EBL: no blood loss    SPECIMEN: none    IMPRESSIONS:  1. BL 4 transforaminal epidurogram, epidural anesthesia and epidural steroid injection procedures accomplished without incident. RECOMMENDATIONS:  1. Complete and return Post-Procedure Pain and Activity Diary.   2.  Contact the [address] for symptom exacerbation, fever or unusual symptoms. 3. Post-procedure care according to verbal and written discharge instructions    POST-PROCEDURE EPIDUROGRAPHY INTERPRETATION:    EXAMINATION: AP, lateral, and oblique views    FLUORO TIME: 21 seconds    DISCUSSION: Spot views of the spine reveal normal alignment and segmentation. Spinal needle is positioned at the BL4 neuroforamin. Contrast spreads and outlines the Bl4 nerve/ neuroforamin and epidural space. The epidurogram reveals excellent contrast flow. Visualized spine reveals See radiology report. Soft tissues reveal no abnormalities. IMPRESSION: BL4 transforaminal epidurogram/epineurogram reveals satisfactory needle position and contrast spread.      Electronically signed by Jak Odonnell MD on 8/13/2021 at 1:45 PM

## 2021-08-13 NOTE — INTERVAL H&P NOTE
I have interviewed and examined the patient and reviewed the recent History and Physical.  There have been no changes to the recent H&P documentation. The surgical consent form has been signed. Last anticoagulant medication use was:na    Premedication taken for contrast allergy? No    Valium taken for oral sedation? Yes    Blood Sugar 202     Outpatient Medications Marked as Taking for the 8/13/21 encounter [facility] Encounter)   Medication Sig Dispense Refill    diazePAM (VALIUM) 5 MG tablet Take 1 tablet by mouth See Admin Instructions for 32 days. Take 1 pill 12 hours prior to the procedure and 1 pill 2 hrs prior to the procedure 2 tablet 0    ibuprofen (ADVIL;MOTRIN) 800 MG tablet Take 1 tablet by mouth every 8 hours as needed for Pain 120 tablet 3    levothyroxine (SYNTHROID) 50 MCG tablet Take 1 tablet by mouth daily 30 tablet 5    losartan-hydroCHLOROthiazide (HYZAAR) 50-12.5 MG per tablet Take 1 tablet by mouth daily 90 tablet 3    cyclobenzaprine (FLEXERIL) 10 MG tablet Take 1 tablet by mouth 2 times daily as needed for Muscle spasms 60 tablet 5    Vitamin D (CHOLECALCIFEROL) 1000 UNITS CAPS capsule Take 4,000 Units by mouth daily       Insulin Lispro, Human, (INSULIN PUMP) FLORENTIN Inject 1 each into the skin See Admin Instructions. The patient understands the planned operation and its associated risks and benefits and agrees to proceed.         Electronically signed by Bonnie Pierce MD on 8/13/2021 at 1:44 PM

## 2021-08-13 NOTE — H&P
.            Signed        Expand AllCollapse All     Show:Clear all  [x]Manual[x]Template[x]Copied    Added by:  [x]AARON Vicente CNP    []Laurita for details  Subjective:      Patient ID: Italia Rader is a 49 y.o. female. Chief Complaint   Patient presents with    Follow-up       bilateral low back pain      Other  Associated symptoms include arthralgias and myalgias. Hip Pain      Returns increased pain, across back radiating bilateral legs. No recent imaging.  Completed physical therapy, continues home exercises daily     Pain Assessment  Location of Pain: Back (radiates to hips and legs)  Location Modifiers: Left, Right, Inferior  Severity of Pain: 4  Quality of Pain: Aching, Other (Comment) (burning)  Duration of Pain: Persistent  Frequency of Pain: Constant  Aggravating Factors: Bending, Other (Comment) (sitting too long)  Limiting Behavior: Yes  Relieving Factors: Ice, Nsaids, Other (Comment) (muscle relaxer)           Allergies   Allergen Reactions    Ace Inhibitors Other (See Comments)       Cough       Pcn [Penicillins] Rash         Active Medications          Outpatient Medications Marked as Taking for the 7/21/21 encounter (Office Visit) with AARON Pablo CNP   Medication Sig Dispense Refill    metoclopramide (REGLAN) 5 MG tablet Take 5 mg by mouth        neomycin-polymyxin-hydrocortisone 1 % SOLN otic solution INSTILL 3 DROPS INTO AFFECTED EAR(S) three times a day for 5 days        lipase-protease-amylase (CREON) 62396-516808 units CPEP delayed release capsule Take 2 tablets by mouth 3 times daily        ibuprofen (ADVIL;MOTRIN) 800 MG tablet Take 1 tablet by mouth every 8 hours as needed for Pain 120 tablet 3    levothyroxine (SYNTHROID) 50 MCG tablet Take 1 tablet by mouth daily 30 tablet 5    losartan-hydroCHLOROthiazide (HYZAAR) 50-12.5 MG per tablet Take 1 tablet by mouth daily 90 tablet 3    cyclobenzaprine (FLEXERIL) 10 MG tablet Take 1 tablet by mouth 2 times daily as needed for Muscle spasms 60 tablet 5    Crisaborole (EUCRISA) 2 % OINT Apply 1 applicator topically 2 times daily (Patient taking differently: Apply 1 applicator topically 2 times daily as needed ) 1 Tube 5    acetaminophen (TYLENOL) 500 MG tablet Take 500 mg by mouth 2 times daily as needed         insulin lispro (HUMALOG) 100 UNIT/ML injection vial Use 60 units per day in pump 1 vial 3    Vitamin D (CHOLECALCIFEROL) 1000 UNITS CAPS capsule Take 4,000 Units by mouth daily         aspirin 81 MG tablet Take 81 mg by mouth daily.  Insulin Lispro, Human, (INSULIN PUMP) FLORENTIN Inject 1 each into the skin See Admin Instructions.                 Past Medical History        Past Medical History:   Diagnosis Date    Bronchitis      Diabetes type 1, controlled ([address]      Eczema      GERD (gastroesophageal reflux disease)      Gestational diabetes      Hyperlipidemia      Hypothyroid      Insomnia      Myopia with astigmatism and presbyopia      Tobacco abuse              Past Surgical History         Past Surgical History:   Procedure Laterality Date    BACK INJECTION Right 4/29/2021     Right Sacroiliac Joint Injection performed by Gina Stevenson MD at [address] [place] [address]   1/05/15     Lumbar microdiscectomy L5-S1    BLADDER SUSPENSION   9/22/09     (transobturator sling)    CERVIX LESION DESTRUCTION   1993     (laser cone)    COLONOSCOPY   10/17/2018     hyperplastic polyp, Dr. Mirella Pickard at [facility]   2006   [address]   09/06/2017     Dr Eleanor qAuino   02/16/2017     laser nerve block Dr Kris Medrano- [address] [facility]   03/09/2017     laser nerve block Dr Shani Cross Bilateral 03/13/2019     BL5 transforaminal radiculogram- [facility] per Dr Lovetta Shone Right 05/29/2019     Right sacroiliac joint injection with arthrography    OTHER SURGICAL HISTORY Bilateral 07/31/2019     BL5 transforaminal radiculogram/epidurogram    SPINAL FUSION   7/6/15     Lumbar L5-S1    US BREAST NEEDLE BIOPSY RIGHT Right 2021     US BREAST NEEDLE BIOPSY RIGHT 2021 [facility] ULTRASOUND            Family History         Family History   Problem Relation Age of Onset    Diabetes Paternal Grandmother      Cancer Father           lymph nodes    Ovarian Cancer Maternal Aunt 79         mother's half sister (same mother)    Glaucoma Neg Hx      Cataracts Neg Hx              Social History   Social History            Socioeconomic History    Marital status:        Spouse name: None    Number of children: None    Years of education: None    Highest education level: None   Occupational History    None   Tobacco Use    Smoking status: Former Smoker       Packs/day: 0.00       Years: 28.00       Pack years: 0.00       Types: Cigarettes       Start date: 1985       Quit date: 2014       Years since quittin.9    Smokeless tobacco: Never Used   Substance and Sexual Activity    Alcohol use: Yes       Alcohol/week: 3.0 standard drinks       Types: 3 Standard drinks or equivalent per week       Comment: Socially.  Drug use: No    Sexual activity: Yes       Partners: Male   Other Topics Concern    None   Social History Narrative    None      Social Determinants of Health          Financial Resource Strain:     Difficulty of Paying Living Expenses:    Food Insecurity:     Worried About Running Out of Food in the Last Year:     [address] in the Last Year:    Transportation Needs:     Lack of Transportation (Medical):      Lack of Transportation (Non-Medical):    Physical Activity:     Days of Exercise per Week:     Minutes of Exercise per Session:    Stress:     Feeling of Stress :    Social Connections:     Frequency of Communication with Friends and Family:     Frequency of Social Gatherings with Friends and Family:     Attends Judaism Services:     Active Member of Clubs or Organizations:     Attends Club or Organization Meetings:     Marital Status:    Intimate Partner Violence:     Fear of Current or Ex-Partner:     Emotionally Abused:     Physically Abused:     Sexually Abused:          Review of Systems   Constitutional: Positive for activity change. Respiratory: Negative. Cardiovascular: Negative. Genitourinary: Negative. Musculoskeletal: Positive for arthralgias, back pain and myalgias. Skin: Negative. Neurological:        Radiculopathy   Psychiatric/Behavioral: Positive for sleep disturbance. Objective:   Physical Exam  Vitals reviewed. Constitutional:       General: She is not in acute distress. Appearance: She is well-developed. She is not diaphoretic. Interventions: She is not intubated. HENT:      Head: Normocephalic and atraumatic. Cardiovascular:      Rate and Rhythm: Normal rate. Pulmonary:      Effort: Pulmonary effort is normal. No tachypnea, bradypnea, accessory muscle usage or respiratory distress. She is not intubated. Musculoskeletal:      Lumbar back: No tenderness or bony tenderness. Positive right straight leg raise test.   Skin:     General: Skin is warm and dry. Capillary Refill: Capillary refill takes less than 2 seconds. Coloration: Skin is not pale. Nails: There is no clubbing. Neurological:      Mental Status: She is alert and oriented to person, place, and time. GCS: GCS eye subscore is 4. GCS verbal subscore is 5. GCS motor subscore is 6. Cranial Nerves: No cranial nerve deficit. Psychiatric:         Speech: Speech normal.         Behavior: Behavior normal. Behavior is not agitated, aggressive, withdrawn or combative. Behavior is cooperative. Judgment: Judgment normal. Judgment is not impulsive or inappropriate. Assessment:   1. Lumbar radiculopathy    2. Chronic bilateral low back pain with bilateral sciatica    3.  Displacement of lumbar intervertebral disc without myelopathy    4. Post laminectomy syndrome                     Plan:   Update lumbar spine xray and lumbar MRI.  Will follow up after to review results and determine appropriate plan of care based on results     Storm Melvin, AARON - CNP

## 2021-08-20 ENCOUNTER — PATIENT MESSAGE (OUTPATIENT)
Dept: PAIN MANAGEMENT | Age: 56
End: 2021-08-20

## 2021-08-23 NOTE — TELEPHONE ENCOUNTER
From: Ирина Body  To: Somerssary Felder, APRN - CNP  Sent: 8/20/2021 9:09 PM EDT  Subject: Non-Urgent Medical Question    [place] I know we have an evisit for the 28th but if you have something sooner I would really like to talk to you. I have gotten very little relief from pain down my legs although it is improved down the left leg. The right is worse as is the pain in my back. I feel like I have a ball of heavy lead in my back and I can't get any relief no matter how much Motrin and Tylenol I take. I also have not slept more than 2 hours at a time for days even with muscle relaxers and it is really wearing on me. The nurse who called to check on me after my shots said there were other things that could be done if they didn't help and I think we need to move on to the next thing. I really need some relief from this pain it os getting worse daily. Please let me know if we can be in contact sooner. I'm at my wits end. If you want to call me directly instead of using this luís my number os [phone]. I will take your call anytime you can fit it in.

## 2021-08-24 ENCOUNTER — TELEMEDICINE (OUTPATIENT)
Dept: PAIN MANAGEMENT | Age: 56
End: 2021-08-24
Payer: COMMERCIAL

## 2021-08-24 DIAGNOSIS — M54.41 CHRONIC BILATERAL LOW BACK PAIN WITH BILATERAL SCIATICA: Primary | ICD-10-CM

## 2021-08-24 DIAGNOSIS — G89.29 CHRONIC BILATERAL LOW BACK PAIN WITH BILATERAL SCIATICA: ICD-10-CM

## 2021-08-24 DIAGNOSIS — G89.29 CHRONIC BILATERAL LOW BACK PAIN WITH BILATERAL SCIATICA: Primary | ICD-10-CM

## 2021-08-24 DIAGNOSIS — M54.41 CHRONIC BILATERAL LOW BACK PAIN WITH BILATERAL SCIATICA: ICD-10-CM

## 2021-08-24 DIAGNOSIS — M54.16 LUMBAR RADICULOPATHY: ICD-10-CM

## 2021-08-24 DIAGNOSIS — M54.42 CHRONIC BILATERAL LOW BACK PAIN WITH BILATERAL SCIATICA: ICD-10-CM

## 2021-08-24 DIAGNOSIS — M96.1 POST LAMINECTOMY SYNDROME: ICD-10-CM

## 2021-08-24 DIAGNOSIS — M54.42 CHRONIC BILATERAL LOW BACK PAIN WITH BILATERAL SCIATICA: Primary | ICD-10-CM

## 2021-08-24 PROCEDURE — 99214 OFFICE O/P EST MOD 30 MIN: CPT | Performed by: NURSE PRACTITIONER

## 2021-08-24 PROCEDURE — 3017F COLORECTAL CA SCREEN DOC REV: CPT | Performed by: NURSE PRACTITIONER

## 2021-08-24 PROCEDURE — G8428 CUR MEDS NOT DOCUMENT: HCPCS | Performed by: NURSE PRACTITIONER

## 2021-08-24 RX ORDER — DIAZEPAM 5 MG/1
5 TABLET ORAL SEE ADMIN INSTRUCTIONS
Qty: 2 TABLET | Refills: 0 | Status: SHIPPED | OUTPATIENT
Start: 2021-08-24 | End: 2021-08-26

## 2021-08-24 ASSESSMENT — ENCOUNTER SYMPTOMS
BACK PAIN: 1
RESPIRATORY NEGATIVE: 1

## 2021-08-24 NOTE — PROGRESS NOTES
2021    TELEHEALTH EVALUATION -- Audio/Visual (During QORCZ-68 public health emergency)    HPI:    Denis Diallo (:  1965) has requested an audio/video evaluation for the following concern(s):    Underwent bilateral L4 TFE with minimal improvement    Review of Systems   Constitutional: Positive for activity change. Respiratory: Negative. Cardiovascular: Negative. Genitourinary: Negative. Musculoskeletal: Positive for arthralgias, back pain and myalgias. Skin: Negative. Neurological:        Radiculopathy   Psychiatric/Behavioral: Positive for sleep disturbance. Prior to Visit Medications    Medication Sig Taking? Authorizing Provider   diazePAM (VALIUM) 5 MG tablet Take 1 tablet by mouth See Admin Instructions for 32 days.  Take 1 pill 12 hours prior to the procedure and 1 pill 2 hrs prior to the procedure  AARON Chow CNP   metoclopramide (REGLAN) 5 MG tablet Take 5 mg by mouth  Historical Provider, MD   lipase-protease-amylase (CREON) 23401-099793 units CPEP delayed release capsule Take 2 tablets by mouth 3 times daily  Historical Provider, MD   ibuprofen (ADVIL;MOTRIN) 800 MG tablet Take 1 tablet by mouth every 8 hours as needed for Pain  AARON Chow CNP   levothyroxine (SYNTHROID) 50 MCG tablet Take 1 tablet by mouth daily  Lizbet Pearson MD   losartan-hydroCHLOROthiazide (HYZAAR) 50-12.5 MG per tablet Take 1 tablet by mouth daily  Lizbet Pearson MD   cyclobenzaprine (FLEXERIL) 10 MG tablet Take 1 tablet by mouth 2 times daily as needed for Muscle spasms  AARON Chow CNP   Crisaborole (EUCRISA) 2 % OINT Apply 1 applicator topically 2 times daily  Patient taking differently: Apply 1 applicator topically 2 times daily as needed   Lizbet Pearson MD   acetaminophen (TYLENOL) 500 MG tablet Take 500 mg by mouth 2 times daily as needed   Historical Provider, MD   insulin lispro (HUMALOG) 100 UNIT/ML injection vial Use 60 units per day in pump  Mis KIRKLAND Layne Mcpherson MD   Vitamin D (CHOLECALCIFEROL) 1000 UNITS CAPS capsule Take 4,000 Units by mouth daily   Historical Provider, MD   aspirin 81 MG tablet Take 81 mg by mouth daily. Historical Provider, MD   Insulin Lispro, Human, (INSULIN PUMP) FLORENTIN Inject 1 each into the skin See Admin Instructions. Historical Provider, MD       Social History     Tobacco Use    Smoking status: Former Smoker     Packs/day: 0.00     Years: 28.00     Pack years: 0.00     Types: Cigarettes     Start date: 1985     Quit date: 2014     Years since quittin.0    Smokeless tobacco: Never Used   Substance Use Topics    Alcohol use: Yes     Alcohol/week: 3.0 standard drinks     Types: 3 Standard drinks or equivalent per week     Comment: Socially.     Drug use: No        Allergies   Allergen Reactions    Sertraline Other (See Comments)     Hypertension    Ace Inhibitors Other (See Comments)     Cough      Penicillins Rash and Other (See Comments)   ,   Past Medical History:   Diagnosis Date    Bronchitis     Diabetes type 1, controlled ([address]     Eczema     GERD (gastroesophageal reflux disease)     Gestational diabetes     Hyperlipidemia     Hypothyroid     Insomnia     Myopia with astigmatism and presbyopia     Tobacco abuse    ,   Past Surgical History:   Procedure Laterality Date    BACK INJECTION Right 2021    Right Sacroiliac Joint Injection performed by Catarina Polanco MD at [address] [place] [address]  1/05/15    Lumbar microdiscectomy L5-S1    BLADDER SUSPENSION  09    (transobturator sling)    CERVIX LESION DESTRUCTION      (laser cone)    COLONOSCOPY  10/17/2018    hyperplastic polyp, Dr. Lisbet Red at [facility]  2006   [address]  2017    Dr Ruth Chery  2017    laser nerve block Dr Alberto Gomez- [facility]  2017    laser nerve block Dr Ambreen Sprague Bilateral 2019    BL5 transforaminal radiculogram- [facility] per Dr Leslye Dyer Right 2019    Right sacroiliac joint injection with arthrography    OTHER SURGICAL HISTORY Bilateral 2019    BL5 transforaminal radiculogram/epidurogram    PAIN MANAGEMENT PROCEDURE Bilateral 2021    Bilateral L4 TRANSFORAMINAL performed by Zelda Sorensen MD at [facility]  7/6/15    Lumbar L5-S1    US BREAST NEEDLE BIOPSY RIGHT Right 2021    US BREAST NEEDLE BIOPSY RIGHT 2021 [facility] ULTRASOUND   ,   Social History     Tobacco Use    Smoking status: Former Smoker     Packs/day: 0.00     Years: 28.00     Pack years: 0.00     Types: Cigarettes     Start date: 1985     Quit date: 2014     Years since quittin.0    Smokeless tobacco: Never Used   Substance Use Topics    Alcohol use: Yes     Alcohol/week: 3.0 standard drinks     Types: 3 Standard drinks or equivalent per week     Comment: Socially.     Drug use: No   ,   Family History   Problem Relation Age of Onset    Diabetes Paternal Grandmother     Cancer Father         lymph nodes    Ovarian Cancer Maternal Aunt 79        mother's half sister (same mother)    Glaucoma Neg Hx     Cataracts Neg Hx    ,   Immunization History   Administered Date(s) Administered    COVID-19, Pfizer, PF, 30mcg/0.3mL 2021, 2021    Influenza Virus Vaccine 2011, 10/06/2014, 2015, 2017, 2018    Influenza, Quadv, IM, PF (6 mo and older Fluzone, Flulaval, Fluarix, and 3 yrs and older Afluria) 2016, 10/27/2020    Pneumococcal Polysaccharide ([id number]) 2017, 2018    Td, unspecified formulation 2008    Tdap (Boostrix, Adacel) 2016   ,   Health Maintenance   Topic Date Due    Hepatitis C screen  Never done    Hepatitis B vaccine (1 of 3 - Risk 3-dose series) Never done    Shingles Vaccine (1 of 2) Never done    Diabetic foot exam  2021    A1C test (Diabetic or Prediabetic)  2021    Flu vaccine (1) 09/01/2021    Diabetic microalbuminuria test  07/19/2022    Lipid screen  07/19/2022    TSH testing  07/19/2022    Potassium monitoring  07/19/2022    Creatinine monitoring  07/19/2022    Diabetic retinal exam  08/02/2022    Breast cancer screen  07/12/2023    Colon cancer screen colonoscopy  10/17/2023    Cervical cancer screen  03/11/2024    DTaP/Tdap/Td vaccine (2 - Td or Tdap) 11/07/2026    Pneumococcal 0-64 years Vaccine (2 of 2 - PPSV23) 06/22/2032    COVID-19 Vaccine  Completed    HIV screen  Addressed    Hepatitis A vaccine  Aged Out    Hib vaccine  Aged Out    Meningococcal (ACWY) vaccine  Aged Out       PHYSICAL EXAMINATION:  [ INSTRUCTIONS:  \"[x]\" Indicates a positive item  \"[]\" Indicates a negative item  -- DELETE ALL ITEMS NOT EXAMINED]  Vital Signs: (As obtained by patient/caregiver or practitioner observation)    Blood pressure-  Heart rate-    Respiratory rate-    Temperature-  Pulse oximetry-     Constitutional: [x] Appears well-developed and well-nourished [x] No apparent distress      [] Abnormal-   Mental status  [x] Alert and awake  [x] Oriented to person/place/time [x]Able to follow commands      Eyes:  EOM    [x]  Normal  [] Abnormal-  Sclera  [x]  Normal  [] Abnormal -         Discharge [x]  None visible  [] Abnormal -    HENT:   [x] Normocephalic, atraumatic.   [] Abnormal   [x] Mouth/Throat: Mucous membranes are moist.     External Ears [x] Normal  [] Abnormal-     Neck: [x] No visualized mass     Pulmonary/Chest: [x] Respiratory effort normal.  [x] No visualized signs of difficulty breathing or respiratory distress        [] Abnormal-      Musculoskeletal:   [] Normal gait with no signs of ataxia         [] Normal range of motion of neck        [x] Abnormal- Pain worse with flexion    MRI OF THE LUMBAR SPINE WITHOUT CONTRAST, 7/28/2021 4:29 pm       TECHNIQUE:   Multiplanar multisequence MRI of the lumbar spine was performed without the   administration of intravenous No gaze palsy        [x] Abnormal- radicular pain from lumbar spine        Skin:        [x] No significant exanthematous lesions or discoloration noted on facial skin         [] Abnormal-            Psychiatric:       [x] Normal Affect [x] No Hallucinations        [] Abnormal-     Other pertinent observable physical exam findings-     ASSESSMENT/PLAN:   Diagnosis Orders   1. Chronic bilateral low back pain with bilateral sciatica     2. Post laminectomy syndrome     3. Lumbar radiculopathy         Schedule L4/L5 interlaminar, if no relief MBB's  No follow-ups on file. [employer], was evaluated through a synchronous (real-time) audio-video encounter. The patient (or guardian if applicable) is aware that this is a billable service. Verbal consent to proceed has been obtained within the past 12 months. The visit was conducted pursuant to the emergency declaration under the [address] [place] [address], [address] [place] [address] authority and the [employer] and [employer] General Act. Patient identification was verified, and a caregiver was present when appropriate. The patient was located in a state where the provider was credentialed to provide care. --AARON Bartlett - CNP on 8/24/2021 at 9:46 AM    An electronic signature was used to authenticate this note.

## 2021-09-02 ENCOUNTER — HOSPITAL ENCOUNTER (OUTPATIENT)
Age: 56
Setting detail: OUTPATIENT SURGERY
Discharge: HOME | End: 2021-09-02
Attending: PHYSICAL MEDICINE & REHABILITATION | Admitting: PHYSICAL MEDICINE & REHABILITATION
Payer: COMMERCIAL

## 2021-09-02 ENCOUNTER — APPOINTMENT (OUTPATIENT)
Dept: GENERAL RADIOLOGY | Age: 56
End: 2021-09-02
Attending: PHYSICAL MEDICINE & REHABILITATION
Payer: COMMERCIAL

## 2021-09-02 ENCOUNTER — TELEPHONE (OUTPATIENT)
Dept: PAIN MANAGEMENT | Age: 56
End: 2021-09-02

## 2021-09-02 VITALS
RESPIRATION RATE: 16 BRPM | WEIGHT: 156 LBS | DIASTOLIC BLOOD PRESSURE: 94 MMHG | OXYGEN SATURATION: 96 % | SYSTOLIC BLOOD PRESSURE: 167 MMHG | HEART RATE: 76 BPM | BODY MASS INDEX: 28.71 KG/M2 | HEIGHT: 62 IN | TEMPERATURE: 97.8 F

## 2021-09-02 PROCEDURE — 2580000003 HC RX 258: Performed by: PHYSICAL MEDICINE & REHABILITATION

## 2021-09-02 PROCEDURE — 7100000010 HC PHASE II RECOVERY - FIRST 15 MIN: Performed by: PHYSICAL MEDICINE & REHABILITATION

## 2021-09-02 PROCEDURE — 7100000011 HC PHASE II RECOVERY - ADDTL 15 MIN: Performed by: PHYSICAL MEDICINE & REHABILITATION

## 2021-09-02 PROCEDURE — 6360000002 HC RX W HCPCS: Performed by: PHYSICAL MEDICINE & REHABILITATION

## 2021-09-02 PROCEDURE — 6360000004 HC RX CONTRAST MEDICATION: Performed by: PHYSICAL MEDICINE & REHABILITATION

## 2021-09-02 PROCEDURE — 3209999900 FLUORO FOR SURGICAL PROCEDURES

## 2021-09-02 PROCEDURE — 62323 NJX INTERLAMINAR LMBR/SAC: CPT | Performed by: PHYSICAL MEDICINE & REHABILITATION

## 2021-09-02 PROCEDURE — 2709999900 HC NON-CHARGEABLE SUPPLY: Performed by: PHYSICAL MEDICINE & REHABILITATION

## 2021-09-02 PROCEDURE — 2500000003 HC RX 250 WO HCPCS: Performed by: PHYSICAL MEDICINE & REHABILITATION

## 2021-09-02 PROCEDURE — 3600000056 HC PAIN LEVEL 4 BASE: Performed by: PHYSICAL MEDICINE & REHABILITATION

## 2021-09-02 RX ORDER — DEXAMETHASONE SODIUM PHOSPHATE 10 MG/ML
INJECTION INTRAMUSCULAR; INTRAVENOUS PRN
Status: DISCONTINUED | OUTPATIENT
Start: 2021-09-02 | End: 2021-09-02 | Stop reason: ALTCHOICE

## 2021-09-02 RX ORDER — SODIUM CHLORIDE 9 MG/ML
INJECTION INTRAVENOUS PRN
Status: DISCONTINUED | OUTPATIENT
Start: 2021-09-02 | End: 2021-09-02 | Stop reason: ALTCHOICE

## 2021-09-02 RX ORDER — BUPIVACAINE HYDROCHLORIDE 5 MG/ML
INJECTION, SOLUTION EPIDURAL; INTRACAUDAL PRN
Status: DISCONTINUED | OUTPATIENT
Start: 2021-09-02 | End: 2021-09-02 | Stop reason: ALTCHOICE

## 2021-09-02 ASSESSMENT — PAIN DESCRIPTION - LOCATION
LOCATION: BACK
LOCATION: BACK
LOCATION_2: HEAD
LOCATION: BACK

## 2021-09-02 ASSESSMENT — PAIN DESCRIPTION - ORIENTATION
ORIENTATION: RIGHT;LEFT;LOWER
ORIENTATION: RIGHT;LEFT;LOWER
ORIENTATION_2: ANTERIOR
ORIENTATION_2: ANTERIOR

## 2021-09-02 ASSESSMENT — PAIN DESCRIPTION - PAIN TYPE
TYPE: CHRONIC PAIN
TYPE_2: ACUTE PAIN
TYPE_2: ACUTE PAIN
TYPE: CHRONIC PAIN
TYPE_2: ACUTE PAIN
TYPE: CHRONIC PAIN

## 2021-09-02 ASSESSMENT — ENCOUNTER SYMPTOMS
ALLERGIC/IMMUNOLOGIC NEGATIVE: 1
RESPIRATORY NEGATIVE: 1
EYES NEGATIVE: 1
DIARRHEA: 0
CONSTIPATION: 0
BACK PAIN: 1

## 2021-09-02 ASSESSMENT — PAIN DESCRIPTION - INTENSITY
RATING_2: 2
RATING_2: 7
RATING_2: 2

## 2021-09-02 ASSESSMENT — PAIN SCALES - GENERAL
PAINLEVEL_OUTOF10: 2

## 2021-09-02 ASSESSMENT — PAIN DESCRIPTION - DESCRIPTORS
DESCRIPTORS: ACHING;BURNING;JABBING
DESCRIPTORS: ACHING;BURNING;JABBING

## 2021-09-02 ASSESSMENT — PAIN - FUNCTIONAL ASSESSMENT: PAIN_FUNCTIONAL_ASSESSMENT: 0-10

## 2021-09-02 ASSESSMENT — PAIN DESCRIPTION - ONSET: ONSET_2: SUDDEN

## 2021-09-02 ASSESSMENT — PAIN DESCRIPTION - DIRECTION: RADIATING_TOWARDS: DOWN HIPS

## 2021-09-02 NOTE — H&P
Subjective:       Patient is here today for a diagnostic/therapeutic injection. 9/2/21    Active Hospital Problems    Diagnosis Date Noted    Lumbar radiculopathy [M54.16] 03/01/2019    Displacement of lumbar intervertebral disc without myelopathy [M51.26] 01/21/2015    Chronic bilateral low back pain with bilateral sciatica [M54.42, M54.41, G89.29] 04/29/2014       HPI: Patient is here today for adiagnostic/therapeutic injection. The most recent [facility] office visit notes have been reviewed and are unchanged. Review of Systems   Constitutional: Positive for fatigue. HENT: Negative. Eyes: Negative. Respiratory: Negative. Cardiovascular: Negative. Gastrointestinal: Negative for constipation and diarrhea. Endocrine: Negative. Genitourinary: Negative for difficulty urinating and flank pain. Musculoskeletal: Positive for back pain and myalgias. Skin: Negative. Allergic/Immunologic: Negative. Neurological: Positive for weakness and numbness. Hematological: Negative. Psychiatric/Behavioral: Positive for sleep disturbance. Allergies   Allergen Reactions    Ace Inhibitors Other (See Comments)     Cough      Penicillins Rash and Other (See Comments)    Sertraline Other (See Comments)     Hypertension          Prior to Admission medications    Medication Sig Start Date End Date Taking?  Authorizing Provider   lipase-protease-amylase (CREON) 51180-945372 units CPEP delayed release capsule Take 2 tablets by mouth 3 times daily 7/13/21 9/2/21 Yes Historical Provider, MD   ibuprofen (ADVIL;MOTRIN) 800 MG tablet Take 1 tablet by mouth every 8 hours as needed for Pain 3/11/21  Yes AARON Smith - CNP   levothyroxine (SYNTHROID) 50 MCG tablet Take 1 tablet by mouth daily 10/29/20  Yes Dorothea Khoury MD   losartan-hydroCHLOROthiazide [facility]) 50-12.5 MG per tablet Take 1 tablet by mouth daily 10/19/20  Yes Dorothea Khoury MD   cyclobenzaprine (FLEXERIL) 10 MG tablet Take 1 tablet by mouth 2 times daily as needed for Muscle spasms 10/16/20  Yes EverardoAARON Danielson - CNP   acetaminophen (TYLENOL) 500 MG tablet Take 500 mg by mouth 2 times daily as needed    Yes Historical Provider, MD   insulin lispro (HUMALOG) 100 UNIT/ML injection vial Use 60 units per day in pump 3/24/17  Yes Alma Rosa Bernard MD   Vitamin D (CHOLECALCIFEROL) 1000 UNITS CAPS capsule Take 4,000 Units by mouth daily    Yes Historical Provider, MD   aspirin 81 MG tablet Take 81 mg by mouth daily. Yes Historical Provider, MD   Insulin Lispro, Human, (INSULIN PUMP) FLORENTIN Inject 1 each into the skin See Admin Instructions.    Yes Historical Provider, MD   Crisaborole (EUCRISA) 2 % OINT Apply 1 applicator topically 2 times daily  Patient taking differently: Apply 1 applicator topically 2 times daily as needed  7/28/20   Alma Rosa Bernard MD       Past Medical History:   Diagnosis Date    Bronchitis     Diabetes type 1, controlled ([address]     Eczema     GERD (gastroesophageal reflux disease)     Gestational diabetes     Hyperlipidemia     Hypothyroid     Insomnia     Myopia with astigmatism and presbyopia     Tobacco abuse        Past Surgical History:   Procedure Laterality Date    BACK INJECTION Right 4/29/2021    Right Sacroiliac Joint Injection performed by Linda Omer MD at [address] [place] [address]  1/05/15    Lumbar microdiscectomy L5-S1    BLADDER SUSPENSION  9/22/09    (transobturator sling)    CERVIX LESION DESTRUCTION  1993    (laser cone)    COLONOSCOPY  10/17/2018    hyperplastic polyp, Dr. Leander La at [facility]  2006   [address]  09/06/2017    Dr Sanjuanita Dexter  02/16/2017    laser nerve block Dr Jennifer Hurley- [address] [facility]  03/09/2017    laser nerve block Dr Keo Collins Bilateral 03/13/2019    BL5 transforaminal radiculogram- [facility] per Dr Yuly Barrett Right 05/29/2019    Right sacroiliac joint injection with arthrography    OTHER SURGICAL HISTORY Bilateral 07/31/2019    BL5 transforaminal radiculogram/epidurogram    PAIN MANAGEMENT PROCEDURE Bilateral 8/13/2021    Bilateral L4 TRANSFORAMINAL performed by Conner Woodson MD at [address]  7/6/15    Lumbar L5-S1    US BREAST NEEDLE BIOPSY RIGHT Right 7/12/2021    US BREAST NEEDLE BIOPSY RIGHT 7/12/2021 [id number] [facility] ULTRASOUND       Family andSocial History reviewed in the electronic medical record. Imaging: Reviewed available imaging in oursystem with the patient. No results found. Objective:     Vitals:    09/02/21 0918   BP: 128/78   Pulse: 85   Resp: 16   Temp: 98.2 °F (36.8 °C)   SpO2: 98%          Physical Exam  Constitutional:       General: She is not in acute distress. Appearance: Normal appearance. She is well-developed. She is not diaphoretic. HENT:      Head: Normocephalic and atraumatic. Right Ear: External ear normal. No decreased hearing noted. Left Ear: External ear normal. No decreased hearing noted. Nose: Nose normal.   Eyes:      General: Lids are normal. No scleral icterus. Conjunctiva/sclera: Conjunctivae normal.   Neck:      Trachea: Phonation normal.   Cardiovascular:      Comments: No BLE edema present  Pulmonary:      Effort: Pulmonary effort is normal. No accessory muscle usage or respiratory distress. Abdominal:      General: Abdomen is flat. Palpations: Abdomen is soft. Genitourinary:     Comments: deferred  Musculoskeletal:      Lumbar back: Negative right straight leg raise test and negative left straight leg raise test.   Skin:     General: Skin is warm and dry. Coloration: Skin is not pale. Findings: No erythema or rash. Neurological:      Mental Status: She is alert and oriented to person, place, and time.    Psychiatric:         Speech: Speech normal.         Behavior: Behavior normal.       Neurologic Exam     Mental Status   Oriented to person, place, and time. Speech: speech is normal     Motor Exam     Strength   Right quadriceps: 5/5  Left quadriceps: 5/5  Right hamstrin/5  Left hamstrin/5    Back Exam     Tenderness   The patient is experiencing tenderness in the lumbar (+slump Ilan ). Range of Motion   Extension: normal   Flexion: normal   Lateral bend right: normal   Lateral bend left: normal   Rotation right: normal   Rotation left: normal     Muscle Strength   Right Quadriceps:  5/5   Left Quadriceps:  5/5   Right Hamstrings:  5/5   Left Hamstrings:  5/5     Tests   Straight leg raise right: negative  Straight leg raise left: negative    Other   Toe walk: normal  Heel walk: normal  Sensation: normal  Gait: normal             Lab Results   Component Value Date    WBC 6.4 2020    HGB 14.1 2020    HCT 40.1 2020     2020    CHOL 204 (H) 2021    TRIG 47 2021    HDL 93 2021    ALT 12 2021    AST 22 2021     2021    K 4.4 2021     2021    CREATININE 0.63 2021    BUN 11 2021    CO2 29 2021    TSH 0.39 2021    LABA1C 7.6 (H) 2020    LABMICR CANNOT BE CALCULATED 2021       Assessment:                            Active Hospital Problems    Diagnosis Date Noted    Lumbar radiculopathy [M54.16] 2019    Displacement of lumbar intervertebral disc without myelopathy [M51.26] 2015    Chronic bilateral low back pain with bilateral sciatica [M54.42, M54.41, G89.29] 2014                  Plan:   Proceed with planned procedure  - L4-5 IESI     The patientunderstands the planned operation and its associated risks and benefits and agrees to proceed. The surgical consent form has been signed. Last NSAID/anticoagulant medication use was:3-5 days    Premedication taken for contrast allergy? No    Valium taken for oral sedation?  No

## 2021-09-02 NOTE — INTERVAL H&P NOTE
I have interviewed and examined the patient and reviewed the recent History and Physical.  There have been no changes to the recent H&P documentation. The surgical consent form has been signed. Last anticoagulant medication use was:na    Premedication taken for contrast allergy? No    Valium taken for oral sedation? No    Outpatient Medications Marked as Taking for the 9/2/21 encounter [facility] Encounter)   Medication Sig Dispense Refill    lipase-protease-amylase (CREON) 01444-008347 units CPEP delayed release capsule Take 2 tablets by mouth 3 times daily      ibuprofen (ADVIL;MOTRIN) 800 MG tablet Take 1 tablet by mouth every 8 hours as needed for Pain 120 tablet 3    levothyroxine (SYNTHROID) 50 MCG tablet Take 1 tablet by mouth daily 30 tablet 5    losartan-hydroCHLOROthiazide (HYZAAR) 50-12.5 MG per tablet Take 1 tablet by mouth daily 90 tablet 3    cyclobenzaprine (FLEXERIL) 10 MG tablet Take 1 tablet by mouth 2 times daily as needed for Muscle spasms 60 tablet 5    acetaminophen (TYLENOL) 500 MG tablet Take 500 mg by mouth 2 times daily as needed       insulin lispro (HUMALOG) 100 UNIT/ML injection vial Use 60 units per day in pump 1 vial 3    Vitamin D (CHOLECALCIFEROL) 1000 UNITS CAPS capsule Take 4,000 Units by mouth daily       aspirin 81 MG tablet Take 81 mg by mouth daily. Insulin Lispro, Human, (INSULIN PUMP) FLORENTIN Inject 1 each into the skin See Admin Instructions. The patient understands the planned operation and its associated risks and benefits and agrees to proceed.         Electronically signed by Jak Perez MD on 9/2/2021 at 10:28 AM

## 2021-09-02 NOTE — OP NOTE
INTERLAMINAR EPIDURAL STEROID INJECTION    9/2/21  The patient was counseled at length about the risks of lilli Covid-19 during their perioperative period and any recovery window from their procedure. The patient was made aware that lilli Covid-19  may worsen their prognosis for recovering from their procedure  and lend to a higher morbidity and/or mortality risk. All material risks, benefits, and reasonable alternatives including postponing the procedure were discussed. The patient does wish to proceed with the procedure at this time. Surgeon: Linda Dillon MD    Pre-operative Diagnosis:   Hospital Problems         Last Modified POA    * (Principal) Lumbar radiculopathy 9/2/2021 Yes    Chronic bilateral low back pain with bilateral sciatica 9/2/2021 Yes    Displacement of lumbar intervertebral disc without myelopathy 9/2/2021 Yes          Post-operative Diagnosis: Same    Assistants: none    INDICATION:  Interlaminar epidural injection has been requested for diagnostic and therapeutic reasons. Please see H&P for details on previous treatments, examination findings, and work up. Conservative treatment was ineffective i.e.: ice, NSAIDS, rest,     Patient is unable to perform the following ADL's: ambulating, grooming, sitting and standing     Pain Assessment: 0-10        Pain Orientation: Right, Left, Lower  Pain Location: Back  Pain Descriptors: Aching, Burning, Other (Comment), Jabbing (deep)    Last Plain films: 2020    EXAMINATION:  1. L4-5 Interlaminar radiculogram/epidurogram.   2. L4-5 Interlaminar epidural anesthetic injection. 3. L4-5 Interlaminar epidural steroid injection. CONSENT: Written consent was obtained from the patient on preprinted consent form after explaining the procedure, indications, potential complications and outcomes. Alternative treatments were also discussed.     DISCUSSION: The patient was sterilely prepped and draped in the usual fashion in the   prone EBL: no blood loss    SPECIMEN: none    RECOMMENDATIONS:  1. Complete and return Post-Procedure Pain and Activity Diary.    2. Contact the [address] for symptom exacerbation, fever or unusual symptoms. 3. Post-procedure care according to verbal and written discharge instructions    POST-PROCEDURE EPIDUROGRAPHY INTERPRETATION:    EXAMINATION: AP, lateral views. FLUORO TIME: 21 seconds    DISCUSSION: Spot views of the spine reveal normal alignment and segmentation. Spinal needle is positioned at the L4-5 interlaminar space. Contrast spreads and outlines the epidural space. The epidurogram reveals excellent contrast flow. Visualized spine reveals See radiology report. Soft tissues reveal no abnormalities. IMPRESSION: L4-5 interlaminar epidurogram/epineurogram reveals satisfactory needle position and contrast spread.      Electronically signed by Terri Dumont MD on 9/2/2021 at 10:29 AM

## 2021-09-17 ENCOUNTER — TELEMEDICINE (OUTPATIENT)
Dept: PAIN MANAGEMENT | Age: 56
End: 2021-09-17
Payer: COMMERCIAL

## 2021-09-17 DIAGNOSIS — M96.1 POST LAMINECTOMY SYNDROME: ICD-10-CM

## 2021-09-17 DIAGNOSIS — G89.29 CHRONIC BILATERAL LOW BACK PAIN WITH BILATERAL SCIATICA: Primary | ICD-10-CM

## 2021-09-17 DIAGNOSIS — M54.42 CHRONIC BILATERAL LOW BACK PAIN WITH BILATERAL SCIATICA: Primary | ICD-10-CM

## 2021-09-17 DIAGNOSIS — M51.26 DISPLACEMENT OF LUMBAR INTERVERTEBRAL DISC WITHOUT MYELOPATHY: ICD-10-CM

## 2021-09-17 DIAGNOSIS — M54.41 CHRONIC BILATERAL LOW BACK PAIN WITH BILATERAL SCIATICA: Primary | ICD-10-CM

## 2021-09-17 PROCEDURE — 99214 OFFICE O/P EST MOD 30 MIN: CPT | Performed by: NURSE PRACTITIONER

## 2021-09-17 PROCEDURE — 3017F COLORECTAL CA SCREEN DOC REV: CPT | Performed by: NURSE PRACTITIONER

## 2021-09-17 PROCEDURE — G8427 DOCREV CUR MEDS BY ELIG CLIN: HCPCS | Performed by: NURSE PRACTITIONER

## 2021-09-17 ASSESSMENT — ENCOUNTER SYMPTOMS
RESPIRATORY NEGATIVE: 1
BACK PAIN: 1

## 2021-09-17 NOTE — PROGRESS NOTES
2021    TELEHEALTH EVALUATION -- Audio/Visual (During [facility]13 public health emergency)    HPI:    Juliet Zapata (:  1965) has requested an audio/video evaluation for the following concern(s):    Underwent L4/L5 interlaminar with minimal improvement, failed PT, but continues home exercises. Feels like her disc is getting worse, similar discomfort as prior to previous surgery    Review of Systems   Constitutional: Positive for activity change. Respiratory: Negative. Cardiovascular: Negative. Genitourinary: Negative. Musculoskeletal: Positive for arthralgias, back pain and myalgias. Skin: Negative. Neurological:        Radiculopathy   Psychiatric/Behavioral: Positive for sleep disturbance. Prior to Visit Medications    Medication Sig Taking?  Authorizing Provider   lipase-protease-amylase (CREON) 49124-722448 units CPEP delayed release capsule Take 2 tablets by mouth 3 times daily  Historical Provider, MD   ibuprofen (ADVIL;MOTRIN) 800 MG tablet Take 1 tablet by mouth every 8 hours as needed for Pain  AARON Sampson CNP   levothyroxine (SYNTHROID) 50 MCG tablet Take 1 tablet by mouth daily  Tiffany Bush MD   losartan-hydroCHLOROthiazide (HYZAAR) 50-12.5 MG per tablet Take 1 tablet by mouth daily  Tiffany Bush MD   cyclobenzaprine (FLEXERIL) 10 MG tablet Take 1 tablet by mouth 2 times daily as needed for Muscle spasms  AARON Sampson CNP   Crisaborole (EUCRISA) 2 % OINT Apply 1 applicator topically 2 times daily  Patient taking differently: Apply 1 applicator topically 2 times daily as needed   Tiffany Bush MD   acetaminophen (TYLENOL) 500 MG tablet Take 500 mg by mouth 2 times daily as needed   Historical Provider, MD   insulin lispro (HUMALOG) 100 UNIT/ML injection vial Use 60 units per day in pump  Tiffany Bush MD   Vitamin D (CHOLECALCIFEROL) 1000 UNITS CAPS capsule Take 4,000 Units by mouth daily   Historical Provider, MD   aspirin 81 MG tablet Take 81 mg by mouth daily. Historical Provider, MD   Insulin Lispro, Human, (INSULIN PUMP) FLORENTIN Inject 1 each into the skin See Admin Instructions. Historical Provider, MD       Social History     Tobacco Use    Smoking status: Former Smoker     Packs/day: 0.00     Years: 28.00     Pack years: 0.00     Types: Cigarettes     Start date: 1985     Quit date: 2014     Years since quittin.0    Smokeless tobacco: Never Used   Substance Use Topics    Alcohol use: Yes     Alcohol/week: 3.0 standard drinks     Types: 3 Standard drinks or equivalent per week     Comment: Socially.     Drug use: No        Allergies   Allergen Reactions    Ace Inhibitors Other (See Comments)     Cough      Penicillins Rash and Other (See Comments)    Sertraline Other (See Comments)     Hypertension   ,   Past Medical History:   Diagnosis Date    Bronchitis     Diabetes type 1, controlled ([address]     Eczema     GERD (gastroesophageal reflux disease)     Gestational diabetes     Hyperlipidemia     Hypothyroid     Insomnia     Myopia with astigmatism and presbyopia     Tobacco abuse    ,   Past Surgical History:   Procedure Laterality Date    BACK INJECTION Right 2021    Right Sacroiliac Joint Injection performed by Naheed Ramon MD at [address] [place] [address]  1/05/15    Lumbar microdiscectomy L5-S1    BLADDER SUSPENSION  09    (transobturator sling)    CERVIX LESION DESTRUCTION      (laser cone)    COLONOSCOPY  10/17/2018    hyperplastic polyp, Dr. Estrella Adair at [facility]  2006   [address]  2017    Dr Earl Smith  2017    laser nerve block Dr Oma Rosenthal- [address] [facility]  2017    laser nerve block Dr Tatiana Echevarria Bilateral 2019    BL5 transforaminal radiculogram- [facility] per Dr Reuben Kawasaki Right 2019    Right sacroiliac joint injection with arthrography    OTHER SURGICAL HISTORY Bilateral 2019    BL5 transforaminal radiculogram/epidurogram    PAIN MANAGEMENT PROCEDURE Bilateral 2021    Bilateral L4 TRANSFORAMINAL performed by Brittany Bunn MD at [facility] N/A 2021    L4-L5  INTERLAMINAR performed by Brittany Bunn MD at [address] [facility]  7/6/15    Lumbar L5-S1    US BREAST NEEDLE BIOPSY RIGHT Right 2021    US BREAST NEEDLE BIOPSY RIGHT 2021 MD ULTRASOUND   ,   Social History     Tobacco Use    Smoking status: Former Smoker     Packs/day: 0.00     Years: 28.00     Pack years: 0.00     Types: Cigarettes     Start date: 1985     Quit date: 2014     Years since quittin.0    Smokeless tobacco: Never Used   Substance Use Topics    Alcohol use: Yes     Alcohol/week: 3.0 standard drinks     Types: 3 Standard drinks or equivalent per week     Comment: Socially.     Drug use: No   ,   Family History   Problem Relation Age of Onset    Diabetes Paternal Grandmother     Cancer Father         lymph nodes    Ovarian Cancer Maternal Aunt 79        mother's half sister (same mother)    Glaucoma Neg Hx     Cataracts Neg Hx    ,   Immunization History   Administered Date(s) Administered    COVID-19, Pfizer, PF, 30mcg/0.3mL 2021, 2021    Influenza Virus Vaccine 2011, 10/06/2014, 2015, 2017, 2018    Influenza, Quadv, IM, PF (6 mo and older Fluzone, Flulaval, Fluarix, and 3 yrs and older Afluria) 2016, 10/27/2020    Pneumococcal Polysaccharide ([id number]) 2017, 2018    Td, unspecified formulation 2008    Tdap (Boostrix, Adacel) 2016   ,   Health Maintenance   Topic Date Due    Hepatitis C screen  Never done    Hepatitis B vaccine (1 of 3 - Risk 3-dose series) Never done    Shingles Vaccine (1 of 2) Never done    Cervical cancer screen  2021    Diabetic foot exam  2021    A1C test (Diabetic or Prediabetic)  2021    Flu vaccine (1) 09/01/2021    Diabetic microalbuminuria test  07/19/2022    Lipid screen  07/19/2022    TSH testing  07/19/2022    Potassium monitoring  07/19/2022    Creatinine monitoring  07/19/2022    Diabetic retinal exam  08/02/2022    Breast cancer screen  07/12/2023    Colon cancer screen colonoscopy  10/17/2023    DTaP/Tdap/Td vaccine (2 - Td or Tdap) 11/07/2026    Pneumococcal 0-64 years Vaccine (2 of 2 - PPSV23) 06/22/2032    COVID-19 Vaccine  Completed    HIV screen  Addressed    Hepatitis A vaccine  Aged Out    Hib vaccine  Aged Out    Meningococcal (ACWY) vaccine  Aged Out       PHYSICAL EXAMINATION:  [ INSTRUCTIONS:  \"[x]\" Indicates a positive item  \"[]\" Indicates a negative item  -- DELETE ALL ITEMS NOT EXAMINED]  Vital Signs: (As obtained by patient/caregiver or practitioner observation)    Blood pressure-  Heart rate-    Respiratory rate-    Temperature-  Pulse oximetry-     Constitutional: [x] Appears well-developed and well-nourished [x] No apparent distress      [] Abnormal-   Mental status  [x] Alert and awake  [x] Oriented to person/place/time [x]Able to follow commands      Eyes:  EOM    [x]  Normal  [] Abnormal-  Sclera  [x]  Normal  [] Abnormal -         Discharge [x]  None visible  [] Abnormal -    HENT:   [x] Normocephalic, atraumatic.   [] Abnormal   [x] Mouth/Throat: Mucous membranes are moist.     External Ears [x] Normal  [] Abnormal-     Neck: [x] No visualized mass     Pulmonary/Chest: [x] Respiratory effort normal.  [x] No visualized signs of difficulty breathing or respiratory distress        [] Abnormal-      Musculoskeletal:   [] Normal gait with no signs of ataxia         [] Normal range of motion of neck        [x] Abnormal- Pain worse with flexion    MRI OF THE LUMBAR SPINE WITHOUT CONTRAST, 7/28/2021 4:29 pm       TECHNIQUE:   Multiplanar multisequence MRI of the lumbar spine was performed without the   administration of intravenous contrast.       COMPARISON:   11/06/2014     HISTORY:   ORDERING SYSTEM PROVIDED HISTORY: Lumbar radiculopathy   TECHNOLOGIST PROVIDED HISTORY:   radicular pain   Is the patient pregnant?->No   Reason for Exam: Chronic low back pain and bilateral leg numbness. Three   previous lumbar surgeries. Acuity: Chronic   Type of Exam: Initial   Additional signs and symptoms: Lumbar radiculopathy       FINDINGS:   BONES/ALIGNMENT: There has been anterior lumbar spine fusion at L5-S1 since   the prior study with a disc spacer placement.  There is a vertebral body   hemangioma of L3.  The curvature of the lumbar spine is within normal limits. There is mild degenerative disc disease at L3-4 and L4-5.       SPINAL CORD: The conus terminates normally.       SOFT TISSUES: No paraspinal mass identified.       L1-L2:  The thecal sac and neural foramina are intact.       L2-L3:  The thecal sac and neural foramina are intact.       L3-L4: There is a minimum disc bulge and small osteophytes.  Disc and/or   osteophytes cause minimal narrowing of the neural foramina bilaterally.  The   thecal sac is not stenotic.       L4-L5: There is a disc protrusion measuring 3-4 mm.  The thecal sac is not   stenotic. Disc and/or osteophytes result in moderate narrowing of the neural   foramina bilaterally.       L5-S1: There has been a laminectomy on the left.  The thecal sac and S1 nerve   roots are intact.  The neural foramina are intact.           Impression   Disc protrusion at L4-5 with moderate narrowing of the neural foramina   bilaterally.  Minimal narrowing of the neural foramina bilaterally at L3-4   from disc and osteophytes.  No stenosis of the thecal sac in the lumbar   region.  Progression of the degenerative changes since the prior study.    There has been anterior fusion at L5-S1 since the previous exam         Neurological:        [x] No Facial Asymmetry (Cranial nerve 7 motor function) (limited exam to video visit)          [x] No gaze palsy        [x] Abnormal- radicular pain from lumbar spine        Skin:        [x] No significant exanthematous lesions or discoloration noted on facial skin         [] Abnormal-            Psychiatric:       [x] Normal Affect [x] No Hallucinations        [] Abnormal-     Other pertinent observable physical exam findings-     ASSESSMENT/PLAN:   Diagnosis Orders   1. Chronic bilateral low back pain with bilateral sciatica  Sushila Cartwright DO, Neurosurgery, [place]   2. Post laminectomy syndrome  Sushila Cartwright DO, Neurosurgery, [place]   3. Displacement of lumbar intervertebral disc without myelopathy  Sushila Cartwright DO, Neurosurgery, [place]       Refer to neurosurgery. May consider MBB's  No follow-ups on file. [employer], was evaluated through a synchronous (real-time) audio-video encounter. The patient (or guardian if applicable) is aware that this is a billable service. Verbal consent to proceed has been obtained within the past 12 months. The visit was conducted pursuant to the emergency declaration under the [address] [place] [address] authority and the [employer] General Act. Patient identification was verified, and a caregiver was present when appropriate. The patient was located in a state where the provider was credentialed to provide care. --Markie Hilario, AARON - CNP on 9/17/2021 at 10:36 AM    An electronic signature was used to authenticate this note.

## 2021-09-29 ENCOUNTER — PATIENT MESSAGE (OUTPATIENT)
Dept: PAIN MANAGEMENT | Age: 56
End: 2021-09-29

## 2021-09-29 DIAGNOSIS — G89.29 CHRONIC BILATERAL LOW BACK PAIN WITH BILATERAL SCIATICA: Primary | ICD-10-CM

## 2021-09-29 DIAGNOSIS — M54.41 CHRONIC BILATERAL LOW BACK PAIN WITH BILATERAL SCIATICA: Primary | ICD-10-CM

## 2021-09-29 DIAGNOSIS — M54.42 CHRONIC BILATERAL LOW BACK PAIN WITH BILATERAL SCIATICA: Primary | ICD-10-CM

## 2021-09-30 RX ORDER — HYDROCODONE BITARTRATE AND ACETAMINOPHEN 5; 325 MG/1; MG/1
1 TABLET ORAL 2 TIMES DAILY PRN
Qty: 28 TABLET | Refills: 0 | Status: SHIPPED | OUTPATIENT
Start: 2021-09-30 | End: 2021-10-14

## 2021-09-30 NOTE — TELEPHONE ENCOUNTER
[place] Report checked for [place], and [place]: 8/9/21 diazepam 5mg #2. No history of any Norco or any other opioids filled.      Norco 5 BID PRN #[id number] pended
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
## 2021-10-19 ENCOUNTER — OFFICE VISIT (OUTPATIENT)
Dept: FAMILY MEDICINE CLINIC | Age: 56
End: 2021-10-19
Payer: COMMERCIAL

## 2021-10-19 VITALS
WEIGHT: 149 LBS | BODY MASS INDEX: 27.25 KG/M2 | TEMPERATURE: 97.5 F | OXYGEN SATURATION: 98 % | HEART RATE: 84 BPM | SYSTOLIC BLOOD PRESSURE: 148 MMHG | DIASTOLIC BLOOD PRESSURE: 92 MMHG

## 2021-10-19 DIAGNOSIS — H93.8X3 EAR POPPING, BILATERAL: ICD-10-CM

## 2021-10-19 DIAGNOSIS — H60.503 ACUTE OTITIS EXTERNA OF BOTH EARS, UNSPECIFIED TYPE: ICD-10-CM

## 2021-10-19 DIAGNOSIS — L29.9 EAR ITCHING: Primary | ICD-10-CM

## 2021-10-19 PROCEDURE — G8427 DOCREV CUR MEDS BY ELIG CLIN: HCPCS | Performed by: INTERNAL MEDICINE

## 2021-10-19 PROCEDURE — 3017F COLORECTAL CA SCREEN DOC REV: CPT | Performed by: INTERNAL MEDICINE

## 2021-10-19 PROCEDURE — G8484 FLU IMMUNIZE NO ADMIN: HCPCS | Performed by: INTERNAL MEDICINE

## 2021-10-19 PROCEDURE — 1036F TOBACCO NON-USER: CPT | Performed by: INTERNAL MEDICINE

## 2021-10-19 PROCEDURE — 99214 OFFICE O/P EST MOD 30 MIN: CPT | Performed by: INTERNAL MEDICINE

## 2021-10-19 PROCEDURE — 4130F TOPICAL PREP RX AOE: CPT | Performed by: INTERNAL MEDICINE

## 2021-10-19 PROCEDURE — G8417 CALC BMI ABV UP PARAM F/U: HCPCS | Performed by: INTERNAL MEDICINE

## 2021-10-19 RX ORDER — CIPROFLOXACIN 500 MG/1
500 TABLET, FILM COATED ORAL 2 TIMES DAILY
Qty: 14 TABLET | Refills: 0 | Status: SHIPPED | OUTPATIENT
Start: 2021-10-19 | End: 2021-10-26

## 2021-10-19 RX ORDER — CIPROFLOXACIN AND DEXAMETHASONE 3; 1 MG/ML; MG/ML
4 SUSPENSION/ DROPS AURICULAR (OTIC) 2 TIMES DAILY
Qty: 1 EACH | Refills: 0 | Status: SHIPPED | OUTPATIENT
Start: 2021-10-19 | End: 2021-10-26

## 2021-10-19 SDOH — ECONOMIC STABILITY: FOOD INSECURITY: WITHIN THE PAST 12 MONTHS, THE FOOD YOU BOUGHT JUST DIDN'T LAST AND YOU DIDN'T HAVE MONEY TO GET MORE.: NEVER TRUE

## 2021-10-19 SDOH — ECONOMIC STABILITY: FOOD INSECURITY: WITHIN THE PAST 12 MONTHS, YOU WORRIED THAT YOUR FOOD WOULD RUN OUT BEFORE YOU GOT MONEY TO BUY MORE.: NEVER TRUE

## 2021-10-19 ASSESSMENT — SOCIAL DETERMINANTS OF HEALTH (SDOH): HOW HARD IS IT FOR YOU TO PAY FOR THE VERY BASICS LIKE FOOD, HOUSING, MEDICAL CARE, AND HEATING?: NOT HARD AT ALL

## 2021-10-19 NOTE — PROGRESS NOTES
(INSULIN PUMP) FLORENTIN Inject 1 each into the skin See Admin Instructions. Yes Historical Provider, MD        Allergies:  is allergic to ace inhibitors, penicillins, and sertraline. Past Medical History:   has a past medical history of Bronchitis, Diabetes type 1, controlled ([address] Eczema, GERD (gastroesophageal reflux disease), Gestational diabetes, Hyperlipidemia, Hypothyroid, Insomnia, Myopia with astigmatism and presbyopia, and Tobacco abuse. Past Surgical History   has a past surgical history that includes Endometrial ablation (2006); Cervix lesion destruction (1993); bladder suspension (9/22/09); back surgery (1/05/15); Spinal fusion (7/6/15); Nerve Block (02/16/2017); Nerve Block (03/09/2017); lumbar laminectomy (09/06/2017); Colonoscopy (10/17/2018); other surgical history (Bilateral, 03/13/2019); other surgical history (Right, 05/29/2019); other surgical history (Bilateral, 07/31/2019); Back Injection (Right, 4/29/2021); US BREAST BIOPSY W LOC DEVICE 1ST LESION RIGHT (Right, 7/12/2021); Pain management procedure (Bilateral, 8/13/2021); and Pain management procedure (N/A, 9/2/2021). Family History  family history includes Cancer in her father; Diabetes in her paternal grandmother; Ovarian Cancer (age of onset: 79) in her maternal aunt. Social History   reports that she quit smoking about 7 years ago. Her smoking use included cigarettes. She started smoking about 36 years ago. She smoked 0.00 packs per day for 28.00 years. She has never used smokeless tobacco. She reports current alcohol use of about 3.0 standard drinks of alcohol per week. She reports that she does not use drugs.     Health Maintenance:    Health Maintenance   Topic Date Due    Hepatitis C screen  Never done    Hepatitis B vaccine (1 of 3 - Risk 3-dose series) Never done    Shingles Vaccine (1 of 2) Never done    Diabetic foot exam  07/20/2021    A1C test (Diabetic or Prediabetic)  08/13/2021    Flu vaccine (1) 09/01/2021    COVID-19 Vaccine (3 - Pfizer booster) 09/23/2021    Diabetic microalbuminuria test  07/19/2022    Lipid screen  07/19/2022    TSH testing  07/19/2022    Potassium monitoring  07/19/2022    Creatinine monitoring  07/19/2022    Diabetic retinal exam  08/02/2022    Breast cancer screen  07/12/2023    Colon cancer screen colonoscopy  10/17/2023    Cervical cancer screen  03/11/2024    DTaP/Tdap/Td vaccine (2 - Td or Tdap) 11/07/2026    Pneumococcal 0-64 years Vaccine (2 of 2 - PPSV23) 06/22/2032    HIV screen  Addressed    Hepatitis A vaccine  Aged Out    Hib vaccine  Aged Out    Meningococcal (ACWY) vaccine  Aged Out       Subjective:      Review of Systems   Constitutional: Negative for fatigue, fever and unexpected weight change. HENT: Positive for ear discharge, ear pain and hearing loss. Negative for postnasal drip, rhinorrhea, sinus pain, sore throat and trouble swallowing. Eyes: Negative for visual disturbance. Respiratory: Negative for cough, chest tightness and shortness of breath. Cardiovascular: Negative for chest pain and leg swelling. Gastrointestinal: Negative for abdominal pain, blood in stool and diarrhea. Endocrine: Negative for polyuria. Genitourinary: Negative for difficulty urinating and flank pain. Musculoskeletal: Negative for arthralgias, joint swelling and myalgias. Skin: Negative for rash. Allergic/Immunologic: Negative for environmental allergies. Neurological: Negative for weakness, light-headedness, numbness and headaches. Hematological: Negative for adenopathy. Psychiatric/Behavioral: Negative for behavioral problems and suicidal ideas. The patient is not nervous/anxious. Objective:     BP (!) 148/92   Pulse 84   Temp 97.5 °F (36.4 °C)   Wt 149 lb (67.6 kg)   SpO2 98%   BMI 27.25 kg/m²     Physical Exam  Vitals and nursing note reviewed. HENT:      Head: Normocephalic and atraumatic. Right Ear: Decreased hearing noted.  Drainage and tenderness present. Left Ear: Decreased hearing noted. Drainage and tenderness present. Ears:      Comments: +purluent whitish creamish discharge noted auditory ear canal .  Cardiovascular:      Rate and Rhythm: Normal rate and regular rhythm. Pulmonary:      Breath sounds: No stridor. Skin:     General: Skin is warm. Neurological:      Mental Status: She is oriented to person, place, and time. Mental status is at baseline. Psychiatric:         Mood and Affect: Mood normal.             Assessment       Diagnosis Orders   1. Ear itching  ciprofloxacin-dexamethasone (CIPRODEX) 0.3-0.1 % otic suspension    ciprofloxacin (CIPRO) 500 MG tablet   2. Ear popping, bilateral  ciprofloxacin-dexamethasone (CIPRODEX) 0.3-0.1 % otic suspension    ciprofloxacin (CIPRO) 500 MG tablet   3. Acute otitis externa of both ears, unspecified type  ciprofloxacin-dexamethasone (CIPRODEX) 0.3-0.1 % otic suspension    ciprofloxacin (CIPRO) 500 MG tablet         PLAN   ciprodex for otitis externa. Avoid getting ears wet -- During treatment, you should avoid getting the inside of your ears wet. While showering, you can place a cotton ball coated with petroleum jelly in the ear. However, you should not swim for 7 to 10 days after starting treatment. Avoid wearing hearing aids and in-ear headphones until pain improves      No orders of the defined types were placed in this encounter. No follow-ups on file. Patient given educational materials - see patient instructions. Discussed use, benefit, and side effects of prescribed medications. All patient questions answered. Pt voiced understanding. Reviewed health maintenance.        Electronically signed Harvey Orozco MD on 10/19/2021 at 1:23 PM EDT

## 2021-10-25 ASSESSMENT — ENCOUNTER SYMPTOMS
DIARRHEA: 0
CHEST TIGHTNESS: 0
SINUS PAIN: 0
BLOOD IN STOOL: 0
RHINORRHEA: 0
TROUBLE SWALLOWING: 0
SORE THROAT: 0
SHORTNESS OF BREATH: 0
COUGH: 0
ABDOMINAL PAIN: 0

## 2021-10-26 ENCOUNTER — OFFICE VISIT (OUTPATIENT)
Dept: FAMILY MEDICINE CLINIC | Age: 56
End: 2021-10-26
Payer: COMMERCIAL

## 2021-10-26 VITALS
SYSTOLIC BLOOD PRESSURE: 120 MMHG | DIASTOLIC BLOOD PRESSURE: 70 MMHG | WEIGHT: 149.8 LBS | TEMPERATURE: 97.3 F | BODY MASS INDEX: 27.4 KG/M2 | HEART RATE: 84 BPM | OXYGEN SATURATION: 98 %

## 2021-10-26 DIAGNOSIS — H60.393 OTHER INFECTIVE ACUTE OTITIS EXTERNA OF BOTH EARS: Primary | ICD-10-CM

## 2021-10-26 DIAGNOSIS — E10.9 TYPE 1 DIABETES MELLITUS WITHOUT COMPLICATION (HCC): ICD-10-CM

## 2021-10-26 PROCEDURE — 99214 OFFICE O/P EST MOD 30 MIN: CPT | Performed by: INTERNAL MEDICINE

## 2021-10-26 PROCEDURE — G8484 FLU IMMUNIZE NO ADMIN: HCPCS | Performed by: INTERNAL MEDICINE

## 2021-10-26 PROCEDURE — 4130F TOPICAL PREP RX AOE: CPT | Performed by: INTERNAL MEDICINE

## 2021-10-26 PROCEDURE — 3046F HEMOGLOBIN A1C LEVEL >9.0%: CPT | Performed by: INTERNAL MEDICINE

## 2021-10-26 PROCEDURE — G8427 DOCREV CUR MEDS BY ELIG CLIN: HCPCS | Performed by: INTERNAL MEDICINE

## 2021-10-26 PROCEDURE — 2024F 7 FLD RTA PHOTO EVC RTNOPTHY: CPT | Performed by: INTERNAL MEDICINE

## 2021-10-26 PROCEDURE — 1036F TOBACCO NON-USER: CPT | Performed by: INTERNAL MEDICINE

## 2021-10-26 PROCEDURE — 3017F COLORECTAL CA SCREEN DOC REV: CPT | Performed by: INTERNAL MEDICINE

## 2021-10-26 PROCEDURE — G8417 CALC BMI ABV UP PARAM F/U: HCPCS | Performed by: INTERNAL MEDICINE

## 2021-10-26 RX ORDER — CIPROFLOXACIN AND DEXAMETHASONE 3; 1 MG/ML; MG/ML
4 SUSPENSION/ DROPS AURICULAR (OTIC) 2 TIMES DAILY
Qty: 1 EACH | Refills: 0 | Status: SHIPPED | OUTPATIENT
Start: 2021-10-26 | End: 2021-11-05

## 2021-10-26 RX ORDER — CEFDINIR 300 MG/1
300 CAPSULE ORAL 2 TIMES DAILY
Qty: 14 CAPSULE | Refills: 0 | Status: SHIPPED | OUTPATIENT
Start: 2021-10-26 | End: 2021-11-02

## 2021-10-26 RX ORDER — CIPROFLOXACIN 750 MG/1
750 TABLET, FILM COATED ORAL 2 TIMES DAILY
Qty: 14 TABLET | Refills: 0 | Status: SHIPPED | OUTPATIENT
Start: 2021-10-26 | End: 2021-11-02

## 2021-10-26 NOTE — PROGRESS NOTES
MD   ciprofloxacin-dexamethasone (CIPRODEX) 0.3-0.1 % otic suspension Place 4 drops into both ears 2 times daily for 7 days  Patient not taking: Reported on 10/26/2021  Radha Andersen MD   ciprofloxacin (CIPRO) 500 MG tablet Take 1 tablet by mouth 2 times daily for 7 days  Patient not taking: Reported on 10/26/2021  Radha Andersen MD   lipase-protease-amylase (CREON) 77919-639580 units CPEP delayed release capsule Take 2 tablets by mouth 3 times daily  Historical Provider, MD        Allergies:  is allergic to ace inhibitors, penicillins, and sertraline. Past Medical History:   has a past medical history of Bronchitis, Diabetes type 1, controlled ([address] Eczema, GERD (gastroesophageal reflux disease), Gestational diabetes, Hyperlipidemia, Hypothyroid, Insomnia, Myopia with astigmatism and presbyopia, and Tobacco abuse. Past Surgical History   has a past surgical history that includes Endometrial ablation (2006); Cervix lesion destruction (1993); bladder suspension (9/22/09); back surgery (1/05/15); Spinal fusion (7/6/15); Nerve Block (02/16/2017); Nerve Block (03/09/2017); lumbar laminectomy (09/06/2017); Colonoscopy (10/17/2018); other surgical history (Bilateral, 03/13/2019); other surgical history (Right, 05/29/2019); other surgical history (Bilateral, 07/31/2019); Back Injection (Right, 4/29/2021); US BREAST BIOPSY W LOC DEVICE 1ST LESION RIGHT (Right, 7/12/2021); Pain management procedure (Bilateral, 8/13/2021); and Pain management procedure (N/A, 9/2/2021). Family History  family history includes Cancer in her father; Diabetes in her paternal grandmother; Ovarian Cancer (age of onset: 79) in her maternal aunt. Social History   reports that she quit smoking about 7 years ago. Her smoking use included cigarettes. She started smoking about 36 years ago. She smoked 0.00 packs per day for 28.00 years.  She has never used smokeless tobacco. She reports current alcohol use of about 3.0 standard drinks of alcohol per week. She reports that she does not use drugs. Health Maintenance:    Health Maintenance   Topic Date Due    Hepatitis C screen  Never done    Hepatitis B vaccine (1 of 3 - Risk 3-dose series) Never done    Shingles Vaccine (1 of 2) Never done    Diabetic foot exam  07/20/2021    A1C test (Diabetic or Prediabetic)  08/13/2021    Flu vaccine (1) 09/01/2021    COVID-19 Vaccine (3 - Pfizer booster) 09/23/2021    Diabetic microalbuminuria test  07/19/2022    Lipid screen  07/19/2022    TSH testing  07/19/2022    Potassium monitoring  07/19/2022    Creatinine monitoring  07/19/2022    Diabetic retinal exam  08/02/2022    Breast cancer screen  07/12/2023    Colon cancer screen colonoscopy  10/17/2023    Cervical cancer screen  03/11/2024    DTaP/Tdap/Td vaccine (2 - Td or Tdap) 11/07/2026    Pneumococcal 0-64 years Vaccine (2 of 2 - PPSV23) 06/22/2032    HIV screen  Addressed    Hepatitis A vaccine  Aged Out    Hib vaccine  Aged Out    Meningococcal (ACWY) vaccine  Aged Out       Subjective:      Review of Systems   Constitutional: Negative for fatigue, fever and unexpected weight change. HENT: Positive for ear pain and hearing loss. Negative for postnasal drip, rhinorrhea, sinus pain, sore throat and trouble swallowing. Eyes: Negative for visual disturbance. Respiratory: Negative for cough, chest tightness and shortness of breath. Cardiovascular: Negative for chest pain and leg swelling. Gastrointestinal: Negative for abdominal pain, blood in stool and diarrhea. Endocrine: Negative for polyuria. Genitourinary: Negative for difficulty urinating and flank pain. Musculoskeletal: Negative for arthralgias, joint swelling and myalgias. Skin: Negative for rash. Allergic/Immunologic: Negative for environmental allergies. Neurological: Negative for weakness, light-headedness, numbness and headaches. Hematological: Negative for adenopathy.    Psychiatric/Behavioral:

## 2021-10-29 ENCOUNTER — OFFICE VISIT (OUTPATIENT)
Dept: NEUROSURGERY | Age: 56
End: 2021-10-29
Payer: COMMERCIAL

## 2021-10-29 ENCOUNTER — HOSPITAL ENCOUNTER (OUTPATIENT)
Dept: GENERAL RADIOLOGY | Age: 56
Discharge: HOME | End: 2021-10-31
Payer: COMMERCIAL

## 2021-10-29 VITALS
WEIGHT: 151.2 LBS | OXYGEN SATURATION: 99 % | BODY MASS INDEX: 27.82 KG/M2 | HEART RATE: 77 BPM | DIASTOLIC BLOOD PRESSURE: 84 MMHG | HEIGHT: 62 IN | SYSTOLIC BLOOD PRESSURE: 126 MMHG

## 2021-10-29 DIAGNOSIS — M43.16 LUMBAR ADJACENT SEGMENT DISEASE WITH SPONDYLOLISTHESIS: Primary | ICD-10-CM

## 2021-10-29 DIAGNOSIS — M47.26 OTHER SPONDYLOSIS WITH RADICULOPATHY, LUMBAR REGION: ICD-10-CM

## 2021-10-29 DIAGNOSIS — M43.16 LUMBAR ADJACENT SEGMENT DISEASE WITH SPONDYLOLISTHESIS: ICD-10-CM

## 2021-10-29 DIAGNOSIS — M51.36 LUMBAR ADJACENT SEGMENT DISEASE WITH SPONDYLOLISTHESIS: ICD-10-CM

## 2021-10-29 DIAGNOSIS — M51.36 LUMBAR ADJACENT SEGMENT DISEASE WITH SPONDYLOLISTHESIS: Primary | ICD-10-CM

## 2021-10-29 PROCEDURE — G8427 DOCREV CUR MEDS BY ELIG CLIN: HCPCS | Performed by: NEUROLOGICAL SURGERY

## 2021-10-29 PROCEDURE — 72120 X-RAY BEND ONLY L-S SPINE: CPT

## 2021-10-29 PROCEDURE — 3017F COLORECTAL CA SCREEN DOC REV: CPT | Performed by: NEUROLOGICAL SURGERY

## 2021-10-29 PROCEDURE — 99204 OFFICE O/P NEW MOD 45 MIN: CPT | Performed by: NEUROLOGICAL SURGERY

## 2021-10-29 PROCEDURE — G8484 FLU IMMUNIZE NO ADMIN: HCPCS | Performed by: NEUROLOGICAL SURGERY

## 2021-10-29 PROCEDURE — G8417 CALC BMI ABV UP PARAM F/U: HCPCS | Performed by: NEUROLOGICAL SURGERY

## 2021-10-29 PROCEDURE — 1036F TOBACCO NON-USER: CPT | Performed by: NEUROLOGICAL SURGERY

## 2021-10-29 NOTE — PROGRESS NOTES
[address]  [address] [facility], [address]  [facility] [address]  Dept: [phone]  Loc: [phone]    Patient:  Christiano Ewing  YOB: 1965  Date: 10/29/21    The patient is a 49 y.o. female who presents today for consult of the following problems:     Chief Complaint   Patient presents with    Back Pain             HPI:     Christiano Ewing is a 49 y.o. female on whom neurosurgical consultation was requested by Michaela Tompkins MD for management of lumbar spondylosis radiculopathy. The patient has had remote surgery L5-S1 via posterior discectomy followed by ALIF followed by posterior decompression again. The patient is at significant disabling pain on a daily basis axial and spasmodic in nature at least 6-7 out of 10. Symptoms are worsened with mobilization and slightly improved with changes in position without any significant exacerbating factors. Radiation occurs in bilateral lower extremity approximately S1 distribution posterior hamstring calf into the plantar surface of the feet. No distinct trigger terms of positional correlation with radiculopathic pain. No specific laterality saddle anesthesia bowel or bladder incontinence. The patient has been through multiple years of exhaustive conservative measures including physical therapy TENS units daily use of ice as well as conservative over-the-counter medications in addition to [facility] recently prescribed by pain management. She has also been through multiple interventions including sacroiliac injection, L4 transforaminal injections as well as interlaminar L4 5 injections. She does not state that any one of the injections provided very significant resounding level of relief that lasted. Only modest improvement for approximately 1 week. 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   She is currently working as a  and and is probably in a seated position not able to ambulate for long periods of time. Regina Cheng       History:     Past Medical History:   Diagnosis Date    Bronchitis     Diabetes type 1, controlled ([address]     Eczema     GERD (gastroesophageal reflux disease)     Gestational diabetes     Hyperlipidemia     Hypothyroid     Insomnia     Myopia with astigmatism and presbyopia     Tobacco abuse      Past Surgical History:   Procedure Laterality Date    BACK INJECTION Right 4/29/2021    Right Sacroiliac Joint Injection performed by Fany Holliday MD at [address] [place] [address]  1/05/15    Lumbar microdiscectomy L5-S1    BLADDER SUSPENSION  9/22/09    (transobturator sling)    CERVIX LESION DESTRUCTION  1993    (laser cone)    COLONOSCOPY  10/17/2018    hyperplastic polyp, Dr. Milagros Haas at [facility]  2006   [address]  09/06/2017    Dr Marcio Schmitt  02/16/2017    laser nerve block Dr Yecenia Valadez- [facility]  03/09/2017    laser nerve block Dr Kindra Edouard Bilateral 03/13/2019    BL5 transforaminal radiculogram- [facility] per Dr Tidwell [address] 05/29/2019    Right sacroiliac joint injection with arthrography    OTHER SURGICAL HISTORY Bilateral 07/31/2019    BL5 transforaminal radiculogram/epidurogram    PAIN MANAGEMENT PROCEDURE Bilateral 8/13/2021    Bilateral L4 TRANSFORAMINAL performed by Fany Holliday MD at [address] [facility] N/A 9/2/2021    L4-L5  INTERLAMINAR performed by Fany Holliday MD at [facility]  7/6/15    Lumbar L5-S1    US BREAST NEEDLE BIOPSY RIGHT Right 7/12/2021    US BREAST NEEDLE BIOPSY RIGHT 7/12/2021 [facility] ULTRASOUND     Family History   Problem Relation Age of Onset    Diabetes Paternal Grandmother     Cancer Father         lymph nodes    Ovarian Cancer Maternal Aunt 79        mother's half sister (same mother)    Glaucoma Neg Hx     Cataracts Neg Hx      Current Outpatient Medications on File Prior to Visit   Medication Sig Dispense Refill    ciprofloxacin-dexamethasone (CIPRODEX) 0.3-0.1 % otic suspension Place 4 drops into both ears 2 times daily for 10 days 1 each 0    ciprofloxacin (CIPRO) 750 MG tablet Take 1 tablet by mouth 2 times daily for 7 days 14 tablet 0    cefdinir (OMNICEF) 300 MG capsule Take 1 capsule by mouth 2 times daily for 7 days 14 capsule 0    lipase-protease-amylase (CREON) 31575-423764 units CPEP delayed release capsule Take 2 tablets by mouth 3 times daily      ibuprofen (ADVIL;MOTRIN) 800 MG tablet Take 1 tablet by mouth every 8 hours as needed for Pain 120 tablet 3    levothyroxine (SYNTHROID) 50 MCG tablet Take 1 tablet by mouth daily 30 tablet 5    losartan-hydroCHLOROthiazide (HYZAAR) 50-12.5 MG per tablet Take 1 tablet by mouth daily 90 tablet 3    Crisaborole (EUCRISA) 2 % OINT Apply 1 applicator topically 2 times daily (Patient taking differently: Apply 1 applicator topically 2 times daily as needed ) 1 Tube 5    acetaminophen (TYLENOL) 500 MG tablet Take 500 mg by mouth 2 times daily as needed       insulin lispro (HUMALOG) 100 UNIT/ML injection vial Use 60 units per day in pump 1 vial 3    Vitamin D (CHOLECALCIFEROL) 1000 UNITS CAPS capsule Take 4,000 Units by mouth daily       aspirin 81 MG tablet Take 81 mg by mouth daily.  Insulin Lispro, Human, (INSULIN PUMP) FLORENTIN Inject 1 each into the skin See Admin Instructions.  cyclobenzaprine (FLEXERIL) 10 MG tablet Take 1 tablet by mouth 2 times daily as needed for Muscle spasms (Patient not taking: Reported on 10/29/2021) 60 tablet 5     No current facility-administered medications on file prior to visit.      Social History     Tobacco Use    Smoking status: Former Smoker     Packs/day: 0.00     Years: 28.00     Pack years: 0.00     Types: Cigarettes     Start date: 1985     Quit date: 2014     Years since quittin.2    Smokeless tobacco: Never Used   Substance Use Topics    Alcohol use: Yes     Alcohol/week: 3.0 standard drinks     Types: 3 Standard drinks or equivalent per week     Comment: Socially.  Drug use: No       Allergies   Allergen Reactions    Ace Inhibitors Other (See Comments)     Cough      Penicillins Rash and Other (See Comments)    Sertraline Other (See Comments)     Hypertension       Review of Systems  Constitutional: Negative for activity change and appetite change. HENT: Negative for ear pain and facial swelling. Eyes: Negative for discharge and itching. Respiratory: Negative for choking and chest tightness. Cardiovascular: Negative for chest pain and leg swelling. Gastrointestinal: Negative for nausea and abdominal pain. Endocrine: Negative for cold intolerance and heat intolerance. Genitourinary: Negative for frequency and flank pain. Musculoskeletal: Negative for myalgias and joint swelling. Skin: Negative for rash and wound. Allergic/Immunologic: Negative for environmental allergies and food allergies. Hematological: Negative for adenopathy. Does not bruise/bleed easily. Psychiatric/Behavioral: Negative for self-injury. The patient is not nervous/anxious. Physical Exam:      /84 (Site: Left Upper Arm, Position: Sitting, Cuff Size: Large Adult)   Pulse 77   Ht 5' 2\" (1.575 m)   Wt 151 lb 3.2 oz (68.6 kg)   SpO2 99%   BMI 27.65 kg/m²   Estimated body mass index is 27.65 kg/m² as calculated from the following:    Height as of this encounter: 5' 2\" (1.575 m). Weight as of this encounter: 151 lb 3.2 oz (68.6 kg). General:  Gerardo Gomez is a 49y.o. year old female who appears her stated age. HEENT: Normocephalic atraumatic. Neck supple. Chest: regular rate; pulses equal  Abdomen: Soft nontender nondistended. Normoactive bowel sounds.   Ext: DP and PT pulses 2+, good cap refill  Neuro    Mentation  Appropriate affect  Registration intact  Orientation intact  3 item recall intact  Judgement intact to situation    Cranial Nerves:   Pupils equal and reactive to light  Extraocular motion intact  Face and shrug symmetric  Tongue midline  No dysarthria  v1-3 sensation symmetric, masseter tone symmetric  Hearing symmetric and intact to finger rub    Sensation:   Intact    Motor  L deltoid 5/5; R deltoid 5/5  L biceps 5/5; R biceps 5/5  L triceps 5/5; R triceps 5/5  L wrist extension 5/5; R wrist extension 5/5  L intrinsics 5/5; R intrinsics 5/5     L iliopsoas 5/5 , R iliopsoas 5/5  L quadriceps 5/5; R quadriceps 5/5  L Dorsiflexion 5/5; R dorsiflexion 5/5  L Plantarflexion 5/5; R plantarflexion 5/5  L EHL 5/5; R EHL 5/5    Reflexes  L Brachioradialis 2+/4; R brachioradialis 2+/4  L Biceps 2+/4; R Biceps 2+/4  L Triceps 2+/4; R Triceps 2+/4  L Patellar 2+/4: R Patellar 2+/4  L Achilles 2+/4; R Achilles 2+/4    hoffmans L: neg  hoffmans R: neg  Clonus L: neg  Clonus R: neg  Babinski L: up  Babinski R; up    Negative Norva Acron. Negative trochanteric tenderness. Significant trigger with forward flexion approximate 80 degrees. Significant improvement with extension maneuver. Negative straight leg raise. Studies Review:     MRI lumbar spine does reveal discogenic disease with broad-based protrusion at L4-5 with desiccation along with some loss of height. Maintained lordosis. Prior evidence of L5-S1 fusion. Assessment and Plan:      1. Lumbar adjacent segment disease with spondylolisthesis    2. Other spondylosis with radiculopathy, lumbar region          Plan: Will obtain flexion ext and neutral standing xrays  Extensive discussion with the patient regarding the findings on examination as well as assessment which include discogenic pain likely etiology is L4-5 along with radiculopathic S1 distribution.   Patient has been through multimodal conservative measures that have been fully exhausted and is significantly disabled both from a radiculopathic and axial pain standpoint. I explained to her that at this point we would have to confirm localization of the pain. Based on imaging I am fairly certain that her pain localizes to L4-5 considering this is the adjacent level with focal disc disease at this level. I would not elect for a simple discectomy considering that the patient's pain distribution is both back and leg and equal distribution. I recommended a work-up which includes facet blocks as well as discogram as a maneuver in order to improve the source of the pain. Overall I do believe that based on her clinical assessment and her imaging I am approximately 60 to 10% certain that her J7-1 level is the etiology of all of her symptoms. Considering the above I did offer her surgical intervention in the form of an oblique anterior lateral interbody fusion at L4-5 with posterior fixation versus continued work-up to increase her level of confidence. Followup: No follow-ups on file. Prescriptions Ordered:  No orders of the defined types were placed in this encounter. Orders Placed:  No orders of the defined types were placed in this encounter. Electronically signed by Lavon Tyler DO on 10/29/2021 at 11:35 AM    Please note that this chart was generated using voice recognition Dragon dictation software. Although every effort was made to ensure the accuracy of this automated transcription, some errors in transcription may have occurred.

## 2021-11-04 ASSESSMENT — ENCOUNTER SYMPTOMS
SORE THROAT: 0
SINUS PAIN: 0
RHINORRHEA: 0
CHEST TIGHTNESS: 0
DIARRHEA: 0
BLOOD IN STOOL: 0
TROUBLE SWALLOWING: 0
SHORTNESS OF BREATH: 0
ABDOMINAL PAIN: 0
COUGH: 0

## 2021-11-05 ENCOUNTER — TELEPHONE (OUTPATIENT)
Dept: FAMILY MEDICINE CLINIC | Age: 56
End: 2021-11-05

## 2021-11-05 DIAGNOSIS — B36.9 OTOMYCOSIS OF BOTH EARS: Primary | ICD-10-CM

## 2021-11-05 DIAGNOSIS — H62.43 OTOMYCOSIS OF BOTH EARS: Primary | ICD-10-CM

## 2021-11-05 RX ORDER — CLOTRIMAZOLE 1 G/ML
SOLUTION TOPICAL
Qty: 10 ML | Refills: 0 | Status: SHIPPED | OUTPATIENT
Start: 2021-11-05 | End: 2022-05-24

## 2021-11-16 ENCOUNTER — TELEPHONE (OUTPATIENT)
Dept: NEUROSURGERY | Age: 56
End: 2021-11-16

## 2021-11-19 RX ORDER — SODIUM CHLORIDE, SODIUM LACTATE, POTASSIUM CHLORIDE, CALCIUM CHLORIDE 600; 310; 30; 20 MG/100ML; MG/100ML; MG/100ML; MG/100ML
1000 INJECTION, SOLUTION INTRAVENOUS CONTINUOUS
Status: CANCELLED | OUTPATIENT
Start: 2021-11-19

## 2021-11-22 ENCOUNTER — HOSPITAL ENCOUNTER (OUTPATIENT)
Dept: PREADMISSION TESTING | Age: 56
Discharge: HOME | End: 2021-11-26
Payer: COMMERCIAL

## 2021-11-22 VITALS
SYSTOLIC BLOOD PRESSURE: 142 MMHG | HEART RATE: 76 BPM | RESPIRATION RATE: 18 BRPM | WEIGHT: 151 LBS | OXYGEN SATURATION: 97 % | HEIGHT: 62 IN | DIASTOLIC BLOOD PRESSURE: 92 MMHG | BODY MASS INDEX: 27.79 KG/M2 | TEMPERATURE: 97.8 F

## 2021-11-22 LAB
ANION GAP SERPL CALCULATED.3IONS-SCNC: 14 MMOL/L (ref 9–17)
BUN BLDV-MCNC: 12 MG/DL (ref 6–20)
CHLORIDE BLD-SCNC: 100 MMOL/L (ref 98–107)
CO2: 23 MMOL/L (ref 20–31)
CREAT SERPL-MCNC: 0.75 MG/DL (ref 0.5–0.9)
GFR AFRICAN AMERICAN: >60 ML/MIN
GFR NON-AFRICAN AMERICAN: >60 ML/MIN
GFR SERPL CREATININE-BSD FRML MDRD: NORMAL ML/MIN/{1.73_M2}
GFR SERPL CREATININE-BSD FRML MDRD: NORMAL ML/MIN/{1.73_M2}
GLUCOSE BLD-MCNC: 110 MG/DL (ref 70–99)
HCT VFR BLD CALC: 41.5 % (ref 36.3–47.1)
HEMOGLOBIN: 14.1 G/DL (ref 11.9–15.1)
MCH RBC QN AUTO: 31.7 PG (ref 25.2–33.5)
MCHC RBC AUTO-ENTMCNC: 34 G/DL (ref 28.4–34.8)
MCV RBC AUTO: 93.3 FL (ref 82.6–102.9)
NRBC AUTOMATED: 0 PER 100 WBC
PDW BLD-RTO: 11.7 % (ref 11.8–14.4)
PLATELET # BLD: 411 K/UL (ref 138–453)
PMV BLD AUTO: 9.3 FL (ref 8.1–13.5)
POTASSIUM SERPL-SCNC: 3.8 MMOL/L (ref 3.7–5.3)
RBC # BLD: 4.45 M/UL (ref 3.95–5.11)
SODIUM BLD-SCNC: 137 MMOL/L (ref 135–144)
WBC # BLD: 7.5 K/UL (ref 3.5–11.3)

## 2021-11-22 PROCEDURE — 80051 ELECTROLYTE PANEL: CPT

## 2021-11-22 PROCEDURE — 36415 COLL VENOUS BLD VENIPUNCTURE: CPT

## 2021-11-22 PROCEDURE — 82565 ASSAY OF CREATININE: CPT

## 2021-11-22 PROCEDURE — 86850 RBC ANTIBODY SCREEN: CPT

## 2021-11-22 PROCEDURE — 93005 ELECTROCARDIOGRAM TRACING: CPT | Performed by: ANESTHESIOLOGY

## 2021-11-22 PROCEDURE — 86901 BLOOD TYPING SEROLOGIC RH(D): CPT

## 2021-11-22 PROCEDURE — 84520 ASSAY OF UREA NITROGEN: CPT

## 2021-11-22 PROCEDURE — 86900 BLOOD TYPING SEROLOGIC ABO: CPT

## 2021-11-22 PROCEDURE — 85027 COMPLETE CBC AUTOMATED: CPT

## 2021-11-22 PROCEDURE — 82947 ASSAY GLUCOSE BLOOD QUANT: CPT

## 2021-11-22 ASSESSMENT — PAIN DESCRIPTION - DESCRIPTORS: DESCRIPTORS: ACHING;BURNING;THROBBING;SHOOTING

## 2021-11-22 ASSESSMENT — PAIN DESCRIPTION - PROGRESSION: CLINICAL_PROGRESSION: GRADUALLY IMPROVING

## 2021-11-22 ASSESSMENT — PAIN DESCRIPTION - ORIENTATION: ORIENTATION: LEFT;RIGHT

## 2021-11-22 ASSESSMENT — PAIN DESCRIPTION - FREQUENCY: FREQUENCY: CONTINUOUS

## 2021-11-22 ASSESSMENT — PAIN SCALES - GENERAL: PAINLEVEL_OUTOF10: 4

## 2021-11-22 ASSESSMENT — PAIN DESCRIPTION - LOCATION: LOCATION: BACK;LEG

## 2021-11-22 ASSESSMENT — PAIN DESCRIPTION - PAIN TYPE: TYPE: CHRONIC PAIN;ACUTE PAIN

## 2021-11-22 ASSESSMENT — PAIN DESCRIPTION - ONSET: ONSET: ON-GOING

## 2021-11-22 NOTE — PROGRESS NOTES
Anesthesia Focused Assessment    Has patient ever tested positive for COVID? Patient was vaccinated. STOP-BANG Sleep Apnea Questionnaire    SNORE loudly (heard through closed doors)? No  TIRED, fatigued, sleepy during daytime? No  OBSERVED stopping breathing during sleep? No  High blood PRESSURE being treated? Yes    BMI over 35? No  AGE over 48? Yes  NECK circumference over 16\"? No  GENDER (male)? No             Total 2  High risk 5-8  Intermediate risk 3-4  Low risk 0-2    Obstructive Sleep Apnea: denies  If YES, machine used: no     Type 1 DM: Yes, has insulin pump. Follows with Dr. Nito Romero endocrinology at [facility]. T2DM:  no    Coronary Artery Disease:  no  Hypertension:  yes    Active smoker:  Smoker for 28 years, quit 2014. Drinks Alcohol:  socially    Dentition: benign    Defib / AICD / Pacemaker: no      Renal Failure/dialysis:  no    Patient was evaluated in PAT & anesthesia guidelines were applied. NPO guidelines, medication instructions and scheduled arrival time were reviewed with patient. Hx of anesthesia complications:  no  Family hx of anesthesia complications:  no                                                                                                                     Anesthesia contacted:   no  Medical or cardiac clearance ordered: no.  Patient has had surgery in the past with her insulin pump. She knows to bring her supplies in case of need of changing site.     Eladio Anderson PA-C  11/22/21  1:51 PM

## 2021-11-22 NOTE — H&P
History and Physical    Pt Name: Sonia Busch  MRN: [id number]  YOB: 1965  Date of evaluation: 11/22/2021    SUBJECTIVE:   History of Chief Complaint:    Patient presents for PAT appointment. She complains of lumbar pain, BLE pain as well. She says that she has had three lumbar spine surgeries in the past.  She has also underwent pain management procedures as well. She has recently had persistent pain and symptoms despite conservative treatment. She has been scheduled for lumbar fusion. Past Medical History    has a past medical history of Bronchitis, Diabetes type 1, controlled ([address] Eczema, GERD (gastroesophageal reflux disease), Gestational diabetes, Hyperlipidemia, Hypertension, Hypothyroid, Insomnia, Insulin pump in place, Myopia with astigmatism and presbyopia, Tobacco abuse, Under care of team, and Under care of team.  Past Surgical History   has a past surgical history that includes Endometrial ablation (2006); Cervix lesion destruction (1993); bladder suspension (09/22/2009); back surgery (01/05/2015); Spinal fusion (07/06/2015); Nerve Block (02/16/2017); Nerve Block (03/09/2017); lumbar laminectomy (09/06/2017); Colonoscopy (10/17/2018); Nerve Block (Bilateral, 03/13/2019); Nerve Block (Right, 05/29/2019); Nerve Block (Bilateral, 07/31/2019); Back Injection (Right, 04/29/2021); US BREAST BIOPSY W LOC DEVICE 1ST LESION RIGHT (Right, 07/12/2021); Pain management procedure (Bilateral, 08/13/2021); and Pain management procedure (N/A, 09/02/2021). Medications  Prior to Admission medications    Medication Sig Start Date End Date Taking?  Authorizing Provider   ibuprofen (ADVIL;MOTRIN) 800 MG tablet Take 1 tablet by mouth every 8 hours as needed for Pain 3/11/21  Yes Jaqukarel Gum, APRN - CNP   levothyroxine (SYNTHROID) 50 MCG tablet Take 1 tablet by mouth daily 10/29/20  Yes Ligia Simons MD   losartan-hydroCHLOROthiazide [facility]) 50-12.5 MG per tablet Take 1 tablet by mouth daily 10/19/20  Yes Sushila Hartley MD   cyclobenzaprine (FLEXERIL) 10 MG tablet Take 1 tablet by mouth 2 times daily as needed for Muscle spasms 10/16/20  Yes AARON Arredondo CNP   Crisaborole (EUCRISA) 2 % OINT Apply 1 applicator topically 2 times daily  Patient taking differently: Apply 1 applicator topically 2 times daily as needed  7/28/20  Yes Sushila Hartley MD   acetaminophen (TYLENOL) 500 MG tablet Take 500 mg by mouth 2 times daily as needed    Yes Historical Provider, MD   insulin lispro (HUMALOG) 100 UNIT/ML injection vial Use 60 units per day in pump 3/24/17  Yes Sushila Hartley MD   Vitamin D (CHOLECALCIFEROL) 1000 UNITS CAPS capsule Take 4,000 Units by mouth daily    Yes Historical Provider, MD   aspirin 81 MG tablet Take 81 mg by mouth daily. Yes Historical Provider, MD   Insulin Lispro, Human, (INSULIN PUMP) FLORENTIN Inject 1 each into the skin See Admin Instructions. Yes Historical Provider, MD   clotrimazole (LOTRIMIN) 1 % external solution Instill 4 to 5 drops into the affected ear(s) twice daily for 10 to 14 days 11/5/21   Kaitlin Toth MD   lipase-protease-amylase (CREON) 12509-884153 units CPEP delayed release capsule Take 2 tablets by mouth 3 times daily 7/13/21 10/29/22  Historical Provider, MD     Allergies  is allergic to ace inhibitors, penicillins, and sertraline. Family History  family history includes Cancer in her father; Diabetes in her paternal grandmother; High Blood Pressure in her mother; Ovarian Cancer (age of onset: 79) in her maternal aunt. Social History   reports that she quit smoking about 7 years ago. Her smoking use included cigarettes. She started smoking about 36 years ago. She smoked 0.00 packs per day for 28.00 years. She has never used smokeless tobacco.   reports current alcohol use of about 3.0 standard drinks of alcohol per week. reports no history of drug use.   Marital Status     Review of Systems:  CONSTITUTIONAL:   negative for fevers, chills, fatigue and malaise EYES:   negative for double vision, blurred vision and photophobia    HEENT:   negative for tinnitus, epistaxis and sore throat     RESPIRATORY:   negative for cough, shortness of breath, wheezing     CARDIOVASCULAR:   negative for chest pain, palpitations, syncope, edema     GASTROINTESTINAL:   negative for nausea, vomiting     GENITOURINARY:   negative for incontinence     MUSCULOSKELETAL:   Positive for back pain     NEUROLOGICAL:   Positive for BLE pain  negative for headaches and dizziness     PSYCHIATRIC:   negative for anxiety         OBJECTIVE:   VITALS:  height is 5' 2\" (1.575 m) and weight is 151 lb (68.5 kg). Her temporal temperature is 97.8 °F (36.6 °C). Her blood pressure is 142/92 (abnormal) and her pulse is 76. Her respiration is 18 and oxygen saturation is 97%. CONSTITUTIONAL:alert & cooperative, no acute distress. Very pleasant. SKIN:  Warm and dry, no rashes on exposed areas of skin. HEAD:  Normocephalic, atraumatic. EYES: EOMs intact. Wearing glasses. EARS:  Hearing grossly WNL. NOSE:  Nares patent. No rhinorrhea   MOUTH/THROAT:  benign  NECK:supple, no lymphadenopathy  LUNGS: Clear to auscultation bilaterally, no wheezes. CARDIOVASCULAR: Heart sounds are normal.  Regular rate and rhythm without murmur. ABDOMEN: soft, non tender, non distended. Insulin pump. EXTREMITIES: no edema bilateral lower extremities. Testing:   EK21  Labs pending: drawn 2021     IMPRESSIONS:   1. Lumbar disc disease  2.  has a past medical history of Bronchitis, Diabetes type 1, controlled ([address] Eczema, GERD (gastroesophageal reflux disease), Gestational diabetes, Hyperlipidemia, Hypertension, Hypothyroid, Insomnia, Insulin pump in place, Myopia with astigmatism and presbyopia, Tobacco abuse, Under care of team (2021), and Under care of team (2021). PLANS:   1.  Lumbar fusion    MOHAN Medrano PA-C  Electronically signed 2021 at 2:38 PM

## 2021-11-22 NOTE — H&P (VIEW-ONLY)
History and Physical    Pt Name: Skye Crooks  MRN: [id number]  YOB: 1965  Date of evaluation: 11/22/2021    SUBJECTIVE:   History of Chief Complaint:    Patient presents for PAT appointment. She complains of lumbar pain, BLE pain as well. She says that she has had three lumbar spine surgeries in the past.  She has also underwent pain management procedures as well. She has recently had persistent pain and symptoms despite conservative treatment. She has been scheduled for lumbar fusion. Past Medical History    has a past medical history of Bronchitis, Diabetes type 1, controlled ([address] Eczema, GERD (gastroesophageal reflux disease), Gestational diabetes, Hyperlipidemia, Hypertension, Hypothyroid, Insomnia, Insulin pump in place, Myopia with astigmatism and presbyopia, Tobacco abuse, Under care of team, and Under care of team.  Past Surgical History   has a past surgical history that includes Endometrial ablation (2006); Cervix lesion destruction (1993); bladder suspension (09/22/2009); back surgery (01/05/2015); Spinal fusion (07/06/2015); Nerve Block (02/16/2017); Nerve Block (03/09/2017); lumbar laminectomy (09/06/2017); Colonoscopy (10/17/2018); Nerve Block (Bilateral, 03/13/2019); Nerve Block (Right, 05/29/2019); Nerve Block (Bilateral, 07/31/2019); Back Injection (Right, 04/29/2021); US BREAST BIOPSY W LOC DEVICE 1ST LESION RIGHT (Right, 07/12/2021); Pain management procedure (Bilateral, 08/13/2021); and Pain management procedure (N/A, 09/02/2021). Medications  Prior to Admission medications    Medication Sig Start Date End Date Taking?  Authorizing Provider   ibuprofen (ADVIL;MOTRIN) 800 MG tablet Take 1 tablet by mouth every 8 hours as needed for Pain 3/11/21  Yes AARON Freeman - CNP   levothyroxine (SYNTHROID) 50 MCG tablet Take 1 tablet by mouth daily 10/29/20  Yes Ashley Chan MD   losartan-hydroCHLOROthiazide [facility]) 50-12.5 MG per tablet Take 1 tablet by mouth daily 10/19/20  Yes Sushila Hartley MD   cyclobenzaprine (FLEXERIL) 10 MG tablet Take 1 tablet by mouth 2 times daily as needed for Muscle spasms 10/16/20  Yes AARON Arredondo CNP   Crisaborole (EUCRISA) 2 % OINT Apply 1 applicator topically 2 times daily  Patient taking differently: Apply 1 applicator topically 2 times daily as needed  7/28/20  Yes Sushila Hartley MD   acetaminophen (TYLENOL) 500 MG tablet Take 500 mg by mouth 2 times daily as needed    Yes Historical Provider, MD   insulin lispro (HUMALOG) 100 UNIT/ML injection vial Use 60 units per day in pump 3/24/17  Yes Sushila Hartley MD   Vitamin D (CHOLECALCIFEROL) 1000 UNITS CAPS capsule Take 4,000 Units by mouth daily    Yes Historical Provider, MD   aspirin 81 MG tablet Take 81 mg by mouth daily. Yes Historical Provider, MD   Insulin Lispro, Human, (INSULIN PUMP) FLORENTIN Inject 1 each into the skin See Admin Instructions. Yes Historical Provider, MD   clotrimazole (LOTRIMIN) 1 % external solution Instill 4 to 5 drops into the affected ear(s) twice daily for 10 to 14 days 11/5/21   Kaitlin Toth MD   lipase-protease-amylase (CREON) 77046-390511 units CPEP delayed release capsule Take 2 tablets by mouth 3 times daily 7/13/21 10/29/22  Historical Provider, MD     Allergies  is allergic to ace inhibitors, penicillins, and sertraline. Family History  family history includes Cancer in her father; Diabetes in her paternal grandmother; High Blood Pressure in her mother; Ovarian Cancer (age of onset: 79) in her maternal aunt. Social History   reports that she quit smoking about 7 years ago. Her smoking use included cigarettes. She started smoking about 36 years ago. She smoked 0.00 packs per day for 28.00 years. She has never used smokeless tobacco.   reports current alcohol use of about 3.0 standard drinks of alcohol per week. reports no history of drug use.   Marital Status     Review of Systems:  CONSTITUTIONAL:   negative for fevers, chills, fatigue and malaise EYES:   negative for double vision, blurred vision and photophobia    HEENT:   negative for tinnitus, epistaxis and sore throat     RESPIRATORY:   negative for cough, shortness of breath, wheezing     CARDIOVASCULAR:   negative for chest pain, palpitations, syncope, edema     GASTROINTESTINAL:   negative for nausea, vomiting     GENITOURINARY:   negative for incontinence     MUSCULOSKELETAL:   Positive for back pain     NEUROLOGICAL:   Positive for BLE pain  negative for headaches and dizziness     PSYCHIATRIC:   negative for anxiety         OBJECTIVE:   VITALS:  height is 5' 2\" (1.575 m) and weight is 151 lb (68.5 kg). Her temporal temperature is 97.8 °F (36.6 °C). Her blood pressure is 142/92 (abnormal) and her pulse is 76. Her respiration is 18 and oxygen saturation is 97%. CONSTITUTIONAL:alert & cooperative, no acute distress. Very pleasant. SKIN:  Warm and dry, no rashes on exposed areas of skin. HEAD:  Normocephalic, atraumatic. EYES: EOMs intact. Wearing glasses. EARS:  Hearing grossly WNL. NOSE:  Nares patent. No rhinorrhea   MOUTH/THROAT:  benign  NECK:supple, no lymphadenopathy  LUNGS: Clear to auscultation bilaterally, no wheezes. CARDIOVASCULAR: Heart sounds are normal.  Regular rate and rhythm without murmur. ABDOMEN: soft, non tender, non distended. Insulin pump. EXTREMITIES: no edema bilateral lower extremities. Testing:   EK21  Labs pending: drawn 2021     IMPRESSIONS:   1. Lumbar disc disease  2.  has a past medical history of Bronchitis, Diabetes type 1, controlled ([address] Eczema, GERD (gastroesophageal reflux disease), Gestational diabetes, Hyperlipidemia, Hypertension, Hypothyroid, Insomnia, Insulin pump in place, Myopia with astigmatism and presbyopia, Tobacco abuse, Under care of team (2021), and Under care of team (2021). PLANS:   1.  Lumbar fusion    MOHAN Romo PA-C  Electronically signed 2021 at 2:38 PM

## 2021-11-23 LAB
EKG ATRIAL RATE: 67 BPM
EKG P AXIS: 30 DEGREES
EKG P-R INTERVAL: 146 MS
EKG Q-T INTERVAL: 396 MS
EKG QRS DURATION: 76 MS
EKG QTC CALCULATION (BAZETT): 418 MS
EKG R AXIS: 26 DEGREES
EKG T AXIS: 38 DEGREES
EKG VENTRICULAR RATE: 67 BPM

## 2021-11-23 PROCEDURE — 93010 ELECTROCARDIOGRAM REPORT: CPT | Performed by: INTERNAL MEDICINE

## 2021-11-24 ENCOUNTER — OFFICE VISIT (OUTPATIENT)
Dept: FAMILY MEDICINE CLINIC | Age: 56
End: 2021-11-24
Payer: COMMERCIAL

## 2021-11-24 VITALS
BODY MASS INDEX: 28.12 KG/M2 | SYSTOLIC BLOOD PRESSURE: 130 MMHG | WEIGHT: 152.8 LBS | OXYGEN SATURATION: 98 % | DIASTOLIC BLOOD PRESSURE: 84 MMHG | HEART RATE: 88 BPM | HEIGHT: 62 IN

## 2021-11-24 DIAGNOSIS — E10.9 TYPE 1 DIABETES MELLITUS WITHOUT COMPLICATION (HCC): Primary | ICD-10-CM

## 2021-11-24 DIAGNOSIS — H93.8X3 SENSATION OF PLUGGED EAR ON BOTH SIDES: ICD-10-CM

## 2021-11-24 DIAGNOSIS — H92.13 EAR DISCHARGE OF BOTH EARS: ICD-10-CM

## 2021-11-24 PROCEDURE — G8484 FLU IMMUNIZE NO ADMIN: HCPCS | Performed by: INTERNAL MEDICINE

## 2021-11-24 PROCEDURE — 99214 OFFICE O/P EST MOD 30 MIN: CPT | Performed by: INTERNAL MEDICINE

## 2021-11-24 PROCEDURE — 3017F COLORECTAL CA SCREEN DOC REV: CPT | Performed by: INTERNAL MEDICINE

## 2021-11-24 PROCEDURE — G8417 CALC BMI ABV UP PARAM F/U: HCPCS | Performed by: INTERNAL MEDICINE

## 2021-11-24 PROCEDURE — 2024F 7 FLD RTA PHOTO EVC RTNOPTHY: CPT | Performed by: INTERNAL MEDICINE

## 2021-11-24 PROCEDURE — 3046F HEMOGLOBIN A1C LEVEL >9.0%: CPT | Performed by: INTERNAL MEDICINE

## 2021-11-24 PROCEDURE — 1036F TOBACCO NON-USER: CPT | Performed by: INTERNAL MEDICINE

## 2021-11-24 PROCEDURE — G8427 DOCREV CUR MEDS BY ELIG CLIN: HCPCS | Performed by: INTERNAL MEDICINE

## 2021-11-24 ASSESSMENT — ENCOUNTER SYMPTOMS
CHEST TIGHTNESS: 0
SHORTNESS OF BREATH: 0
DIARRHEA: 0
COUGH: 0
ABDOMINAL PAIN: 0
TROUBLE SWALLOWING: 0
RHINORRHEA: 0
SINUS PAIN: 0
SORE THROAT: 0
BLOOD IN STOOL: 0

## 2021-11-24 NOTE — PROGRESS NOTES
[facility]  [address] [address] [place] [address]  Dept: [phone]  Dept Fax: [phone]  Loc: [phone]     Visit Date:  11/24/2021    Patient:  Alfonso Lanza  YOB: 1965    HPI:   Alfonso Lanza presents today for   Chief Complaint   Patient presents with    Other     Recheck of ears from infection   . [age]PI52 year old female is coming in for ear check. Feels plugged and sometimes discharge. Type 1 DM. Multiple rounds of ear drops/ciprodex/oral antibiotics for otitis externa/media with effusion/malignant externa as well recent status post 10 day treatment with topical antifungal ear drops. Medications  Prior to Visit Medications    Medication Sig Taking?  Authorizing Provider   clotrimazole (LOTRIMIN) 1 % external solution Instill 4 to 5 drops into the affected ear(s) twice daily for 10 to 14 days  Nova Mendez MD   lipase-protease-amylase (CREON) 63525-803624 units CPEP delayed release capsule Take 2 tablets by mouth 3 times daily  Historical Provider, MD   ibuprofen (ADVIL;MOTRIN) 800 MG tablet Take 1 tablet by mouth every 8 hours as needed for Pain  AARON Valdez - CNP   levothyroxine (SYNTHROID) 50 MCG tablet Take 1 tablet by mouth daily  Ubaldo Macdonald MD   losartan-hydroCHLOROthiazide (HYZAAR) 50-12.5 MG per tablet Take 1 tablet by mouth daily  Ubaldo Macdonald MD   cyclobenzaprine (FLEXERIL) 10 MG tablet Take 1 tablet by mouth 2 times daily as needed for Muscle spasms  AARON Valdez - CNP   Crisaborole (EUCRISA) 2 % OINT Apply 1 applicator topically 2 times daily  Patient taking differently: Apply 1 applicator topically 2 times daily as needed   Ubaldo Macdonald MD   acetaminophen (TYLENOL) 500 MG tablet Take 500 mg by mouth 2 times daily as needed   Historical Provider, MD   insulin lispro (HUMALOG) 100 UNIT/ML injection vial Use 60 units per day in pump  Ubaldo Macdonald MD Vitamin D (CHOLECALCIFEROL) 1000 UNITS CAPS capsule Take 4,000 Units by mouth daily   Historical Provider, MD   aspirin 81 MG tablet Take 81 mg by mouth daily. Historical Provider, MD   Insulin Lispro, Human, (INSULIN PUMP) FLORENTIN Inject 1 each into the skin See Admin Instructions. Historical Provider, MD        Allergies:  is allergic to ace inhibitors, penicillins, and sertraline. Past Medical History:   has a past medical history of Bronchitis, Diabetes type 1, controlled ([address] Eczema, GERD (gastroesophageal reflux disease), Gestational diabetes, Hyperlipidemia, Hypertension, Hypothyroid, Insomnia, Insulin pump in place, Myopia with astigmatism and presbyopia, Tobacco abuse, Under care of team, and Under care of team.    Past Surgical History   has a past surgical history that includes Endometrial ablation (2006); Cervix lesion destruction (1993); bladder suspension (09/22/2009); back surgery (01/05/2015); Spinal fusion (07/06/2015); Nerve Block (02/16/2017); Nerve Block (03/09/2017); lumbar laminectomy (09/06/2017); Colonoscopy (10/17/2018); Nerve Block (Bilateral, 03/13/2019); Nerve Block (Right, 05/29/2019); Nerve Block (Bilateral, 07/31/2019); Back Injection (Right, 04/29/2021); US BREAST BIOPSY W LOC DEVICE 1ST LESION RIGHT (Right, 07/12/2021); Pain management procedure (Bilateral, 08/13/2021); and Pain management procedure (N/A, 09/02/2021). Family History  family history includes Cancer in her father; Diabetes in her paternal grandmother; High Blood Pressure in her mother; Ovarian Cancer (age of onset: 79) in her maternal aunt. Social History   reports that she quit smoking about 7 years ago. Her smoking use included cigarettes. She started smoking about 36 years ago. She smoked 0.00 packs per day for 28.00 years. She has never used smokeless tobacco. She reports current alcohol use of about 3.0 standard drinks of alcohol per week. She reports that she does not use drugs.     Health Maintenance:    Health Maintenance   Topic Date Due    Hepatitis C screen  Never done    Hepatitis B vaccine (1 of 3 - Risk 3-dose series) Never done    Shingles Vaccine (1 of 2) Never done    Diabetic foot exam  07/20/2021    A1C test (Diabetic or Prediabetic)  08/13/2021    Flu vaccine (1) 09/01/2021    Diabetic microalbuminuria test  07/19/2022    Lipid screen  07/19/2022    TSH testing  07/19/2022    Diabetic retinal exam  08/02/2022    Potassium monitoring  11/22/2022    Creatinine monitoring  11/22/2022    Breast cancer screen  07/12/2023    Colon cancer screen colonoscopy  10/17/2023    Cervical cancer screen  03/11/2024    DTaP/Tdap/Td vaccine (2 - Td or Tdap) 11/07/2026    Pneumococcal 0-64 years Vaccine (2 of 2 - PPSV23) 06/22/2032    COVID-19 Vaccine  Completed    HIV screen  Addressed    Hepatitis A vaccine  Aged Out    Hib vaccine  Aged Out    Meningococcal (ACWY) vaccine  Aged Out       Subjective:      Review of Systems   Constitutional: Negative for fatigue, fever and unexpected weight change. HENT: Positive for ear discharge, ear pain and hearing loss. Negative for postnasal drip, rhinorrhea, sinus pain, sore throat and trouble swallowing. Eyes: Negative for visual disturbance. Respiratory: Negative for cough, chest tightness and shortness of breath. Cardiovascular: Negative for chest pain and leg swelling. Gastrointestinal: Negative for abdominal pain, blood in stool and diarrhea. Endocrine: Negative for polyuria. Genitourinary: Negative for difficulty urinating and flank pain. Musculoskeletal: Negative for arthralgias, joint swelling and myalgias. Skin: Negative for rash. Allergic/Immunologic: Negative for environmental allergies. Neurological: Negative for weakness, light-headedness, numbness and headaches. Hematological: Negative for adenopathy. Psychiatric/Behavioral: Negative for behavioral problems and suicidal ideas.  The patient is not

## 2021-12-08 ENCOUNTER — ANESTHESIA EVENT (OUTPATIENT)
Dept: OPERATING ROOM | Age: 56
DRG: 460 | End: 2021-12-08
Payer: COMMERCIAL

## 2021-12-09 ENCOUNTER — APPOINTMENT (OUTPATIENT)
Dept: GENERAL RADIOLOGY | Age: 56
DRG: 460 | End: 2021-12-09
Attending: NEUROLOGICAL SURGERY
Payer: COMMERCIAL

## 2021-12-09 ENCOUNTER — ANESTHESIA (OUTPATIENT)
Dept: OPERATING ROOM | Age: 56
DRG: 460 | End: 2021-12-09
Payer: COMMERCIAL

## 2021-12-09 ENCOUNTER — HOSPITAL ENCOUNTER (INPATIENT)
Age: 56
LOS: 1 days | Discharge: HOME | DRG: 460 | End: 2021-12-10
Attending: NEUROLOGICAL SURGERY | Admitting: NEUROLOGICAL SURGERY
Payer: COMMERCIAL

## 2021-12-09 VITALS — SYSTOLIC BLOOD PRESSURE: 165 MMHG | DIASTOLIC BLOOD PRESSURE: 95 MMHG | OXYGEN SATURATION: 100 % | TEMPERATURE: 96.8 F

## 2021-12-09 DIAGNOSIS — Z98.1 S/P LUMBAR FUSION: Primary | ICD-10-CM

## 2021-12-09 LAB
ABO/RH: NORMAL
ANTIBODY SCREEN: NEGATIVE
ARM BAND NUMBER: NORMAL
EXPIRATION DATE: NORMAL
GLUCOSE BLD-MCNC: 123 MG/DL (ref 65–105)
GLUCOSE BLD-MCNC: 126 MG/DL (ref 65–105)
GLUCOSE BLD-MCNC: 159 MG/DL (ref 74–100)
POC POTASSIUM: 3.7 MMOL/L (ref 3.5–4.5)

## 2021-12-09 PROCEDURE — 3209999900 FLUORO FOR SURGICAL PROCEDURES

## 2021-12-09 PROCEDURE — 2580000003 HC RX 258: Performed by: PHYSICIAN ASSISTANT

## 2021-12-09 PROCEDURE — 6360000002 HC RX W HCPCS: Performed by: NURSE ANESTHETIST, CERTIFIED REGISTERED

## 2021-12-09 PROCEDURE — 7100000000 HC PACU RECOVERY - FIRST 15 MIN: Performed by: NEUROLOGICAL SURGERY

## 2021-12-09 PROCEDURE — 6370000000 HC RX 637 (ALT 250 FOR IP): Performed by: PHYSICIAN ASSISTANT

## 2021-12-09 PROCEDURE — 3700000000 HC ANESTHESIA ATTENDED CARE: Performed by: NEUROLOGICAL SURGERY

## 2021-12-09 PROCEDURE — 0ST20ZZ RESECTION OF LUMBAR VERTEBRAL DISC, OPEN APPROACH: ICD-10-PCS | Performed by: NEUROLOGICAL SURGERY

## 2021-12-09 PROCEDURE — 2720000010 HC SURG SUPPLY STERILE: Performed by: NEUROLOGICAL SURGERY

## 2021-12-09 PROCEDURE — 1200000000 HC SEMI PRIVATE

## 2021-12-09 PROCEDURE — 2500000003 HC RX 250 WO HCPCS: Performed by: NEUROLOGICAL SURGERY

## 2021-12-09 PROCEDURE — C1821 INTERSPINOUS IMPLANT: HCPCS | Performed by: NEUROLOGICAL SURGERY

## 2021-12-09 PROCEDURE — 6370000000 HC RX 637 (ALT 250 FOR IP): Performed by: NEUROLOGICAL SURGERY

## 2021-12-09 PROCEDURE — 3600000004 HC SURGERY LEVEL 4 BASE: Performed by: NEUROLOGICAL SURGERY

## 2021-12-09 PROCEDURE — 6360000002 HC RX W HCPCS: Performed by: PHYSICIAN ASSISTANT

## 2021-12-09 PROCEDURE — APPSS30 APP SPLIT SHARED TIME 16-30 MINUTES: Performed by: PHYSICIAN ASSISTANT

## 2021-12-09 PROCEDURE — 6370000000 HC RX 637 (ALT 250 FOR IP): Performed by: ANESTHESIOLOGY

## 2021-12-09 PROCEDURE — 7100000001 HC PACU RECOVERY - ADDTL 15 MIN: Performed by: NEUROLOGICAL SURGERY

## 2021-12-09 PROCEDURE — 22558 ARTHRD ANT NTRBD MIN DSC LUM: CPT | Performed by: NEUROLOGICAL SURGERY

## 2021-12-09 PROCEDURE — 2580000003 HC RX 258: Performed by: NEUROLOGICAL SURGERY

## 2021-12-09 PROCEDURE — 6360000002 HC RX W HCPCS: Performed by: ANESTHESIOLOGY

## 2021-12-09 PROCEDURE — C9359 IMPLNT,BON VOID FILLER-PUTTY: HCPCS | Performed by: NEUROLOGICAL SURGERY

## 2021-12-09 PROCEDURE — 6360000002 HC RX W HCPCS

## 2021-12-09 PROCEDURE — 2580000003 HC RX 258: Performed by: ANESTHESIOLOGY

## 2021-12-09 PROCEDURE — 87086 URINE CULTURE/COLONY COUNT: CPT

## 2021-12-09 PROCEDURE — 2500000003 HC RX 250 WO HCPCS: Performed by: NURSE ANESTHETIST, CERTIFIED REGISTERED

## 2021-12-09 PROCEDURE — 72100 X-RAY EXAM L-S SPINE 2/3 VWS: CPT

## 2021-12-09 PROCEDURE — 84132 ASSAY OF SERUM POTASSIUM: CPT

## 2021-12-09 PROCEDURE — 3700000001 HC ADD 15 MINUTES (ANESTHESIA): Performed by: NEUROLOGICAL SURGERY

## 2021-12-09 PROCEDURE — 0SG00A0 FUSION OF LUMBAR VERTEBRAL JOINT WITH INTERBODY FUSION DEVICE, ANTERIOR APPROACH, ANTERIOR COLUMN, OPEN APPROACH: ICD-10-PCS | Performed by: NEUROLOGICAL SURGERY

## 2021-12-09 PROCEDURE — 2580000003 HC RX 258: Performed by: NURSE ANESTHETIST, CERTIFIED REGISTERED

## 2021-12-09 PROCEDURE — 2709999900 HC NON-CHARGEABLE SUPPLY: Performed by: NEUROLOGICAL SURGERY

## 2021-12-09 PROCEDURE — 3600000014 HC SURGERY LEVEL 4 ADDTL 15MIN: Performed by: NEUROLOGICAL SURGERY

## 2021-12-09 PROCEDURE — 22853 INSJ BIOMECHANICAL DEVICE: CPT | Performed by: NEUROLOGICAL SURGERY

## 2021-12-09 PROCEDURE — 82947 ASSAY GLUCOSE BLOOD QUANT: CPT

## 2021-12-09 PROCEDURE — 22840 INSERT SPINE FIXATION DEVICE: CPT | Performed by: NEUROLOGICAL SURGERY

## 2021-12-09 PROCEDURE — C1713 ANCHOR/SCREW BN/BN,TIS/BN: HCPCS | Performed by: NEUROLOGICAL SURGERY

## 2021-12-09 DEVICE — SET SCR SPNL DIA4.75MM POST THORLUM SACR TI PERC APPRCH CDH: Type: IMPLANTABLE DEVICE | Site: SPINE LUMBAR | Status: FUNCTIONAL

## 2021-12-09 DEVICE — PLATE 2140002 OLIF25 2-HOLE PLATE LARGE
Type: IMPLANTABLE DEVICE | Site: SPINE LUMBAR | Status: FUNCTIONAL
Brand: PIVOX™ OBLIQUE LATERAL SPINAL SYSTEM

## 2021-12-09 DEVICE — BONE GRAFT KIT 7510100 INFUSE X SMALL
Type: IMPLANTABLE DEVICE | Status: FUNCTIONAL
Brand: INFUSE® BONE GRAFT

## 2021-12-09 DEVICE — DBM T43102 1CC GRAFTON PUTTY
Type: IMPLANTABLE DEVICE | Site: SPINE LUMBAR | Status: FUNCTIONAL
Brand: GRAFTON®AND GRAFTON PLUS®DEMINERALIZED BONE MATRIX (DBM)

## 2021-12-09 DEVICE — MAS 54850017545 4.75 EXT TAB CANN 7.5X45
Type: IMPLANTABLE DEVICE | Site: SPINE LUMBAR | Status: FUNCTIONAL
Brand: CD HORIZON® SOLERA® SPINAL SYSTEM

## 2021-12-09 RX ORDER — LEVOTHYROXINE SODIUM 0.05 MG/1
50 TABLET ORAL DAILY
Status: DISCONTINUED | OUTPATIENT
Start: 2021-12-10 | End: 2021-12-10 | Stop reason: HOSPADM

## 2021-12-09 RX ORDER — FENTANYL CITRATE 50 UG/ML
25 INJECTION, SOLUTION INTRAMUSCULAR; INTRAVENOUS EVERY 5 MIN PRN
Status: DISCONTINUED | OUTPATIENT
Start: 2021-12-09 | End: 2021-12-09 | Stop reason: HOSPADM

## 2021-12-09 RX ORDER — LABETALOL HYDROCHLORIDE 5 MG/ML
5 INJECTION, SOLUTION INTRAVENOUS EVERY 10 MIN PRN
Status: DISCONTINUED | OUTPATIENT
Start: 2021-12-09 | End: 2021-12-09 | Stop reason: HOSPADM

## 2021-12-09 RX ORDER — CEFAZOLIN SODIUM 2 G/50ML
SOLUTION INTRAVENOUS PRN
Status: DISCONTINUED | OUTPATIENT
Start: 2021-12-09 | End: 2021-12-09 | Stop reason: SDUPTHER

## 2021-12-09 RX ORDER — METHOCARBAMOL 750 MG/1
750 TABLET, FILM COATED ORAL EVERY 8 HOURS PRN
Status: DISCONTINUED | OUTPATIENT
Start: 2021-12-09 | End: 2021-12-10 | Stop reason: HOSPADM

## 2021-12-09 RX ORDER — OXYCODONE HYDROCHLORIDE 5 MG/1
10 TABLET ORAL EVERY 4 HOURS PRN
Status: DISCONTINUED | OUTPATIENT
Start: 2021-12-09 | End: 2021-12-10 | Stop reason: HOSPADM

## 2021-12-09 RX ORDER — LOSARTAN POTASSIUM AND HYDROCHLOROTHIAZIDE 12.5; 5 MG/1; MG/1
1 TABLET ORAL DAILY
Status: DISCONTINUED | OUTPATIENT
Start: 2021-12-09 | End: 2021-12-09

## 2021-12-09 RX ORDER — NEOSTIGMINE METHYLSULFATE 5 MG/5 ML
SYRINGE (ML) INTRAVENOUS PRN
Status: DISCONTINUED | OUTPATIENT
Start: 2021-12-09 | End: 2021-12-09 | Stop reason: SDUPTHER

## 2021-12-09 RX ORDER — ONDANSETRON 2 MG/ML
INJECTION INTRAMUSCULAR; INTRAVENOUS PRN
Status: DISCONTINUED | OUTPATIENT
Start: 2021-12-09 | End: 2021-12-09 | Stop reason: SDUPTHER

## 2021-12-09 RX ORDER — LOSARTAN POTASSIUM 50 MG/1
50 TABLET ORAL DAILY
Status: DISCONTINUED | OUTPATIENT
Start: 2021-12-10 | End: 2021-12-10 | Stop reason: HOSPADM

## 2021-12-09 RX ORDER — VITAMIN B COMPLEX
4000 TABLET ORAL DAILY
Status: DISCONTINUED | OUTPATIENT
Start: 2021-12-09 | End: 2021-12-10 | Stop reason: HOSPADM

## 2021-12-09 RX ORDER — BISACODYL 10 MG
10 SUPPOSITORY, RECTAL RECTAL DAILY PRN
Status: DISCONTINUED | OUTPATIENT
Start: 2021-12-09 | End: 2021-12-10 | Stop reason: HOSPADM

## 2021-12-09 RX ORDER — ACETAMINOPHEN 325 MG/1
650 TABLET ORAL EVERY 6 HOURS
Status: DISCONTINUED | OUTPATIENT
Start: 2021-12-09 | End: 2021-12-10 | Stop reason: HOSPADM

## 2021-12-09 RX ORDER — SODIUM CHLORIDE 0.9 % (FLUSH) 0.9 %
5-40 SYRINGE (ML) INJECTION PRN
Status: DISCONTINUED | OUTPATIENT
Start: 2021-12-09 | End: 2021-12-10 | Stop reason: HOSPADM

## 2021-12-09 RX ORDER — ONDANSETRON 2 MG/ML
4 INJECTION INTRAMUSCULAR; INTRAVENOUS
Status: DISCONTINUED | OUTPATIENT
Start: 2021-12-09 | End: 2021-12-09 | Stop reason: HOSPADM

## 2021-12-09 RX ORDER — DIPHENHYDRAMINE HYDROCHLORIDE 50 MG/ML
12.5 INJECTION INTRAMUSCULAR; INTRAVENOUS
Status: DISCONTINUED | OUTPATIENT
Start: 2021-12-09 | End: 2021-12-09 | Stop reason: HOSPADM

## 2021-12-09 RX ORDER — ONDANSETRON 2 MG/ML
4 INJECTION INTRAMUSCULAR; INTRAVENOUS EVERY 6 HOURS PRN
Status: DISCONTINUED | OUTPATIENT
Start: 2021-12-09 | End: 2021-12-10 | Stop reason: HOSPADM

## 2021-12-09 RX ORDER — MIDAZOLAM HYDROCHLORIDE 1 MG/ML
INJECTION INTRAMUSCULAR; INTRAVENOUS PRN
Status: DISCONTINUED | OUTPATIENT
Start: 2021-12-09 | End: 2021-12-09 | Stop reason: SDUPTHER

## 2021-12-09 RX ORDER — MAGNESIUM HYDROXIDE 1200 MG/15ML
LIQUID ORAL CONTINUOUS PRN
Status: COMPLETED | OUTPATIENT
Start: 2021-12-09 | End: 2021-12-09

## 2021-12-09 RX ORDER — KETAMINE HYDROCHLORIDE 10 MG/ML
INJECTION, SOLUTION INTRAMUSCULAR; INTRAVENOUS PRN
Status: DISCONTINUED | OUTPATIENT
Start: 2021-12-09 | End: 2021-12-09 | Stop reason: SDUPTHER

## 2021-12-09 RX ORDER — SODIUM CHLORIDE 0.9 % (FLUSH) 0.9 %
5-40 SYRINGE (ML) INJECTION EVERY 12 HOURS SCHEDULED
Status: DISCONTINUED | OUTPATIENT
Start: 2021-12-09 | End: 2021-12-10 | Stop reason: HOSPADM

## 2021-12-09 RX ORDER — MORPHINE SULFATE 2 MG/ML
2 INJECTION, SOLUTION INTRAMUSCULAR; INTRAVENOUS EVERY 5 MIN PRN
Status: COMPLETED | OUTPATIENT
Start: 2021-12-09 | End: 2021-12-09

## 2021-12-09 RX ORDER — DEXTROSE MONOHYDRATE 25 G/50ML
12.5 INJECTION, SOLUTION INTRAVENOUS PRN
Status: DISCONTINUED | OUTPATIENT
Start: 2021-12-09 | End: 2021-12-10 | Stop reason: HOSPADM

## 2021-12-09 RX ORDER — MAGNESIUM SULFATE 1 G/100ML
INJECTION INTRAVENOUS PRN
Status: DISCONTINUED | OUTPATIENT
Start: 2021-12-09 | End: 2021-12-09 | Stop reason: SDUPTHER

## 2021-12-09 RX ORDER — GINSENG 100 MG
CAPSULE ORAL PRN
Status: DISCONTINUED | OUTPATIENT
Start: 2021-12-09 | End: 2021-12-09 | Stop reason: ALTCHOICE

## 2021-12-09 RX ORDER — LIDOCAINE HYDROCHLORIDE 10 MG/ML
INJECTION, SOLUTION EPIDURAL; INFILTRATION; INTRACAUDAL; PERINEURAL PRN
Status: DISCONTINUED | OUTPATIENT
Start: 2021-12-09 | End: 2021-12-09 | Stop reason: SDUPTHER

## 2021-12-09 RX ORDER — SODIUM CHLORIDE 9 MG/ML
25 INJECTION, SOLUTION INTRAVENOUS PRN
Status: DISCONTINUED | OUTPATIENT
Start: 2021-12-09 | End: 2021-12-10 | Stop reason: HOSPADM

## 2021-12-09 RX ORDER — ONDANSETRON 4 MG/1
4 TABLET, ORALLY DISINTEGRATING ORAL EVERY 8 HOURS PRN
Status: DISCONTINUED | OUTPATIENT
Start: 2021-12-09 | End: 2021-12-10 | Stop reason: HOSPADM

## 2021-12-09 RX ORDER — NICOTINE POLACRILEX 4 MG
15 LOZENGE BUCCAL PRN
Status: DISCONTINUED | OUTPATIENT
Start: 2021-12-09 | End: 2021-12-10 | Stop reason: HOSPADM

## 2021-12-09 RX ORDER — GLYCOPYRROLATE 1 MG/5 ML
SYRINGE (ML) INTRAVENOUS PRN
Status: DISCONTINUED | OUTPATIENT
Start: 2021-12-09 | End: 2021-12-09 | Stop reason: SDUPTHER

## 2021-12-09 RX ORDER — SENNA AND DOCUSATE SODIUM 50; 8.6 MG/1; MG/1
1 TABLET, FILM COATED ORAL 2 TIMES DAILY
Status: DISCONTINUED | OUTPATIENT
Start: 2021-12-09 | End: 2021-12-10 | Stop reason: HOSPADM

## 2021-12-09 RX ORDER — HYDROCHLOROTHIAZIDE 25 MG/1
12.5 TABLET ORAL DAILY
Status: DISCONTINUED | OUTPATIENT
Start: 2021-12-10 | End: 2021-12-10 | Stop reason: HOSPADM

## 2021-12-09 RX ORDER — OXYCODONE HYDROCHLORIDE AND ACETAMINOPHEN 5; 325 MG/1; MG/1
1 TABLET ORAL PRN
Status: COMPLETED | OUTPATIENT
Start: 2021-12-09 | End: 2021-12-09

## 2021-12-09 RX ORDER — OXYCODONE HYDROCHLORIDE AND ACETAMINOPHEN 5; 325 MG/1; MG/1
2 TABLET ORAL PRN
Status: COMPLETED | OUTPATIENT
Start: 2021-12-09 | End: 2021-12-09

## 2021-12-09 RX ORDER — SODIUM CHLORIDE, SODIUM LACTATE, POTASSIUM CHLORIDE, CALCIUM CHLORIDE 600; 310; 30; 20 MG/100ML; MG/100ML; MG/100ML; MG/100ML
INJECTION, SOLUTION INTRAVENOUS CONTINUOUS PRN
Status: DISCONTINUED | OUTPATIENT
Start: 2021-12-09 | End: 2021-12-09 | Stop reason: SDUPTHER

## 2021-12-09 RX ORDER — GLUCAGON 1 MG/ML
1 KIT INJECTION PRN
Status: DISCONTINUED | OUTPATIENT
Start: 2021-12-09 | End: 2021-12-10 | Stop reason: HOSPADM

## 2021-12-09 RX ORDER — FENTANYL CITRATE 50 UG/ML
INJECTION, SOLUTION INTRAMUSCULAR; INTRAVENOUS PRN
Status: DISCONTINUED | OUTPATIENT
Start: 2021-12-09 | End: 2021-12-09 | Stop reason: SDUPTHER

## 2021-12-09 RX ORDER — LIDOCAINE HYDROCHLORIDE AND EPINEPHRINE 10; 10 MG/ML; UG/ML
INJECTION, SOLUTION INFILTRATION; PERINEURAL PRN
Status: DISCONTINUED | OUTPATIENT
Start: 2021-12-09 | End: 2021-12-09 | Stop reason: ALTCHOICE

## 2021-12-09 RX ORDER — MIDAZOLAM HYDROCHLORIDE 2 MG/2ML
1 INJECTION, SOLUTION INTRAMUSCULAR; INTRAVENOUS EVERY 5 MIN PRN
Status: COMPLETED | OUTPATIENT
Start: 2021-12-09 | End: 2021-12-09

## 2021-12-09 RX ORDER — MIDAZOLAM HYDROCHLORIDE 2 MG/2ML
0.5 INJECTION, SOLUTION INTRAMUSCULAR; INTRAVENOUS 4 TIMES DAILY PRN
Status: DISCONTINUED | OUTPATIENT
Start: 2021-12-09 | End: 2021-12-09

## 2021-12-09 RX ORDER — SODIUM CHLORIDE 9 MG/ML
INJECTION, SOLUTION INTRAVENOUS CONTINUOUS
Status: DISCONTINUED | OUTPATIENT
Start: 2021-12-09 | End: 2021-12-10

## 2021-12-09 RX ORDER — PROPOFOL 10 MG/ML
INJECTION, EMULSION INTRAVENOUS PRN
Status: DISCONTINUED | OUTPATIENT
Start: 2021-12-09 | End: 2021-12-09 | Stop reason: SDUPTHER

## 2021-12-09 RX ORDER — PHENYLEPHRINE HYDROCHLORIDE 10 MG/ML
INJECTION INTRAVENOUS PRN
Status: DISCONTINUED | OUTPATIENT
Start: 2021-12-09 | End: 2021-12-09 | Stop reason: SDUPTHER

## 2021-12-09 RX ORDER — DEXTROSE MONOHYDRATE 50 MG/ML
100 INJECTION, SOLUTION INTRAVENOUS PRN
Status: DISCONTINUED | OUTPATIENT
Start: 2021-12-09 | End: 2021-12-10 | Stop reason: HOSPADM

## 2021-12-09 RX ORDER — MIDAZOLAM HYDROCHLORIDE 1 MG/ML
INJECTION INTRAMUSCULAR; INTRAVENOUS
Status: COMPLETED
Start: 2021-12-09 | End: 2021-12-09

## 2021-12-09 RX ORDER — ROCURONIUM BROMIDE 10 MG/ML
INJECTION, SOLUTION INTRAVENOUS PRN
Status: DISCONTINUED | OUTPATIENT
Start: 2021-12-09 | End: 2021-12-09 | Stop reason: SDUPTHER

## 2021-12-09 RX ORDER — SODIUM CHLORIDE, SODIUM LACTATE, POTASSIUM CHLORIDE, CALCIUM CHLORIDE 600; 310; 30; 20 MG/100ML; MG/100ML; MG/100ML; MG/100ML
1000 INJECTION, SOLUTION INTRAVENOUS CONTINUOUS
Status: DISCONTINUED | OUTPATIENT
Start: 2021-12-09 | End: 2021-12-09

## 2021-12-09 RX ORDER — OXYCODONE HYDROCHLORIDE 5 MG/1
5 TABLET ORAL EVERY 4 HOURS PRN
Status: DISCONTINUED | OUTPATIENT
Start: 2021-12-09 | End: 2021-12-10 | Stop reason: HOSPADM

## 2021-12-09 RX ORDER — DEXAMETHASONE SODIUM PHOSPHATE 10 MG/ML
INJECTION INTRAMUSCULAR; INTRAVENOUS PRN
Status: DISCONTINUED | OUTPATIENT
Start: 2021-12-09 | End: 2021-12-09 | Stop reason: SDUPTHER

## 2021-12-09 RX ADMIN — OXYCODONE 10 MG: 5 TABLET ORAL at 21:40

## 2021-12-09 RX ADMIN — DOCUSATE SODIUM 50MG AND SENNOSIDES 8.6MG 1 TABLET: 8.6; 5 TABLET, FILM COATED ORAL at 21:45

## 2021-12-09 RX ADMIN — SODIUM CHLORIDE, POTASSIUM CHLORIDE, SODIUM LACTATE AND CALCIUM CHLORIDE: 600; 310; 30; 20 INJECTION, SOLUTION INTRAVENOUS at 10:15

## 2021-12-09 RX ADMIN — Medication 0.6 MG: at 11:29

## 2021-12-09 RX ADMIN — ONDANSETRON 4 MG: 2 INJECTION, SOLUTION INTRAMUSCULAR; INTRAVENOUS at 11:34

## 2021-12-09 RX ADMIN — KETAMINE HYDROCHLORIDE 10 MG: 10 INJECTION INTRAMUSCULAR; INTRAVENOUS at 11:05

## 2021-12-09 RX ADMIN — PROPOFOL 170 MG: 10 INJECTION, EMULSION INTRAVENOUS at 07:52

## 2021-12-09 RX ADMIN — MORPHINE SULFATE 2 MG: 2 INJECTION, SOLUTION INTRAMUSCULAR; INTRAVENOUS at 13:05

## 2021-12-09 RX ADMIN — MORPHINE SULFATE 2 MG: 2 INJECTION, SOLUTION INTRAMUSCULAR; INTRAVENOUS at 13:16

## 2021-12-09 RX ADMIN — MORPHINE SULFATE 2 MG: 2 INJECTION, SOLUTION INTRAMUSCULAR; INTRAVENOUS at 12:32

## 2021-12-09 RX ADMIN — FENTANYL CITRATE 25 MCG: 50 INJECTION, SOLUTION INTRAMUSCULAR; INTRAVENOUS at 16:13

## 2021-12-09 RX ADMIN — HYDROMORPHONE HYDROCHLORIDE 0.5 MG: 1 INJECTION, SOLUTION INTRAMUSCULAR; INTRAVENOUS; SUBCUTANEOUS at 18:37

## 2021-12-09 RX ADMIN — MIDAZOLAM HYDROCHLORIDE 1 MG: 1 INJECTION, SOLUTION INTRAMUSCULAR; INTRAVENOUS at 07:43

## 2021-12-09 RX ADMIN — FENTANYL CITRATE 25 MCG: 50 INJECTION, SOLUTION INTRAMUSCULAR; INTRAVENOUS at 08:15

## 2021-12-09 RX ADMIN — PHENYLEPHRINE HYDROCHLORIDE 100 MCG: 10 INJECTION INTRAVENOUS at 08:10

## 2021-12-09 RX ADMIN — FENTANYL CITRATE 25 MCG: 50 INJECTION, SOLUTION INTRAMUSCULAR; INTRAVENOUS at 12:21

## 2021-12-09 RX ADMIN — DEXTROSE MONOHYDRATE 2000 MG: 50 INJECTION, SOLUTION INTRAVENOUS at 21:40

## 2021-12-09 RX ADMIN — FENTANYL CITRATE 25 MCG: 50 INJECTION, SOLUTION INTRAMUSCULAR; INTRAVENOUS at 08:00

## 2021-12-09 RX ADMIN — SODIUM CHLORIDE, POTASSIUM CHLORIDE, SODIUM LACTATE AND CALCIUM CHLORIDE 1000 ML: 600; 310; 30; 20 INJECTION, SOLUTION INTRAVENOUS at 06:31

## 2021-12-09 RX ADMIN — ACETAMINOPHEN 650 MG: 325 TABLET ORAL at 18:35

## 2021-12-09 RX ADMIN — PHENYLEPHRINE HYDROCHLORIDE 200 MCG: 10 INJECTION INTRAVENOUS at 08:26

## 2021-12-09 RX ADMIN — SODIUM CHLORIDE: 9 INJECTION, SOLUTION INTRAVENOUS at 17:59

## 2021-12-09 RX ADMIN — OXYCODONE HYDROCHLORIDE AND ACETAMINOPHEN 2 TABLET: 5; 325 TABLET ORAL at 12:43

## 2021-12-09 RX ADMIN — ROCURONIUM BROMIDE 20 MG: 10 INJECTION INTRAVENOUS at 08:49

## 2021-12-09 RX ADMIN — MIDAZOLAM HYDROCHLORIDE 1 MG: 1 INJECTION, SOLUTION INTRAMUSCULAR; INTRAVENOUS at 07:39

## 2021-12-09 RX ADMIN — PHENYLEPHRINE HYDROCHLORIDE 100 MCG: 10 INJECTION INTRAVENOUS at 08:33

## 2021-12-09 RX ADMIN — ROCURONIUM BROMIDE 20 MG: 10 INJECTION INTRAVENOUS at 09:32

## 2021-12-09 RX ADMIN — ROCURONIUM BROMIDE 10 MG: 10 INJECTION INTRAVENOUS at 10:11

## 2021-12-09 RX ADMIN — Medication 3 MG: at 11:29

## 2021-12-09 RX ADMIN — PANCRELIPASE 36000 UNITS: 24000; 76000; 120000 CAPSULE, DELAYED RELEASE PELLETS ORAL at 21:41

## 2021-12-09 RX ADMIN — MORPHINE SULFATE 2 MG: 2 INJECTION, SOLUTION INTRAMUSCULAR; INTRAVENOUS at 15:48

## 2021-12-09 RX ADMIN — ONDANSETRON 4 MG: 2 INJECTION INTRAMUSCULAR; INTRAVENOUS at 19:54

## 2021-12-09 RX ADMIN — PHENYLEPHRINE HYDROCHLORIDE 100 MCG: 10 INJECTION INTRAVENOUS at 08:03

## 2021-12-09 RX ADMIN — PHENYLEPHRINE HYDROCHLORIDE 100 MCG: 10 INJECTION INTRAVENOUS at 08:45

## 2021-12-09 RX ADMIN — FENTANYL CITRATE 50 MCG: 50 INJECTION, SOLUTION INTRAMUSCULAR; INTRAVENOUS at 07:52

## 2021-12-09 RX ADMIN — LIDOCAINE HYDROCHLORIDE 50 MG: 10 INJECTION, SOLUTION EPIDURAL; INFILTRATION; INTRACAUDAL; PERINEURAL at 07:52

## 2021-12-09 RX ADMIN — PHENYLEPHRINE HYDROCHLORIDE 100 MCG: 10 INJECTION INTRAVENOUS at 08:15

## 2021-12-09 RX ADMIN — CEFAZOLIN SODIUM 2000 MG: 2 SOLUTION INTRAVENOUS at 08:40

## 2021-12-09 RX ADMIN — FENTANYL CITRATE 25 MCG: 50 INJECTION, SOLUTION INTRAMUSCULAR; INTRAVENOUS at 12:16

## 2021-12-09 RX ADMIN — DEXAMETHASONE SODIUM PHOSPHATE 4 MG: 10 INJECTION INTRAMUSCULAR; INTRAVENOUS at 07:52

## 2021-12-09 RX ADMIN — FENTANYL CITRATE 25 MCG: 50 INJECTION, SOLUTION INTRAMUSCULAR; INTRAVENOUS at 12:26

## 2021-12-09 RX ADMIN — FENTANYL CITRATE 25 MCG: 50 INJECTION, SOLUTION INTRAMUSCULAR; INTRAVENOUS at 12:13

## 2021-12-09 RX ADMIN — PHENYLEPHRINE HYDROCHLORIDE 50 MCG/MIN: 10 INJECTION INTRAVENOUS at 09:00

## 2021-12-09 RX ADMIN — SODIUM CHLORIDE, POTASSIUM CHLORIDE, SODIUM LACTATE AND CALCIUM CHLORIDE: 600; 310; 30; 20 INJECTION, SOLUTION INTRAVENOUS at 09:22

## 2021-12-09 RX ADMIN — ROCURONIUM BROMIDE 50 MG: 10 INJECTION INTRAVENOUS at 07:52

## 2021-12-09 RX ADMIN — PROPOFOL 30 MG: 10 INJECTION, EMULSION INTRAVENOUS at 08:20

## 2021-12-09 RX ADMIN — MIDAZOLAM HYDROCHLORIDE 2 MG: 1 INJECTION, SOLUTION INTRAMUSCULAR; INTRAVENOUS at 07:41

## 2021-12-09 RX ADMIN — FENTANYL CITRATE 25 MCG: 50 INJECTION, SOLUTION INTRAMUSCULAR; INTRAVENOUS at 16:18

## 2021-12-09 RX ADMIN — KETAMINE HYDROCHLORIDE 10 MG: 10 INJECTION INTRAMUSCULAR; INTRAVENOUS at 10:04

## 2021-12-09 RX ADMIN — PHENYLEPHRINE HYDROCHLORIDE 100 MCG: 10 INJECTION INTRAVENOUS at 08:53

## 2021-12-09 RX ADMIN — Medication 4000 UNITS: at 21:42

## 2021-12-09 RX ADMIN — MAGNESIUM SULFATE HEPTAHYDRATE 1000 MG: 1 INJECTION, SOLUTION INTRAVENOUS at 11:17

## 2021-12-09 RX ADMIN — PHENYLEPHRINE HYDROCHLORIDE 100 MCG: 10 INJECTION INTRAVENOUS at 08:37

## 2021-12-09 RX ADMIN — OXYCODONE 5 MG: 5 TABLET ORAL at 17:19

## 2021-12-09 RX ADMIN — Medication 0.2 MG: at 07:59

## 2021-12-09 ASSESSMENT — PULMONARY FUNCTION TESTS
PIF_VALUE: 14
PIF_VALUE: 14
PIF_VALUE: 13
PIF_VALUE: 14
PIF_VALUE: 13
PIF_VALUE: 14
PIF_VALUE: 14
PIF_VALUE: 13
PIF_VALUE: 14
PIF_VALUE: 14
PIF_VALUE: 12
PIF_VALUE: 14
PIF_VALUE: 2
PIF_VALUE: 14
PIF_VALUE: 13
PIF_VALUE: 14
PIF_VALUE: 15
PIF_VALUE: 14
PIF_VALUE: 14
PIF_VALUE: 15
PIF_VALUE: 12
PIF_VALUE: 14
PIF_VALUE: 2
PIF_VALUE: 14
PIF_VALUE: 13
PIF_VALUE: 15
PIF_VALUE: 3
PIF_VALUE: 14
PIF_VALUE: 13
PIF_VALUE: 14
PIF_VALUE: 2
PIF_VALUE: 14
PIF_VALUE: 15
PIF_VALUE: 13
PIF_VALUE: 14
PIF_VALUE: 15
PIF_VALUE: 14
PIF_VALUE: 11
PIF_VALUE: 14
PIF_VALUE: 14
PIF_VALUE: 2
PIF_VALUE: 14
PIF_VALUE: 17
PIF_VALUE: 15
PIF_VALUE: 17
PIF_VALUE: 13
PIF_VALUE: 14
PIF_VALUE: 13
PIF_VALUE: 14
PIF_VALUE: 13
PIF_VALUE: 14
PIF_VALUE: 3
PIF_VALUE: 14
PIF_VALUE: 13
PIF_VALUE: 15
PIF_VALUE: 15
PIF_VALUE: 14
PIF_VALUE: 13
PIF_VALUE: 15
PIF_VALUE: 1
PIF_VALUE: 13
PIF_VALUE: 1
PIF_VALUE: 14
PIF_VALUE: 15
PIF_VALUE: 17
PIF_VALUE: 13
PIF_VALUE: 14
PIF_VALUE: 15
PIF_VALUE: 14
PIF_VALUE: 13
PIF_VALUE: 2
PIF_VALUE: 15
PIF_VALUE: 14
PIF_VALUE: 13
PIF_VALUE: 14
PIF_VALUE: 15
PIF_VALUE: 14
PIF_VALUE: 1
PIF_VALUE: 1
PIF_VALUE: 14
PIF_VALUE: 15
PIF_VALUE: 14
PIF_VALUE: 15
PIF_VALUE: 23
PIF_VALUE: 14
PIF_VALUE: 15
PIF_VALUE: 14
PIF_VALUE: 15
PIF_VALUE: 14
PIF_VALUE: 13
PIF_VALUE: 0
PIF_VALUE: 15
PIF_VALUE: 14
PIF_VALUE: 17
PIF_VALUE: 17
PIF_VALUE: 15
PIF_VALUE: 17
PIF_VALUE: 15
PIF_VALUE: 15
PIF_VALUE: 14
PIF_VALUE: 13
PIF_VALUE: 14
PIF_VALUE: 2
PIF_VALUE: 14
PIF_VALUE: 2
PIF_VALUE: 15
PIF_VALUE: 14
PIF_VALUE: 14
PIF_VALUE: 13
PIF_VALUE: 15
PIF_VALUE: 14
PIF_VALUE: 14
PIF_VALUE: 5
PIF_VALUE: 14
PIF_VALUE: 13
PIF_VALUE: 13
PIF_VALUE: 14
PIF_VALUE: 14
PIF_VALUE: 1
PIF_VALUE: 15
PIF_VALUE: 14
PIF_VALUE: 14
PIF_VALUE: 15
PIF_VALUE: 2
PIF_VALUE: 14
PIF_VALUE: 1
PIF_VALUE: 14
PIF_VALUE: 13
PIF_VALUE: 14
PIF_VALUE: 12
PIF_VALUE: 13
PIF_VALUE: 14
PIF_VALUE: 14
PIF_VALUE: 17
PIF_VALUE: 14
PIF_VALUE: 14
PIF_VALUE: 1
PIF_VALUE: 13
PIF_VALUE: 12
PIF_VALUE: 14
PIF_VALUE: 15
PIF_VALUE: 14
PIF_VALUE: 14
PIF_VALUE: 15
PIF_VALUE: 14
PIF_VALUE: 15
PIF_VALUE: 14
PIF_VALUE: 14
PIF_VALUE: 2
PIF_VALUE: 14
PIF_VALUE: 13
PIF_VALUE: 14
PIF_VALUE: 15
PIF_VALUE: 14
PIF_VALUE: 15
PIF_VALUE: 2
PIF_VALUE: 14
PIF_VALUE: 2
PIF_VALUE: 14
PIF_VALUE: 13
PIF_VALUE: 2
PIF_VALUE: 14
PIF_VALUE: 15
PIF_VALUE: 15
PIF_VALUE: 14
PIF_VALUE: 14
PIF_VALUE: 12
PIF_VALUE: 14
PIF_VALUE: 2
PIF_VALUE: 15
PIF_VALUE: 14
PIF_VALUE: 14
PIF_VALUE: 1
PIF_VALUE: 3
PIF_VALUE: 14
PIF_VALUE: 15
PIF_VALUE: 15
PIF_VALUE: 14
PIF_VALUE: 0
PIF_VALUE: 1
PIF_VALUE: 14
PIF_VALUE: 14
PIF_VALUE: 15
PIF_VALUE: 14
PIF_VALUE: 13
PIF_VALUE: 12
PIF_VALUE: 14
PIF_VALUE: 2
PIF_VALUE: 2
PIF_VALUE: 13
PIF_VALUE: 14
PIF_VALUE: 2
PIF_VALUE: 13
PIF_VALUE: 14
PIF_VALUE: 14
PIF_VALUE: 13
PIF_VALUE: 2
PIF_VALUE: 15
PIF_VALUE: 13
PIF_VALUE: 14
PIF_VALUE: 15
PIF_VALUE: 14
PIF_VALUE: 13
PIF_VALUE: 15
PIF_VALUE: 13
PIF_VALUE: 15
PIF_VALUE: 14
PIF_VALUE: 2

## 2021-12-09 ASSESSMENT — PAIN SCALES - GENERAL
PAINLEVEL_OUTOF10: 6
PAINLEVEL_OUTOF10: 7
PAINLEVEL_OUTOF10: 8
PAINLEVEL_OUTOF10: 6
PAINLEVEL_OUTOF10: 7
PAINLEVEL_OUTOF10: 5
PAINLEVEL_OUTOF10: 7
PAINLEVEL_OUTOF10: 7
PAINLEVEL_OUTOF10: 6
PAINLEVEL_OUTOF10: 7

## 2021-12-09 ASSESSMENT — PAIN - FUNCTIONAL ASSESSMENT: PAIN_FUNCTIONAL_ASSESSMENT: 0-10

## 2021-12-09 ASSESSMENT — PAIN DESCRIPTION - LOCATION
LOCATION: BACK

## 2021-12-09 ASSESSMENT — PAIN DESCRIPTION - PAIN TYPE
TYPE: SURGICAL PAIN

## 2021-12-09 ASSESSMENT — LIFESTYLE VARIABLES: SMOKING_STATUS: 1

## 2021-12-09 ASSESSMENT — PAIN DESCRIPTION - DESCRIPTORS: DESCRIPTORS: DISCOMFORT

## 2021-12-09 ASSESSMENT — PAIN DESCRIPTION - FREQUENCY: FREQUENCY: CONTINUOUS

## 2021-12-09 ASSESSMENT — PAIN DESCRIPTION - ORIENTATION
ORIENTATION: LOWER
ORIENTATION: LOWER

## 2021-12-09 NOTE — PROGRESS NOTES
Neurosurgery Post op Progress Note      POD# 0    s/p Oblique anterior L4-5 complete discectomy osteophytectomy and anterior arthrodesis, Implantation of peek interbody cage, anterior fixation, percutaneous pedicle screw fixation at L4-5 with screws and rods    SUBJECTIVE:      Patient presents with Lumbar adjacent segment disease with spondylisthesis      OBJECTIVE      Physical exam   VITALS:    Vitals:    12/09/21 1600   BP: 139/84   Pulse: 75   Resp: 17   Temp:    SpO2: 91%     INTAKE:      Intake/Output Summary (Last 24 hours) at 12/9/2021 1651  Last data filed at 12/9/2021 1226  Gross per 24 hour   Intake 2000 ml   Output 750 ml   Net 1250 ml            Neurological exam   alert, oriented x3, affect appropriate, no focal neurological deficits and moves all extremities well  alert, oriented, normal speech, no focal findings or movement disorder noted.       Wound   Post op wound:    Dressing is clean/dry/intact with no signs of drainage     ASSESSMENT AND PLAN    49 y.o. female status post OLIF L4-5 and Posterior fixation L4-5 post op day # 0    - Analgesia: Percocet 5-325mg q4hrs PRN   - Periop Antibiotics: Ancef 1g q8hrs for 3 doses   - Activity: As tolerated with LSO brace   - DVT prophylaxis: SCDs  - Diet: Advance as tolerated  - Obtain post op upright AP/LAT films     Electronically signed by ARIANA Jean on 12/9/2021 at 4:51 PM

## 2021-12-09 NOTE — INTERVAL H&P NOTE
Pt Name: Bartolo Valdovinos  MRN: [id number]  [place]: 1965  Date of evaluation: 12/9/2021    I have reviewed the patient's history and physical examination completed in pre-admission testing.     Changes to history or on examination, if any, are as follows:  none    Eladio Anderson PA-C  12/9/21  6:43 AM

## 2021-12-09 NOTE — ANESTHESIA PRE PROCEDURE
Department of Anesthesiology  Preprocedure Note       Name:  Nell Brown   Age:  49 y.o.  :  1965                                          MRN:  [id number]         Date:  2021      Surgeon: Karis Francis):  Dave Diego, DO Dave Diego DO    Procedure: Procedure(s):  OLIF L4-5, C-ARM, LATERAL FLAT BOBBI, MEDTRONICS, MICROSCOPE  POSTERIOR FIXATION L4-5, PRONE Deledestiney Peraza O-ARM/[facility]    Department of Anesthesiology  Pre-Anesthesia Evaluation/Consultation         Name:  Nell Brown                                         Age:  49 y.o.   MRN:  [id number]             Medications  Current Facility-Administered Medications   Medication Dose Route Frequency Provider Last Rate Last Admin    lactated ringers infusion 1,000 mL  1,000 mL IntraVENous Continuous Ollis Severs, MD 50 mL/hr at 21 0631 1,000 mL at 21 0631       Allergies   Allergen Reactions    Ace Inhibitors Other (See Comments)     Cough      Penicillins Rash and Other (See Comments)    Sertraline Other (See Comments)     Hypertension     Patient Active Problem List   Diagnosis    Piriformis syndrome of right side    Chronic bilateral low back pain with bilateral sciatica    Displacement of lumbar intervertebral disc without myelopathy    Myopia of both eyes with astigmatism and presbyopia    Essential hypertension    Hyperlipidemia    Type 1 diabetes mellitus without complication (HCC)    Acquired hypothyroidism    Vitamin D deficiency    Menopause    Lumbar radiculopathy    Post laminectomy syndrome    Chronic right sacroiliac joint pain    Sacroiliitis (HCC)     Past Medical History:   Diagnosis Date    Bronchitis     Diabetes type 1, controlled ([address]     Eczema     GERD (gastroesophageal reflux disease)     Gestational diabetes     Hyperlipidemia     Hypertension     Hypothyroid     Insomnia     Insulin pump in place     Dr. Deepa Hester endocrinology [address]    Myopia with astigmatism and presbyopia     Tobacco abuse     Under care of team 2021    endocrine-Dr Crandall 52 visit 2021    Under care of team 2021    pcp-Dr Ch[facility]  visit 2021     Past Surgical History:   Procedure Laterality Date    BACK INJECTION Right 2021    Right Sacroiliac Joint Injection performed by Zeferino Metzger MD at [address] [place] [address]  2015    Lumbar microdiscectomy L5-S1    BLADDER SUSPENSION  2009    (transobturator sling)    CERVIX LESION DESTRUCTION      (laser cone)    COLONOSCOPY  10/17/2018    hyperplastic polyp, Dr. Irina Leary at [facility]  2006   [address]  2017    [facility]    [facility]er nerve block Dr Arlene Tucker- [address] [facility]  2017    laser nerve block Dr Rebecca Greco Bilateral 2019    BL5 transforaminal radiculogram- [facility] per Dr Doretha Everett Right 2019    Right sacroiliac joint injection with arthrography    NERVE BLOCK Bilateral 2019    BL5 transforaminal radiculogram/epidurogram    PAIN MANAGEMENT PROCEDURE Bilateral 2021    Bilateral L4 TRANSFORAMINAL performed by Zeferino Metzger MD at [address] [facility] N/A 2021    L4-L5  INTERLAMINAR performed by Zeferino Metzger MD at [address] [facility]  2015    Lumbar L5-S1    US BREAST NEEDLE BIOPSY RIGHT Right 2021    US BREAST NEEDLE BIOPSY RIGHT 2021 [facility] ULTRASOUND     Social History     Tobacco Use    Smoking status: Former Smoker     Packs/day: 0.00     Years: 28.00     Pack years: 0.00     Types: Cigarettes     Start date: 1985     Quit date: 2014     Years since quittin.3    Smokeless tobacco: Never Used   Vaping Use    Vaping Use: Never used   Substance Use Topics    Alcohol use: Yes     Alcohol/week: 3.0 standard drinks     Types: 3 Standard drinks or equivalent per week     Comment: Socially. -weekly    Drug use: No         Vital Signs (Current)   Vitals:    21   BP: 137/82   Pulse:    Resp:    Temp:    SpO2:      Vital Signs Statistics (for past 48 hrs)     Temp  Av.9 °F (36.6 °C)  Min: 97.9 °F (36.6 °C)   Min taken time: 21  Max: 97.9 °F (36.6 °C)   Max taken time: 21  Pulse  Av  Min: 76   Min taken time: 21  Max: 76   Max taken time: 21  Resp  Av  Min: 18   Min taken time: 21  Max: 18   Max taken time: 21  BP  Min: 137/82   Min taken time: 21  Max: 172/81   Max taken time: 21  SpO2  Av %  Min: 99 %   Min taken time: 21  Max: 99 %   Max taken time: 21  BP Readings from Last 3 Encounters:   21 137/82   21 (!) 142/92   21 130/84       BMI  Body mass index is 27.44 kg/m².     CBC   Lab Results   Component Value Date    WBC 7.5 2021    RBC 4.45 2021    HGB 14.1 2021    HCT 41.5 2021    MCV 93.3 2021    RDW 11.7 2021     2021       CMP    Lab Results   Component Value Date     2021    K 3.8 2021     2021    CO2 23 2021    BUN 12 2021    CREATININE 0.75 2021    GFRAA >60 2021    LABGLOM >60 2021    GLUCOSE 110 2021    PROT 7.1 2021    CALCIUM 9.2 2021    BILITOT 0.44 2021    ALKPHOS 63 2021    AST 22 2021    ALT 12 2021       BMP    Lab Results   Component Value Date     2021    K 3.8 2021     2021    CO2 23 2021    BUN 12 2021    CREATININE 0.75 2021    CALCIUM 9.2 2021    GFRAA >60 2021    LABGLOM >60 2021    GLUCOSE 110 2021       POC Testing  Recent Labs     21  0628   POCGLU 159*   POCK 3.7       Coags  No results found for: PROTIME, INR, APTT    HCG (If Applicable) No results found for: PREGTESTUR, PREGSERUM, HCG, HCGQUANT     ABGs No results found for: PHART, PO2ART, UDN8ERE, YES0LUR, BEART, B4VCQJIQ     Type & Screen (If Applicable)  No results found for: LABABO, [address]    Radiology (If Applicable)    Cardiac Testing (If Applicable)     EKG (If Applicable) nl          Medications prior to admission:   Prior to Admission medications    Medication Sig Start Date End Date Taking? Authorizing Provider   clotrimazole (LOTRIMIN) 1 % external solution Instill 4 to 5 drops into the affected ear(s) twice daily for 10 to 14 days 11/5/21  Yes Dandy Kay MD   lipase-protease-amylase (CREON) 76420-771244 units CPEP delayed release capsule Take 2 tablets by mouth 3 times daily 7/13/21 10/29/22 Yes Historical Provider, MD   levothyroxine (SYNTHROID) 50 MCG tablet Take 1 tablet by mouth daily 10/29/20  Yes Nael Sands MD   losartan-hydroCHLOROthiazide [facility]) 50-12.5 MG per tablet Take 1 tablet by mouth daily 10/19/20  Yes Nael Sands MD   cyclobenzaprine (FLEXERIL) 10 MG tablet Take 1 tablet by mouth 2 times daily as needed for Muscle spasms 10/16/20  Yes AARON Baldwin CNP   insulin lispro (HUMALOG) 100 UNIT/ML injection vial Use 60 units per day in pump 3/24/17  Yes Nael Sands MD   Vitamin D (CHOLECALCIFEROL) 1000 UNITS CAPS capsule Take 4,000 Units by mouth daily    Yes Historical Provider, MD   aspirin 81 MG tablet Take 81 mg by mouth daily. Yes Historical Provider, MD   Insulin Lispro, Human, (INSULIN PUMP) FLORENTIN Inject 1 each into the skin See Admin Instructions.    Yes Historical Provider, MD   ibuprofen (ADVIL;MOTRIN) 800 MG tablet Take 1 tablet by mouth every 8 hours as needed for Pain 3/11/21   AARON Baldwin - CNP   Crisaborole (EUCRISA) 2 % OINT Apply 1 applicator topically 2 times daily  Patient taking differently: Apply 1 applicator topically 2 times daily as needed  7/28/20   Nael Sands MD   acetaminophen (TYLENOL) 500 MG tablet Take 500 mg by mouth 2 times daily as needed     Historical Provider, MD Current medications:    Current Facility-Administered Medications   Medication Dose Route Frequency Provider Last Rate Last Admin    lactated ringers infusion 1,000 mL  1,000 mL IntraVENous Continuous Amanda Brown MD 50 mL/hr at 12/09/21 0631 1,000 mL at 12/09/21 0631       Allergies:     Allergies   Allergen Reactions    Ace Inhibitors Other (See Comments)     Cough      Penicillins Rash and Other (See Comments)    Sertraline Other (See Comments)     Hypertension       Problem List:    Patient Active Problem List   Diagnosis Code    Piriformis syndrome of right side G57.01    Chronic bilateral low back pain with bilateral sciatica M54.42, M54.41, G89.29    Displacement of lumbar intervertebral disc without myelopathy M51.26    Myopia of both eyes with astigmatism and presbyopia H52.13, H52.203, H52.4    Essential hypertension I10    Hyperlipidemia E78.5    Type 1 diabetes mellitus without complication ([address] S15.8    Acquired hypothyroidism E03.9    Vitamin D deficiency E55.9    Menopause Z78.0    Lumbar radiculopathy M54.16    Post laminectomy syndrome M96.1    Chronic right sacroiliac joint pain M53.3, G89.29    Sacroiliitis ([address] M46.1       Past Medical History:        Diagnosis Date    Bronchitis     Diabetes type 1, controlled ([address]     Eczema     GERD (gastroesophageal reflux disease)     Gestational diabetes     Hyperlipidemia     Hypertension     Hypothyroid     Insomnia     Insulin pump in place     Dr. Archana Romero endocrinology [address]    Myopia with astigmatism and presbyopia     Tobacco abuse     Under care of team 11/22/2021    endocrine-Dr Crandall 52 visit nov 2021    Under care of team 11/22/2021    pcp- [address] [place] [address]  visit nov 2021       Past Surgical History:        Procedure Laterality Date    BACK INJECTION Right 04/29/2021    Right Sacroiliac Joint Injection performed by Chela Galloway MD at [address] [place] [address]  01/05/2015 Lumbar microdiscectomy L5-S1    BLADDER SUSPENSION  2009    (transobturator sling)    CERVIX LESION DESTRUCTION      (laser cone)    COLONOSCOPY  10/17/2018    hyperplastic polyp, Dr. Srini Noe at [facility]  2006   [address]  2017    Dr Cleo Pickett- Rupal Deborah  2017    laser nerve block Dr Hill Si- [address] [facility] [address]  2017    laser nerve block Dr Lincoln Phan Bilateral 2019    BL5 transforaminal radiculogram- [facility] per Dr Blanca Carver Right 2019    Right sacroiliac joint injection with arthrography    NERVE BLOCK Bilateral 2019    BL5 transforaminal radiculogram/epidurogram    PAIN MANAGEMENT PROCEDURE Bilateral 2021    Bilateral L4 TRANSFORAMINAL performed by Reynaldo Aguayo MD at [address] [facility] N/A 2021    L4-L5  INTERLAMINAR performed by Reynaldo Aguayo MD at [address] [facility]  2015    Lumbar L5-S1    US BREAST NEEDLE BIOPSY RIGHT Right 2021    US BREAST NEEDLE BIOPSY RIGHT 2021 [facility] ULTRASOUND       Social History:    Social History     Tobacco Use    Smoking status: Former Smoker     Packs/day: 0.00     Years: 28.00     Pack years: 0.00     Types: Cigarettes     Start date: 1985     Quit date: 2014     Years since quittin.3    Smokeless tobacco: Never Used   Substance Use Topics    Alcohol use: Yes     Alcohol/week: 3.0 standard drinks     Types: 3 Standard drinks or equivalent per week     Comment: Socially. -weekly                                Counseling given: Not Answered      Vital Signs (Current):   Vitals:    21 0613 21 0619   BP: (!) 172/81 137/82   Pulse: 76    Resp: 18    Temp: 97.9 °F (36.6 °C)    TempSrc: Temporal    SpO2: 99%    Weight: 150 lb (68 kg)    Height: 5' 2\" (1.575 m)                                               BP Readings from Last 3 Encounters:   21 137/82   21 (!) 142/92 11/24/21 130/84       NPO Status: Time of last liquid consumption: 2345                        Time of last solid consumption: 1830                        Date of last liquid consumption: 12/08/21                        Date of last solid food consumption: 12/08/21    BMI:   Wt Readings from Last 3 Encounters:   12/09/21 150 lb (68 kg)   11/22/21 151 lb (68.5 kg)   11/24/21 152 lb 12.8 oz (69.3 kg)     Body mass index is 27.44 kg/m².     CBC:   Lab Results   Component Value Date    WBC 7.5 11/22/2021    RBC 4.45 11/22/2021    HGB 14.1 11/22/2021    HCT 41.5 11/22/2021    MCV 93.3 11/22/2021    RDW 11.7 11/22/2021     11/22/2021       CMP:   Lab Results   Component Value Date     11/22/2021    K 3.8 11/22/2021     11/22/2021    CO2 23 11/22/2021    BUN 12 11/22/2021    CREATININE 0.75 11/22/2021    GFRAA >60 11/22/2021    LABGLOM >60 11/22/2021    GLUCOSE 110 11/22/2021    PROT 7.1 07/19/2021    CALCIUM 9.2 07/19/2021    BILITOT 0.44 07/19/2021    ALKPHOS 63 07/19/2021    AST 22 07/19/2021    ALT 12 07/19/2021       POC Tests:   Recent Labs     12/09/21  0628   POCGLU 159*   POCK 3.7       Coags: No results found for: PROTIME, INR, APTT    HCG (If Applicable): No results found for: PREGTESTUR, PREGSERUM, HCG, HCGQUANT     ABGs: No results found for: PHART, PO2ART, TOD5NTI, CFA9PFY, BEART, U6AFLUEW     Type & Screen (If Applicable):  No results found for: LABABO, LABRH    Drug/Infectious Status (If Applicable):  No results found for: HIV, HEPCAB    COVID-19 Screening (If Applicable):   Lab Results   Component Value Date    COVID19 Not Detected 12/03/2020           Anesthesia Evaluation    Airway: Mallampati: II     Neck ROM: full   Dental:          Pulmonary:   (+) current smoker    (-) asthma and recent URI                           Cardiovascular:  Exercise tolerance: good (>4 METS),       (-) hypertension                Neuro/Psych:   (+) neuromuscular disease:,    (-) CVA            ROS comment: PLS GI/Hepatic/Renal:   (+) GERD:,           Endo/Other:    (+) DiabetesType I DM, well controlled, using insulin, hypothyroidism: arthritis:., .                  ROS comment: Insulin pump Abdominal:             Vascular: Other Findings:             Anesthesia Plan      general     ASA 3       Induction: intravenous.                           Pete Gaytan MD   12/9/2021

## 2021-12-09 NOTE — ANESTHESIA POSTPROCEDURE EVALUATION
Department of Anesthesiology  Postprocedure Note    Patient: Emerson Ojeda  MRN: [id number]  [place]: 1965  Date of evaluation: 12/9/2021  Time:  6:05 PM     Procedure Summary     Date: 12/09/21 Room / Location: [address]    Anesthesia Start: 5685 Anesthesia Stop: 3619    Procedures:       OLIF L4-5, C-ARM, LATERAL Margaret King, MEDTRONICS, MICROSCOPE (N/A Spine Lumbar)      POSTERIOR FIXATION L4-5, O-ARM/STEALTH (N/A Spine Lumbar) Diagnosis: (LUMBAR ADJACENT SEGMENT DISEASE WITH SPONDYLOLISTHESIS)    Surgeons: [facility]  Responsible Provider: Micah Tucker MD    Anesthesia Type: general ASA Status: 3          Anesthesia Type: general    Haris Phase I: Haris Score: 9    Haris Phase II:      Last vitals: Reviewed and per EMR flowsheets.      POST-OP ANESTHESIA NOTE       /84   Pulse 75   Temp 97.1 °F (36.2 °C)   Resp 17   Ht 5' 2\" (1.575 m)   Wt 150 lb (68 kg)   SpO2 91%   BMI 27.44 kg/m²    Pain Assessment: 0-10  Pain Level: 5        Anesthesia Post Evaluation    Patient location during evaluation: PACU  Patient participation: complete - patient participated  Level of consciousness: awake  Pain score: 5  Airway patency: patent  Nausea & Vomiting: no vomiting and no nausea  Complications: no  Cardiovascular status: hemodynamically stable  Respiratory status: acceptable  Hydration status: stable

## 2021-12-09 NOTE — OP NOTE
Operative Note      Patient: Berhane Rivera  YOB: 1965  MRN: [id number]    Date of Procedure: 12/9/2021    Pre-Op Diagnosis: LUMBAR ADJACENT SEGMENT DISEASE WITH SPONDYLOLISTHESIS    Post-Op Diagnosis: Same     Indications for surgery. Patient with progressive axial back pain along with bilateral lower extremity) the left S1 radiculopathy. Clear-cut evidence of adjacent level disease along with dynamic instability on x-ray. Patient had trialed conservative measures including therapy for 6 to 8 weeks as well as multifocal injections both therapeutic and diagnostic measures. Despite these measures patient with significant refractory radicular symptoms along with axial pain with evidence of instability on imaging correlating with adjacent segment disease. Based on failure of conservative measures and clear-cut evidence of adjacent level disease and instability patient was consented for complete discectomy interbody fusion with percutaneous fixation. Procedure  Part 1    Oblique anterior L4-5 complete discectomy osteophytectomy and anterior arthrodesis  Implantation of peek interbody cage  Use of morselized allograft  Use of anterior fixation with titanium plate and 25 mm screws  Use of fluoroscopy    Part 2    Use of spinal neuro navigation  Use of fluoroscopy  Percutaneous pedicle screw fixation at L4-5 with screws and rods. Surgeon(s):  DO Desi Campos DO    Assistant:   First Assistant: Calderon Shaver RN    Anesthesia: General    Estimated Blood Loss (mL): less than 50     Complications: None    Specimens:   ID Type Source Tests Collected by Time Destination   1 : URINE- INITIAL MADERA INSERTION Urine Urine, indwelling catheter CULTURE, URINE Yessica Henry RN 12/9/2021 1108        Implants:  Implant Name Type Inv.  Item Serial No.  Lot No. LRB No. Used Action   KIT MELANIA FlipKey XSM 1.4CC RHBMP-2 ABSRB CLLGN SPNG INFUSE - [id number]  KIT KauliMELANIA FlipKey XSM 1.4CC RHBMP-2 ABSRB CLLGN SPNG INFUSE [id number] [email or web address] [id number] N/A 1 Implanted   GRAFT BNE SUB 1CC DBM FRZ DRY PTTY GRFTON - [id number]  GRAFT BNE SUB 1CC DBM FRZ DRY PTTY GRFTON [id number] [employer] SPINALGRAFT TECH-Siamosoci  N/A 1 Implanted   GRAFT BNE SUB 1CC DBM FRZ DRY PTTY GRFTON - [id number]  GRAFT BNE SUB 1CC DBM FRZ DRY PTTY GRFTON [id number] [employer] SPINALGRAFT TECH-Siamosoci  N/A 1 Implanted   GRAFT BNE SUB 1CC DBM FRZ DRY PTTY GRFTON - [id number]  GRAFT BNE SUB 1CC DBM FRZ DRY PTTY GRFTON [id number] [employer] TECH-WD  N/A 1 Implanted   PLATE SPNL L 2 H FOR OBLQ LAT LUM INTBDY FUS OLIF25 PIVOX - [id number]  PLATE SPNL L 2 H FOR OBLQ LAT LUM INTBDY FUS OLIF25 PIVOX [id number] [employer] [facility] [id number] N/A 1 Implanted   SPACER SPNL [id number] 12DEG LORD OBLQ LAT LUM INTBDY FUS - [id number]  SPACER SPNL [id number] 12DEG LORD OBLQ LAT LUM INTBDY FUS [id number] [email or web address] [id number] N/A 1 Implanted   SCREW SPNL L25MM DIA5. 5MM FOR OBLQ LAT LUM INTBDY FUS PIVOX - [id number]  SCREW SPNL L25MM DIA5. 5MM FOR OBLQ LAT LUM INTBDY FUS PIVOX [id number] [email or web address] [id number] N/A 1 Implanted   SCREW SPNL L25MM DIA5. 5MM FOR OBLQ LAT LUM INTBDY FUS PIVOX - [id number]  SCREW SPNL L25MM DIA5. 5MM FOR OBLQ LAT LUM INTBDY FUS PIVOX [id number] [employer] Support Agency[facility] [id number] N/A 1 Implanted   SCREW SPNL L45MM OD75MM POST THORACOLUMBOSACRAL MARQUEZ - [id number]  SCREW SPNL L45MM OD75MM POST THORACOLUMBOSACRAL MARQUEZ [id number] [employer] [place] [employer]WD  N/A 1 Implanted   SCREW SPNL L40MM OD85MM POST THORACOLUMBOSACRAL MARQUEZ - [id number]  SCREW SPNL L40MM OD85MM POST THORACOLUMBOSACRAL MARQUEZ [id number] [employer] [place] [employer]WD  N/A 1 Implanted   FELICITY SPNL L45MM [id number] THORACOLUMBOSACRAL CHROMALOY PERC - [id number]  FELICITY SPNL L45MM [id number] THORACOLUMBOSACRAL CHROMALOY PERC [id number] [employer] [place] [employer]WD  N/A 1 Implanted   SET SCR SPNL DIA4. 75MM POST [facility] - clamp and levels confirmed on lateral fluoroscopy. Medial retractor was also placed. 15 blade was used to perform the annulotomy. Marlene and curettes were used to perform the bulk of the discectomy as well as punches and pituitaries. Arthrodesis of the endplates was performed. Trial was placed Dr. Lambert and the cage was then opened. 40 mm height 12 degree lordotic cage was opened to millimeters in width. Cage was filled with BMP and morselized allograft and implanted under direct fluoroscopic guidance. Was placed on the middle one third of vertebral body from side to side. At this point a large plate was placed with 25 mm screws aimed posteriorly. All screws were affixed and the locking cap was driven. Wound was thoroughly irrigated hemostasis obtained with FloSeal and Gelfoam.  Iliac vein and artery were inspected noted to be patent with no evidence of injury. Thorough irrigation inspection was completed and wound was closed with loose 0 Vicryl's approximating the muscle layers and a multiple 0 Vicryl rectus for the anterior tissue. Inverted 2-0 Vicryl's were used for subcutaneous tissue and a running 4-0 Monocryl subcuticular for skin with Dermabond. I then turned my attention to the posterior percutaneous screws. Right paramedian site was identified. A percutaneous pin was placed on the iliac crest and O-arm spin was completed. Using neuro navigation identified a starting point for the L4 and L5 pedicle screws. This incision was infiltrated with 1% lidocaine with epinephrine. 10 blade is used to perform incision followed Bovie to take all the down the subfascial layer. Finger dissection muscles was performed and the L4-5 and L3-4 facets were palpated. High-speed bur that was navigated was used to perform starting points at L4 and L5 followed by navigated tap followed by placement of screws at L4 and L5 on the right side.   Calipers used to appropriately measured the christopher which was then placed within the L4 and L5 screw heads and caps were secured. O-arm spin was performed noting that the screws were in good position of the christopher would within the heads of the screws but needed to advance very slightly. I then advanced the christopher down into the tulip and. Final tightening WAS performed and tabs were broken off. Posterior incision was thoroughly irrigated FloSeal Gelfoam used to obtain hemostasis. Multiple 0 Vicryl's were used to close fascia and inverted 2-0 Vicryl's were used for subcutaneous tissue followed by a running 4-0 Monocryl for skin and Dermabond. Patient was then flipped back supine extubated in stable condition returned to the PACU. Of note final AP and lateral x-rays noted that there were no retained instruments or sponges and counts were correct near the end of the case.     Electronically signed by Rubin Guidry DO on 12/9/2021 at 12:26 PM

## 2021-12-10 VITALS
SYSTOLIC BLOOD PRESSURE: 134 MMHG | HEIGHT: 62 IN | WEIGHT: 150 LBS | TEMPERATURE: 97.6 F | OXYGEN SATURATION: 93 % | DIASTOLIC BLOOD PRESSURE: 75 MMHG | HEART RATE: 100 BPM | BODY MASS INDEX: 27.6 KG/M2 | RESPIRATION RATE: 20 BRPM

## 2021-12-10 LAB
HCT VFR BLD CALC: 35.4 % (ref 36.3–47.1)
HEMOGLOBIN: 11.8 G/DL (ref 11.9–15.1)
INR BLD: 1
MCH RBC QN AUTO: 31.7 PG (ref 25.2–33.5)
MCHC RBC AUTO-ENTMCNC: 33.3 G/DL (ref 28.4–34.8)
MCV RBC AUTO: 95.2 FL (ref 82.6–102.9)
NRBC AUTOMATED: 0 PER 100 WBC
PARTIAL THROMBOPLASTIN TIME: 25.6 SEC (ref 20.5–30.5)
PDW BLD-RTO: 12.1 % (ref 11.8–14.4)
PLATELET # BLD: 287 K/UL (ref 138–453)
PMV BLD AUTO: 9.5 FL (ref 8.1–13.5)
PROTHROMBIN TIME: 10.4 SEC (ref 9.1–12.3)
RBC # BLD: 3.72 M/UL (ref 3.95–5.11)
WBC # BLD: 6.6 K/UL (ref 3.5–11.3)

## 2021-12-10 PROCEDURE — 97535 SELF CARE MNGMENT TRAINING: CPT | Performed by: OCCUPATIONAL THERAPIST

## 2021-12-10 PROCEDURE — 85610 PROTHROMBIN TIME: CPT

## 2021-12-10 PROCEDURE — 97116 GAIT TRAINING THERAPY: CPT

## 2021-12-10 PROCEDURE — 6370000000 HC RX 637 (ALT 250 FOR IP): Performed by: PHYSICIAN ASSISTANT

## 2021-12-10 PROCEDURE — 97165 OT EVAL LOW COMPLEX 30 MIN: CPT | Performed by: OCCUPATIONAL THERAPIST

## 2021-12-10 PROCEDURE — 97161 PT EVAL LOW COMPLEX 20 MIN: CPT

## 2021-12-10 PROCEDURE — 85730 THROMBOPLASTIN TIME PARTIAL: CPT

## 2021-12-10 PROCEDURE — 6360000002 HC RX W HCPCS: Performed by: PHYSICIAN ASSISTANT

## 2021-12-10 PROCEDURE — 85027 COMPLETE CBC AUTOMATED: CPT

## 2021-12-10 PROCEDURE — APPSS30 APP SPLIT SHARED TIME 16-30 MINUTES: Performed by: PHYSICIAN ASSISTANT

## 2021-12-10 PROCEDURE — 2580000003 HC RX 258: Performed by: PHYSICIAN ASSISTANT

## 2021-12-10 PROCEDURE — 36415 COLL VENOUS BLD VENIPUNCTURE: CPT

## 2021-12-10 RX ORDER — ONDANSETRON 4 MG/1
4 TABLET, ORALLY DISINTEGRATING ORAL EVERY 8 HOURS PRN
Qty: 21 TABLET | Refills: 0 | Status: SHIPPED | OUTPATIENT
Start: 2021-12-10 | End: 2021-12-17

## 2021-12-10 RX ORDER — OXYCODONE HYDROCHLORIDE AND ACETAMINOPHEN 5; 325 MG/1; MG/1
2 TABLET ORAL EVERY 6 HOURS PRN
Qty: 56 TABLET | Refills: 0 | Status: SHIPPED | OUTPATIENT
Start: 2021-12-10 | End: 2021-12-17 | Stop reason: SDUPTHER

## 2021-12-10 RX ORDER — METHOCARBAMOL 750 MG/1
750 TABLET, FILM COATED ORAL 3 TIMES DAILY PRN
Qty: 21 TABLET | Refills: 0 | Status: SHIPPED | OUTPATIENT
Start: 2021-12-10 | End: 2021-12-17 | Stop reason: SDUPTHER

## 2021-12-10 RX ADMIN — DOCUSATE SODIUM 50MG AND SENNOSIDES 8.6MG 1 TABLET: 8.6; 5 TABLET, FILM COATED ORAL at 08:35

## 2021-12-10 RX ADMIN — ACETAMINOPHEN 650 MG: 325 TABLET ORAL at 01:41

## 2021-12-10 RX ADMIN — OXYCODONE 10 MG: 5 TABLET ORAL at 05:45

## 2021-12-10 RX ADMIN — LEVOTHYROXINE SODIUM 50 MCG: 50 TABLET ORAL at 08:35

## 2021-12-10 RX ADMIN — LOSARTAN POTASSIUM 50 MG: 50 TABLET, FILM COATED ORAL at 08:32

## 2021-12-10 RX ADMIN — OXYCODONE 10 MG: 5 TABLET ORAL at 01:38

## 2021-12-10 RX ADMIN — DEXTROSE MONOHYDRATE 2000 MG: 50 INJECTION, SOLUTION INTRAVENOUS at 03:00

## 2021-12-10 RX ADMIN — HYDROCHLOROTHIAZIDE 12.5 MG: 25 TABLET ORAL at 08:35

## 2021-12-10 RX ADMIN — Medication 4000 UNITS: at 08:34

## 2021-12-10 RX ADMIN — ACETAMINOPHEN 650 MG: 325 TABLET ORAL at 05:45

## 2021-12-10 RX ADMIN — METHOCARBAMOL 750 MG: 750 TABLET ORAL at 05:45

## 2021-12-10 RX ADMIN — OXYCODONE 10 MG: 5 TABLET ORAL at 14:09

## 2021-12-10 RX ADMIN — OXYCODONE 10 MG: 5 TABLET ORAL at 09:42

## 2021-12-10 RX ADMIN — ENOXAPARIN SODIUM 40 MG: 100 INJECTION SUBCUTANEOUS at 10:14

## 2021-12-10 ASSESSMENT — PAIN SCALES - GENERAL
PAINLEVEL_OUTOF10: 8
PAINLEVEL_OUTOF10: 8
PAINLEVEL_OUTOF10: 6
PAINLEVEL_OUTOF10: 7
PAINLEVEL_OUTOF10: 8
PAINLEVEL_OUTOF10: 8
PAINLEVEL_OUTOF10: 5
PAINLEVEL_OUTOF10: 3

## 2021-12-10 ASSESSMENT — PAIN DESCRIPTION - PAIN TYPE
TYPE: SURGICAL PAIN
TYPE: SURGICAL PAIN

## 2021-12-10 ASSESSMENT — PAIN DESCRIPTION - LOCATION
LOCATION: ABDOMEN
LOCATION: BACK

## 2021-12-10 ASSESSMENT — PAIN DESCRIPTION - ORIENTATION: ORIENTATION: LEFT

## 2021-12-10 NOTE — PLAN OF CARE
Waylon Hinojosa requires a shower chair due to recent surgery, and is physically incapable of utilizing regular toilet facilities. Current body weight is Weight: 150 lb (68 kg).

## 2021-12-10 NOTE — PROGRESS NOTES
Physical Therapy    Facility/Department: [address] ORTHO/MED SURG  Initial Assessment    NAME: Ivy Larson  : 1965  MRN: [id number]    Date of Service: 12/10/2021  s/p Oblique anterior L4-5 complete discectomy osteophytectomy and anterior arthrodesis, Implantation of peek interbody cage, anterior fixation, percutaneous pedicle screw fixation at L4-5 with screws and rods      PT Equipment Recommendations  Equipment Needed: No    Assessment   Assessment: Patient is moving well and independently. Knowledgable about restrictions, pain control. She was educated in \"respect the fusion\" in regards to understanding the immobility at the fused segments. Educated in use of ice, not heat, lifting restriction (surgeon told her 2-3 lbs). No further need for acute PT with education done and patient verbalizing understanding. Prognosis: Good  Decision Making: Low Complexity  Clinical Presentation: stable  PT Education: Disease Specific Education; Plan of Care; Precautions; General Safety; Gait Training  REQUIRES PT FOLLOW UP: Yes  Activity Tolerance  Activity Tolerance: Patient Tolerated treatment well       Patient Diagnosis(es): The encounter diagnosis was S/P lumbar fusion. has a past medical history of Bronchitis, Diabetes type 1, controlled ([address] Eczema, GERD (gastroesophageal reflux disease), Gestational diabetes, Hyperlipidemia, Hypertension, Hypothyroid, Insomnia, Insulin pump in place, Myopia with astigmatism and presbyopia, Tobacco abuse, Under care of team, and Under care of team.   has a past surgical history that includes Endometrial ablation (); Cervix lesion destruction (); bladder suspension (2009); back surgery (2015); Spinal fusion (2015); Nerve Block (2017); Nerve Block (2017); lumbar laminectomy (2017); Colonoscopy (10/17/2018); Nerve Block (Bilateral, 2019); Nerve Block (Right, 2019); Nerve Block (Bilateral, 2019);  Back Injection (Right, 04/29/2021); US BREAST BIOPSY W LOC DEVICE 1ST LESION RIGHT (Right, 07/12/2021); Pain management procedure (Bilateral, 08/13/2021); Pain management procedure (N/A, 09/02/2021); and Lumbar spine surgery (12/09/2021). Restrictions  Restrictions/Precautions  Restrictions/Precautions: Surgical Protocols  Position Activity Restriction  Other position/activity restrictions: LSO on when out of bed. Oblique anterior L4-5 complete discectomy osteophytectomy and anterior arthrodesis, anterior fixation          Subjective  General  Chart Reviewed: Yes  Patient assessed for rehabilitation services?: Yes  Family / Caregiver Present: No  Follows Commands: Within Functional Limits  Pain Screening  Patient Currently in Pain: Yes  Pain Assessment  Pain Assessment: 0-10  Pain Level: 5  Pain Type: Surgical pain  Pain Location: Back  Vital Signs  Patient Currently in Pain: Yes       Orientation  Orientation  Overall Orientation Status: Within Functional Limits  Social/Functional History  Social/Functional History  Lives With: Spouse  Type of Home: House  Home Layout: Able to Live on Main level with bedroom/bathroom  Home Access: Stairs to enter with rails  Entrance Stairs - Number of Steps: 3  Entrance Stairs - Rails: Left  ADL Assistance: Independent  Homemaking Assistance: Independent  Ambulation Assistance: Independent  Transfer Assistance: Independent  Cognition   Cognition  Overall Cognitive Status: WFL    Objective     Observation/Palpation  Observation: Incisions right low back medial to spine and just medial and inferior of ASIS. Strength RLE  Strength RLE: WFL  Strength LLE  Strength LLE: WFL     Sensation  Overall Sensation Status: WFL  Bed mobility  Supine to Sit: Independent  Sit to Supine: Independent  Transfers  Sit to Stand: Independent  Stand to sit:  Independent  Ambulation  Ambulation?: Yes  Ambulation 1  Device: No Device  Assistance: Supervision  Quality of Gait: 5/10 pain, steady, equal step lengths  Distance: 400'  Stairs/Curb  Stairs?: Yes  Stairs  # Steps : 2  Stairs Height: 4\"  Rails: Right ascending  Assistance: Stand by assistance  Comment: Educated in one step at a time technique, up first with the good leg, down first with the weak leg              Plan   Safety Devices  Type of devices:  All fall risk precautions in place, Call light within reach, Left in chair           AM-PAC Score  AM-PAC Inpatient Mobility Raw Score : 24 (12/10/21 1156)  AM-PAC Inpatient T-Scale Score : 61.14 (12/10/21 1156)  Mobility Inpatient CMS 0-100% Score: 0 (12/10/21 1156)  Mobility Inpatient CMS G-Code Modifier : [address] (12/10/21 1156)   Therapy Time   Individual Concurrent Group Co-treatment   Time In 0850         Time Out 0915         Minutes 25         Timed Code Treatment Minutes: [address], PT

## 2021-12-10 NOTE — DISCHARGE INSTR - DIET

## 2021-12-10 NOTE — DISCHARGE SUMMARY
Department of Neurosurgery                                            Discharge Summary       PATIENT NAME: Brenda Arora  YOB: 1965  MEDICAL RECORD NO. [id number]  DATE: 12/10/2021  PRIMARY CARE PHYSICIAN: Kaitlin Toth MD   Admission date: 12/9/2021  DISCHARGE DATE:  12/10/2021  3:40 PM  DISCHARGE DIAGNOSIS: lumbar adjacent segment disease with spondyloisthesis  Patient Active Problem List   Diagnosis Code    Piriformis syndrome of right side G57.01    Chronic bilateral low back pain with bilateral sciatica M54.42, M54.41, G89.29    Displacement of lumbar intervertebral disc without myelopathy M51.26    Myopia of both eyes with astigmatism and presbyopia H52.13, H52.203, H52.4    Essential hypertension I10    Hyperlipidemia E78.5    Type 1 diabetes mellitus without complication ([facility] [address] W74.4    Acquired hypothyroidism E03.9    Vitamin D deficiency E55.9    Menopause Z78.0    Lumbar radiculopathy M54.16    Post laminectomy syndrome M96.1    Chronic right sacroiliac joint pain M53.3, G89.29    Sacroiliitis (HCC) M46.1    S/P lumbar fusion Z98.1     DISPOSITION: Home    PROCEDURES:    OLIF L4-5, Posterior fixation L4-5    Cristina Spencer originally presented to the hospital on 12/9/2021  5:06 AM with lumbar adjacent segment disease. She was admitted and taken to the OR for neurosurgery for procedure listed above. Patient tolerated all procedures well. Labs and imaging were followed daily. At time of discharge, Brenda Arora was tolerating a ADULT DIET; Regular; 3 carb choices (45 gm/meal), having bowel movements, ambulating on her own accord and had adequate analgesia on oral pain medications, and had no signs of symptoms of complications. She is medically stable to be discharged. PHYSICAL EXAMINATION        Discharge Vitals:  height is 5' 2\" (1.575 m) and weight is 150 lb (68 kg). Her oral temperature is 97.6 °F (36.4 °C).  Her blood pressure is 134/75 and her pulse is 100. Her respiration is 20 and oxygen saturation is 93%. AOx3   CNII-XII intact   PERRL, EOMI   CVS: normal s2/s1  Respiratory: equal air entry bilaterally   Abdomen: soft, no rigidity  Motor and sensory intact all over    LABS     Recent Labs     12/10/21  0606   WBC 6.6   HGB 11.8*   HCT 35.4*          DISCHARGE INSTRUCTIONS     Discharge Medications:        Medication List      START taking these medications    methocarbamol 750 MG tablet  Commonly known as: ROBAXIN  Take 1 tablet by mouth 3 times daily as needed (muscle spasms)     ondansetron 4 MG disintegrating tablet  Commonly known as: ZOFRAN-ODT  Take 1 tablet by mouth every 8 hours as needed for Nausea or Vomiting     oxyCODONE-acetaminophen 5-325 MG per tablet  Commonly known as: Percocet  Take 2 tablets by mouth every 6 hours as needed for Pain for up to 7 days. Intended supply: 3 days.  Take lowest dose possible to manage pain        CHANGE how you take these medications    Eucrisa 2 % Oint  Generic drug: Crisaborole  Apply 1 applicator topically 2 times daily  What changed:   · when to take this  · reasons to take this        CONTINUE taking these medications    clotrimazole 1 % external solution  Commonly known as: LOTRIMIN  Instill 4 to 5 drops into the affected ear(s) twice daily for 10 to 14 days     Creon [id number] units Cpep delayed release capsule  Generic drug: lipase-protease-amylase     insulin lispro 100 UNIT/ML injection vial  Commonly known as: HumaLOG  Use 60 units per day in pump     Insulin Pump Lupe     levothyroxine 50 MCG tablet  Commonly known as: SYNTHROID  Take 1 tablet by mouth daily     losartan-hydroCHLOROthiazide 50-12.5 MG per tablet  Commonly known as: HYZAAR  Take 1 tablet by mouth daily     Vitamin D 1000 units Caps capsule  Commonly known as: CHOLECALCIFEROL        STOP taking these medications    acetaminophen 500 MG tablet  Commonly known as: TYLENOL     aspirin 81 MG tablet cyclobenzaprine 10 MG tablet  Commonly known as: FLEXERIL     ibuprofen 800 MG tablet  Commonly known as: ADVIL;MOTRIN           Where to Get Your Medications      These medications were sent to [address] [place] [address] [address] [phone] Kelley Francisco [phone]  [facility] [address] [address]    Phone: [phone]   · methocarbamol 750 MG tablet  · ondansetron 4 MG disintegrating tablet  · oxyCODONE-acetaminophen 5-325 MG per tablet       Diet: ADULT DIET;  Regular; 3 carb choices (45 gm/meal) diet as tolerated  Activity: Provided in AVS  Wound Care: Daily and as needed  Follow-up:  in the [facility] clinic as shown in AVS   Time Spent for discharge: 30 minutes    ARIANA Kearney  12/10/2021, 5:37 PM

## 2021-12-10 NOTE — PROGRESS NOTES
Occupational Therapy   Occupational Therapy Initial Assessment  Date: 12/10/2021   Patient Name: Ashli Peters  MRN: [id number]     : 1965    Date of Service: 12/10/2021    Discharge Recommendations:    No further therapy required at discharge. OT Equipment Recommendations  Equipment Needed: Yes  Mobility Devices: ADL Assistive Devices  ADL Assistive Devices: Shower Chair with back    Assessment   Assessment: Pt presents s/p screw fixation of L4-5. Pt presenting without functional deficits at this time  Prognosis: Good  Decision Making: Low Complexity  OT Education: OT Role; Plan of Care; Transfer Training; ADL Adaptive Strategies  Patient Education: Pt verbalized and demonstrated understanding of figure 4 technique for LB dressing as well as log roll technique  No Skilled OT: Independent with functional mobility; Independent with ADL's  REQUIRES OT FOLLOW UP: No  Activity Tolerance  Activity Tolerance: Patient Tolerated treatment well  Safety Devices  Safety Devices in place: Yes  Type of devices: Left in chair; Call light within reach  Restraints  Initially in place: No         Patient Diagnosis(es): The encounter diagnosis was S/P lumbar fusion. has a past medical history of Bronchitis, Diabetes type 1, controlled ([address] Eczema, GERD (gastroesophageal reflux disease), Gestational diabetes, Hyperlipidemia, Hypertension, Hypothyroid, Insomnia, Insulin pump in place, Myopia with astigmatism and presbyopia, Tobacco abuse, Under care of team, and Under care of team.   has a past surgical history that includes Endometrial ablation (); Cervix lesion destruction (); bladder suspension (2009); back surgery (2015); Spinal fusion (2015); Nerve Block (2017); Nerve Block (2017); lumbar laminectomy (2017); Colonoscopy (10/17/2018); Nerve Block (Bilateral, 2019); Nerve Block (Right, 2019); Nerve Block (Bilateral, 2019);  Back Injection (Right, 04/29/2021); US BREAST BIOPSY W LOC DEVICE 1ST LESION RIGHT (Right, 07/12/2021); Pain management procedure (Bilateral, 08/13/2021); Pain management procedure (N/A, 09/02/2021); Lumbar spine surgery (12/09/2021); lumbar fusion (N/A, 12/9/2021); and lumbar fusion (N/A, 12/9/2021).        Restrictions  Restrictions/Precautions  Restrictions/Precautions: Surgical Protocols  Required Braces or Orthoses?: Yes  Required Braces or Orthoses  Spinal: Thoracic Lumbar Sacral Orthotics (Type: LSO Location: Lower back Be be worn for comfort when out of bed Ok for patient to be out of bed without it)  Position Activity Restriction  Other position/activity restrictions: Oblique anterior L4-5 complete discectomy osteophytectomy and anterior arthrodesis, anterior fixation    Subjective   General  Patient assessed for rehabilitation services?: Yes  Family / Caregiver Present: No  Patient Currently in Pain: Yes  Pain Assessment  Pain Assessment: 0-10  Pain Level: 8  Pain Type: Surgical pain  Pain Location: Abdomen  Pain Orientation: Left  Non-Pharmaceutical Pain Intervention(s): Ambulation/Increased Activity  Response to Pain Intervention: Patient Satisfied  Vital Signs  Patient Currently in Pain: Yes     Social/Functional History  Social/Functional History  Lives With: Spouse  Type of Home: House  Home Layout: Able to Live on Main level with bedroom/bathroom  Home Access: Stairs to enter with rails  Entrance Stairs - Number of Steps: 3  Entrance Stairs - Rails: Left  Bathroom Shower/Tub: Tub/Shower unit  Bathroom Toilet: Standard  Bathroom Equipment: Grab bars around toilet  ADL Assistance: Independent  Homemaking Assistance: Independent  Ambulation Assistance: Independent  Transfer Assistance: Independent  Mode of Transportation: SUV  Occupation: Full time employment  Type of occupation: bookkeeping  Additional Comments: Pt reports  works close to home and can assist prn     Objective   Vision: Impaired  Vision Exceptions: Wears glasses at all times  Hearing: Within functional limits    Orientation  Overall Orientation Status: Within Functional Limits  Observation/Palpation  Observation: Incisions right low back medial to spine and just medial and inferior of ASIS. Balance  Sitting Balance: Independent  Standing Balance: Independent  Standing Balance  Time: 5 minutes  Activity: functional mobility  Functional Mobility  Functional - Mobility Device: No device  Activity: To/from bathroom  Assist Level: Independent  Toilet Transfers  Toilet - Technique: Ambulating  Equipment Used: Standard toilet  Toilet Transfer: Independent  ADL  Feeding: Independent  Grooming: Independent  UE Bathing: Independent  LE Bathing: Independent  UE Dressing: Independent  LE Dressing: Independent (pt doffing/donning socks using figure 4 method)  Toileting: Independent (pt completing toileting and toileting tasks)  Tone RUE  RUE Tone: Normotonic  Tone LUE  LUE Tone: Normotonic  Coordination  Movements Are Fluid And Coordinated: Yes     Bed mobility  Supine to Sit: Independent  Sit to Supine: Independent  Comment: Pt demonstrated use of log roll during bed mobility  Transfers  Sit to stand: Independent  Stand to sit:  Independent     Cognition  Overall Cognitive Status: WFL        Sensation  Overall Sensation Status: WFL        LUE AROM (degrees)  LUE AROM : WFL  Left Hand AROM (degrees)  Left Hand AROM: WFL  RUE AROM (degrees)  RUE AROM : WFL  Right Hand AROM (degrees)  Right Hand AROM: WFL  LUE Strength  Gross LUE Strength: WFL  RUE Strength  Gross RUE Strength: WFL     AM-PAC Score   AM-[facility] Inpatient Daily Activity Raw Score: 24 (12/10/21 1500)  AM-PAC Inpatient ADL T-Scale Score : 57.54 (12/10/21 1500)  ADL Inpatient CMS 0-100% Score: 0 (12/10/21 1500)  ADL Inpatient CMS G-Code Modifier : CH (12/10/21 1500)    Therapy Time   Individual Concurrent Group Co-treatment   Time In 2188         Time Out 1017         Minutes 25         Timed Code Treatment Minutes: [facility] OTR/L

## 2021-12-10 NOTE — PLAN OF CARE
Problem: Pain:  Goal: Pain level will decrease  Description: Pain level will decrease  12/10/2021 0025 by Juni Aly RN  Outcome: Ongoing  12/10/2021 0025 by Juni Aly RN  Outcome: Ongoing  Goal: Control of acute pain  Description: Control of acute pain  12/10/2021 0025 by Juni Aly RN  Outcome: Ongoing  12/10/2021 0025 by Juni Aly RN  Outcome: Ongoing  Goal: Control of chronic pain  Description: Control of chronic pain  12/10/2021 0025 by Juni Aly RN  Outcome: Ongoing  12/10/2021 0025 by Juni Aly RN  Outcome: Ongoing     Problem: Anxiety:  Goal: Level of anxiety will decrease  Description: Level of anxiety will decrease  Outcome: Ongoing     Problem: Nausea/Vomiting:  Goal: Absence of nausea/vomiting  Description: Absence of nausea/vomiting  Outcome: Ongoing  Goal: Able to drink  Description: Able to drink  Outcome: Ongoing  Goal: Able to eat  Description: Able to eat  Outcome: Ongoing  Goal: Ability to achieve adequate nutritional intake will improve  Description: Ability to achieve adequate nutritional intake will improve  Outcome: Ongoing

## 2021-12-10 NOTE — PROGRESS NOTES
Neurosurgery [facility]/Resident    Daily Progress Note   No chief complaint on file. 12/10/2021  7:50 AM    Chart reviewed. No acute events overnight. No new complaints. Tolerating PO. Ambulating on own accord. Voiding without complication. Post op pain controlled. Reports some improvement in radicular pain post op. Vitals:    12/09/21 1600 12/09/21 1805 12/09/21 2235 12/10/21 0307   BP: 139/84 (!) 140/91 136/74 134/75   Pulse: 75 84 96 100   Resp: 17 16 18 20   Temp:  98.2 °F (36.8 °C) 98.4 °F (36.9 °C) 97.6 °F (36.4 °C)   TempSrc:  Oral Oral Oral   SpO2: 91% 98% 98% 93%   Weight:       Height:             PE:    AOx3   Motor   L deltoid 5/5; R deltoid 5/5  L biceps 5/5; R biceps 5/5  L triceps 5/5; R triceps 5/5  L wrist extension 5/5; R wrist extension 5/5  L intrinsics 5/5; R intrinsics 5/5      L iliopsoas 5/5 , R iliopsoas 5/5  L quadriceps 5/5; R quadriceps 5/5  L Dorsiflexion 5/5; R dorsiflexion 5/5  L Plantarflexion 5/5; R plantarflexion 5/5  L EHL 5/5; R EHL 5/5    Sensation intact     Incision c/d/i      Lab Results   Component Value Date    WBC 6.6 12/10/2021    HGB 11.8 (L) 12/10/2021    HCT 35.4 (L) 12/10/2021     12/10/2021    CHOL 204 (H) 07/19/2021    TRIG 47 07/19/2021    HDL 93 07/19/2021    ALT 12 07/19/2021    AST 22 07/19/2021     11/22/2021    K 3.8 11/22/2021     11/22/2021    CREATININE 0.75 11/22/2021    BUN 12 11/22/2021    CO2 23 11/22/2021    TSH 0.39 07/19/2021    INR 1.0 12/10/2021    LABA1C 7.6 (H) 08/13/2020    LABMICR CANNOT BE CALCULATED 07/19/2021    CRP 1.8 09/07/2018    SEDRATE 7 09/07/2018       Radiology   XR LUMBAR SPINE (2-3 VIEWS)    Result Date: 12/9/2021  EXAMINATION: THREE XRAY VIEWS OF THE LUMBAR SPINE 12/9/2021 6:20 pm COMPARISON: October 29, 2021 HISTORY: ORDERING SYSTEM PROVIDED HISTORY: s/p L4-5 fusion TECHNOLOGIST PROVIDED HISTORY: Please obtain upright AP/LAT s/p L4-5 fusion FINDINGS: New sideplate and screws noted at L4-5 on the right and left.  Disc prosthesis also noted at this level. Anterior plate and screws at B9-Q0 unchanged. Vertebral bodies normal in height and alignment. Intervertebral disc spaces maintained. Sacroiliac joints normal.  Gas-filled loops of small bowel. New hardware L4-5 and disc spacer as above. Anterior lumbar fusion at L5-S1 unchanged. FLUORO FOR SURGICAL PROCEDURES    Result Date: 12/9/2021  Radiology exam is complete. No Radiologist dictation. Please follow up with ordering provider. FLUORO FOR SURGICAL PROCEDURES    Result Date: 12/9/2021  Radiology exam is complete. No Radiologist dictation. Please follow up with ordering provider. A/P  49 y.o. female who presents with lumbar adjacent segment disease with spondylisthesis  POD 1 s/p OLIF L4-5 and percutaneous pedicle screw fixation L4-5       - LSO brace to be worn when out of bed    - Shower chair ordered for DME home use     - Stable for discharge home this afternoon   - Follow up outpatient in 2 weeks     Please contact neurosurgery with any changes in patients neurologic status.        Jesus Balbuena PA-C  12/10/21  7:50 AM

## 2021-12-10 NOTE — DISCHARGE INSTR - COC
Continuity of Care Form    Patient Name: Jn Christina   :  1965  MRN:  [id number]    Admit date:  2021  Discharge date:  ***    Code Status Order: Full Code   Advance Directives:   Advance Care Flowsheet Documentation       Date/Time Healthcare Directive Type of Healthcare Directive Copy in [address] Agent's Name Healthcare Agent's Phone Number    21 0604 No, patient does not have an advance directive for healthcare treatment -- -- -- -- --            Admitting Physician:  Mateus Ibrahim DO  PCP: Laurita Serrano MD    Discharging Nurse: [facility] Unit/Room#: 4322/2609-79  Discharging Unit Phone Number: ***    Emergency Contact:   Extended Emergency Contact Information  Primary Emergency Contact: Giovanni Odom  Address: [facility] [address] Phone: [phone]  Work Phone: [phone]  Mobile Phone: [phone]  Relation: Other    Past Surgical History:  Past Surgical History:   Procedure Laterality Date    BACK INJECTION Right 2021    Right Sacroiliac Joint Injection performed by Alona Severin, MD at [address] [place] [address]  2015    Lumbar microdiscectomy L5-S1    BLADDER SUSPENSION  2009    (transobturator sling)    CERVIX LESION DESTRUCTION      (laser cone)    COLONOSCOPY  10/17/2018    hyperplastic polyp, Dr. Lynette Denis at [facility]. 68.  2006    LUMBAR LAMINECTOMY  2017    Dr Xochitl Loo  2021    OLIF L4-5, Posterior fixation L4-5    NERVE BLOCK  2017    [facility]    laser nerve block Dr Paz Dockery Bilateral 2019    BL5 transforaminal radiculogram- [facility] per Dr Dejah Coppola Right 2019    Right sacroiliac joint injection with arthrography    NERVE BLOCK Bilateral 2019    BL5 transforaminal radiculogram/epidurogram    PAIN MANAGEMENT PROCEDURE Bilateral 08/13/2021    Bilateral L4 TRANSFORAMINAL performed by Larry Catalan MD at [facility] N/A 09/02/2021    L4-L5  INTERLAMINAR performed by Larry Caatlan MD at [address]  07/06/2015    Lumbar L5-S1    US BREAST NEEDLE BIOPSY RIGHT Right 07/12/2021    US BREAST NEEDLE BIOPSY RIGHT 7/12/2021 [facility] ULTRASOUND       Immunization History:   Immunization History   Administered Date(s) Administered    COVID-19, Pfizer, PF, 30mcg/0.3mL 03/02/2021, 03/23/2021, 11/10/2021    Influenza Virus Vaccine 09/21/2011, 10/06/2014, 12/29/2015, 09/23/2017, 11/04/2018    Influenza, Jamas Jointer, IM, PF (6 mo and older Fluzone, Flulaval, Fluarix, and 3 yrs and older Afluria) 11/07/2016, 10/27/2020    Pneumococcal Polysaccharide ([id number]) 09/23/2017, 11/04/2018    Td, unspecified formulation 01/17/2008    Tdap (Boostrix, Adacel) 11/07/2016       Active Problems:  Patient Active Problem List   Diagnosis Code    Piriformis syndrome of right side G57.01    Chronic bilateral low back pain with bilateral sciatica M54.42, M54.41, G89.29    Displacement of lumbar intervertebral disc without myelopathy M51.26    Myopia of both eyes with astigmatism and presbyopia H52.13, H52.203, H52.4    Essential hypertension I10    Hyperlipidemia E78.5    Type 1 diabetes mellitus without complication (HCC) S64.9    Acquired hypothyroidism E03.9    Vitamin D deficiency E55.9    Menopause Z78.0    Lumbar radiculopathy M54.16    Post laminectomy syndrome M96.1    Chronic right sacroiliac joint pain M53.3, G89.29    Sacroiliitis (HCC) M46.1    S/P lumbar fusion Z98.1       Isolation/Infection:   Isolation            No Isolation          Patient Infection Status       Infection Onset Added Last Indicated Last Indicated By Review Planned Expiration Resolved Resolved By    None active    Resolved    COVID-19 (Rule Out) 12/03/20 12/03/20 12/03/20 COVID-19 Ambulatory (Ordered)   12/06/20 Rule-Out Test Resulted            Nurse Assessment:  Last Vital Signs: /75   Pulse 100   Temp 97.6 °F (36.4 °C) (Oral)   Resp 20   Ht 5' 2\" (1.575 m)   Wt 150 lb (68 kg)   SpO2 93%   BMI 27.44 kg/m²     Last documented pain score (0-10 scale): Pain Level: 3  Last Weight:   Wt Readings from Last 1 Encounters:   12/09/21 150 lb (68 kg)     Mental Status:  {IP PT MENTAL STATUS:20030}    IV Access:  { GARY IV ACCESS:084048523}    Nursing Mobility/ADLs:  Walking   {CHP DME PTHU:426844620}  Transfer  {CHP DME KYLU:854843997}  Bathing  {CHP DME JTJL:203314059}  Dressing  {CHP DME FPVA:859614556}  Toileting  {CHP DME XIUA:481240816}  Feeding  {CHP DME JZOF:307782872}  Med Admin  {CHP DME QBLL:367212762}  Med Delivery   { GARY MED Delivery:637985759}    Wound Care Documentation and Therapy:        Elimination:  Continence: Bowel: {YES / WF:53397}  Bladder: {YES / HAO:62431}  Urinary Catheter: {Urinary Catheter:112442556}   Colostomy/Ileostomy/Ileal Conduit: {YES / DH:99332}       Date of Last BM: ***    Intake/Output Summary (Last 24 hours) at 12/10/2021 1113  Last data filed at 12/9/2021 1226  Gross per 24 hour   Intake 1000 ml   Output 750 ml   Net 250 ml     I/O last 3 completed shifts:   In: 2000 [I.V.:2000]  Out: 750 [Urine:700; Blood:50]    Safety Concerns:     [address] [employer] GAYR Safety Concerns:904326545}    Impairments/Disabilities:      [address] [employer] Data Impairments/Disabilities:430780434}    Nutrition Therapy:  Current Nutrition Therapy:   508 [employer] Diet List:774729356}    Routes of Feeding: {CHP DME Other Feedings:287080533}  Liquids: {Slp liquid thickness:35846}  Daily Fluid Restriction: {CHP DME Yes amt example:006457410}  Last Modified Barium Swallow with Video (Video Swallowing Test): {Done Not Done KMZU:042369550}    Treatments at the Time of Hospital Discharge:   Respiratory Treatments: ***  Oxygen Therapy:  {Therapy; copd oxygen:19366}  Ventilator:    {MH CC Vent CCTB:782756274}    Rehab Therapies: {THERAPEUTIC INTERVENTION:3866192321}  Weight Bearing Status/Restrictions: 50Nahed Matthews CC Weight Bearin}  Other Medical Equipment (for information only, NOT a DME order):  {EQUIPMENT:579678850}  Other Treatments: ***    Patient's personal belongings (please select all that are sent with patient):  {CHP DME Belongings:194900462}    RN SIGNATURE:  {Esignature:723535852}    CASE MANAGEMENT/SOCIAL WORK SECTION    Inpatient Status Date: ***    Readmission Risk Assessment Score:  Readmission Risk              Risk of Unplanned Readmission:  9           Discharging to Facility/ Agency   Name:   Address:  Phone:  Fax:    Dialysis Facility (if applicable)   Name:  Address:  Dialysis Schedule:  Phone:  Fax:    / signature: {Esignature:366290127}    PHYSICIAN SECTION    Prognosis: {Prognosis:8256141311}    Condition at Discharge: 50Nahed Matthews Patient Condition:674393920}    Rehab Potential (if transferring to Rehab): {Prognosis:3243403374}    Recommended Labs or Other Treatments After Discharge: ***    Physician Certification: I certify the above information and transfer of Salty Keller  is necessary for the continuing treatment of the diagnosis listed and that she requires {Admit to Appropriate Level of Care:48708} for {GREATER/LESS:183195768} 30 days.      Update Admission H&P: {CHP DME Changes in BEA:605533475}    PHYSICIAN SIGNATURE:  {Esignature:508460870}

## 2021-12-11 LAB
CULTURE: NORMAL
Lab: NORMAL
SPECIMEN DESCRIPTION: NORMAL

## 2021-12-17 DIAGNOSIS — Z98.1 S/P LUMBAR FUSION: ICD-10-CM

## 2021-12-17 RX ORDER — OXYCODONE HYDROCHLORIDE AND ACETAMINOPHEN 5; 325 MG/1; MG/1
1-2 TABLET ORAL EVERY 6 HOURS PRN
Qty: 48 TABLET | Refills: 0 | Status: SHIPPED | OUTPATIENT
Start: 2021-12-17 | End: 2021-12-29 | Stop reason: SDUPTHER

## 2021-12-17 RX ORDER — METHOCARBAMOL 750 MG/1
750 TABLET, FILM COATED ORAL 3 TIMES DAILY PRN
Qty: 21 TABLET | Refills: 0 | Status: SHIPPED | OUTPATIENT
Start: 2021-12-17 | End: 2021-12-29 | Stop reason: SDUPTHER

## 2021-12-17 NOTE — TELEPHONE ENCOUNTER
Patient calling for refill of percocet and robaxin.     Date of last fill: 12.10.2021  Surgery: 12.9.2021  Time elapsed since last surgery: 1 weeks  Date of next follow up appointment: 12.27.2021    Pt notified response time for refills is 24-48 hours

## 2021-12-29 ENCOUNTER — OFFICE VISIT (OUTPATIENT)
Dept: NEUROSURGERY | Age: 56
End: 2021-12-29

## 2021-12-29 VITALS
OXYGEN SATURATION: 96 % | DIASTOLIC BLOOD PRESSURE: 82 MMHG | HEIGHT: 62 IN | BODY MASS INDEX: 27.6 KG/M2 | SYSTOLIC BLOOD PRESSURE: 139 MMHG | WEIGHT: 150 LBS | HEART RATE: 95 BPM

## 2021-12-29 DIAGNOSIS — M51.36 LUMBAR ADJACENT SEGMENT DISEASE WITH SPONDYLOLISTHESIS: Primary | ICD-10-CM

## 2021-12-29 DIAGNOSIS — Z98.1 S/P LUMBAR FUSION: ICD-10-CM

## 2021-12-29 DIAGNOSIS — M43.16 LUMBAR ADJACENT SEGMENT DISEASE WITH SPONDYLOLISTHESIS: Primary | ICD-10-CM

## 2021-12-29 PROCEDURE — 99024 POSTOP FOLLOW-UP VISIT: CPT | Performed by: NURSE PRACTITIONER

## 2021-12-29 RX ORDER — METHOCARBAMOL 750 MG/1
750 TABLET, FILM COATED ORAL 3 TIMES DAILY PRN
Qty: 21 TABLET | Refills: 0 | Status: SHIPPED | OUTPATIENT
Start: 2021-12-29 | End: 2022-01-10 | Stop reason: SDUPTHER

## 2021-12-29 RX ORDER — OXYCODONE HYDROCHLORIDE AND ACETAMINOPHEN 5; 325 MG/1; MG/1
1-2 TABLET ORAL EVERY 6 HOURS PRN
Qty: 35 TABLET | Refills: 0 | Status: SHIPPED | OUTPATIENT
Start: 2021-12-29 | End: 2022-01-10 | Stop reason: SDUPTHER

## 2021-12-29 NOTE — PROGRESS NOTES
[facility] [address] [place] , [address] [place] [address]  Dept: [phone]    Patient:  Navjot Denson  YOB: 1965  Date: 12/29/21    The patient is a 49 y.o. female who presents today for consult of the following problems:     Chief Complaint   Patient presents with    Post-Op Check     2 week         HPI:     Navjot Denson is a 49 y.o. female who presents to the office for post-op evaluation s/p OLIF L4-5, posterior fixation L4-5. Patient states that she is doing very well postoperatively. Has had essentially complete resolution of bilateral lower extremity pain. Still with some expected axial postop pain. It is well controlled with current medications. Denies any saddle anesthesia, loss of bowel or bladder function. Both incisions have healed well. Denies any discharge or drainage. Did initially have some pain in her left hip, this has resolved. Does have a small patch to her left anterior thigh that has decreased sensation, it is not painful, and does seem to be slightly improving. Did not receive bone stimulator, as insurance denied it. Strength 5/5  Sensation intact, with exception of small area to anterior left thigh  Incisions well healing    Date of surgery: 12/9/2021    Assessment and Plan:      1. Lumbar adjacent segment disease with spondylolisthesis    2. S/P lumbar fusion          Plan: Patient overall recovering very well. Very pleased. Medication refill sent as requested, continue weaning pain medication as tolerated. Referral provided for physical therapy to work on lower extremity stretching, strengthening, range of motion. Continue to monitor area of decreased sensation to left thigh. Next postop visit in February with Dr. Blessing Blanco as scheduled, upright x-rays prior. Contact office with questions or concerns.     Followup: Return in about 6 weeks (around 2/9/2022), or if symptoms worsen or fail to improve. Prescriptions Ordered:  Orders Placed This Encounter   Medications    methocarbamol (ROBAXIN) 750 MG tablet     Sig: Take 1 tablet by mouth 3 times daily as needed (muscle spasms)     Dispense:  21 tablet     Refill:  0    oxyCODONE-acetaminophen (PERCOCET) 5-325 MG per tablet     Sig: Take 1-2 tablets by mouth every 6 hours as needed for Pain for up to 7 days. Take lowest dose possible to manage pain     Dispense:  35 tablet     Refill:  0     Reduce doses taken as pain becomes manageable        Orders Placed:  Orders Placed This Encounter   Procedures    XR LUMBAR SPINE (2-3 VIEWS)     Standing Status:   Future     Standing Expiration Date:   12/29/2022     Scheduling Instructions:      STANDING AP AND LATERAL; include both femoral heads     Order Specific Question:   Reason for exam:     Answer:   post-op    Ambulatory referral to Physical Therapy     Referral Priority:   Routine     Referral Type:   Eval and Treat     Referral Reason:   Specialty Services Required     Requested Specialty:   Physical Therapy     Number of Visits Requested:   1        Electronically signed by AARON Fried CNP on 12/29/2021 at 3:19 PM    Please note that this chart was generated using voice recognition Dragon dictation software. Although every effort was made to ensure the accuracy of this automated transcription, some errors in transcription may have occurred.

## 2022-01-06 ENCOUNTER — HOSPITAL ENCOUNTER (OUTPATIENT)
Dept: PHYSICAL THERAPY | Age: 57
Setting detail: THERAPIES SERIES
Discharge: HOME | End: 2022-01-06
Payer: COMMERCIAL

## 2022-01-06 PROCEDURE — 97161 PT EVAL LOW COMPLEX 20 MIN: CPT

## 2022-01-06 ASSESSMENT — PAIN DESCRIPTION - LOCATION: LOCATION: BACK

## 2022-01-06 ASSESSMENT — PAIN SCALES - GENERAL: PAINLEVEL_OUTOF10: 5

## 2022-01-06 ASSESSMENT — PAIN DESCRIPTION - ORIENTATION: ORIENTATION: LEFT

## 2022-01-06 ASSESSMENT — PAIN DESCRIPTION - PAIN TYPE: TYPE: SURGICAL PAIN

## 2022-01-06 ASSESSMENT — PAIN DESCRIPTION - PROGRESSION: CLINICAL_PROGRESSION: GRADUALLY IMPROVING

## 2022-01-06 ASSESSMENT — PAIN DESCRIPTION - FREQUENCY: FREQUENCY: INTERMITTENT

## 2022-01-06 ASSESSMENT — PAIN DESCRIPTION - DESCRIPTORS: DESCRIPTORS: ACHING

## 2022-01-06 NOTE — FLOWSHEET NOTE
Physical Therapy Daily Treatment Note    Date:  2022    Patient Name:  Madai Cespedes    :  1965  MRN: [id number]  Restrictions/Precautions:     Medical/Treatment Diagnosis Information:   · Diagnosis: Z98.1 (ICD-10-CM) - S/P lumbar fusion, M51.36, M43.16 (ICD-10-CM) - Lumbar adjacent segment disease with spondylolisthesis  ·    Insurance/Certification information:  PT Insurance Information: [employer]  Physician Information:  Referring Practitioner: AARON Garg CNP  Plan of care signed (Y/N):  n  Visit# / total visits:  1/10  Pain level: /10       Time In: 1330  Time Out:1400    Progress Note: [x]  Yes  []  No  Next due by: Visit #10      Subjective:   See eval   Objective: See Eval   Observation:   Test measurements:      Exercises:   Exercise/Equipment Resistance/Repetitions Other comments        Prone lying      Prone Prop   HEP    Prone hip ext                Abd Braceing           Counter Exs      squats      Steps      Seated ham stretch   HEP    Modified Trunk Ext   HEP         [] Provided verbal/tactile cueing for activities related to strengthening, flexibility, endurance, ROM. (64809)  [] Provided verbal/tactile cueing for activities related to improving balance, coordination, kinesthetic sense, posture, motor skill, proprioception. (73703)    Therapeutic Activities:     [] Therapeutic activities, direct (one-on-one) patient contact (use of dynamic activities to improve functional performance). (32428)    Gait:   [] Provided training and instruction to the patient for ambulation re-education. (81034)    Self-Care/ADL's  [] Self-care/home management training and compensatory training, meal preparation, safety procedures, and instructions in use of assistive technology devices/adaptive equipment, direct one-on-one contact.  (86190)    Home Exercise Program:     [] Reviewed/Progressed HEP activities related to strengthening, flexibility, endurance, ROM. (08399)  [] Reviewed/Progressed HEP activities related to improving balance, coordination, kinesthetic sense, posture, motor skill, proprioception.  (24484)    Manual Treatments:    [] Provided manual therapy to mobilize soft tissue/joints for the purpose of modulating pain, promoting relaxation,  increasing ROM, reducing/eliminating soft tissue swelling/inflammation/restriction, improving soft tissue extensibility. (38675)    Service Based Modalities:      Timed Code Treatment Minutes: Total Treatment Minutes:   30    Treatment/Activity Tolerance:  [x] Patient tolerated treatment well [] Patient limited by fatique  [] Patient limited by pain  [] Patient limited by other medical complications  [] Other:     Prognosis: [x] Good [] Fair  [] Poor    Patient Requires Follow-up: [x] Yes  [] No      Goals:  Short term goals  Time Frame for Short term goals: 3 weeks  Short term goal 1: Intiatie HEP    Long term goals  Time Frame for Long term goals : 6 weeks  Long term goal 1: Indpendent in HEP  Long term goal 2: Improve LE strength to 5/5. Long term goal 3: Patient to be able to tolerate sitting up to 1 hour without increase in pain.   Long term goal 4: Patient to note no sleep disturbances without increase in pain          Plan:   [] Continue per plan of care [] Alter current plan (see comments)  [x] Plan of care initiated [] Hold pending MD visit [] Discharge  Plan for Next Session:      Electronically signed by:  Puneet Torre PT

## 2022-01-06 NOTE — PLAN OF CARE
[facility]    [x] [place]  Phone: [phone]  Fax: [phone]      [] [place]  Phone: [phone]  Fax: [phone]        To: Referring Practitioner: AARON Lucas CNP      Patient: Haris Eric  : 1965   MRN: [id number]  Evaluation Date: 2022      Diagnosis Information:  · Diagnosis: Z98.1 (ICD-10-CM) - S/P lumbar fusion, M51.36, M43.16 (ICD-10-CM) - Lumbar adjacent segment disease with spondylolisthesis   ·       Physical Therapy Certification  Dear AARON Lucas CNP  The following patient has been evaluated for physical therapy services and for therapy to continue, Medicare requires monthly physician review of the treatment plan. Please review the attached evaluation and/or summary of the patient's plan of care, and verify that you agree therapy should continue by signing the attached document and sending it back to our office. Plan of Care/Treatment to date:  [x] Therapeutic Exercise    [x] Modalities:  [x] Therapeutic Activity     [] Ultrasound  [] Electrical Stimulation  [x] Gait Training      [] Cervical Traction [] Lumbar Traction  [x] Neuromuscular Re-education    [] Cold/hotpack [] Iontophoresis   [x] Instruction in HEP     Other:  [x] Manual Therapy      []             [] Aquatic Therapy      []           ? Goals:  Short term goals  Time Frame for Short term goals: 3 weeks  Short term goal 1: Intiatie HEP    Long term goals  Time Frame for Long term goals : 6 weeks  Long term goal 1: Indpendent in HEP  Long term goal 2: Improve LE strength to 5/5. Long term goal 3: Patient to be able to tolerate sitting up to 1 hour without increase in pain. Long term goal 4: Patient to note no sleep disturbances without increase in pain    Frequency/Duration:22-22  # Days per week: [x] 1 day # Weeks: [] 1 week [] 5 weeks     [x] 2 days?    [] 2 weeks [x] 6 weeks     [x] 3 days   [] 3 weeks [] 7 weeks     [] 4 days   [] 4 weeks [] 8 weeks    Rehab Potential: [] Excellent [x] Good [] Fair  [] Poor     Electronically signed by:  Haris Rodgers PT    If you have any questions or concerns, please don't hesitate to call.   Thank you for your referral.      Physician Signature:________________________________Date:__________________  By signing above, therapists plan is approved by physician

## 2022-01-06 NOTE — PROGRESS NOTES
Physical Therapy  Initial Assessment  Date: 2022  Patient Name: Bobbi Hinton  MRN: [id number]  : 1965          Restrictions  Position Activity Restriction  Spinal Precautions: Please work on BLE ROM, stretching, strengthening, no back work yet    Subjective   General  Chart Reviewed: Yes  Patient assessed for rehabilitation services?: Yes  Referring Practitioner: AARON Crews CNP  Referral Date : 21  Diagnosis: Z98.1 (ICD-10-CM) - S/P lumbar fusion, M51.36, M43.16 (ICD-10-CM) - Lumbar adjacent segment disease with spondylolisthesis  PT Visit Information  PT Insurance Information: [employer]  Subjective  Subjective: 49 y.o. female who presents to the office for post-op evaluation s/p OLIF L4-5, posterior fixation L4-5. Patient states that she is doing very well postoperatively. Has had essentially complete resolution of bilateral lower extremity pain. Still with some expected axial postop pain. It is well controlled with current medications. Denies any saddle anesthesia, loss of bowel or bladder function. Pain Screening  Patient Currently in Pain: Yes  Pain Assessment  Pain Assessment: 0-10  Pain Level: 5 (6/10 at worst, 3/10)  Pain Type: Surgical pain  Pain Location: Back  Pain Orientation: Left  Pain Descriptors: Aching  Pain Frequency: Intermittent  Clinical Progression: Gradually improving  Vital Signs  Patient Currently in Pain: Yes    Vision/Hearing  Vision  Vision: Within Functional Limits  Hearing  Hearing: Within functional limits    Orientation  Orientation  Overall Orientation Status: Within Functional Limits    Social/Functional History  Social/Functional History  Occupation: Full time employment  Type of occupation:  for 2 jobs    Objective     Observation/Palpation  Posture: Fair  Palpation: mod tightness bilateral lumbar paraspinals  Scar: moderate tightness of the incision on the right.     Spine  Lumbar: flexion to lower leg, ext approx 10 deg, bilateral rotation limited 25%    Strength RLE  Strength RLE: Exception  R Hip Flexion: 4+/5  R Hip ABduction: 4+/5  R Hip ADduction: 4+/5  R Knee Flexion: 4+/5  R Knee Extension: 4+/5  Strength LLE  Strength LLE: Exception  L Hip Flexion: 4+/5  L Hip ABduction: 4+/5  L Hip ADduction: 4+/5  L Knee Flexion: 4+/5  L Knee Extension: 4+/5     Additional Measures  Special Tests: prone lying decreased lumbar pain, moderate tightness bilateral hamstrings                                             Assessment   Conditions Requiring Skilled Therapeutic Intervention  Body structures, Functions, Activity limitations: Decreased functional mobility ; Increased pain;Decreased ROM; Decreased strength  Assessment: Patient to the clinic s/p lumbar fusion. Patient noting difficulty with LE strength and LE tightness. Prognosis: Good  Decision Making: Low Complexity  REQUIRES PT FOLLOW UP: Yes  Activity Tolerance  Activity Tolerance: Patient Tolerated treatment well         Plan   Plan  Times per week: 1-3x/week  Plan weeks: 6 weeks  Current Treatment Recommendations: Strengthening,Neuromuscular Re-education,Home Exercise Program,Integrated Dry Needling,ROM,Safety Education & Training,Aquatics,Manual Therapy - Soft Tissue Mobilization,Balance Training,Endurance Training,Patient/Caregiver Education & Training,Equipment Evaluation, Education, & procurement,Manual Therapy - Joint Manipulation,Functional Mobility Training,Transfer Training,Gait Training,Modalities,Pain Management    G-Code       OutComes Score                                                  AM-PAC Score             Goals  Short term goals  Time Frame for Short term goals: 3 weeks  Short term goal 1: Intiatie HEP  Long term goals  Time Frame for Long term goals : 6 weeks  Long term goal 1: Indpendent in HEP  Long term goal 2: Improve LE strength to 5/5. Long term goal 3: Patient to be able to tolerate sitting up to 1 hour without increase in pain.   Long term goal 4: Patient to note no sleep disturbances without increase in pain  Patient Goals   Patient goals : Decrease Pain, Improve strength       Therapy Time   Individual Concurrent Group Co-treatment   Time In 1330         Time Out 1400         Minutes 30                 Haris Rodgers, [address]

## 2022-01-07 ENCOUNTER — PATIENT MESSAGE (OUTPATIENT)
Dept: FAMILY MEDICINE CLINIC | Age: 57
End: 2022-01-07

## 2022-01-07 DIAGNOSIS — E10.9 TYPE 1 DIABETES MELLITUS WITHOUT COMPLICATION (HCC): Primary | ICD-10-CM

## 2022-01-07 DIAGNOSIS — L29.9 EAR ITCHING: ICD-10-CM

## 2022-01-07 NOTE — TELEPHONE ENCOUNTER
From: Alfreda Higuera  To: Dr. Carola Stinson: 2022 11:03 AM EST  Subject: Ears again     I was wondering if you found out if there is anything safe to put in my ears for the eczema or whatever is going on in there yet. Right now my face is broke out with it too. I have steroid ointment for my face but if you would like to see what it looks like if you think it may help you to figure out what is going on in my ears I will make an appointment and not use the steroid till you see it.    Sorry this is so ongoing but my ears itch so bad and it is driving me crazy

## 2022-01-09 ENCOUNTER — PATIENT MESSAGE (OUTPATIENT)
Dept: NEUROSURGERY | Age: 57
End: 2022-01-09

## 2022-01-09 DIAGNOSIS — Z98.1 S/P LUMBAR FUSION: ICD-10-CM

## 2022-01-09 DIAGNOSIS — M43.16 LUMBAR ADJACENT SEGMENT DISEASE WITH SPONDYLOLISTHESIS: ICD-10-CM

## 2022-01-09 DIAGNOSIS — M51.36 LUMBAR ADJACENT SEGMENT DISEASE WITH SPONDYLOLISTHESIS: ICD-10-CM

## 2022-01-10 RX ORDER — METHOCARBAMOL 750 MG/1
750 TABLET, FILM COATED ORAL 3 TIMES DAILY PRN
Qty: 21 TABLET | Refills: 0 | Status: SHIPPED | OUTPATIENT
Start: 2022-01-10 | End: 2022-01-17

## 2022-01-10 RX ORDER — OXYCODONE HYDROCHLORIDE AND ACETAMINOPHEN 5; 325 MG/1; MG/1
1-2 TABLET ORAL EVERY 6 HOURS PRN
Qty: 28 TABLET | Refills: 0 | Status: SHIPPED | OUTPATIENT
Start: 2022-01-10 | End: 2022-01-24 | Stop reason: SDUPTHER

## 2022-01-10 NOTE — TELEPHONE ENCOUNTER
From: Madeline Castle  To: Brett Craig  Sent: 1/9/2022 9:34 AM EST  Subject: Medication     The scripts for Methocarbamol and Oxycodone-acetaminophen have dropped off the refill request list. I would like these refilled please.

## 2022-01-13 ENCOUNTER — HOSPITAL ENCOUNTER (OUTPATIENT)
Dept: PHYSICAL THERAPY | Age: 57
Setting detail: THERAPIES SERIES
Discharge: HOME | End: 2022-01-13
Payer: COMMERCIAL

## 2022-01-13 PROCEDURE — 97110 THERAPEUTIC EXERCISES: CPT

## 2022-01-13 NOTE — FLOWSHEET NOTE
Physical Therapy Daily Treatment Note    Date:  2022    Patient Name:  Anupama Mosley    :  1965  MRN: [id number]  Restrictions/Precautions:     Medical/Treatment Diagnosis Information:   · Diagnosis: Z98.1 (ICD-10-CM) - S/P lumbar fusion, M51.36, M43.16 (ICD-10-CM) - Lumbar adjacent segment disease with spondylolisthesis     Insurance/Certification information:  PT Insurance Information: [employer]  Physician Information:  Referring Practitioner: AARON Ibrahim CNP  Plan of care signed (Y/N):  n  Visit# / total visits:  1/10  Pain level: /10       Time In: 1330  Time Out:1400    Progress Note: [x]  Yes  []  No  Next due by: Visit #10      Subjective:   Patient reporting 3-4/10 low back pain. Left leg discomfort after sitting for prolonged periods. Patient noting has done well with exs. Objective: KAYLA per flowsheet. Reviewed HEP and progressed program for LE strength and core strengthening. Patient noting mod soreness after last session. Patient given updated HEP and will see in 2 weeks for progression of there-ex. Observation:   Test measurements:      Exercises:   Exercise/Equipment Resistance/Repetitions Other comments        Prone lying      Prone Prop  3' HEP    Prone hip ext  10x HEP              Abd Braceing  5\" x 10  HEP         Counter Exs 10x HEP     squats  Add next     Steps  Add next    Seated ham stretch   HEP    Modified Trunk Ext   HEP         [] Provided verbal/tactile cueing for activities related to strengthening, flexibility, endurance, ROM. (15265)  [] Provided verbal/tactile cueing for activities related to improving balance, coordination, kinesthetic sense, posture, motor skill, proprioception. (12926)    Therapeutic Activities:     [] Therapeutic activities, direct (one-on-one) patient contact (use of dynamic activities to improve functional performance). (07382)    Gait:   [] Provided training and instruction to the patient for ambulation re-education. (93120)    Self-Care/ADL's  [] Self-care/home management training and compensatory training, meal preparation, safety procedures, and instructions in use of assistive technology devices/adaptive equipment, direct one-on-one contact. (62217)    Home Exercise Program:     [] Reviewed/Progressed HEP activities related to strengthening, flexibility, endurance, ROM. (16428)  [] Reviewed/Progressed HEP activities related to improving balance, coordination, kinesthetic sense, posture, motor skill, proprioception.  (75546)    Manual Treatments:    [] Provided manual therapy to mobilize soft tissue/joints for the purpose of modulating pain, promoting relaxation,  increasing ROM, reducing/eliminating soft tissue swelling/inflammation/restriction, improving soft tissue extensibility. (10226)    Service Based Modalities:      Timed Code Treatment Minutes: Total Treatment Minutes:   30    Treatment/Activity Tolerance:  [x] Patient tolerated treatment well [] Patient limited by fatique  [] Patient limited by pain  [] Patient limited by other medical complications  [] Other:     Prognosis: [x] Good [] Fair  [] Poor    Patient Requires Follow-up: [x] Yes  [] No      Goals:  Short term goals  Time Frame for Short term goals: 3 weeks  Short term goal 1: Intiatie HEP    Long term goals  Time Frame for Long term goals : 6 weeks  Long term goal 1: Indpendent in HEP  Long term goal 2: Improve LE strength to 5/5. Long term goal 3: Patient to be able to tolerate sitting up to 1 hour without increase in pain.   Long term goal 4: Patient to note no sleep disturbances without increase in pain          Plan:   [] Continue per plan of care [] Alter current plan (see comments)  [x] Plan of care initiated [] Hold pending MD visit [] Discharge  Plan for Next Session:      Electronically signed by:  Sheldon Agee, PT

## 2022-01-27 ENCOUNTER — HOSPITAL ENCOUNTER (OUTPATIENT)
Dept: PHYSICAL THERAPY | Age: 57
Setting detail: THERAPIES SERIES
Discharge: HOME | End: 2022-01-27
Payer: COMMERCIAL

## 2022-01-27 PROCEDURE — 97110 THERAPEUTIC EXERCISES: CPT

## 2022-01-27 NOTE — FLOWSHEET NOTE
Physical Therapy Daily Treatment Note    Date:  2022    Patient Name:  Ameya Cabrera    :  1965  MRN: [id number]  Restrictions/Precautions:     Medical/Treatment Diagnosis Information:   · Diagnosis: Z98.1 (ICD-10-CM) - S/P lumbar fusion, M51.36, M43.16 (ICD-10-CM) - Lumbar adjacent segment disease with spondylolisthesis     Insurance/Certification information:  PT Insurance Information: [employer]  Physician Information:  Referring Practitioner: AARON Valladares CNP  Plan of care signed (Y/N):  n  Visit# / total visits:  3/10  Pain level: /10       Time In: 1425  Time KEP:2326    Progress Note: [x]  Yes  []  No  Next due by: Visit #10      Subjective:   Patient reporting 3-4/10 low back pain. Patient noting able to complete 10 reps x 3, 2x/day. Patient noting improvement with LE strength and improvement in strength and balance. Sitting still difficult for work, difficult to sit longer one hour. Objective: KAYLA per flowsheet. Reviewed HEP and progressed program for LE strength and core strengthening. Patient noting reduced pain after completing HEP's. Patient given updated HEP and will see in 2 weeks for progression of there-ex.   Observation:   Test measurements:      Exercises:   Exercise/Equipment Resistance/Repetitions Other comments        Prone lying      Prone Prop  6' HEP    Prone hip ext   HEP              Abd Braceing   HEP         Counter Exs  HEP     squats  10x HEP    Steps  10x HEP    Seated ham stretch   HEP    Modified Trunk Ext   HEP    Rows/Ext Green x 10 ea  HEP    Multifidous  Green x 10 ea  HEP              [] Provided verbal/tactile cueing for activities related to strengthening, flexibility, endurance, ROM. (81338)  [] Provided verbal/tactile cueing for activities related to improving balance, coordination, kinesthetic sense, posture, motor skill, proprioception. (25810)    Therapeutic Activities:     [] Therapeutic activities, direct (one-on-one) patient contact (use of dynamic activities to improve functional performance). (97506)    Gait:   [] Provided training and instruction to the patient for ambulation re-education. (72609)    Self-Care/ADL's  [] Self-care/home management training and compensatory training, meal preparation, safety procedures, and instructions in use of assistive technology devices/adaptive equipment, direct one-on-one contact. (45875)    Home Exercise Program:     [] Reviewed/Progressed HEP activities related to strengthening, flexibility, endurance, ROM. (82520)  [] Reviewed/Progressed HEP activities related to improving balance, coordination, kinesthetic sense, posture, motor skill, proprioception.  (05558)    Manual Treatments:    [] Provided manual therapy to mobilize soft tissue/joints for the purpose of modulating pain, promoting relaxation,  increasing ROM, reducing/eliminating soft tissue swelling/inflammation/restriction, improving soft tissue extensibility. (99594)    Service Based Modalities:      Timed Code Treatment Minutes:   28'    Total Treatment Minutes:   28    Treatment/Activity Tolerance:  [x] Patient tolerated treatment well [] Patient limited by fatique  [] Patient limited by pain  [] Patient limited by other medical complications  [] Other:     Prognosis: [x] Good [] Fair  [] Poor    Patient Requires Follow-up: [x] Yes  [] No      Goals:  Short term goals  Time Frame for Short term goals: 3 weeks  Short term goal 1: Intiatie HEP    Long term goals  Time Frame for Long term goals : 6 weeks  Long term goal 1: Indpendent in HEP  Long term goal 2: Improve LE strength to 5/5. Long term goal 3: Patient to be able to tolerate sitting up to 1 hour without increase in pain.   Long term goal 4: Patient to note no sleep disturbances without increase in pain          Plan:   [] Continue per plan of care [] Alter current plan (see comments)  [] Plan of care initiated [] Hold pending MD visit [] Discharge  Plan for Next Session:  See in 2 weeks, if doing well and Dr happy with current progress, will DC to HEP.   Electronically signed by:  Lynnette Beckwith PT

## 2022-01-31 ENCOUNTER — PATIENT MESSAGE (OUTPATIENT)
Dept: FAMILY MEDICINE CLINIC | Age: 57
End: 2022-01-31

## 2022-01-31 ENCOUNTER — HOSPITAL ENCOUNTER (OUTPATIENT)
Dept: GENERAL RADIOLOGY | Age: 57
Discharge: HOME | End: 2022-02-02
Payer: COMMERCIAL

## 2022-01-31 DIAGNOSIS — M51.36 LUMBAR ADJACENT SEGMENT DISEASE WITH SPONDYLOLISTHESIS: ICD-10-CM

## 2022-01-31 DIAGNOSIS — Z98.1 S/P LUMBAR FUSION: ICD-10-CM

## 2022-01-31 DIAGNOSIS — H92.13 EAR DISCHARGE OF BOTH EARS: ICD-10-CM

## 2022-01-31 DIAGNOSIS — L29.9 EAR ITCHING: ICD-10-CM

## 2022-01-31 DIAGNOSIS — E10.9 TYPE 1 DIABETES MELLITUS WITHOUT COMPLICATION (HCC): Primary | ICD-10-CM

## 2022-01-31 DIAGNOSIS — M43.16 LUMBAR ADJACENT SEGMENT DISEASE WITH SPONDYLOLISTHESIS: ICD-10-CM

## 2022-01-31 DIAGNOSIS — H62.43 OTOMYCOSIS OF BOTH EARS: ICD-10-CM

## 2022-01-31 DIAGNOSIS — B36.9 OTOMYCOSIS OF BOTH EARS: ICD-10-CM

## 2022-01-31 DIAGNOSIS — H93.8X3 SENSATION OF PLUGGED EAR ON BOTH SIDES: ICD-10-CM

## 2022-01-31 PROCEDURE — 72100 X-RAY EXAM L-S SPINE 2/3 VWS: CPT

## 2022-02-01 NOTE — TELEPHONE ENCOUNTER
From: Vinny Angelo  To: Dr. Kristine Bartlett: 1/31/2022 9:58 AM EST  Subject: Ears    This isnt working. As soon as I stop the drops the wetness and flaking skin in my ears starts again. I think maybe its time to see the ENT unless you have another idea you would like to try.

## 2022-02-02 ENCOUNTER — VIRTUAL VISIT (OUTPATIENT)
Dept: NEUROSURGERY | Age: 57
End: 2022-02-02

## 2022-02-02 DIAGNOSIS — M43.16 LUMBAR ADJACENT SEGMENT DISEASE WITH SPONDYLOLISTHESIS: Primary | ICD-10-CM

## 2022-02-02 DIAGNOSIS — M51.36 LUMBAR ADJACENT SEGMENT DISEASE WITH SPONDYLOLISTHESIS: Primary | ICD-10-CM

## 2022-02-02 PROCEDURE — 99024 POSTOP FOLLOW-UP VISIT: CPT | Performed by: NEUROLOGICAL SURGERY

## 2022-02-02 RX ORDER — OXYCODONE HYDROCHLORIDE AND ACETAMINOPHEN 5; 325 MG/1; MG/1
1 TABLET ORAL 2 TIMES DAILY PRN
Qty: 14 TABLET | Refills: 0 | Status: SHIPPED | OUTPATIENT
Start: 2022-02-02 | End: 2022-02-14 | Stop reason: SDUPTHER

## 2022-02-02 NOTE — PROGRESS NOTES
2022    TELEHEALTH EVALUATION -- Audio/Visual (During NICAW-86 public health emergency)    HPI:    Lary Koenig (:  1965) has requested an audio/video evaluation for the following concern(s):    Was doing well with 2-3/10 and has been having more pain since more aggressive PT in upper buttock and back. No radiating pain down LE at all except minimal after PT. Overall doing very well. Review of Systems    Prior to Visit Medications    Medication Sig Taking? Authorizing Provider   clotrimazole (LOTRIMIN) 1 % external solution Instill 4 to 5 drops into the affected ear(s) twice daily for 10 to 14 days  Maddy Ibrahim MD   lipase-protease-amylase (CREON) 44965-796114 units CPEP delayed release capsule Take 2 tablets by mouth 3 times daily  Patient not taking: Reported on 2021  Loy Catalan MD   levothyroxine (SYNTHROID) 50 MCG tablet Take 1 tablet by mouth daily  Ousmane So MD   losartan-hydroCHLOROthiazide (HYZAAR) 50-12.5 MG per tablet Take 1 tablet by mouth daily  Ousmane So MD   Crisaborole (EUCRISA) 2 % OINT Apply 1 applicator topically 2 times daily  Patient taking differently: Apply 1 applicator topically 2 times daily as needed   Ousmane So MD   insulin lispro (HUMALOG) 100 UNIT/ML injection vial Use 60 units per day in pump  Ousmane So MD   Vitamin D (CHOLECALCIFEROL) 1000 UNITS CAPS capsule Take 4,000 Units by mouth daily   Loy Catalan MD   Insulin Lispro, Human, (INSULIN PUMP) FLORENTIN Inject 1 each into the skin See Admin Instructions. Historical Provider, MD       Social History     Tobacco Use    Smoking status: Former Smoker     Packs/day: 0.00     Years: 28.00     Pack years: 0.00     Types: Cigarettes     Start date: 1985     Quit date: 2014     Years since quittin.4    Smokeless tobacco: Never Used   Vaping Use    Vaping Use: Never used   Substance Use Topics    Alcohol use:  Yes     Alcohol/week: 3.0 standard drinks     Types: 3 Standard drinks or equivalent per week     Comment: Socially. -weekly    Drug use: No        Allergies   Allergen Reactions    Ace Inhibitors Other (See Comments)     Cough      Penicillins Rash and Other (See Comments)    Sertraline Other (See Comments)     Hypertension   ,   Past Medical History:   Diagnosis Date    Bronchitis     Diabetes type 1, controlled ([address]     Eczema     GERD (gastroesophageal reflux disease)     Gestational diabetes     Hyperlipidemia     Hypertension     Hypothyroid     Insomnia     Insulin pump in place     Dr. Susanna Baeza endocrinology [address]    Myopia with astigmatism and presbyopia     Tobacco abuse     Under care of team 11/22/2021    endocrine-[facility]-last visit nov 2021    Under care of team 11/22/2021    pcp- [address] [place] [address] Dr visit nov 2021   ,   Past Surgical History:   Procedure Laterality Date    BACK INJECTION Right 04/29/2021    Right Sacroiliac Joint Injection performed by Matt Hoskins MD at [address] [place] [address]  01/05/2015    Lumbar microdiscectomy L5-S1    BLADDER SUSPENSION  09/22/2009    (transobturator sling)   [address]    (laser cone)    COLONOSCOPY  10/17/2018    hyperplastic [facility], [facility] Isiah Delarosa [facility]  2006    LUMBAR FUSION N/A 12/9/2021    OLIF L4-5, C-ARM, LATERAL FLAT BOBBI, MEDTRONICS, MICROSCOPE performed by Khadra Butt DO at [facility] 12/9/2021    POSTERIOR FIXATION L4-5, O-ARM/STEALTH performed by Khadra Butt DO at [place] [address]  09/06/2017    Dr Mala Razo  12/09/2021    OLIF L4-5, Posterior fixation L4-5    NERVE BLOCK  02/16/2017    laser nerve block Dr Charly Steward- [facility]  03/09/2017    laser nerve block Dr Viki Aase Bilateral 03/13/2019    BL5 transforaminal radiculogram- [facility] per Dr Fatoumata Tenorio Right 05/29/2019    Right sacroiliac joint injection with arthrography    NERVE BLOCK Bilateral 2019    BL5 transforaminal radiculogram/epidurogram    PAIN MANAGEMENT PROCEDURE Bilateral 2021    Bilateral L4 TRANSFORAMINAL performed by Azra Macias MD at [facility]note 2] N/A 2021    L4-L5  INTERLAMINAR performed by Azra Macias MD at [facility]  2015    Lumbar L5-S1    US BREAST NEEDLE BIOPSY RIGHT Right 2021    US BREAST NEEDLE BIOPSY RIGHT 2021 MD ULTRASOUND   ,   Social History     Tobacco Use    Smoking status: Former Smoker     Packs/day: 0.00     Years: 28.00     Pack years: 0.00     Types: Cigarettes     Start date: 1985     Quit date: 2014     Years since quittin.4    Smokeless tobacco: Never Used   Vaping Use    Vaping Use: Never used   Substance Use Topics    Alcohol use: Yes     Alcohol/week: 3.0 standard drinks     Types: 3 Standard drinks or equivalent per week     Comment: Socially. -weekly    Drug use: No   ,   Family History   Problem Relation Age of Onset    Diabetes Paternal Grandmother     Cancer Father         lymph nodes    Ovarian Cancer Maternal Aunt 79        mother's half sister (same mother)    High Blood Pressure Mother     Glaucoma Neg Hx     Cataracts Neg Hx        PHYSICAL EXAMINATION:  [ INSTRUCTIONS:  \"[x]\" Indicates a positive item  \"[]\" Indicates a negative item  -- DELETE ALL ITEMS NOT EXAMINED]  Vital Signs: (As obtained by patient/caregiver or practitioner observation)    Blood pressure-  Heart rate-    Respiratory rate-    Temperature-  Pulse oximetry-     Constitutional: [x] Appears well-developed and well-nourished [x] No apparent distress      [] Abnormal-   Mental status  [x] Alert and awake  [x] Oriented to person/place/time [x]Able to follow commands      Eyes:  EOM    [x]  Normal  [] Abnormal-  Sclera  [x]  Normal  [] Abnormal -         Discharge [x]  None visible  [] Abnormal -    HENT:   [x] Normocephalic, atraumatic.   [] Abnormal   [x] Mouth/Throat: Mucous membranes are moist.     External Ears [x] Normal  [] Abnormal-     Neck: [x] No visualized mass     Pulmonary/Chest: [x] Respiratory effort normal.  [x] No visualized signs of difficulty breathing or respiratory distress        [] Abnormal-      Musculoskeletal:   [x] Normal gait with no signs of ataxia         [x] Normal range of motion of neck        [] Abnormal-       Neurological:        [x] No Facial Asymmetry (Cranial nerve 7 motor function) (limited exam to video visit)          [x] No gaze palsy        [] Abnormal-         Skin:        [x] No significant exanthematous lesions or discoloration noted on facial skin         [] Abnormal-            Psychiatric:       [x] Normal Affect [x] No Hallucinations        [] Abnormal-     Other pertinent observable physical exam findings-     ASSESSMENT/PLAN:    Lumbar adjacent segment disease with spondylolisthesis at L3-4. Patient is recovering exceptionally well able to ambulate with minimal abdominal or back pain. Is recovering well with therapy and proceeding as expected. Requesting to discontinue formalized physical therapy as she has been through multiple regimens over the years and is fully versed in the appropriate stretching isometric and core exercises that are necessary. I reiterated to her that she needs to maintain motivated and consistent in terms of perform the exercises and at least the stretches on a daily basis and the patient agreed. She also no longer needs to be in a brace. I did refill her Percocet at 2 tablets/day and stated that she is still within the timeframe for refills and we will continue with a weekly wean as she calls. Follow-up 2 months with repeat x-rays    [facility], was evaluated through a synchronous (real-time) audio-video encounter. The patient (or guardian if applicable) is aware that this is a billable service, which includes applicable co-pays.  This Virtual Visit was conducted with patient's (and/or legal guardian's) consent. The visit was conducted pursuant to the emergency declaration under the [address] [place] [address] authority and the [employer] Act. Patient identification was verified, and a caregiver was present when appropriate. The patient was located at home in a state where the provider was licensed to provide care. Total time spent on this encounter: 15    --Marsha Ruff DO on 2/2/2022 at 1:15 PM    An electronic signature was used to authenticate this note.

## 2022-02-09 ENCOUNTER — HOSPITAL ENCOUNTER (OUTPATIENT)
Dept: MAMMOGRAPHY | Age: 57
Discharge: HOME | End: 2022-02-11
Payer: COMMERCIAL

## 2022-02-09 ENCOUNTER — HOSPITAL ENCOUNTER (OUTPATIENT)
Dept: ULTRASOUND IMAGING | Age: 57
Discharge: HOME | End: 2022-02-11
Payer: COMMERCIAL

## 2022-02-09 DIAGNOSIS — R92.8 ABNORMAL MAMMOGRAM OF RIGHT BREAST: Primary | ICD-10-CM

## 2022-02-09 DIAGNOSIS — R92.8 ABNORMAL MAMMOGRAM OF RIGHT BREAST: ICD-10-CM

## 2022-02-09 PROCEDURE — G0279 TOMOSYNTHESIS, MAMMO: HCPCS

## 2022-02-09 PROCEDURE — 76642 ULTRASOUND BREAST LIMITED: CPT

## 2022-02-11 ENCOUNTER — PATIENT MESSAGE (OUTPATIENT)
Dept: NEUROSURGERY | Age: 57
End: 2022-02-11

## 2022-02-14 DIAGNOSIS — M51.36 LUMBAR ADJACENT SEGMENT DISEASE WITH SPONDYLOLISTHESIS: ICD-10-CM

## 2022-02-14 DIAGNOSIS — M43.16 LUMBAR ADJACENT SEGMENT DISEASE WITH SPONDYLOLISTHESIS: ICD-10-CM

## 2022-02-14 RX ORDER — OXYCODONE HYDROCHLORIDE AND ACETAMINOPHEN 5; 325 MG/1; MG/1
1 TABLET ORAL 2 TIMES DAILY PRN
Qty: 10 TABLET | Refills: 0 | Status: SHIPPED | OUTPATIENT
Start: 2022-02-14 | End: 2022-02-21 | Stop reason: SDUPTHER

## 2022-02-14 NOTE — TELEPHONE ENCOUNTER
From: Anupama Mosley  To: Dr. Rodriguez Dus: 2/11/2022 5:46 PM EST  Subject: Pain meds     Can you refill my Percocet please thanks you

## 2022-02-14 NOTE — TELEPHONE ENCOUNTER
Patient calling for refill of percocet.     Date of last fill: 2.2.2022  Surgery: 12.2.2021  Time elapsed since last surgery: 9 weeks  Date of next follow up appointment: 4.8.2022    Pt notified response time for refills is 24-48 hours

## 2022-02-21 DIAGNOSIS — M43.16 LUMBAR ADJACENT SEGMENT DISEASE WITH SPONDYLOLISTHESIS: Primary | ICD-10-CM

## 2022-02-21 DIAGNOSIS — M51.36 LUMBAR ADJACENT SEGMENT DISEASE WITH SPONDYLOLISTHESIS: Primary | ICD-10-CM

## 2022-02-21 RX ORDER — OXYCODONE HYDROCHLORIDE AND ACETAMINOPHEN 5; 325 MG/1; MG/1
1 TABLET ORAL DAILY PRN
Qty: 7 TABLET | Refills: 0 | Status: SHIPPED | OUTPATIENT
Start: 2022-02-21 | End: 2022-02-28

## 2022-02-21 NOTE — RESULT ENCOUNTER NOTE
I spoke with Bianca Friday. She sent a note to radiologist. They recommend excisional biopsy. You also recommended excisional biopsy at 8/5/21 office visit. Patient had a steriotactic biopsy in [address]

## 2022-03-03 ENCOUNTER — OFFICE VISIT (OUTPATIENT)
Dept: OTOLARYNGOLOGY | Age: 57
End: 2022-03-03
Payer: COMMERCIAL

## 2022-03-03 VITALS
WEIGHT: 157 LBS | DIASTOLIC BLOOD PRESSURE: 78 MMHG | HEART RATE: 71 BPM | RESPIRATION RATE: 18 BRPM | HEIGHT: 62 IN | SYSTOLIC BLOOD PRESSURE: 132 MMHG | OXYGEN SATURATION: 98 % | BODY MASS INDEX: 28.89 KG/M2

## 2022-03-03 DIAGNOSIS — H60.8X3 CHRONIC ECZEMATOUS OTITIS EXTERNA OF BOTH EARS: Primary | ICD-10-CM

## 2022-03-03 PROCEDURE — 99203 OFFICE O/P NEW LOW 30 MIN: CPT | Performed by: OTOLARYNGOLOGY

## 2022-03-03 PROCEDURE — 3017F COLORECTAL CA SCREEN DOC REV: CPT | Performed by: OTOLARYNGOLOGY

## 2022-03-03 PROCEDURE — G8427 DOCREV CUR MEDS BY ELIG CLIN: HCPCS | Performed by: OTOLARYNGOLOGY

## 2022-03-03 PROCEDURE — 1036F TOBACCO NON-USER: CPT | Performed by: OTOLARYNGOLOGY

## 2022-03-03 PROCEDURE — G8484 FLU IMMUNIZE NO ADMIN: HCPCS | Performed by: OTOLARYNGOLOGY

## 2022-03-03 PROCEDURE — 99212 OFFICE O/P EST SF 10 MIN: CPT | Performed by: OTOLARYNGOLOGY

## 2022-03-03 PROCEDURE — G8417 CALC BMI ABV UP PARAM F/U: HCPCS | Performed by: OTOLARYNGOLOGY

## 2022-03-03 RX ORDER — FLUOCINOLONE ACETONIDE 0.11 MG/ML
OIL AURICULAR (OTIC)
Qty: 20 ML | Refills: 0 | Status: SHIPPED | OUTPATIENT
Start: 2022-03-03 | End: 2022-03-17

## 2022-03-03 NOTE — PROGRESS NOTES
3/3/2022 2:39 PM NIMO Waldron (:  1965) is a 49 y.o. female,New patient, here for evaluation of the following chief complaint(s):  Ear Problem (nw pt- ear infections, fungal infections, itchy ears)      ASSESSMENT/PLAN:  1. Chronic eczematous otitis externa of both ears      1. Chronic eczematous otitis externa of both ears    Discussed the diagnosis of chronic otitis externa and chronic eczematous otitis externa  Derm otic drops  Also discussed prednisolone drops for dexamethasone ophthalmic suspension  Follow-up to review response  Discussed boric acid powder    No follow-ups on file. SUBJECTIVE/OBJECTIVE:  HPI   52yo woman with chronic history of bilateral ear canal itching, flaking. She has a strong personal history of eczema across the face. For the past 2 years she has noticed dryness, cracking, itching and intermittent wetness. It worsened more recently and she has been on multiple antibiotic drops followed by antifungal drops as well as oral antibiotics. She believes that the bacteria and fungus have improved but continues to have is bothersome flaking and itching quality. No hearing change.      Past Medical History:   Diagnosis Date    Bronchitis     Diabetes type 1, controlled ([address]     Eczema     GERD (gastroesophageal reflux disease)     Gestational diabetes     Hyperlipidemia     Hypertension     Hypothyroid     Insomnia     Insulin pump in place     Dr. Paul Maddox endocrinology [address]    Myopia with astigmatism and presbyopia     Tobacco abuse     Under care of team 2021    endocrine-Dr [address] visit 2021    Under care of team 2021    pcp-Dr Chairez [facility]  visit 2021     Past Surgical History:   Procedure Laterality Date    BACK INJECTION Right 2021    Right Sacroiliac Joint Injection performed by Macrina Loving MD at [facility]  2015    Lumbar microdiscectomy L5-S1    BLADDER SUSPENSION  2009 (transobturator sling)    CERVIX LESION DESTRUCTION  1993    (laser cone)    COLONOSCOPY  10/17/2018    hyperplastic polyp, [facility]. Claudell Kemps [facility]  2006   [address] [facility] 12/9/2021    OLIF L4-5, C-ARM, LATERAL FLAT BOBBI, MEDTRONICS, MICROSCOPE performed by Alberta Lara DO at [address] N/A 12/9/2021    POSTERIOR FIXATION L4-5, O-ARM/STEALTH performed by Alberta Lara DO at [facility]  09/06/2017    Dr Janki Barry  12/09/2021    OLIF L4-5, Posterior fixation L4-5    NERVE BLOCK  02/16/2017    laser nerve block Dr Yahir Vega Gilson Doherty  03/09/2017    laser nerve block Dr Manda Delgado Bilateral 03/13/2019    BL5 transforaminal radiculogram- [facility] per Dr Leoncio Naylor Right 05/29/2019    Right sacroiliac joint injection with arthrography    NERVE BLOCK Bilateral 07/31/2019    BL5 transforaminal radiculogram/epidurogram    PAIN MANAGEMENT PROCEDURE Bilateral 08/13/2021    Bilateral L4 TRANSFORAMINAL performed by Neftaly Nick MD at [facility] N/A 09/02/2021    L4-L5  INTERLAMINAR performed by Neftaly Nick MD at [facility]  07/06/2015    Lumbar L5-S1    US BREAST NEEDLE BIOPSY RIGHT Right 07/12/2021    US BREAST NEEDLE BIOPSY RIGHT 7/12/2021 [facility] ULTRASOUND     Social History     Tobacco History     Smoking Status  Former Smoker Smoking Start Date  1/1/1985 Quit date  8/16/2014 Smoking Frequency  0 packs/day for 28 years (0 pk yrs)    Smoking Tobacco Type  Cigarettes    Smokeless Tobacco Use  Never Used          Alcohol History     Alcohol Use Status  Yes Drinks/Week  3 Standard drinks or equivalent per week Amount  3.0 standard drinks of alcohol/wk Comment  Socially. -weekly          Drug Use     Drug Use Status  No          Sexual Activity     Sexually Active  Yes Partners  Male              Family History   Problem Relation Age of Onset    Diabetes Paternal Grandmother     Cancer Father         lymph nodes    Ovarian Cancer Maternal Aunt 79        mother's half sister (same mother)    High Blood Pressure Mother     Glaucoma Neg Hx     Cataracts Neg Hx      Current Outpatient Medications   Medication Instructions    clotrimazole (LOTRIMIN) 1 % external solution Instill 4 to 5 drops into the affected ear(s) twice daily for 10 to 14 days    Crisaborole (EUCRISA) 2 % OINT 1 applicator, Apply externally, 2 TIMES DAILY    fluocinolone (DERMOTIC) 0.01 % OIL oil Apply 4 drops to affected ear(s) twice a day for 7 days and then once a day as needed for itching    insulin lispro (HUMALOG) 100 UNIT/ML injection vial Use 60 units per day in pump    Insulin Lispro, Human, (INSULIN PUMP) FLORENTIN 1 each, SEE ADMIN INSTRUCTIONS    levothyroxine (SYNTHROID) 50 mcg, Oral, DAILY    lipase-protease-amylase (CREON) 05938-921792 units CPEP delayed release capsule 2 tablets, 3 TIMES DAILY    losartan-hydroCHLOROthiazide (HYZAAR) 50-12.5 MG per tablet 1 tablet, Oral, DAILY    Vitamin D (CHOLECALCIFEROL) 4,000 Units, Oral, DAILY     Allergies   Allergen Reactions    Ace Inhibitors Other (See Comments)     Cough      Penicillins Rash and Other (See Comments)    Sertraline Other (See Comments)     Hypertension       ENT ROS: positive for - ear itching     General: The patient is found to be alert and normally responsive female. Hearing: grossly normal   Voice: Clear   Skin: The skin has normal colour and turgor. Face: The facial contour is symmetric at rest and with movement. Ears: The pinnae have normal contours. Bilateral tragus dry, flaking, irritated raw characteristics of the skin  AD: EAC clear, TM intact, no effusion/erythema/retraction   AS: EAC clear, TM intact, no effusion/erythema/retraction   Eye: The ocular movements are full and symmetric, the conjunctiva is unremarkable; sclera are anicteric, pupillary response is symmetric. No nystagmus is found. Nose:   The external nasal contour is normal  Anterior clear  Oral cavity:   Anterior clear  Neck: The neck has a normal contour; no masses are found on palpation        An electronic signature was used to authenticate this note.     --Yuniel Hamlin MD     3/3/2022 2:39 PM EST

## 2022-03-17 ENCOUNTER — OFFICE VISIT (OUTPATIENT)
Dept: OTOLARYNGOLOGY | Age: 57
End: 2022-03-17
Payer: COMMERCIAL

## 2022-03-17 VITALS
HEIGHT: 62 IN | SYSTOLIC BLOOD PRESSURE: 118 MMHG | OXYGEN SATURATION: 99 % | DIASTOLIC BLOOD PRESSURE: 70 MMHG | RESPIRATION RATE: 16 BRPM | WEIGHT: 157 LBS | BODY MASS INDEX: 28.89 KG/M2 | HEART RATE: 70 BPM

## 2022-03-17 DIAGNOSIS — H60.8X3 CHRONIC ECZEMATOUS OTITIS EXTERNA OF BOTH EARS: Primary | ICD-10-CM

## 2022-03-17 PROCEDURE — 1036F TOBACCO NON-USER: CPT | Performed by: OTOLARYNGOLOGY

## 2022-03-17 PROCEDURE — G8427 DOCREV CUR MEDS BY ELIG CLIN: HCPCS | Performed by: OTOLARYNGOLOGY

## 2022-03-17 PROCEDURE — 3017F COLORECTAL CA SCREEN DOC REV: CPT | Performed by: OTOLARYNGOLOGY

## 2022-03-17 PROCEDURE — 99213 OFFICE O/P EST LOW 20 MIN: CPT | Performed by: OTOLARYNGOLOGY

## 2022-03-17 PROCEDURE — G8417 CALC BMI ABV UP PARAM F/U: HCPCS | Performed by: OTOLARYNGOLOGY

## 2022-03-17 PROCEDURE — 99212 OFFICE O/P EST SF 10 MIN: CPT | Performed by: OTOLARYNGOLOGY

## 2022-03-17 PROCEDURE — G8484 FLU IMMUNIZE NO ADMIN: HCPCS | Performed by: OTOLARYNGOLOGY

## 2022-03-17 RX ORDER — FLUOCINOLONE ACETONIDE 0.11 MG/ML
OIL AURICULAR (OTIC)
Qty: 20 ML | Refills: 5 | Status: SHIPPED | OUTPATIENT
Start: 2022-03-17

## 2022-03-17 NOTE — PROGRESS NOTES
3/17/2022 2:39 PM NIMO Herzog (:  1965) is a 49 y.o. female,New patient, here for evaluation of the following chief complaint(s):  Ear Problem (2 wk fu dermotic gtts)      ASSESSMENT/PLAN:  1. Chronic eczematous otitis externa of both ears      1. Chronic eczematous otitis externa of both ears    Discussed the diagnosis of chronic otitis externa and chronic eczematous otitis externa  Continue Derm otic drops  Also discussed prednisolone drops for dexamethasone ophthalmic suspension  Discussed boric acid powder    Doing well fu PRN     No follow-ups on file. SUBJECTIVE/OBJECTIVE:  HPI   50yo woman with chronic history of bilateral ear canal itching, flaking. She has a strong personal history of eczema across the face. For the past 2 years she has noticed dryness, cracking, itching and intermittent wetness. It worsened more recently and she has been on multiple antibiotic drops followed by antifungal drops as well as oral antibiotics. She believes that the bacteria and fungus have improved but continues to have is bothersome flaking and itching quality. No hearing change. She follows up about 2 weeks later. States that while she was on the drops her symptoms significantly improved. She is been off and on for about a week and has not used any but over the last day or 2 she started noticing a little bit of irritation and itching around the tragus and the meatus.     Past Medical History:   Diagnosis Date    Bronchitis     Diabetes type 1, controlled ([address]     Eczema     GERD (gastroesophageal reflux disease)     Gestational diabetes     Hyperlipidemia     Hypertension     Hypothyroid     Insomnia     Insulin pump in place     Dr. Finley Sham endocrinology [address]    Myopia with astigmatism and presbyopia     Tobacco abuse     Under care of team 2021    endocrine-Dr Crandall 52 visit 2021    Under care of team 2021    pcp- [address] [place] [address]  visit 2021 Past Surgical History:   Procedure Laterality Date    BACK INJECTION Right 04/29/2021    Right Sacroiliac Joint Injection performed by Nina Garsia MD at [facility]  01/05/2015    Lumbar microdiscectomy L5-S1    BLADDER SUSPENSION  09/22/2009    (transobturator sling)    CERVIX LESION DESTRUCTION  1993    (laser cone)    COLONOSCOPY  10/17/2018    hyperplastic polyp, Dr. Alyson Keller at [address] N/A 12/9/2021    OLIF L4-5, C-ARM, LATERAL FLAT BOBBI, MEDTRONICS, MICROSCOPE performed by Carl Castillo DO at [address] [facility] N/A 12/9/2021    POSTERIOR FIXATION L4-5, O-ARM/STEALTH performed by Carl Castillo DO at [place] [address]  09/06/2017    Dr Rebeca Miller  12/09/2021    OLIF L4-5, Posterior fixation L4-5    NERVE BLOCK  02/16/2017    laser nerve block Dr Matthew Madison Esther Ballesteros  03/09/2017    laser nerve block Dr Nish Muhammad Bilateral 03/13/2019    BL5 transforaminal radiculogram- [facility] per Dr Ana Vasquez Right 05/29/2019    Right sacroiliac joint injection with arthrography    NERVE BLOCK Bilateral 07/31/2019    BL5 transforaminal radiculogram/epidurogram    PAIN MANAGEMENT PROCEDURE Bilateral 08/13/2021    Bilateral L4 TRANSFORAMINAL performed by Nina Garsia MD at [place] [address] N/A 09/02/2021    L4-L5  INTERLAMINAR performed by Nina Garsia MD at [facility]  07/06/2015    Lumbar L5-S1    US BREAST NEEDLE BIOPSY RIGHT Right 07/12/2021    US BREAST NEEDLE BIOPSY RIGHT 7/12/2021 [facility] ULTRASOUND     Social History     Tobacco History     Smoking Status  Former Smoker Smoking Start Date  1/1/1985 Quit date  8/16/2014 Smoking Frequency  0 packs/day for 28 years (0 pk yrs)    Smoking Tobacco Type  Cigarettes    Smokeless Tobacco Use  Never Used          Alcohol History     Alcohol Use Status  Yes Drinks/Week  3 Standard drinks or equivalent per week Amount  3.0 standard drinks of alcohol/wk Comment  Socially. -weekly          Drug Use     Drug Use Status  No          Sexual Activity     Sexually Active  Yes Partners  Male              Family History   Problem Relation Age of Onset    Diabetes Paternal Grandmother     Cancer Father         lymph nodes    Ovarian Cancer Maternal Aunt 79        mother's half sister (same mother)    High Blood Pressure Mother     Glaucoma Neg Hx     Cataracts Neg Hx      Current Outpatient Medications   Medication Instructions    clotrimazole (LOTRIMIN) 1 % external solution Instill 4 to 5 drops into the affected ear(s) twice daily for 10 to 14 days    Crisaborole (EUCRISA) 2 % OINT 1 applicator, Apply externally, 2 TIMES DAILY    fluocinolone (DERMOTIC) 0.01 % OIL oil Apply 4 drops to affected ear(s) twice a day for 7 days and then once a day as needed for itching    insulin lispro (HUMALOG) 100 UNIT/ML injection vial Use 60 units per day in pump    Insulin Lispro, Human, (INSULIN PUMP) FLORENTIN 1 each, SEE ADMIN INSTRUCTIONS    levothyroxine (SYNTHROID) 50 mcg, Oral, DAILY    lipase-protease-amylase (CREON) 97576-834036 units CPEP delayed release capsule 2 tablets, 3 TIMES DAILY    losartan-hydroCHLOROthiazide (HYZAAR) 50-12.5 MG per tablet 1 tablet, Oral, DAILY    Vitamin D (CHOLECALCIFEROL) 4,000 Units, Oral, DAILY     Allergies   Allergen Reactions    Ace Inhibitors Other (See Comments)     Cough      Penicillins Rash and Other (See Comments)    Sertraline Other (See Comments)     Hypertension       ENT ROS: positive for - ear itching     General: The patient is found to be alert and normally responsive female. Hearing: grossly normal   Voice: Clear   Skin: The skin has normal colour and turgor. Face: The facial contour is symmetric at rest and with movement. Ears: The pinnae have normal contours.   Right tragus with mild dry, flaking, irritated raw characteristics of the skin, left tragus appears normal but there is some irritation to the skin of the meatus. AD: EAC clear, TM intact, no effusion/erythema/retraction   AS: EAC clear, TM intact, no effusion/erythema/retraction   Eye: The ocular movements are full and symmetric, the conjunctiva is unremarkable; sclera are anicteric, pupillary response is symmetric. No nystagmus is found. Nose:   The external nasal contour is normal  Anterior clear  Oral cavity:   Anterior clear  Neck: The neck has a normal contour; no masses are found on palpation        An electronic signature was used to authenticate this note.     --Audrey Núñez MD     3/17/2022 2:39 PM EST

## 2022-04-08 ENCOUNTER — OFFICE VISIT (OUTPATIENT)
Dept: NEUROSURGERY | Age: 57
End: 2022-04-08
Payer: COMMERCIAL

## 2022-04-08 ENCOUNTER — HOSPITAL ENCOUNTER (OUTPATIENT)
Dept: GENERAL RADIOLOGY | Age: 57
Discharge: HOME | End: 2022-04-10
Payer: COMMERCIAL

## 2022-04-08 VITALS
OXYGEN SATURATION: 99 % | DIASTOLIC BLOOD PRESSURE: 78 MMHG | BODY MASS INDEX: 27.84 KG/M2 | WEIGHT: 152.2 LBS | SYSTOLIC BLOOD PRESSURE: 130 MMHG | HEART RATE: 88 BPM

## 2022-04-08 DIAGNOSIS — M51.36 LUMBAR ADJACENT SEGMENT DISEASE WITH SPONDYLOLISTHESIS: ICD-10-CM

## 2022-04-08 DIAGNOSIS — M51.36 LUMBAR ADJACENT SEGMENT DISEASE WITH SPONDYLOLISTHESIS: Primary | ICD-10-CM

## 2022-04-08 DIAGNOSIS — M43.16 LUMBAR ADJACENT SEGMENT DISEASE WITH SPONDYLOLISTHESIS: Primary | ICD-10-CM

## 2022-04-08 DIAGNOSIS — M43.16 LUMBAR ADJACENT SEGMENT DISEASE WITH SPONDYLOLISTHESIS: ICD-10-CM

## 2022-04-08 PROCEDURE — 1036F TOBACCO NON-USER: CPT | Performed by: NEUROLOGICAL SURGERY

## 2022-04-08 PROCEDURE — 72100 X-RAY EXAM L-S SPINE 2/3 VWS: CPT

## 2022-04-08 PROCEDURE — 3017F COLORECTAL CA SCREEN DOC REV: CPT | Performed by: NEUROLOGICAL SURGERY

## 2022-04-08 PROCEDURE — G8417 CALC BMI ABV UP PARAM F/U: HCPCS | Performed by: NEUROLOGICAL SURGERY

## 2022-04-08 PROCEDURE — G8427 DOCREV CUR MEDS BY ELIG CLIN: HCPCS | Performed by: NEUROLOGICAL SURGERY

## 2022-04-08 PROCEDURE — 99213 OFFICE O/P EST LOW 20 MIN: CPT | Performed by: NEUROLOGICAL SURGERY

## 2022-04-08 NOTE — PROGRESS NOTES
[place] [id number] [email or web address]  [address] [facility]  [address] [facility], [address]  [place] [address]  Dept: [phone]    Patient:  Pb Perez  YOB: 1965  Date: 4/8/22    The patient is a 49 y.o. female who presents today for consult of the following problems:     Chief Complaint   Patient presents with    Follow-up     2 mo (needs x rays?)    Back Pain     pt reports stiffnes in back after waking              HPI:     Pb Perez is a 49 y.o. female on whom neurosurgical consultation was requested by Autumn Williamson MD for management of lumbar spondylosis with radiculopathy. Resolved lower extremity pain. Does have a patch of numbness along the left anterior lateral thigh with some sensation that is decreased. Overall her axial pain is significant proved and she states she is been walking upwards of 3 miles a day. She does notice weather changes and when it is rainy or overcast does have worsened discomfort in her back. Overall she rates her a.m. back pain at approximately 4 out of 10 in her basal back pain across the course the day approximately 2-3 out of 10. She specifically denied limitation of any type of activities or quality of life based on axial discomfort. Critical access hospital Billing       History:     Past Medical History:   Diagnosis Date    Bronchitis     Diabetes type 1, controlled ([address]     Eczema     GERD (gastroesophageal reflux disease)     Gestational diabetes     Hyperlipidemia     Hypertension     Hypothyroid     Insomnia     Insulin pump in place     Dr. Meño Lowry endocrinology [facility]    Myopia with astigmatism and presbyopia     Tobacco abuse     Under care of team 11/22/2021    endocrine-Dr Crandall 52 visit nov 2021    Under care of team 11/22/2021    pcp- [address] [place] [address]  visit nov 2021     Past Surgical History:   Procedure Laterality Date    BACK INJECTION Right 04/29/2021 Right Sacroiliac Joint Injection performed by Blas Capps MD at [address] [facility]  01/05/2015    Lumbar microdiscectomy L5-S1    BLADDER SUSPENSION  09/22/2009    (transobturator sling)    CERVIX LESION DESTRUCTION  1993    (laser cone)    COLONOSCOPY  10/17/2018    hyperplastic polyp, Dr. Sussy Fuller at [address] N/A 12/9/2021    OLIF L4-5, C-ARM, LATERAL FLAT BOBBI, MEDTRONICS, MICROSCOPE performed by Aaliyah Cortes DO at [address] [facility] N/A 12/9/2021    POSTERIOR FIXATION L4-5, O-ARM/STEALTH performed by Aaliyah Cortes DO at [place] [address]  09/06/2017    Dr Maciel Madison  12/09/2021    OLIF L4-5, Posterior fixation L4-5    NERVE BLOCK  02/16/2017    laser nerve block Dr Pepper Blancas Bring  03/09/2017    laser nerve block Dr Ofelia Lara Bilateral 03/13/2019    BL5 transforaminal radiculogram- [facility] per Dr Michael Feliz Right 05/29/2019    Right sacroiliac joint injection with arthrography    NERVE BLOCK Bilateral 07/31/2019    BL5 transforaminal radiculogram/epidurogram    PAIN MANAGEMENT PROCEDURE Bilateral 08/13/2021    Bilateral L4 TRANSFORAMINAL performed by Blas Capps MD at [facility] N/A 09/02/2021    L4-L5  INTERLAMINAR performed by Blas Capps MD at [facility]  07/06/2015    Lumbar L5-S1    US BREAST NEEDLE BIOPSY RIGHT Right 07/12/2021    US BREAST NEEDLE BIOPSY RIGHT 7/12/2021 [facility] [facility] ULTRASOUND     Family History   Problem Relation Age of Onset    Diabetes Paternal Grandmother     Cancer Father         lymph nodes    Ovarian Cancer Maternal Aunt 79        mother's half sister (same mother)    High Blood Pressure Mother     Glaucoma Neg Hx     Cataracts Neg Hx      Current Outpatient Medications on File Prior to Visit   Medication Sig Dispense Refill    clotrimazole (LOTRIMIN) 1 % external solution Instill 4 to 5 drops into the affected ear(s) twice daily for 10 to 14 days 10 mL 0    levothyroxine (SYNTHROID) 50 MCG tablet Take 1 tablet by mouth daily 30 tablet 5    losartan-hydroCHLOROthiazide (HYZAAR) 50-12.5 MG per tablet Take 1 tablet by mouth daily 90 tablet 3    Crisaborole (EUCRISA) 2 % OINT Apply 1 applicator topically 2 times daily 1 Tube 5    insulin lispro (HUMALOG) 100 UNIT/ML injection vial Use 60 units per day in pump 1 vial 3    Vitamin D (CHOLECALCIFEROL) 1000 UNITS CAPS capsule Take 4,000 Units by mouth daily       Insulin Lispro, Human, (INSULIN PUMP) FLORENTIN Inject 1 each into the skin See Admin Instructions.  fluocinolone (DERMOTIC) 0.01 % OIL oil Apply 4 drops to affected ear(s) twice a day for 7 days and then once a day as needed for itching (Patient not taking: Reported on 2022) 20 mL 5    lipase-protease-amylase (CREON) 12411-180232 units CPEP delayed release capsule Take 2 tablets by mouth 3 times daily (Patient not taking: Reported on 2021)       No current facility-administered medications on file prior to visit. Social History     Tobacco Use    Smoking status: Former Smoker     Packs/day: 0.00     Years: 28.00     Pack years: 0.00     Types: Cigarettes     Start date: 1985     Quit date: 2014     Years since quittin.6    Smokeless tobacco: Never Used   Vaping Use    Vaping Use: Never used   Substance Use Topics    Alcohol use: Yes     Alcohol/week: 3.0 standard drinks     Types: 3 Standard drinks or equivalent per week     Comment: Socially. -weekly    Drug use: No       Allergies   Allergen Reactions    Ace Inhibitors Other (See Comments)     Cough      Penicillins Rash and Other (See Comments)    Sertraline Other (See Comments)     Hypertension       Review of Systems  ROS: back pain    Physical Exam:      /78 (Site: Left Upper Arm, Position: Sitting)   Pulse 88   Wt 152 lb 3.2 oz (69 kg)   SpO2 99%   BMI 27.84 kg/m²   Estimated body mass index is 27.84 kg/m² as calculated from the following:    Height as of 3/17/22: 5' 2\" (1.575 m). Weight as of this encounter: 152 lb 3.2 oz (69 kg). General:  Matthew Mercer is a 49y.o. year old female who appears her stated age. HEENT: Normocephalic atraumatic. Neck supple. Chest: regular rate; pulses equal. Equal chest rise and fall  Abdomen: Soft nondistended. Ext: DP equal with good capillary refill  Neuro    Mentation  Appropriate affect   oriented    Cranial Nerves:   Pupils equal and reactive to light  Extraocular motion intact  Face symmetric  No dysarthria  v1-3 sensation symmetric, masseter tone symmetric  Hearing symmetric and intact to finger rub    Sensation:   Left ant lateral thigh    Motor  L deltoid 5/5; R deltoid 5/5  L biceps 5/5; R biceps 5/5  L triceps 5/5; R triceps 5/5  L wrist extension 5/5; R wrist extension 5/5  L intrinsics 5/5; R intrinsics 5/5     L iliopsoas 5/5 , R iliopsoas 5/5  L quadriceps 5/5; R quadriceps 5/5  L Dorsiflexion 5/5; R dorsiflexion 5/5  L Plantarflexion 5/5; R plantarflexion 5/5  L EHL 5/5; R EHL 5/5    Reflexes  L Brachioradialis 2+/4; R brachioradialis 2+/4  L Biceps 2+/4; R Biceps 2+/4  L Triceps 2+/4; R Triceps 2+/4  L Patellar 2+/4: R Patellar 2+/4  L Achilles 2+/4; R Achilles 2+/4    hoffmans L: neg  hoffmans R: neg  Clonus L: neg  Clonus R: neg  Babinski L: up  Babinski R; up    Studies Review:     -    Assessment and Plan:      1. Lumbar adjacent segment disease with spondylolisthesis          Plan: doing very well. xrays today. Prn     Followup: No follow-ups on file. Prescriptions Ordered:  No orders of the defined types were placed in this encounter. Orders Placed:  No orders of the defined types were placed in this encounter. Electronically signed by Amauri Perry DO on 4/8/2022 at 9:57 AM    Please note that this chart was generated using voice recognition Dragon dictation software.   Although every effort was made to ensure the accuracy of this automated transcription, some errors in transcription may have occurred.

## 2022-05-06 NOTE — DISCHARGE SUMMARY
Patient seen x 2 sessions, for progression of HEP. Patient canceled last session, no return. See note dated 1/27/22 for further details.   Nemesio Briones PT

## 2022-05-24 ENCOUNTER — HOSPITAL ENCOUNTER (OUTPATIENT)
Dept: GENERAL RADIOLOGY | Age: 57
Discharge: HOME | End: 2022-05-26
Payer: COMMERCIAL

## 2022-05-24 ENCOUNTER — OFFICE VISIT (OUTPATIENT)
Dept: PAIN MANAGEMENT | Age: 57
End: 2022-05-24
Payer: COMMERCIAL

## 2022-05-24 VITALS
DIASTOLIC BLOOD PRESSURE: 82 MMHG | HEART RATE: 86 BPM | WEIGHT: 152.2 LBS | BODY MASS INDEX: 28.01 KG/M2 | HEIGHT: 62 IN | SYSTOLIC BLOOD PRESSURE: 138 MMHG

## 2022-05-24 DIAGNOSIS — M25.619 LIMITED RANGE OF MOTION (ROM) OF SHOULDER: ICD-10-CM

## 2022-05-24 DIAGNOSIS — G89.29 CHRONIC RIGHT SHOULDER PAIN: ICD-10-CM

## 2022-05-24 DIAGNOSIS — M54.12 CERVICAL RADICULOPATHY: ICD-10-CM

## 2022-05-24 DIAGNOSIS — M25.619 LIMITED RANGE OF MOTION (ROM) OF SHOULDER: Primary | ICD-10-CM

## 2022-05-24 DIAGNOSIS — M79.18 MYOFASCIAL PAIN ON RIGHT SIDE: ICD-10-CM

## 2022-05-24 DIAGNOSIS — M25.511 CHRONIC RIGHT SHOULDER PAIN: ICD-10-CM

## 2022-05-24 PROCEDURE — 72040 X-RAY EXAM NECK SPINE 2-3 VW: CPT

## 2022-05-24 PROCEDURE — 3017F COLORECTAL CA SCREEN DOC REV: CPT | Performed by: NURSE PRACTITIONER

## 2022-05-24 PROCEDURE — 73030 X-RAY EXAM OF SHOULDER: CPT

## 2022-05-24 PROCEDURE — G8427 DOCREV CUR MEDS BY ELIG CLIN: HCPCS | Performed by: NURSE PRACTITIONER

## 2022-05-24 PROCEDURE — 1036F TOBACCO NON-USER: CPT | Performed by: NURSE PRACTITIONER

## 2022-05-24 PROCEDURE — G8417 CALC BMI ABV UP PARAM F/U: HCPCS | Performed by: NURSE PRACTITIONER

## 2022-05-24 PROCEDURE — 99214 OFFICE O/P EST MOD 30 MIN: CPT | Performed by: NURSE PRACTITIONER

## 2022-05-24 ASSESSMENT — ENCOUNTER SYMPTOMS
RESPIRATORY NEGATIVE: 1
BACK PAIN: 1

## 2022-05-24 NOTE — PROGRESS NOTES
Subjective:      Patient ID: Esther Mack is a 49 y.o. female. Chief Complaint   Patient presents with    Shoulder Pain     ROM has changed - un able to lift right shoulder      Leg Pain   Associated symptoms include numbness (right arm). Other  Associated symptoms include arthralgias, myalgias and numbness (right arm). Hip Pain   Associated symptoms include numbness (right arm). Shoulder Pain   Associated symptoms include numbness (right arm).    New complaint, right shoulder/cervical    Numbness, limited right shouder accompanied by pain, right sided neck pain and tightness    Pain Assessment  Location of Pain: Shoulder  Location Modifiers: Right  Severity of Pain: 4  Quality of Pain: Sharp  Duration of Pain: Persistent  Frequency of Pain: Constant  Aggravating Factors:  (ROM)  Limiting Behavior: Yes  Relieving Factors: Rest,Ice,Nsaids  Result of Injury: No  Work-Related Injury: No    Allergies   Allergen Reactions    Ace Inhibitors Other (See Comments)     Cough      Penicillins Rash and Other (See Comments)    Sertraline Other (See Comments)     Hypertension       Outpatient Medications Marked as Taking for the 5/24/22 encounter (Office Visit) with AARON Medina - CNP   Medication Sig Dispense Refill    fluocinolone (DERMOTIC) 0.01 % OIL oil Apply 4 drops to affected ear(s) twice a day for 7 days and then once a day as needed for itching 20 mL 5    levothyroxine (SYNTHROID) 50 MCG tablet Take 1 tablet by mouth daily 30 tablet 5    losartan-hydroCHLOROthiazide (HYZAAR) 50-12.5 MG per tablet Take 1 tablet by mouth daily 90 tablet 3    Crisaborole (EUCRISA) 2 % OINT Apply 1 applicator topically 2 times daily 1 Tube 5    insulin lispro (HUMALOG) 100 UNIT/ML injection vial Use 60 units per day in pump 1 vial 3    Vitamin D (CHOLECALCIFEROL) 1000 UNITS CAPS capsule Take 4,000 Units by mouth daily       Insulin Lispro, Human, (INSULIN PUMP) FLORENTIN Inject 1 each into the skin See Admin Instructions.          Past Medical History:   Diagnosis Date    Bronchitis     Diabetes type 1, controlled ([address]     Eczema     GERD (gastroesophageal reflux disease)     Gestational diabetes     Hyperlipidemia     Hypertension     Hypothyroid     Insomnia     Insulin pump in place     Dr. Keith Denson endocrinology [address]    Myopia with astigmatism and presbyopia     Tobacco abuse     Under care of team 11/22/2021    endocrine-Dr Crandall 52 visit nov 2021    Under care of team 11/22/2021    pcp-Dr [facility]  visit nov 2021       Past Surgical History:   Procedure Laterality Date    BACK INJECTION Right 04/29/2021    Right Sacroiliac Joint Injection performed by Marc Dudley MD at [address] [address]  01/05/2015    Lumbar microdiscectomy L5-S1    BLADDER SUSPENSION  09/22/2009    (transobturator sling)   [address]    (laser cone)    COLONOSCOPY  10/17/2018    hyperplastic polyp, Dr. Trujillo Books at [facility]  2006    LUMBAR FUSION N/A 12/9/2021    OLIF L4-5, C-ARM, LATERAL FLAT BOBBI, MEDTRONICS, MICROSCOPE performed by Gladitood Cos, DO at [address] N/A 12/9/2021    POSTERIOR FIXATION L4-5, O-ARM/STEALTH [facility]ood [facility]  09/[facility]    Dr Gonzalo Cha  12/09/2021    OLIF L4-5, Posterior fixation L4-5    NERVE BLOCK  02/16/2017    laser nerve block Dr Terry Huggins- [facility]  03/09/2017    laser nerve block Dr Tomasz Kirkpatrick Bilateral 03/13/2019    BL5 transforaminal radiculogram- [facility] per Dr Deysi Chase Right 05/29/2019    Right sacroiliac joint injection with arthrography    NERVE BLOCK Bilateral 07/31/2019    BL5 transforaminal radiculogram/epidurogram    PAIN MANAGEMENT PROCEDURE Bilateral 08/13/2021    Bilateral L4 TRANSFORAMINAL performed by Marc Dudley MD at [place] [address] 2021    L4-L5  INTERLAMINAR performed by Wilnette Collet, MD at [address] [facility] [address]  2015    Lumbar L5-S1    US BREAST NEEDLE BIOPSY RIGHT Right 2021    US BREAST NEEDLE BIOPSY RIGHT 2021 [facility] ULTRASOUND       Family History   Problem Relation Age of Onset    Diabetes Paternal Grandmother     Cancer Father         lymph nodes    Ovarian Cancer Maternal Aunt 79        mother's half sister (same mother)    High Blood Pressure Mother     Glaucoma Neg Hx     Cataracts Neg Hx        Social History     Socioeconomic History    Marital status:      Spouse name: None    Number of children: None    Years of education: None    Highest education level: None   Occupational History    None   Tobacco Use    Smoking status: Former Smoker     Packs/day: 0.00     Years: 28.00     Pack years: 0.00     Types: Cigarettes     Start date: 1985     Quit date: 2014     Years since quittin.7    Smokeless tobacco: Never Used   Vaping Use    Vaping Use: Never used   Substance and Sexual Activity    Alcohol use: Yes     Alcohol/week: 3.0 standard drinks     Types: 3 Standard drinks or equivalent per week     Comment: Socially. -weekly    Drug use: No    Sexual activity: Yes     Partners: Male   Other Topics Concern    None   Social History Narrative    None     Social Determinants of Health     Financial Resource Strain: Low Risk     Difficulty of Paying Living Expenses: Not hard at all   Food Insecurity: No Food Insecurity    Worried About Running Out of Food in the Last Year: Never true    [address] in the Last Year: Never true   Transportation Needs:     Lack of Transportation (Medical): Not on file    Lack of Transportation (Non-Medical):  Not on file   Physical Activity:     Days of Exercise per Week: Not on file    Minutes of Exercise per Session: Not on file   Stress:     Feeling of Stress : Not on file   Social Connections:     Frequency of Communication with Friends and Family: Not on file    Frequency of Social Gatherings with Friends and Family: Not on file    Attends Orthodoxy Services: Not on file    Active Member of Clubs or Organizations: Not on file    Attends Club or Organization Meetings: Not on file    Marital Status: Not on file   Intimate Partner Violence:     Fear of Current or Ex-Partner: Not on file    Emotionally Abused: Not on file    Physically Abused: Not on file    Sexually Abused: Not on file   Housing Stability:     Unable to Pay for Housing in the Last Year: Not on file    Number of [place] in the Last Year: Not on file    Unstable Housing in the Last Year: Not on file     Review of Systems   Constitutional: Positive for activity change. Respiratory: Negative. Cardiovascular: Negative. Genitourinary: Negative. Musculoskeletal: Positive for arthralgias, back pain and myalgias. Skin: Negative. Neurological: Positive for numbness (right arm). Psychiatric/Behavioral: Positive for sleep disturbance. Objective:   Physical Exam  Vitals reviewed. Constitutional:       General: She is not in acute distress. Appearance: She is well-developed. She is not diaphoretic. Interventions: She is not intubated. HENT:      Head: Normocephalic and atraumatic. Cardiovascular:      Rate and Rhythm: Normal rate. Pulmonary:      Effort: Pulmonary effort is normal. No tachypnea, bradypnea, accessory muscle usage or respiratory distress. She is not intubated. Musculoskeletal:      Right shoulder: Decreased range of motion. Decreased strength. Cervical back: Spasms present. Decreased range of motion. Lumbar back: No tenderness or bony tenderness. Skin:     General: Skin is warm and dry. Capillary Refill: Capillary refill takes less than 2 seconds. Coloration: Skin is not pale. Nails: There is no clubbing.    Neurological:      Mental Status: She is alert and oriented to person, place, and time. GCS: GCS eye subscore is 4. GCS verbal subscore is 5. GCS motor subscore is 6. Cranial Nerves: No cranial nerve deficit. Psychiatric:         Speech: Speech normal.         Behavior: Behavior normal. Behavior is not agitated, aggressive, withdrawn or combative. Behavior is cooperative. Judgment: Judgment normal. Judgment is not impulsive or inappropriate. Assessment:      1. Limited range of motion (ROM) of shoulder    2. Chronic right shoulder pain    3. Cervical radiculopathy    4.  Myofascial pain on right side          Plan:   PT eval and treat  Cervical spine and right shoulder xrays  RTC 1 month, consider EMG vs MRI    Jennifer Yung, APRN - CNP

## 2022-05-26 ENCOUNTER — HOSPITAL ENCOUNTER (OUTPATIENT)
Dept: PHYSICAL THERAPY | Age: 57
Setting detail: THERAPIES SERIES
Discharge: HOME | End: 2022-05-26
Payer: COMMERCIAL

## 2022-05-26 PROCEDURE — 97161 PT EVAL LOW COMPLEX 20 MIN: CPT

## 2022-05-26 PROCEDURE — 97110 THERAPEUTIC EXERCISES: CPT

## 2022-05-26 ASSESSMENT — PAIN SCALES - GENERAL: PAINLEVEL_OUTOF10: 0

## 2022-05-26 ASSESSMENT — PAIN DESCRIPTION - PAIN TYPE: TYPE: CHRONIC PAIN

## 2022-05-26 ASSESSMENT — PAIN DESCRIPTION - FREQUENCY: FREQUENCY: INTERMITTENT

## 2022-05-26 ASSESSMENT — PAIN - FUNCTIONAL ASSESSMENT: PAIN_FUNCTIONAL_ASSESSMENT: PREVENTS OR INTERFERES SOME ACTIVE ACTIVITIES AND ADLS

## 2022-05-26 ASSESSMENT — PAIN DESCRIPTION - ORIENTATION: ORIENTATION: RIGHT

## 2022-05-26 ASSESSMENT — PAIN DESCRIPTION - ONSET: ONSET: PROGRESSIVE

## 2022-05-26 ASSESSMENT — PAIN DESCRIPTION - LOCATION: LOCATION: SHOULDER;NECK

## 2022-05-26 NOTE — FLOWSHEET NOTE
Physical Therapy Daily Treatment Note    Date:  2022    Patient Name:  Radha Navas    :  1965  MRN: [id number]  Restrictions/Precautions:   None  Medical/Treatment Diagnosis Information:   · Diagnosis: M25.619 (ICD-10-CM) - Limited range of motion (ROM) of lypxntwdV39.511, G89.29 (ICD-10-CM) - Chronic right shoulder painM54.12 (ICD-10-CM) - Cervical fvvyppsnckonnR56.18 (ICD-10-CM) - Myofascial pain on right side  ·    Insurance/Certification information:  PT Insurance Information: [employer] [place]  Physician Information:    AARON Adam CNP  Plan of care signed (Y/N):  N  Visit# / total visits:    Pain level: 0/10       Time In:  10:40   Time Out: 11:25    Progress Note: [x]  Yes  []  No Next due by: Visit #10  Or by  22    Subjective: See eval       Objective: See eval   Observation:   Test measurements:      Exercises:   Exercise/Equipment Resistance/Repetitions Other comments   Pulleys          UT stretch  3x30\"    Cervical retractions 10x3\"    Cervical retraction with extension 10x    Cervical AROM           Scapular retraction                     Cervical traction          [x] Provided verbal/tactile cueing for activities related to strengthening, flexibility, endurance, ROM. (28703)  [] Provided verbal/tactile cueing for activities related to improving balance, coordination, kinesthetic sense, posture, motor skill, proprioception. (68062)    Therapeutic Activities:     [] Therapeutic activities, direct (one-on-one) patient contact (use of dynamic activities to improve functional performance). (33487)    Gait:   [] Provided training and instruction to the patient for ambulation re-education. (20371)    Self-Care/ADL's  [] Self-care/home management training and compensatory training, meal preparation, safety procedures, and instructions in use of assistive technology devices/adaptive equipment, direct one-on-one contact.  (33611)    Home Exercise Program:  UT stretch, cervical retraction, cervical retraction with ext   [x] Reviewed/Progressed HEP activities related to strengthening, flexibility, endurance, ROM. (23836)  [] Reviewed/Progressed HEP activities related to improving balance, coordination, kinesthetic sense, posture, motor skill, proprioception.  (09159)    Manual Treatments:    [] Provided manual therapy to mobilize soft tissue/joints for the purpose of modulating pain, promoting relaxation,  increasing ROM, reducing/eliminating soft tissue swelling/inflammation/restriction, improving soft tissue extensibility. (06922)    Service Based Modalities:  28' eval    Timed Code Treatment Minutes:   10' there ex    Total Treatment Minutes:   39'    Treatment/Activity Tolerance:  [x] Patient tolerated treatment well [] Patient limited by fatigue  [] Patient limited by pain  [] Patient limited by other medical complications  [] Other:     Prognosis: [x] Good [] Fair  [] Poor    Patient Requires Follow-up: [x] Yes  [] No      Goals:  Short Term Goals  Time Frame for Short term goals: 3 weeks  Short term goal 1: Provide written HEP    Long Term Goals  Time Frame for Long term goals : 6 weeks  Long term goal 1: Patient will report no greater than 1-2/10 neck or right shoulder pain to allow patient to complete her work duties with less impairment. Long term goal 2: Patient will demonstrate improved right shoulder AROM to 160 flexion, 160 degrees abduction, IR to T2/3 to improve ability to reach overhead with greater ease  Long term goal 3: Patient will score less than 10% disability on the NDI to allow patient to complete her daily ADLs with greater ease  Long term goal 4: Patient will demonstrate right shoulder strength to at least 4/5 in all directions to allow patient to lift/carry objects with less impairment.       Plan:   [x] Continue per plan of care [] Alter current plan (see comments)  [] Plan of care initiated [] Hold pending MD visit [] Discharge     Plan for Next Session: Provide written HEP. Initiate exercise program consisting of UE and cervical strengthening/ROM.      Electronically signed by:  Laura Scott

## 2022-05-26 NOTE — PROGRESS NOTES
Physical Therapy  Initial Assessment  Date: 2022  Patient Name: Virginia Perez  MRN: [id number]  : 1965          Restrictions - None       Subjective   General  Chart Reviewed: Yes  Patient Assessed for Rehabilitation Services: Yes  Additional Pertinent Hx: Hx back pain/surgeries  History obtained from[de-identified] Patient,Chart Review  Diagnosis: M25.619 (ICD-10-CM) - Limited range of motion (ROM) of mmoyuldiD19.511, G89.29 (ICD-10-CM) - Chronic right shoulder painM54.12 (ICD-10-CM) - Cervical pdxjnghixpdxeQ81.18 (ICD-10-CM) - Myofascial pain on right side  Referring Provider (secondary): AARON Orta - CNP  Follows Commands: Within Functional Limits  General Comment  Comments: 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 PT Visit Information  PT Insurance Information: Medical [place]  Pain Screening  Patient Currently in Pain: Yes  Pain Assessment: 0-10  Pain Level: 0  Best Pain Level: 0  Worst Pain Level: 6  Pain Type: Chronic pain  Pain Location: Shoulder,Neck  Pain Orientation: Right  Pain Descriptors: Burning,Numbness,Tingling,Sharp  Pain Frequency: Intermittent  Pain Onset: Progressive  Functional Pain Assessment: Prevents or interferes some active activities and ADLs  Aggravating factors: Laying on involved side,Movement,Carrying,Lifting  Pain Management/Relieving Factors: Ice,Rest       Vision/Hearing  Vision  Vision: Impaired  Visual Deficits: Wears glasses  Hearing  Hearing: Within functional limits    Orientation  Orientation  Overall Orientation Status: Within Normal Limits  Patient affect[de-identified] Normal  Follows Commands: Within Functional Limits    Social History  Social History  Lives With: Spouse; Family  Type of Home: House  Home Layout: Two level; Laundry in basement  Home Access: Stairs to enter with rails  Entrance Stairs - Rails: Both  Entrance Stairs - Number of Steps: 3  Bathroom Shower/Tub: Tub/Shower unit    Functional Status  Functional Status  Prior level of function: Independent  Occupation: Full time employment  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 - holding alhaji increases pain  Job Duties: Awkward positions;Repetitive or prolonged grasping;Repetitive lifting  Leisure & Hobbies: yard work, swim, going outside, patric  ADL Assistance: Independent  Homemaking Assistance: Independent  Ambulation Assistance: Independent  Transfer Assistance: Independent  Active : Yes  Mode of Transportation: Car    Objective          AROM RUE (degrees)  RUE AROM : Exceptions (Pain noted with all motions)  R Shoulder Flexion 0-180: 115  R Shoulder ABduction 0-180: 95  R Shoulder Int Rotation  0-70: T8/9 region  R Shoulder Ext Rotation 0-90: Posterior head with aberrant movement  AROM LUE (degrees)  LUE AROM : Exceptions  L Shoulder Flexion 0-180: 160  L Shoulder ABduction 0-180: 150  L Shoulder Int Rotation  0-70: T8/9 region  L Shoulder Ext Rotation  0-90: T3/4 region  Spine  Cervical: Flexion - WNL, Extension - 60 degree with pain at end range, L SB - 35 degrees with pain noted at end range, R SB - 30 degrees, L rotation - 60 degrees with tightness noted, R rotiation - 65 degrees  Special Tests: (-) spurling - denies increase in radiating pain.  (+) distraction - patient reports decrease in right shoulder pain, (-) drop arm, payne wili, neer, ER lag sign,    Strength RUE  Strength RUE: Exception  Comment: Pain noted with all shoulder MMT  R Shoulder Flexion: 4-/5  R Shoulder ABduction: 3+/5  R Shoulder Internal Rotation: 4/5  R Shoulder External Rotation: 3+/5  R Elbow Flexion: 4+/5  R Elbow Extension: 4+/5  R Wrist Flexion: 4+/5  R Wrist Extension: 4+/5  Strength LUE  Strength LUE: Exception  L Shoulder Flexion: 4+/5  L Shoulder ABduction: 4+/5  L Shoulder Internal Rotation: 4+/5  L Shoulder External Rotation: 4+/5  L Elbow Flexion: 4+/5  L Elbow Extension: 4+/5  L Wrist Flexion: 4+/5  L Wrist Extension: 4+/5           Bed mobility  Supine to Sit: Independent  Sit to Supine: Independent  Transfers  Sit to Stand: Independent  Stand to sit: Independent       Ambulation  Surface: carpet  Device: No Device  Assistance: Independent  Gait Deviations: None          Assessment   Conditions Requiring Skilled Therapeutic Intervention  Body Structures, Functions, Activity Limitations Requiring Skilled Therapeutic Intervention: Decreased ROM; Decreased tolerance to work activity; Decreased strength;Decreased high-level IADLs; Increased pain;Decreased posture;Decreased ADL status  Therapy Prognosis: Good  Referring Provider (secondary): AARON Dexter CNP  Activity Tolerance  Activity Tolerance: Patient tolerated treatment well  Activity Tolerance: Patient tolerated treatment well         Plan   Plan  Plan weeks: 6 weeks  Current Treatment Recommendations: Strengthening,ROM,ADL/Self-care training,IADL training,Manual Therapy - Soft Tissue Mobilization,Manual Therapy - Joint Manipulation,Pain management,Return to work related activity,Home exercise program,Safety education & training,Patient/Caregiver education & training,Integrated dry needling,Therapeutic activities,Modalities      OutComes Score  Neck Disability Index Raw Score: 13 (05/26/22 1123) - 26% disability                 Goals  Short Term Goals  Time Frame for Short term goals: 3 weeks  Short term goal 1: Provide written HEP  Long Term Goals  Time Frame for Long term goals : 6 weeks  Long term goal 1: Patient will report no greater than 1-2/10 neck or right shoulder pain to allow patient to complete her work duties with less impairment.   Long term goal 2: Patient will demonstrate improved right shoulder AROM to 160 flexion, 160 degrees abduction, IR to T2/3 to improve ability to reach overhead with greater ease  Long term goal 3: Patient will score less than 10% disability on the NDI to allow patient to complete her daily ADLs with greater ease  Long term goal 4: Patient will demonstrate right shoulder strength to at least 4/5 in all directions to allow patient to lift/carry objects with less impairment. Patient Goals   Patient goals :  \"To get some pain relief and improve my ROM\"       Therapy Time   Individual Concurrent Group Co-treatment   Time In 1040         Time Out 1125         Minutes 45         Timed Code Treatment Minutes: [address] [place], PT

## 2022-05-27 DIAGNOSIS — M54.12 CERVICAL RADICULOPATHY: Primary | ICD-10-CM

## 2022-06-02 ENCOUNTER — HOSPITAL ENCOUNTER (OUTPATIENT)
Dept: PHYSICAL THERAPY | Age: 57
Setting detail: THERAPIES SERIES
Discharge: HOME | End: 2022-06-02
Payer: COMMERCIAL

## 2022-06-02 NOTE — PROGRESS NOTES
Physical Therapy  Outpatient Physical Therapy    [x] [place]  Phone: [phone]  Fax: [phone]      [] [place]  Phone: [phone]  Fax: [phone]    Physical Therapy  Cancellation/No-show Note  Patient Name:  Madelyn Ledesma  :  1965   Date:  2022  Cancelled visits to date: 1  No-shows to date: 0    For today's appointment patient:  [x]  Cancelled  []  Rescheduled appointment  []  No-show     Reason given by patient:  []  Patient ill  []  Conflicting appointment  []  No transportation    []  Conflict with work  []  No reason given  []  Other:     Comments: Patient called to cancel due to having a sick granddaughter.       Electronically signed by: Whit Sorensen PT

## 2022-06-03 ENCOUNTER — HOSPITAL ENCOUNTER (OUTPATIENT)
Dept: PHYSICAL THERAPY | Age: 57
Setting detail: THERAPIES SERIES
Discharge: HOME | End: 2022-06-03
Payer: COMMERCIAL

## 2022-06-03 PROCEDURE — 97110 THERAPEUTIC EXERCISES: CPT | Performed by: PHYSICAL THERAPY ASSISTANT

## 2022-06-03 NOTE — PROGRESS NOTES
Physical Therapy Daily Treatment Note    Date:  6/3/2022    Patient Name:  Kamryn Fontaine    :  1965  MRN: [id number]  Restrictions/Precautions:   None  Medical/Treatment Diagnosis Information:       Diagnosis: M25.619 (ICD-10-CM) - Limited range of motion (ROM) of dvcqisymV43.511, G89.29 (ICD-10-CM) - Chronic right shoulder painM54.12 (ICD-10-CM) - Cervical ktxrmkwkdjsieX60.18 (ICD-10-CM) - Myofascial pain on right side  Insurance/Certification information:    [employer] [place]  Physician Information:    AARON Thompson - CNP  Plan of care signed (Y/N):  Y  Visit# / total visits:    Pain level: 4/10       Time In:  11:33   Time Out: 734    Progress Note: []  Yes  [x]  No Next due by: Visit #10  Or by  22    Subjective: Pt. States pain improved for several days after last session. Slowly returning. Objective: KAYLA complete per flow chart to facilitate strength, motion and stability to allow ease with daily activities and work duties. Initiated and advance several exercises to improve motion and pain. Patient given written and verbal instruction for HEP. Understanding noted. Initiated manual occipital release, distraction, retraction, extension to improve pain. Able to reduce radicular pain. No numbness noted.       Observation:   Test measurements:      Exercises:   Exercise/Equipment Resistance/Repetitions Other comments   Pulleys          UT stretch           Side bend R 10x2    Cervical retractions 10x3\" 2 sets   Cervical retraction with extension 10x 2 sets   Cervical AROM  10x Bilateral        Scapular retraction 15x          Manual  15'         Cervical traction          [x] Provided verbal/tactile cueing for activities related to strengthening, flexibility, endurance, ROM. (16846)  [] Provided verbal/tactile cueing for activities related to improving balance, coordination, kinesthetic sense, posture, motor skill, proprioception. (00674)    Therapeutic Activities:     [] Therapeutic activities, direct (one-on-one) patient contact (use of dynamic activities to improve functional performance). (73307)    Gait:   [] Provided training and instruction to the patient for ambulation re-education. (07805)    Self-Care/ADL's  [] Self-care/home management training and compensatory training, meal preparation, safety procedures, and instructions in use of assistive technology devices/adaptive equipment, direct one-on-one contact. (39654)    Home Exercise Program: Patient given written updated HEP with understanding noted. [x] Reviewed/Progressed HEP activities related to strengthening, flexibility, endurance, ROM. (87854)  [] Reviewed/Progressed HEP activities related to improving balance, coordination, kinesthetic sense, posture, motor skill, proprioception.  (24131)    Manual Treatments:    [] Provided manual therapy to mobilize soft tissue/joints for the purpose of modulating pain, promoting relaxation,  increasing ROM, reducing/eliminating soft tissue swelling/inflammation/restriction, improving soft tissue extensibility. (47729)    Service Based Modalities:      Timed Code Treatment Minutes:   44' there ex    Total Treatment Minutes:  44*'    Treatment/Activity Tolerance:  [x] Patient tolerated treatment well [] Patient limited by fatigue  [] Patient limited by pain  [] Patient limited by other medical complications  [] Other:     Prognosis: [x] Good [] Fair  [] Poor    Patient Requires Follow-up: [x] Yes  [] No      Goals:  Short Term Goals  Time Frame for Short term goals: 3 weeks  Short term goal 1: Provide written HEP (Initiated this date)    Long Term Goals  Time Frame for Long term goals : 6 weeks  Long term goal 1: Patient will report no greater than 1-2/10 neck or right shoulder pain to allow patient to complete her work duties with less impairment.   Long term goal 2: Patient will demonstrate improved right shoulder AROM to 160 flexion, 160 degrees abduction, IR to T2/3 to improve ability to reach overhead with greater ease  Long term goal 3: Patient will score less than 10% disability on the NDI to allow patient to complete her daily ADLs with greater ease  Long term goal 4: Patient will demonstrate right shoulder strength to at least 4/5 in all directions to allow patient to lift/carry objects with less impairment. Plan:   [x] Continue per plan of care [] Alter current plan (see comments)  [] Plan of care initiated [] Hold pending MD visit [] Discharge     Plan for Next Session:  Monitor tolerance to updated HEP and manual. Progress as able. Electronically signed by:   Rabia CompanyKAILASH,

## 2022-06-06 ENCOUNTER — HOSPITAL ENCOUNTER (OUTPATIENT)
Dept: PHYSICAL THERAPY | Age: 57
Setting detail: THERAPIES SERIES
Discharge: HOME | End: 2022-06-06
Payer: COMMERCIAL

## 2022-06-06 PROCEDURE — 97012 MECHANICAL TRACTION THERAPY: CPT

## 2022-06-06 PROCEDURE — 97110 THERAPEUTIC EXERCISES: CPT

## 2022-06-06 NOTE — PROGRESS NOTES
Physical Therapy Daily Treatment Note    Date:  2022    Patient Name:  Kamryn Fontaine    :  1965  MRN: [id number]  Restrictions/Precautions:   None  Medical/Treatment Diagnosis Information:       Diagnosis: M25.619 (ICD-10-CM) - Limited range of motion (ROM) of ntcnhgafG60.511, G89.29 (ICD-10-CM) - Chronic right shoulder painM54.12 (ICD-10-CM) - Cervical rcttfyynltaseU62.18 (ICD-10-CM) - Myofascial pain on right side  Insurance/Certification information:    [employer] [place]  Physician Information:    AARON Thompson - CNP  Plan of care signed (Y/N):  Y  Visit# / total visits:  3/12  Pain level: 4/10       Time In:  1:48   Time Out: 2:24    Progress Note: []  Yes  [x]  No Next due by: Visit #10  Or by  22    Subjective: Patient feels sharp shooting pain with performance of chin tuck and extension. Performed mulching over the weekend which could flare up pain in L shoulder with chin tuck and extension. Objective: KAYLA complete per flow chart to facilitate strength, motion and stability to allow ease with daily activities and work duties. Patient unable to tolerate chin tucks and extension manual application therefore initiated use of traction unit this date. Good tolerance noted.       Observation:   Test measurements:      Exercises:   Exercise/Equipment Resistance/Repetitions Other comments   Pulleys          UT stretch  5' supine        Side bend R 10x2    Cervical retractions 10x3\" 2 sets   Cervical retraction with extension  2 sets   Cervical AROM  10x Bilateral   Head shoulder press 10x3\"    Scapular retraction 15x    doorway pec stretch 20\"x3    Manual  15'         Cervical traction 10'         [x] Provided verbal/tactile cueing for activities related to strengthening, flexibility, endurance, ROM. (14105)  [] Provided verbal/tactile cueing for activities related to improving balance, coordination, kinesthetic sense, posture, motor skill, proprioception. (00683)    Therapeutic Activities: abduction, IR to T2/3 to improve ability to reach overhead with greater ease  Long term goal 3: Patient will score less than 10% disability on the NDI to allow patient to complete her daily ADLs with greater ease  Long term goal 4: Patient will demonstrate right shoulder strength to at least 4/5 in all directions to allow patient to lift/carry objects with less impairment. Plan:   [x] Continue per plan of care [] Alter current plan (see comments)  [] Plan of care initiated [] Hold pending MD visit [] Discharge     Plan for Next Session:  Monitor tolerance to updated HEP and manual. Progress as able.       Electronically signed by:  Germain Dodge PTA

## 2022-06-08 ENCOUNTER — HOSPITAL ENCOUNTER (OUTPATIENT)
Dept: MRI IMAGING | Age: 57
Discharge: HOME | End: 2022-06-10
Payer: COMMERCIAL

## 2022-06-08 DIAGNOSIS — M54.12 CERVICAL RADICULOPATHY: ICD-10-CM

## 2022-06-08 PROCEDURE — 72141 MRI NECK SPINE W/O DYE: CPT

## 2022-06-09 ENCOUNTER — HOSPITAL ENCOUNTER (OUTPATIENT)
Dept: PHYSICAL THERAPY | Age: 57
Setting detail: THERAPIES SERIES
Discharge: HOME | End: 2022-06-09
Payer: COMMERCIAL

## 2022-06-09 PROCEDURE — 97110 THERAPEUTIC EXERCISES: CPT

## 2022-06-09 PROCEDURE — 97012 MECHANICAL TRACTION THERAPY: CPT

## 2022-06-09 NOTE — PROGRESS NOTES
Physical Therapy Daily Treatment Note    Date:  2022    Patient Name:  Dillard Alpers    :  1965  MRN: [id number]  Restrictions/Precautions:   None  Medical/Treatment Diagnosis Information:       Diagnosis: M25.619 (ICD-10-CM) - Limited range of motion (ROM) of zntazqrdK60.511, G89.29 (ICD-10-CM) - Chronic right shoulder painM54.12 (ICD-10-CM) - Cervical xamjrufsrcavpD36.18 (ICD-10-CM) - Myofascial pain on right side  Insurance/Certification information:    [employer] [place]  Physician Information:    AARON Shipley - CNP  Plan of care signed (Y/N):  Y  Visit# / total visits:    Pain level: 3/10       Time In:  1:57   Time Out: 2:41    Progress Note: []  Yes  [x]  No  Next due by: Visit #10  Or by  22    Subjective: Patient reports just received MRI results when walking in with questions. Pt reports chin tucks with ext cause shooting pain in anterior shoulder only when having upright posture however pain is not there with rounded shoulders. Pt states has achyness and numbness in right arm usually and the shooting pin goes into left shoulder. Objective: KAYLA complete per flow chart to facilitate strength, motion and stability to allow ease with daily activities and work duties. Patient unable to tolerate chin tuck with ext however able to complete neck extension. Manual performed with traction and occipital release. R upper trap stretching applied with manual. Pt able to tolerate 10 manual chin retraction this date without shooting pain as long as traction applied while completing. 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     Observation:   Test measurements:  R shoulder IR: T12    Exercises:   Exercise/Equipment Resistance/Repetitions Other comments   Pulleys          UT stretch  5' supine        Side bend R 10x2    Cervical retractions 10x3\" 2 sets   Cervical retraction with extension 0x without chin tuck 2 sets   Cervical AROM  10x2 Bilateral   Head shoulder press 10x3\"    Scapular retraction 15x    doorway pec stretch 20\"x3    Manual  15'         Cervical traction 10'         [x] Provided verbal/tactile cueing for activities related to strengthening, flexibility, endurance, ROM. (47295)  [] Provided verbal/tactile cueing for activities related to improving balance, coordination, kinesthetic sense, posture, motor skill, proprioception. (43657)    Therapeutic Activities:     [] Therapeutic activities, direct (one-on-one) patient contact (use of dynamic activities to improve functional performance). (78380)    Gait:   [] Provided training and instruction to the patient for ambulation re-education. (14885)    Self-Care/ADL's  [] Self-care/home management training and compensatory training, meal preparation, safety procedures, and instructions in use of assistive technology devices/adaptive equipment, direct one-on-one contact. (16159)    Home Exercise Program: Patient given written updated HEP with understanding noted. [x] Reviewed/Progressed HEP activities related to strengthening, flexibility, endurance, ROM. (26981)  [] Reviewed/Progressed HEP activities related to improving balance, coordination, kinesthetic sense, posture, motor skill, proprioception.  (85695)    Manual Treatments:    [] Provided manual therapy to mobilize soft tissue/joints for the purpose of modulating pain, promoting relaxation,  increasing ROM, reducing/eliminating soft tissue swelling/inflammation/restriction, improving soft tissue extensibility.  (12786)    Service Based Modalities:  10' traction 15# max, 10# min    Timed Code Treatment Minutes:   29' there ex    Total Treatment Minutes:  40'    Treatment/Activity Tolerance:  [x] Patient tolerated treatment well [] Patient limited by fatigue  [] Patient limited by pain  [] Patient limited by other medical complications  [] Other:     Prognosis: [x] Good [] Fair  [] Poor    Patient Requires Follow-up: [x] Yes  [] No      Goals:  Short Term Goals  Time Frame for Short term goals: 3 weeks  Short term goal 1: Provide written HEP (Initiated this date)    Long Term Goals  Time Frame for Long term goals : 6 weeks  Long term goal 1: Patient will report no greater than 1-2/10 neck or right shoulder pain to allow patient to complete her work duties with less impairment. Long term goal 2: Patient will demonstrate improved right shoulder AROM to 160 flexion, 160 degrees abduction, IR to T2/3 to improve ability to reach overhead with greater ease  Long term goal 3: Patient will score less than 10% disability on the NDI to allow patient to complete her daily ADLs with greater ease  Long term goal 4: Patient will demonstrate right shoulder strength to at least 4/5 in all directions to allow patient to lift/carry objects with less impairment. Plan:   [x] Continue per plan of care [] Alter current plan (see comments)  [] Plan of care initiated [] Hold pending MD visit [] Discharge     Plan for Next Session:  Monitor tolerance to updated HEP and manual. Progress as able.       Electronically signed by:  Dm Jennings PTA

## 2022-06-13 ENCOUNTER — HOSPITAL ENCOUNTER (OUTPATIENT)
Dept: PHYSICAL THERAPY | Age: 57
Setting detail: THERAPIES SERIES
Discharge: HOME | End: 2022-06-13
Payer: COMMERCIAL

## 2022-06-13 PROCEDURE — 97012 MECHANICAL TRACTION THERAPY: CPT

## 2022-06-13 PROCEDURE — 97110 THERAPEUTIC EXERCISES: CPT

## 2022-06-13 NOTE — PROGRESS NOTES
Physical Therapy Daily Treatment Note    Date:  2022    Patient Name:  Neil Crain    :  1965  MRN: [id number]  Restrictions/Precautions:   None  Medical/Treatment Diagnosis Information:       Diagnosis: M25.619 (ICD-10-CM) - Limited range of motion (ROM) of zlllbveiV50.511, G89.29 (ICD-10-CM) - Chronic right shoulder painM54.12 (ICD-10-CM) - Cervical xmjugpaaiuarxD76.18 (ICD-10-CM) - Myofascial pain on right side  Insurance/Certification information:    Medical [place]  Physician Information:    AARON Kaur - CNP  Plan of care signed (Y/N):  Y  Visit# / total visits:    Pain level: /10       Time In:  200   Time Out: 246    Progress Note: []  Yes  [x]  No  Next due by: Visit #10  Or by  22    Subjective: Patient reports that she feels as if her shoulder pain is getting better. Reporting 1-2/10 right shoulder pain upon arrival. Patient states that the right arm continues to fall asleep and gets pin and needles. Objective: KAYLA complete per flow chart to facilitate strength, motion and stability to allow ease with daily activities and work duties. Manual performed with traction and occipital release. R upper trap stretching applied with manual. Patient reported having mild left shoulder pain with supine cervical retractions and extension, but was able to complete exercise. Observation:   Test measurements:  R shoulder IR: T12    Exercises:   Exercise/Equipment Resistance/Repetitions Other comments   Pulleys          UT stretch  5' supine        Side bend R 10x2    Supine Cervical retractions 15x3\" 2 sets   Supine Cervical retraction with extension 10x 2 sets   Cervical AROM  10x2 Bilateral   Supine Head shoulder press 10x3\"    Scapular retraction 15x    doorway pec stretch 20\"x3    Manual  10'    Prone scap program  next    TB rows/ext          Cervical traction 10'         [x] Provided verbal/tactile cueing for activities related to strengthening, flexibility, endurance, ROM. (20069)  [] Provided verbal/tactile cueing for activities related to improving balance, coordination, kinesthetic sense, posture, motor skill, proprioception. (31128)    Therapeutic Activities:     [] Therapeutic activities, direct (one-on-one) patient contact (use of dynamic activities to improve functional performance). (52617)    Gait:   [] Provided training and instruction to the patient for ambulation re-education. (66827)    Self-Care/ADL's  [] Self-care/home management training and compensatory training, meal preparation, safety procedures, and instructions in use of assistive technology devices/adaptive equipment, direct one-on-one contact. (60667)    Home Exercise Program: Patient given written updated HEP with understanding noted. [x] Reviewed/Progressed HEP activities related to strengthening, flexibility, endurance, ROM. (71276)  [] Reviewed/Progressed HEP activities related to improving balance, coordination, kinesthetic sense, posture, motor skill, proprioception.  (11797)    Manual Treatments:    [x] Provided manual therapy to mobilize soft tissue/joints for the purpose of modulating pain, promoting relaxation,  increasing ROM, reducing/eliminating soft tissue swelling/inflammation/restriction, improving soft tissue extensibility.  (22320)    Service Based Modalities:  10' traction 15# max, 10# min    Timed Code Treatment Minutes:   39' there ex    Total Treatment Minutes:  55'    Treatment/Activity Tolerance:  [x] Patient tolerated treatment well [] Patient limited by fatigue  [] Patient limited by pain  [] Patient limited by other medical complications  [] Other:     Prognosis: [x] Good [] Fair  [] Poor    Patient Requires Follow-up: [x] Yes  [] No      Goals:  Short Term Goals  Time Frame for Short term goals: 3 weeks  Short term goal 1: Provide written HEP (Initiated this date)    Long Term Goals  Time Frame for Long term goals : 6 weeks  Long term goal 1: Patient will report no greater than 1-2/10 neck or right shoulder pain to allow patient to complete her work duties with less impairment. Long term goal 2: Patient will demonstrate improved right shoulder AROM to 160 flexion, 160 degrees abduction, IR to T2/3 to improve ability to reach overhead with greater ease  Long term goal 3: Patient will score less than 10% disability on the NDI to allow patient to complete her daily ADLs with greater ease  Long term goal 4: Patient will demonstrate right shoulder strength to at least 4/5 in all directions to allow patient to lift/carry objects with less impairment. Plan:   [x] Continue per plan of care [] Alter current plan (see comments)  [] Plan of care initiated [] Hold pending MD visit [] Discharge     Plan for Next Session:  Monitor tolerance to updated HEP and manual. Progress as able.       Electronically signed by:  Li Diaz PT

## 2022-06-16 ENCOUNTER — HOSPITAL ENCOUNTER (OUTPATIENT)
Dept: PHYSICAL THERAPY | Age: 57
Setting detail: THERAPIES SERIES
Discharge: HOME | End: 2022-06-16
Payer: COMMERCIAL

## 2022-06-16 PROCEDURE — 97110 THERAPEUTIC EXERCISES: CPT

## 2022-06-16 PROCEDURE — 97012 MECHANICAL TRACTION THERAPY: CPT

## 2022-06-16 NOTE — PROGRESS NOTES
Physical Therapy Daily Treatment Note    Date:  2022    Patient Name:  James Posada    :  1965  MRN: [id number]  Restrictions/Precautions:   None  Medical/Treatment Diagnosis Information:       Diagnosis: M25.619 (ICD-10-CM) - Limited range of motion (ROM) of shoulder; M25.511, G89.29 (ICD-10-CM) - Chronic right shoulder pain; M54.12 (ICD-10-CM) - Cervical radiculopathy; M79.18 (ICD-10-CM) - Myofascial pain on right side  Insurance/Certification information:    Medical [place]  Physician Information:    AARON Lowery - CNP  Plan of care signed (Y/N):  Y  Visit# / total visits:    Pain level: 210       Time In: 207   Time Out:  258    Progress Note: []  Yes  [x]  No  Next due by: Visit #10  Or by  22    Subjective: Patient arrives to the clinic reporting 2/10 pain. No reports of any changes or abnormal s/s. Objective: KAYLA complete per flow chart to facilitate strength, motion and stability to allow ease with daily activities and work duties. Patient reported having mild left shoulder pain with supine cervical retractions and extension, but was able to complete exercise. Manual performed with traction and occipital release. upper trap/levator scap stretching applied with manual. Initiated prone I, Y, T, As with good tolerance. Observation:   Test measurements:  R shoulder IR: T12    Exercises:   Exercise/Equipment Resistance/Repetitions Other comments   Pulleys          UT/levator scap stretch  2x30\" ea Manual stretch, supine        Side bend R 10x2    Supine Cervical retractions 15x3\" 2 sets   Supine Cervical retraction with extension 15x Only extension - patient reported L shoulder pain with retraction and extension this date.    Cervical AROM  10x2 Bilateral   Supine Head shoulder press 10x3\"    Scapular retraction 15x    doorway pec stretch 20\"x3    Manual  4' suboccipital release, manual traction    IYTA  10x    TB rows/ext 10x red TB         Cervical traction 10'         [x] Provided verbal/tactile cueing for activities related to strengthening, flexibility, endurance, ROM. (60708)  [] Provided verbal/tactile cueing for activities related to improving balance, coordination, kinesthetic sense, posture, motor skill, proprioception. (67371)    Therapeutic Activities:     [] Therapeutic activities, direct (one-on-one) patient contact (use of dynamic activities to improve functional performance). (74037)    Gait:   [] Provided training and instruction to the patient for ambulation re-education. (02322)    Self-Care/ADL's  [] Self-care/home management training and compensatory training, meal preparation, safety procedures, and instructions in use of assistive technology devices/adaptive equipment, direct one-on-one contact. (04626)    Home Exercise Program: Patient given written updated HEP with understanding noted. [x] Reviewed/Progressed HEP activities related to strengthening, flexibility, endurance, ROM. (10224)  [] Reviewed/Progressed HEP activities related to improving balance, coordination, kinesthetic sense, posture, motor skill, proprioception.  (43904)    Manual Treatments:    [x] Provided manual therapy to mobilize soft tissue/joints for the purpose of modulating pain, promoting relaxation,  increasing ROM, reducing/eliminating soft tissue swelling/inflammation/restriction, improving soft tissue extensibility.  (57529)    Service Based Modalities:  10' traction 15# max, 10# min    Timed Code Treatment Minutes:   36' there ex    Total Treatment Minutes:  48'     Treatment/Activity Tolerance:  [x] Patient tolerated treatment well [] Patient limited by fatigue  [] Patient limited by pain  [] Patient limited by other medical complications  [] Other:     Prognosis: [x] Good [] Fair  [] Poor    Patient Requires Follow-up: [x] Yes  [] No      Goals:  Short Term Goals  Time Frame for Short term goals: 3 weeks  Short term goal 1: Provide written HEP  - met    Long Term Goals  Time Frame for Long term goals : 6 weeks  Long term goal 1: Patient will report no greater than 1-2/10 neck or right shoulder pain to allow patient to complete her work duties with less impairment. Long term goal 2: Patient will demonstrate improved right shoulder AROM to 160 flexion, 160 degrees abduction, IR to T2/3 to improve ability to reach overhead with greater ease  Long term goal 3: Patient will score less than 10% disability on the NDI to allow patient to complete her daily ADLs with greater ease  Long term goal 4: Patient will demonstrate right shoulder strength to at least 4/5 in all directions to allow patient to lift/carry objects with less impairment. Plan:   [x] Continue per plan of care [] Alter current plan (see comments)  [] Plan of care initiated [] Hold pending MD visit [] Discharge     Plan for Next Session:  Monitor tolerance to updated HEP and manual. Progress as able.       Electronically signed by:  Clair Alberto, PT

## 2022-06-20 ENCOUNTER — HOSPITAL ENCOUNTER (OUTPATIENT)
Dept: PHYSICAL THERAPY | Age: 57
Setting detail: THERAPIES SERIES
Discharge: HOME | End: 2022-06-20
Payer: COMMERCIAL

## 2022-06-20 NOTE — PROGRESS NOTES
Physical Therapy  Outpatient Physical Therapy    [x] [place]  Phone: [phone]  Fax: [phone]      [] [place]  Phone: [phone]  Fax: [phone]    Physical Therapy  Cancellation/No-show Note  Patient Name:  Regina Goodwin  :  1965   Date:  2022  Cancelled visits to date: 2  No-shows to date: 0    For today's appointment patient:  [x]  Cancelled  []  Rescheduled appointment  []  No-show     Reason given by patient:  [x]  Patient ill  []  Conflicting appointment  []  No transportation    []  Conflict with work  []  No reason given  []  Other:     Comments:      Electronically signed by: Lisette Judge PTA, PT

## 2022-06-23 ENCOUNTER — HOSPITAL ENCOUNTER (OUTPATIENT)
Dept: PHYSICAL THERAPY | Age: 57
Setting detail: THERAPIES SERIES
Discharge: HOME | End: 2022-06-23
Payer: COMMERCIAL

## 2022-06-23 ENCOUNTER — OFFICE VISIT (OUTPATIENT)
Dept: PAIN MANAGEMENT | Age: 57
End: 2022-06-23
Payer: COMMERCIAL

## 2022-06-23 ENCOUNTER — TELEPHONE (OUTPATIENT)
Dept: PAIN MANAGEMENT | Age: 57
End: 2022-06-23

## 2022-06-23 VITALS
OXYGEN SATURATION: 99 % | BODY MASS INDEX: 27.44 KG/M2 | WEIGHT: 150 LBS | SYSTOLIC BLOOD PRESSURE: 132 MMHG | RESPIRATION RATE: 16 BRPM | DIASTOLIC BLOOD PRESSURE: 78 MMHG | HEART RATE: 72 BPM

## 2022-06-23 DIAGNOSIS — M96.1 POST LAMINECTOMY SYNDROME: ICD-10-CM

## 2022-06-23 DIAGNOSIS — M54.12 CERVICAL RADICULOPATHY: Primary | ICD-10-CM

## 2022-06-23 DIAGNOSIS — G89.29 CHRONIC RIGHT SACROILIAC JOINT PAIN: ICD-10-CM

## 2022-06-23 DIAGNOSIS — M54.16 LUMBAR RADICULOPATHY: ICD-10-CM

## 2022-06-23 DIAGNOSIS — M51.26 DISPLACEMENT OF LUMBAR INTERVERTEBRAL DISC WITHOUT MYELOPATHY: ICD-10-CM

## 2022-06-23 DIAGNOSIS — M54.41 CHRONIC BILATERAL LOW BACK PAIN WITH BILATERAL SCIATICA: ICD-10-CM

## 2022-06-23 DIAGNOSIS — M53.3 CHRONIC RIGHT SACROILIAC JOINT PAIN: ICD-10-CM

## 2022-06-23 DIAGNOSIS — G89.29 CHRONIC BILATERAL LOW BACK PAIN WITH BILATERAL SCIATICA: ICD-10-CM

## 2022-06-23 DIAGNOSIS — M54.42 CHRONIC BILATERAL LOW BACK PAIN WITH BILATERAL SCIATICA: ICD-10-CM

## 2022-06-23 PROCEDURE — 1036F TOBACCO NON-USER: CPT | Performed by: NURSE PRACTITIONER

## 2022-06-23 PROCEDURE — 3017F COLORECTAL CA SCREEN DOC REV: CPT | Performed by: NURSE PRACTITIONER

## 2022-06-23 PROCEDURE — 97012 MECHANICAL TRACTION THERAPY: CPT

## 2022-06-23 PROCEDURE — G8427 DOCREV CUR MEDS BY ELIG CLIN: HCPCS | Performed by: NURSE PRACTITIONER

## 2022-06-23 PROCEDURE — G8417 CALC BMI ABV UP PARAM F/U: HCPCS | Performed by: NURSE PRACTITIONER

## 2022-06-23 PROCEDURE — 97110 THERAPEUTIC EXERCISES: CPT

## 2022-06-23 PROCEDURE — 99214 OFFICE O/P EST MOD 30 MIN: CPT | Performed by: NURSE PRACTITIONER

## 2022-06-23 RX ORDER — DIAZEPAM 5 MG/1
TABLET ORAL
Qty: 2 TABLET | Refills: 0 | Status: SHIPPED | OUTPATIENT
Start: 2022-06-23 | End: 2022-07-20 | Stop reason: ALTCHOICE

## 2022-06-23 RX ORDER — ASPIRIN 81 MG/1
81 TABLET ORAL DAILY
COMMUNITY

## 2022-06-23 ASSESSMENT — PATIENT HEALTH QUESTIONNAIRE - PHQ9
SUM OF ALL RESPONSES TO PHQ9 QUESTIONS 1 & 2: 0
SUM OF ALL RESPONSES TO PHQ QUESTIONS 1-9: 0
2. FEELING DOWN, DEPRESSED OR HOPELESS: 0
1. LITTLE INTEREST OR PLEASURE IN DOING THINGS: 0
SUM OF ALL RESPONSES TO PHQ QUESTIONS 1-9: 0

## 2022-06-23 ASSESSMENT — ENCOUNTER SYMPTOMS
BACK PAIN: 1
RESPIRATORY NEGATIVE: 1

## 2022-06-23 NOTE — PROGRESS NOTES
Subjective:      Patient ID: Jane Tello is a 56 y.o. female. Chief Complaint   Patient presents with    Neck Pain     1 Mo F/U neck pain. Shoulder Pain   Associated symptoms include numbness (right arm). Leg Pain   Associated symptoms include numbness (right arm). Other  Associated symptoms include arthralgias, myalgias, neck pain and numbness (right arm). Hip Pain   Associated symptoms include numbness (right arm). Neck Pain   Associated symptoms include leg pain and numbness (right arm). New complaint, right shoulder/cervical, to review MRI    Numbness, limited right shouder accompanied by pain, right sided neck pain and tightness    Pain Assessment  Location of Pain: Neck (shoulder)  Location Modifiers: Right  Severity of Pain: 3  Quality of Pain: Aching  Duration of Pain: Persistent  Frequency of Pain: Constant  Aggravating Factors: Bending,Stretching,Straightening,Exercise (overuse)  Limiting Behavior: Yes  Relieving Factors: Rest,Ice  Result of Injury: No  Work-Related Injury: No    Allergies   Allergen Reactions    Ace Inhibitors Other (See Comments)     Cough      Penicillins Rash and Other (See Comments)    Sertraline Other (See Comments)     Hypertension       Outpatient Medications Marked as Taking for the 6/23/22 encounter (Office Visit) with AARON Patterson CNP   Medication Sig Dispense Refill    aspirin 81 MG EC tablet Take 81 mg by mouth daily      diazePAM (VALIUM) 5 MG tablet Take one tablet by mouth 12 hours to procedure and take one tablet by mouth 2 hours prior to procedure.  2 tablet 0    fluocinolone (DERMOTIC) 0.01 % OIL oil Apply 4 drops to affected ear(s) twice a day for 7 days and then once a day as needed for itching 20 mL 5    levothyroxine (SYNTHROID) 50 MCG tablet Take 1 tablet by mouth daily 30 tablet 5    losartan-hydroCHLOROthiazide (HYZAAR) 50-12.5 MG per tablet Take 1 tablet by mouth daily 90 tablet 3    insulin lispro (HUMALOG) 100 UNIT/ML injection vial Use 60 units per day in pump 1 vial 3    Vitamin D (CHOLECALCIFEROL) 1000 UNITS CAPS capsule Take 4,000 Units by mouth daily       Insulin Lispro, Human, (INSULIN PUMP) FLORENTIN Inject 1 each into the skin See Admin Instructions.          Past Medical History:   Diagnosis Date    Bronchitis     Diabetes type 1, controlled ([address]     Eczema     GERD (gastroesophageal reflux disease)     Gestational diabetes     Hyperlipidemia     Hypertension     Hypothyroid     Insomnia     Insulin pump in place     Dr. Eura Hammans endocrin[facility]    Myopia with astigmatism and presbyopia     Tobacco abuse     Under care of team 11/22/2021    endocrine-[facility]-last visit nov 2021    Under care of team 11/22/2021    pcp- [address] [place] [address] Dr visit nov 2021       Past Surgical History:   Procedure Laterality Date    BACK INJECTION Right 04/29/2021    Right Sacroiliac Joint Injection [facility]  01/05/2015    Lumbar microdiscectomy L5-S1    BLADDER SUSPENSION  09/22/2009    (transobturator sling)   [address]    (laser cone)    COLONOSCOPY  10/17/2018    hyperplastic polyp, Dr. Caryl Mcconnell at [facility]  2006    LUMBAR FUSION N/A 12/9/2021    OLIF L4-5, C-ARM, LATERAL FLAT [place], [employer], MICROSCOPE performed by Rocio Geller DO at [address] [place] [address] N/A 12/9/2021    POSTERIOR FIXATION L4-5, O-ARM/STEALTH performed by Rocio Geller DO at [place] [address]  09/06/2017    Dr Fadumo Dukes  12/09/2021    OLIF L4-5, Posterior fixation L4-5    NERVE BLOCK  02/16/2017    laser nerve block Dr Cullen Madrigal- [facility]  03/09/2017    laser nerve block Dr Belkis Byers Bilateral 03/13/2019    BL5 transforaminal radiculogram- [facility] per Dr Barbara Tejeda Right 05/29/2019    Right sacroiliac joint injection with arthrography    NERVE BLOCK Bilateral 2019    BL5 transforaminal radiculogram/epidurogram    PAIN MANAGEMENT PROCEDURE Bilateral 2021    Bilateral L4 TRANSFORAMINAL performed [facility] Ara [facility]ino MD at [facility] N/A 2021    L4-L5  INTERLAMINAR performed by Ara Valentino MD at [address] [place] [address] St  2015    Lumbar L5-S1    US BREAST NEEDLE BIOPSY RIGHT Right 2021    US BREAST NEEDLE BIOPSY RIGHT 2021 MD ULTRASOUND       Family History   Problem Relation Age of Onset    Diabetes Paternal Grandmother     Cancer Father         lymph nodes    Ovarian Cancer Maternal Aunt 79        mother's half sister (same mother)    High Blood Pressure Mother     Glaucoma Neg Hx     Cataracts Neg Hx        Social History     Socioeconomic History    Marital status:      Spouse name: None    Number of children: None    Years of education: None    Highest education level: None   Occupational History    None   Tobacco Use    Smoking status: Former Smoker     Packs/day: 0.00     Years: 28.00     Pack years: 0.00     Types: Cigarettes     Start date: 1985     Quit date: 2014     Years since quittin.8    Smokeless tobacco: Never Used   Vaping Use    Vaping Use: Never used   Substance and Sexual Activity    Alcohol use: Yes     Alcohol/week: 3.0 standard drinks     Types: 3 Standard drinks or equivalent per week     Comment: Socially. -weekly    Drug use: No    Sexual activity: Yes     Partners: Male   Other Topics Concern    None   Social History Narrative    None     Social Determinants of Health     Financial Resource Strain: Low Risk     Difficulty of Paying Living Expenses: Not hard at all   Food Insecurity: No Food Insecurity    Worried About Running Out of Food in the Last Year: Never true    [address] in the Last Year: Never true   Transportation Needs:     Lack of Transportation (Medical):  Not on file    Lack of Transportation (Non-Medical): arm  numbness for approximately two months. Additional signs and symptoms: Cervical radiculopathy     FINDINGS:  BONES/ALIGNMENT: The vertebral body heights are maintained.  There is  age-appropriate bone marrow signal.  There is multilevel degenerative disc  disease with loss of disc signal.  There is no significant disc space  narrowing.  There is no spondylolisthesis.     SPINAL CORD: The spinal cord is normal in caliber and signal.     SOFT TISSUES: The posterior paraspinal soft tissues are unremarkable.  The  prevertebral soft tissues are unremarkable.     C2-C3: There is no significant disc protrusion, spinal canal stenosis or  neural foraminal narrowing.     C3-C4: There is no significant disc protrusion, spinal canal stenosis or  neural foraminal narrowing.     C4-C5: There is no significant disc protrusion, spinal canal stenosis or  neural foraminal narrowing.     C5-C6: There is a disc osteophyte complex with uncovertebral and facet  hypertrophy.  There is no canal stenosis.  There is moderate bilateral  foraminal narrowing.     C6-C7: There is no significant disc protrusion, spinal canal stenosis or  neural foraminal narrowing.     C7-T1: There is no significant disc protrusion, spinal canal stenosis or  neural foraminal narrowing.        Impression  Multilevel degenerative disc disease with uncovertebral and facet hypertrophy  resulting in moderate bilateral foraminal narrowing at C5-6.         Cardiovascular:      Rate and Rhythm: Normal rate. Pulmonary:      Effort: Pulmonary effort is normal. No tachypnea, bradypnea, accessory muscle usage or respiratory distress. She is not intubated. Musculoskeletal:      Right shoulder: Decreased range of motion. Decreased strength. Cervical back: Spasms present. Decreased range of motion. Lumbar back: No tenderness or bony tenderness. Skin:     General: Skin is warm and dry. Capillary Refill: Capillary refill takes less than 2 seconds. Coloration: Skin is not pale. Nails: There is no clubbing. Neurological:      Mental Status: She is alert and oriented to person, place, and time. GCS: GCS eye subscore is 4. GCS verbal subscore is 5. GCS motor subscore is 6. Cranial Nerves: No cranial nerve deficit. Psychiatric:         Speech: Speech normal.         Behavior: Behavior normal. Behavior is not agitated, aggressive, withdrawn or combative. Behavior is cooperative. Judgment: Judgment normal. Judgment is not impulsive or inappropriate. Assessment:      1. Cervical radiculopathy    2. Chronic bilateral low back pain with bilateral sciatica    3. Displacement of lumbar intervertebral disc without myelopathy    4. Post laminectomy syndrome    5. Lumbar radiculopathy    6. Chronic right sacroiliac joint pain          Plan:   MRI reviewed    To schedule right C6 TFE    Informed consent has not been obtained for procedure. Loretta Benitez  on blood thinners. Medications to hold have been reviewed with patient. Blood thinners must be held with permission from your cardiologist or primary care physician. A letter is  required to besent to PCP/Cardiologist regarding holding medications for procedure to decrease bleeding risk.        AARON Villatoro - CNP

## 2022-06-23 NOTE — TELEPHONE ENCOUNTER
Call pt she needs a clearance to get the injection pain management want her to have.  I need to see her as she has never seen me so make and appt to establish with me

## 2022-06-23 NOTE — PATIENT INSTRUCTIONS
[facility]  [address] [facility] [place], [address]  Telephone [phone]  Fax [phone]      PROCEDURE INSTRUCTIONS FOR  PAIN MANAGEMENT PROCEDURES WITHOUT IV SEDATION    Loretta Alfaro scheduled to see Dr. Moises Porras to undergo the following procedure:  Right C6 Transforaminal Epidural Steroid Injection    Procedure Date: ____7/1/22____  You will receive a phone call the day prior to your procedure to confirm a time of arrival.    Report to the [facility], Registration office on the 1st floor in the hospital, after check in and signing of paper work you will then go to the second floor to the surgery center. 1. Stop the following medications prior to the procedure:    Aspirin 81 mg    Stop blood thinners as directed before the injection, with permission from your cardiologist or primary care physician. We will send a letter to them requesting permission to hold the blood thinners. · Medication___Aspirin 81 mg ___ held for __5 __ days    If you take Warfarin (Coumadin), you must have your blood drawn for an INR the day before the procedure. INR must be less than 1.5. if not complete prior to check in the procedure this will be drawn at the procedure center prior to having the procedure completed. 2.  Take all routine medications unless otherwise instructed. Ok to take vitamins and antiinflammatory medications    3. EATING & DRINKING:  Ok to eat or drink before the injection - no IV sedation will be used. 4. If you are allergic to contrast or iodine, you must take benadryl and prednisone prior to the injection to prevent an allergic reaction. Follow the directions on the prescription for the times to take the medication. 5. Oral valium can be prescribed if your are anxious about the injections or feel that you can not lay still during the injection. If you take valium, you must have a .  Make arrangements for a family member or friend to drive you to the surgery center. Your ride must stay in the hospital while you are having the injection done. If they cannot stay, the injection will be rescheduled. The valium may affect your judgment following the procedure and driving a vehicle within 24 hours after the sedation could be dangerous. 6.  Wear simple loose clothing, which can be easily changed. 7.  Leave jewelry (including rings) and other valuables at home. 8.  You will be asked to sign several forms prior to surgery; patients under the age of 25 must have a parent or legal guardian sign the permit to be able to do the procedure. 9.  You must have finished any antibiotic prescribed for recent infections. If required, please take pre-procedure antibiotic or other pre-procedure medications as instructed. 10. Bring inhalers and pain medications with you to your procedure. 11. Bring your MRI/CT films if they were done outside of the [facility]. 12. If you should develop a cold, sore throat, cough, fever or other new indication of illness or infection, or are started on antibiotics within 2 weeks of the scheduled procedure, please notify the [facility] office as early as possible at [phone]. If calling after 4:30pm the day prior to your scheduled procedure please contact [phone] and Leave a Voice Message.

## 2022-06-23 NOTE — TELEPHONE ENCOUNTER
Rt C6 TFE  with no sedation scheduled for 7/1/22 with surgery center notified. Patient Educated to have .      Patient educated to hold blood thinner(s) prior to procedure with blood thinner hiatus notification sent to Leisa Pierce MD.

## 2022-06-23 NOTE — TELEPHONE ENCOUNTER
22                                                                                                                                                                         To: Alcon Orozco MD     From:  Nitish Gilmore MD   Interventional Pain Mgmt Physician, [facility]    Re: Medication Hiatus for Interventional Pain/Spine Procedure for [facility]    Dear Fatimah Byrd MD.                    I am recommending an injection for [facility] to decrease their spine related pain. In order to perform this procedure, antiplatelet/anticoagulant medications will need to be held prior to the procedure. Please respond with one of the options below if you feel that it is safe for [facility]   69 to hold this medication and return this letter to me. [facility] is taking:  Aspirin 81 mg     Medications that must be held prior to injections:     Antiplatelets Aspirin, Effient, Plavix,Ticlid,  Brilinta, Savaysa for 3-5 days   Anticoagulant: Coumadin, Eliquis, Lovenox, Heparin, Pradaxa, Xarelto for 5-7 days. All antiplatelets/anticoagulants must be held for 7 days for a spinal cord stimulator. .    Sincerely,       Electronically signed by Jean Paul Stone LPN on  at 7:06 PM    Nitish Gilmore MD      ·  I agree with holding this medication for the time described above. ·  I agree with holding this medication for ____ days only. ·  I agree with holding this medication for ____ days, however pt must be on another anticoagulant, _______________.   ·  I  DO NOT agree with holding this medication

## 2022-06-23 NOTE — PROGRESS NOTES
Physical Therapy Daily Treatment Note    Date:  2022    Patient Name:  Madelyn Ledesma    :  1965  MRN: [id number]  Restrictions/Precautions:   None  Medical/Treatment Diagnosis Information:       Diagnosis: M25.619 (ICD-10-CM) - Limited range of motion (ROM) of shoulder; M25.511, G89.29 (ICD-10-CM) - Chronic right shoulder pain; M54.12 (ICD-10-CM) - Cervical radiculopathy; M79.18 (ICD-10-CM) - Myofascial pain on right side  Insurance/Certification information:    Medical [place]  Physician Information:    AARON Dexter - CNP  Plan of care signed (Y/N):  Y  Visit# / total visits:    Pain level: 2-3/10       Time In:  104  Time Out:   155     Progress Note: []  Yes  [x]  No  Next due by: Visit #10  Or by  22    Subjective: Patient arrives to the clinic reporting 2-3/10 pain. Patient reports that she has noticed that her arm is continuing to go numb, but it is not as often as it had been. Objective: KAYLA complete per flow chart to facilitate strength, motion and stability to allow ease with daily activities and work duties. Patient reported having mild left shoulder pain with supine cervical retractions and extension, but was able to complete exercise. Manual performed with traction and occipital release. Initiated additional exercises and made progressions this date. Observation:   Test measurements:  R shoulder IR: T12    Exercises:   Exercise/Equipment Resistance/Repetitions Other comments   Pulleys          Supine pec stretch 3x20\"    Supine L scalenes stretch 3x20\"    Supine Cervical retractions 15x3\" 2 sets   Supine Cervical retraction with extension 15x Mild L shoulder pain with retraction and extension this date.    Supine Head shoulder press 10x3\"    Manual 4' suboccipital release, manual traction         Prone IYTA 10x    Prone row 10x         UT / levator scap stretch 2x30\"    Side bend R 10x2    Cervical AROM 10x2 Bilateral    Scapular retraction 15x    doorway pec stretch 20\"x3    TB rows/ext 15x red TB         Cervical traction 10'         [x] Provided verbal/tactile cueing for activities related to strengthening, flexibility, endurance, ROM. (80715)  [] Provided verbal/tactile cueing for activities related to improving balance, coordination, kinesthetic sense, posture, motor skill, proprioception. (71990)    Therapeutic Activities:     [] Therapeutic activities, direct (one-on-one) patient contact (use of dynamic activities to improve functional performance). (81271)    Gait:   [] Provided training and instruction to the patient for ambulation re-education. (11888)    Self-Care/ADL's  [] Self-care/home management training and compensatory training, meal preparation, safety procedures, and instructions in use of assistive technology devices/adaptive equipment, direct one-on-one contact. (94774)    Home Exercise Program: Patient given written updated HEP with understanding noted. [x] Reviewed/Progressed HEP activities related to strengthening, flexibility, endurance, ROM. (26353)  [] Reviewed/Progressed HEP activities related to improving balance, coordination, kinesthetic sense, posture, motor skill, proprioception.  (68914)    Manual Treatments:    [x] Provided manual therapy to mobilize soft tissue/joints for the purpose of modulating pain, promoting relaxation,  increasing ROM, reducing/eliminating soft tissue swelling/inflammation/restriction, improving soft tissue extensibility.  (26952)    Service Based Modalities:  10' traction 15# max, 10# min    Timed Code Treatment Minutes:   36' there ex    Total Treatment Minutes:  48'     Treatment/Activity Tolerance:  [x] Patient tolerated treatment well [] Patient limited by fatigue  [] Patient limited by pain  [] Patient limited by other medical complications  [] Other:     Prognosis: [x] Good [] Fair  [] Poor    Patient Requires Follow-up: [x] Yes  [] No      Goals:  Short Term Goals  Time Frame for Short term goals: 3 weeks  Short term goal 1: Provide written HEP  - met    Long Term Goals  Time Frame for Long term goals : 6 weeks  Long term goal 1: Patient will report no greater than 1-2/10 neck or right shoulder pain to allow patient to complete her work duties with less impairment. (2-3/10 at worst 6/23/22)  Long term goal 2: Patient will demonstrate improved right shoulder AROM to 160 flexion, 160 degrees abduction, IR to T2/3 to improve ability to reach overhead with greater ease  Long term goal 3: Patient will score less than 10% disability on the NDI to allow patient to complete her daily ADLs with greater ease  Long term goal 4: Patient will demonstrate right shoulder strength to at least 4/5 in all directions to allow patient to lift/carry objects with less impairment. Plan:   [x] Continue per plan of care [] Alter current plan (see comments)  [] Plan of care initiated [] Hold pending MD visit [] Discharge     Plan for Next Session:  Monitor tolerance to updated HEP and manual. Progress as able.       Electronically signed by:  Whit Sorensen, PT

## 2022-06-23 NOTE — TELEPHONE ENCOUNTER
Patient says she only takes Aspirin because Dr Nas Redding had told her it was a a good idea for her to do so. She does not want to pay a $30 copay just so we can instruct on whether or not she should take the ASA prior to the procedure. She was not happy.

## 2022-06-24 NOTE — TELEPHONE ENCOUNTER
Thank you for the update. DOS for procedure is 7/1/22. Blood thinner hiatus request is needed back in the office before this date. Aspirin 81 mg is to be held 3-5 days prior to procedure. Again, thank you!

## 2022-06-27 ENCOUNTER — OFFICE VISIT (OUTPATIENT)
Dept: FAMILY MEDICINE CLINIC | Age: 57
End: 2022-06-27
Payer: COMMERCIAL

## 2022-06-27 VITALS
DIASTOLIC BLOOD PRESSURE: 80 MMHG | HEIGHT: 62 IN | RESPIRATION RATE: 16 BRPM | TEMPERATURE: 97.7 F | OXYGEN SATURATION: 98 % | WEIGHT: 153.2 LBS | BODY MASS INDEX: 28.19 KG/M2 | SYSTOLIC BLOOD PRESSURE: 128 MMHG | HEART RATE: 84 BPM

## 2022-06-27 DIAGNOSIS — E03.9 ACQUIRED HYPOTHYROIDISM: ICD-10-CM

## 2022-06-27 DIAGNOSIS — E78.2 MIXED HYPERLIPIDEMIA: ICD-10-CM

## 2022-06-27 DIAGNOSIS — E55.9 VITAMIN D DEFICIENCY: ICD-10-CM

## 2022-06-27 DIAGNOSIS — Z01.818 PREOP TESTING: ICD-10-CM

## 2022-06-27 DIAGNOSIS — Z11.59 ENCOUNTER FOR HEPATITIS C SCREENING TEST FOR LOW RISK PATIENT: ICD-10-CM

## 2022-06-27 DIAGNOSIS — E10.9 TYPE 1 DIABETES MELLITUS WITHOUT COMPLICATION (HCC): Primary | ICD-10-CM

## 2022-06-27 DIAGNOSIS — I10 ESSENTIAL HYPERTENSION: ICD-10-CM

## 2022-06-27 DIAGNOSIS — Z00.00 WELLNESS EXAMINATION: ICD-10-CM

## 2022-06-27 LAB — HBA1C MFR BLD: 6.7 %

## 2022-06-27 PROCEDURE — 2022F DILAT RTA XM EVC RTNOPTHY: CPT | Performed by: FAMILY MEDICINE

## 2022-06-27 PROCEDURE — 99214 OFFICE O/P EST MOD 30 MIN: CPT | Performed by: FAMILY MEDICINE

## 2022-06-27 PROCEDURE — 93000 ELECTROCARDIOGRAM COMPLETE: CPT | Performed by: FAMILY MEDICINE

## 2022-06-27 PROCEDURE — 3044F HG A1C LEVEL LT 7.0%: CPT | Performed by: FAMILY MEDICINE

## 2022-06-27 PROCEDURE — G8417 CALC BMI ABV UP PARAM F/U: HCPCS | Performed by: FAMILY MEDICINE

## 2022-06-27 PROCEDURE — 1036F TOBACCO NON-USER: CPT | Performed by: FAMILY MEDICINE

## 2022-06-27 PROCEDURE — 83036 HEMOGLOBIN GLYCOSYLATED A1C: CPT | Performed by: FAMILY MEDICINE

## 2022-06-27 PROCEDURE — G8427 DOCREV CUR MEDS BY ELIG CLIN: HCPCS | Performed by: FAMILY MEDICINE

## 2022-06-27 PROCEDURE — 3017F COLORECTAL CA SCREEN DOC REV: CPT | Performed by: FAMILY MEDICINE

## 2022-06-27 ASSESSMENT — ENCOUNTER SYMPTOMS
CHOKING: 0
COUGH: 0
PHOTOPHOBIA: 0
CHEST TIGHTNESS: 0
SHORTNESS OF BREATH: 0
WHEEZING: 0
ABDOMINAL PAIN: 0

## 2022-06-27 NOTE — TELEPHONE ENCOUNTER
I just saw the patient today and I wrote in the chart and told her to stop asa 81 mg 5 days prior to injection she gave me verbal understanding

## 2022-06-27 NOTE — PROGRESS NOTES
Name: Madelyn Ledesma  DOS: 6/27/2022  MRN: [id number]      Subjective:  Madelyn Ledesma is a 56 y.o. female being seen for   Chief Complaint   Patient presents with    Other     Clearance for pain procedure        Vitals:    06/27/22 0933   BP: 128/80   Pulse: 84   Resp: 16   Temp: 97.7 °F (36.5 °C)   SpO2: 98%     Allergies   Allergen Reactions    Ace Inhibitors Other (See Comments)     Cough      Penicillins Rash and Other (See Comments)    Sertraline Other (See Comments)     Hypertension     Past Medical History:   Diagnosis Date    Bronchitis     Diabetes type 1, controlled ([address]     Eczema     GERD (gastroesophageal reflux disease)     Gestational diabetes     Hyperlipidemia     Hypertension     Hypothyroid     Insomnia     Insulin pump in place     Dr. Sheyla Duke endocrinology [address]    Myopia with astigmatism and presbyopia     Tobacco abuse     Under care of team 11/22/2021    endocrine-Dr Crandall 52 visit nov 2021    Under care of team 11/22/2021    pcp- [address] [place] [address]  visit nov 2021     Past Surgical History:   Procedure Laterality Date    BACK INJECTION Right 04/29/2021    Right Sacroiliac Joint Injection performed by Sindy Ramey MD at [address] [place] [address]  01/05/2015    Lumbar microdiscectomy L5-S1    BLADDER SUSPENSION  09/22/2009    (transobturator sling)   [address]    (laser cone)    COLONOSCOPY  10/17/2018    hyperplastic polyp, [facility]. Jamie Gaona [facility]  2006    LUMBAR FUSION N/A 12/9/2021    OLIF L4-5, C-ARM, LATERAL FLAT BOBBI [employer], MICROSCOPE performed by Filemon Cruz DO at [facility] 12/9/2021    POSTERIOR FIXATION L4-5, O-ARM/STEALTH performed by Filemon Cruz DO at [place] [address]  09/06/2017    Dr Jane Garzon  12/09/2021    OLIF L4-5, Posterior fixation L4-5    NERVE BLOCK  02/16/2017    laser nerve block Dr Roy [facility] BLOCK  2017    laser nerve block Dr Umm Becerril Bilateral 2019    BL5 transforaminal radiculogram- [facility] per Dr Shandra Chilel Right 2019    Right sacroiliac joint injection with arthrography    NERVE BLOCK Bilateral 2019    BL5 transforaminal radiculogram/epidurogram    PAIN MANAGEMENT PROCEDURE Bilateral 2021    Bilateral L4 TRANSFORAMINAL performed by Lasha De La Paz MD at [facility] N/A 2021    L4-L5  INTERLAMINAR performed by Lasha De La Paz MD at [address] [facility] [address]  2015    Lumbar L5-S1    US BREAST NEEDLE BIOPSY RIGHT Right 2021    US BREAST NEEDLE BIOPSY RIGHT 2021 [facility] ULTRASOUND     Social History     Socioeconomic History    Marital status:      Spouse name: None    Number of children: None    Years of education: None    Highest education level: None   Occupational History    None   Tobacco Use    Smoking status: Former Smoker     Packs/day: 0.00     Years: 28.00     Pack years: 0.00     Types: Cigarettes     Start date: 1985     Quit date: 2014     Years since quittin.8    Smokeless tobacco: Never Used   Vaping Use    Vaping Use: Never used   Substance and Sexual Activity    Alcohol use: Yes     Alcohol/week: 3.0 standard drinks     Types: 3 Standard drinks or equivalent per week     Comment: Socially. -weekly    Drug use: No    Sexual activity: Yes     Partners: Male   Other Topics Concern    None   Social History Narrative    None     Social Determinants of Health     Financial Resource Strain: Low Risk     Difficulty of Paying Living Expenses: Not hard at all   Food Insecurity: No Food Insecurity    Worried About Running Out of Food in the Last Year: Never true    [address] [facility] [address] in the Last Year: Never true   Transportation Needs:     Lack of Transportation (Medical): Not on file    Lack of Transportation (Non-Medical):  Not on file   Physical Activity:  Days of Exercise per Week: Not on file    Minutes of Exercise per Session: Not on file   Stress:     Feeling of Stress : Not on file   Social Connections:     Frequency of Communication with Friends and Family: Not on file    Frequency of Social Gatherings with Friends and Family: Not on file    Attends Jainism Services: Not on file    Active Member of [address] [place] [address] or Organizations: Not on file    Attends Club or Organization Meetings: Not on file    Marital Status: Not on file   Intimate Partner Violence:     Fear of Current or Ex-Partner: Not on file    Emotionally Abused: Not on file    Physically Abused: Not on file    Sexually Abused: Not on file   Housing Stability:     Unable to Pay for Housing in the Last Year: Not on file    Number of [place] in the Last Year: Not on file    Unstable Housing in the Last Year: Not on file       Current Outpatient Medications   Medication Sig Dispense Refill    diazePAM (VALIUM) 5 MG tablet Take one tablet by mouth 12 hours to procedure and take one tablet by mouth 2 hours prior to procedure. 2 tablet 0    fluocinolone (DERMOTIC) 0.01 % OIL oil Apply 4 drops to affected ear(s) twice a day for 7 days and then once a day as needed for itching 20 mL 5    levothyroxine (SYNTHROID) 50 MCG tablet Take 1 tablet by mouth daily 30 tablet 5    losartan-hydroCHLOROthiazide (HYZAAR) 50-12.5 MG per tablet Take 1 tablet by mouth daily 90 tablet 3    Crisaborole (EUCRISA) 2 % OINT Apply 1 applicator topically 2 times daily 1 Tube 5    insulin lispro (HUMALOG) 100 UNIT/ML injection vial Use 60 units per day in pump 1 vial 3    Vitamin D (CHOLECALCIFEROL) 1000 UNITS CAPS capsule Take 4,000 Units by mouth daily       Insulin Lispro, Human, (INSULIN PUMP) FLORENTIN Inject 1 each into the skin See Admin Instructions.  aspirin 81 MG EC tablet Take 81 mg by mouth daily       No current facility-administered medications for this visit.         Objective:  Here to be established and ALSO needs a pre-op for pain management injection. She gets right arm numbness so she is under care of pain management. Review of Systems   Constitutional: Negative for fatigue and unexpected weight change. Eyes: Negative for photophobia and visual disturbance. Respiratory: Negative for cough, choking, chest tightness, shortness of breath and wheezing. Cardiovascular: Negative for chest pain, palpitations and leg swelling. Gastrointestinal: Negative for abdominal pain. Genitourinary: Negative for difficulty urinating, hematuria, vaginal bleeding, vaginal discharge and vaginal pain. Musculoskeletal: Positive for arthralgias (ankles, knees, hips chronic) and neck pain (chronic under care of pain management). Negative for myalgias. Skin: Negative for rash and wound. Neurological: Negative for dizziness, tremors, seizures, syncope, speech difficulty, weakness, light-headedness, numbness and headaches. Hematological: Negative for adenopathy. Does not bruise/bleed easily. Psychiatric/Behavioral: Negative for agitation, confusion, decreased concentration, self-injury, sleep disturbance and suicidal ideas. The patient is not nervous/anxious. Physical Exam  Constitutional:       Appearance: Normal appearance. HENT:      Head: Normocephalic and atraumatic. Right Ear: Tympanic membrane and external ear normal.      Left Ear: Tympanic membrane and external ear normal.      Nose: Nose normal.      Mouth/Throat:      Mouth: Mucous membranes are moist.      Pharynx: Oropharynx is clear. Eyes:      Extraocular Movements: Extraocular movements intact. Conjunctiva/sclera: Conjunctivae normal.      Pupils: Pupils are equal, round, and reactive to light. Cardiovascular:      Rate and Rhythm: Normal rate and regular rhythm. Pulses: Normal pulses. Heart sounds: Normal heart sounds. No murmur heard.       Pulmonary:      Effort: Pulmonary effort is normal.      Breath sounds: Normal breath sounds. Abdominal:      General: Abdomen is flat. Bowel sounds are normal. There is no distension. Palpations: Abdomen is soft. There is no mass. Tenderness: There is no abdominal tenderness. There is no guarding. Musculoskeletal:         General: Normal range of motion. Cervical back: Normal range of motion and neck supple. Lymphadenopathy:      Cervical: No cervical adenopathy. Skin:     General: Skin is warm. Capillary Refill: Capillary refill takes less than 2 seconds. Neurological:      General: No focal deficit present. Mental Status: She is alert and oriented to person, place, and time. Psychiatric:         Mood and Affect: Mood normal.         Behavior: Behavior normal.         Thought Content: Thought content normal.          Assessment:   Diagnosis Orders   1. Type 1 diabetes mellitus without complication (HCC)  Comprehensive Metabolic Panel    Microalbumin, Ur    POCT glycosylated hemoglobin (Hb A1C)    HM DIABETES FOOT EXAM   2. Preop testing  EKG 12 Lead   3. Wellness examination  CBC with Auto Differential    Lipid Panel    Hepatitis C Antibody    Comprehensive Metabolic Panel    Vitamin D 25 Hydroxy   4. Essential hypertension  Comprehensive Metabolic Panel   5. Acquired hypothyroidism     6. Vitamin D deficiency  Vitamin D 25 Hydroxy   7. Mixed hyperlipidemia  Lipid Panel    Comprehensive Metabolic Panel   8. Encounter for hepatitis C screening test for low risk patient  Hepatitis C Antibody         Plan:  Orders Placed This Encounter   Procedures    CBC with Auto Differential     Standing Status:   Future     Standing Expiration Date:   8/27/2022    Lipid Panel     Standing Status:   Future     Standing Expiration Date:   8/27/2022     Order Specific Question:   Is Patient Fasting?/# of Hours     Answer:    Yes    Hepatitis C Antibody     Standing Status:   Future     Standing Expiration Date:   8/27/2022    Comprehensive Metabolic Panel Standing Status:   Future     Standing Expiration Date:   8/27/2022    Vitamin D 25 Hydroxy     Standing Status:   Future     Standing Expiration Date:   8/27/2022    Microalbumin, Ur     Standing Status:   Future     Standing Expiration Date:   8/27/2022    POCT glycosylated hemoglobin (Hb A1C)    EKG 12 Lead     Order Specific Question:   Reason for Exam?     Answer:   Chest pain    HM DIABETES FOOT EXAM         Patient Instructions   DM:   NO sugar, decrease fruit intake, No juice, NO veggies with color other than green, NO sauteed onions or anything that caramelizes, you may eat raw onions. NO alcohol, try not to eat diabetic candies as they may cause diarrhea. Minimize your carbohydrate intake. Elevated Cholesterol:   No greasy, fried, fast, fatty foods   No trans fats   Decreased red meat intake to once every 2 months   No butter, oviedo nor cream cheese, cheese   No egg yolk   NO milk   Decrease your cholesterol in diet    Elevated Blood Pressure:   No caffeine   No fast foods   Decrease salt in diet (consume nothing in a can, nothing in a box as these things contain high sodium)   No energy drinks   Buy a BP cuff and take blood pressure 3 times a day and write down blood pressure and pulse and bring in to me when you RTO   Lose weight and increase exercise if you are capable of exercising   Start only walking then may advance to brisk walking and lift low poundage free weights if you are capable    ekg today    Stop asa 5 days prior to injection    Pt will sign for her labs 2 labs form endocrinologist so we do not do the same labs twice    Diabetic foot exam today  Check your feet every night before bed for wounds  HgA1C 6.7 Reviewed with pt     Fasting blood work and follow up 2 months       Return in about 2 weeks (around 7/11/2022) for REVIEW LABS.      Pradip Valdez, DO

## 2022-06-27 NOTE — PATIENT INSTRUCTIONS
DM:   NO sugar, decrease fruit intake, No juice, NO veggies with color other than green, NO sauteed onions or anything that caramelizes, you may eat raw onions. NO alcohol, try not to eat diabetic candies as they may cause diarrhea. Minimize your carbohydrate intake.     Elevated Cholesterol:   No greasy, fried, fast, fatty foods   No trans fats   Decreased red meat intake to once every 2 months   No butter, [place] nor cream cheese, cheese   No egg yolk   NO milk   Decrease your cholesterol in diet    Elevated Blood Pressure:   No caffeine   No fast foods   Decrease salt in diet (consume nothing in a can, nothing in a box as these things contain high sodium)   No energy drinks   Buy a BP cuff and take blood pressure 3 times a day and write down blood pressure and pulse and bring in to me when you RTO   Lose weight and increase exercise if you are capable of exercising   Start only walking then may advance to brisk walking and lift low poundage free weights if you are capable    ekg today    Stop asa 5 days prior to injection    Pt will sign for her labs 2 labs form endocrinologist so we do not do the same labs twice    Diabetic foot exam today  Check your feet every night before bed for wounds  HgA1C 6.7 Reviewed with pt     Fasting blood work and follow up 2 months

## 2022-06-30 ENCOUNTER — HOSPITAL ENCOUNTER (OUTPATIENT)
Dept: PHYSICAL THERAPY | Age: 57
Setting detail: THERAPIES SERIES
Discharge: HOME | End: 2022-06-30
Payer: COMMERCIAL

## 2022-06-30 PROCEDURE — 97110 THERAPEUTIC EXERCISES: CPT

## 2022-06-30 NOTE — PROGRESS NOTES
Physical Therapy Daily Treatment Note    Date:  2022    Patient Name:  Bartolo Valdovinos    :  1965  MRN: [id number]  Restrictions/Precautions:   None  Medical/Treatment Diagnosis Information:       Diagnosis: M25.619 (ICD-10-CM) - Limited range of motion (ROM) of shoulder; M25.511, G89.29 (ICD-10-CM) - Chronic right shoulder pain; M54.12 (ICD-10-CM) - Cervical radiculopathy; M79.18 (ICD-10-CM) - Myofascial pain on right side  Insurance/Certification information:    [employer] [place]  Physician Information:    AARON Ortega - CNP  Plan of care signed (Y/N):  Y  Visit# / total visits:    Pain level: 1/10       Time In:  100  Time Out:  139     Progress Note: []  Yes  [x]  No  Next due by: Visit #10  Or by  22    Subjective: Patient arrives to the clinic reporting 1/10 neck and R shoulder pain. Patient states that she has not noticed that her arm has been numb in the past week. Patient states that she is getting an injection in her neck tomorrow. Objective: KAYLA complete per flow chart to facilitate strength, motion and stability to allow ease with daily activities and work duties. Manual performed with traction and occipital release. Patient reported that she did not want to perform cervical traction this date. Observation:   Test measurements:  R shoulder IR: T12    Exercises:   Exercise/Equipment Resistance/Repetitions Other comments   Pulleys          Supine pec stretch  Did not feel stretch    Supine L scalenes stretch 3x20\"    Supine Cervical retractions 15x3\" 2 sets   Supine Cervical retraction with extension 15x No L shoulder pain felt at the beginning of the set, mild L shoulder pain noted with ending repetitions.     Supine Head shoulder press 10x3\"    Manual 4' suboccipital release, manual traction         Prone IYTA 15x    Prone row 15x         UT / levator scap stretch 2x30\"    Side bend R 10x2    Cervical AROM 10x2 Bilateral    Scapular retraction 120x    doorway pec stretch 20\"x3    TB rows/ext 20x red TB    TB ER with scapular retraction 10x red TB     SPO 10x red TB          Cervical traction          [x] Provided verbal/tactile cueing for activities related to strengthening, flexibility, endurance, ROM. (31391)  [] Provided verbal/tactile cueing for activities related to improving balance, coordination, kinesthetic sense, posture, motor skill, proprioception. (77636)    Therapeutic Activities:     [] Therapeutic activities, direct (one-on-one) patient contact (use of dynamic activities to improve functional performance). (13389)    Gait:   [] Provided training and instruction to the patient for ambulation re-education. (81289)    Self-Care/ADL's  [] Self-care/home management training and compensatory training, meal preparation, safety procedures, and instructions in use of assistive technology devices/adaptive equipment, direct one-on-one contact. (23416)    Home Exercise Program: Patient given written updated HEP with understanding noted. [x] Reviewed/Progressed HEP activities related to strengthening, flexibility, endurance, ROM. (51672)  [] Reviewed/Progressed HEP activities related to improving balance, coordination, kinesthetic sense, posture, motor skill, proprioception.  (15365)    Manual Treatments:    [x] Provided manual therapy to mobilize soft tissue/joints for the purpose of modulating pain, promoting relaxation,  increasing ROM, reducing/eliminating soft tissue swelling/inflammation/restriction, improving soft tissue extensibility.  (32756)    Service Based Modalities:      Timed Code Treatment Minutes:   44' there ex    Total Treatment Minutes:  44'     Treatment/Activity Tolerance:  [x] Patient tolerated treatment well [] Patient limited by fatigue  [] Patient limited by pain  [] Patient limited by other medical complications  [] Other:     Prognosis: [x] Good [] Fair  [] Poor    Patient Requires Follow-up: [x] Yes  [] No      Goals:  Short Term Goals  Time Frame for Short term goals: 3 weeks  Short term goal 1: Provide written HEP  - met    Long Term Goals  Time Frame for Long term goals : 6 weeks  Long term goal 1: Patient will report no greater than 1-2/10 neck or right shoulder pain to allow patient to complete her work duties with less impairment. (2-3/10 at worst 6/23/22)  Long term goal 2: Patient will demonstrate improved right shoulder AROM to 160 flexion, 160 degrees abduction, IR to T2/3 to improve ability to reach overhead with greater ease  Long term goal 3: Patient will score less than 10% disability on the NDI to allow patient to complete her daily ADLs with greater ease  Long term goal 4: Patient will demonstrate right shoulder strength to at least 4/5 in all directions to allow patient to lift/carry objects with less impairment. Plan:   [x] Continue per plan of care [] Alter current plan (see comments)  [] Plan of care initiated [] Hold pending MD visit [] Discharge     Plan for Next Session:  Monitor tolerance to updated HEP and manual. Progress as able.       Electronically signed by:  Yasir Khoury PT

## 2022-07-01 ENCOUNTER — APPOINTMENT (OUTPATIENT)
Dept: GENERAL RADIOLOGY | Age: 57
End: 2022-07-01
Attending: PHYSICAL MEDICINE & REHABILITATION
Payer: COMMERCIAL

## 2022-07-01 ENCOUNTER — HOSPITAL ENCOUNTER (OUTPATIENT)
Age: 57
Setting detail: OUTPATIENT SURGERY
Discharge: HOME | End: 2022-07-01
Attending: PHYSICAL MEDICINE & REHABILITATION | Admitting: PHYSICAL MEDICINE & REHABILITATION
Payer: COMMERCIAL

## 2022-07-01 VITALS
WEIGHT: 153 LBS | TEMPERATURE: 97.4 F | OXYGEN SATURATION: 98 % | HEIGHT: 62 IN | RESPIRATION RATE: 16 BRPM | BODY MASS INDEX: 28.16 KG/M2 | HEART RATE: 70 BPM | SYSTOLIC BLOOD PRESSURE: 173 MMHG | DIASTOLIC BLOOD PRESSURE: 83 MMHG

## 2022-07-01 PROBLEM — M54.12 CERVICAL RADICULITIS: Status: ACTIVE | Noted: 2022-07-01

## 2022-07-01 PROBLEM — M47.812 CERVICAL SPONDYLOSIS: Status: ACTIVE | Noted: 2022-07-01

## 2022-07-01 PROCEDURE — 3209999900 FLUORO FOR SURGICAL PROCEDURES

## 2022-07-01 PROCEDURE — 64479 NJX AA&/STRD TFRM EPI C/T 1: CPT | Performed by: PHYSICAL MEDICINE & REHABILITATION

## 2022-07-01 PROCEDURE — 2500000003 HC RX 250 WO HCPCS: Performed by: PHYSICAL MEDICINE & REHABILITATION

## 2022-07-01 PROCEDURE — 3600000054 HC PAIN LEVEL 3 BASE: Performed by: PHYSICAL MEDICINE & REHABILITATION

## 2022-07-01 PROCEDURE — 2709999900 HC NON-CHARGEABLE SUPPLY: Performed by: PHYSICAL MEDICINE & REHABILITATION

## 2022-07-01 PROCEDURE — 6360000002 HC RX W HCPCS: Performed by: PHYSICAL MEDICINE & REHABILITATION

## 2022-07-01 PROCEDURE — 7100000010 HC PHASE II RECOVERY - FIRST 15 MIN: Performed by: PHYSICAL MEDICINE & REHABILITATION

## 2022-07-01 PROCEDURE — 6360000004 HC RX CONTRAST MEDICATION: Performed by: PHYSICAL MEDICINE & REHABILITATION

## 2022-07-01 RX ORDER — DEXAMETHASONE SODIUM PHOSPHATE 10 MG/ML
INJECTION INTRAMUSCULAR; INTRAVENOUS PRN
Status: DISCONTINUED | OUTPATIENT
Start: 2022-07-01 | End: 2022-07-01 | Stop reason: ALTCHOICE

## 2022-07-01 RX ORDER — SODIUM CHLORIDE 9 MG/ML
INJECTION, SOLUTION INTRAVENOUS PRN
Status: DISCONTINUED | OUTPATIENT
Start: 2022-07-01 | End: 2022-07-01 | Stop reason: HOSPADM

## 2022-07-01 RX ORDER — BUPIVACAINE HYDROCHLORIDE 2.5 MG/ML
INJECTION, SOLUTION EPIDURAL; INFILTRATION; INTRACAUDAL PRN
Status: DISCONTINUED | OUTPATIENT
Start: 2022-07-01 | End: 2022-07-01 | Stop reason: ALTCHOICE

## 2022-07-01 RX ORDER — LIDOCAINE HYDROCHLORIDE 10 MG/ML
INJECTION, SOLUTION EPIDURAL; INFILTRATION; INTRACAUDAL; PERINEURAL PRN
Status: DISCONTINUED | OUTPATIENT
Start: 2022-07-01 | End: 2022-07-01 | Stop reason: ALTCHOICE

## 2022-07-01 RX ORDER — SODIUM CHLORIDE 0.9 % (FLUSH) 0.9 %
5-40 SYRINGE (ML) INJECTION PRN
Status: DISCONTINUED | OUTPATIENT
Start: 2022-07-01 | End: 2022-07-01 | Stop reason: HOSPADM

## 2022-07-01 RX ORDER — SODIUM CHLORIDE 0.9 % (FLUSH) 0.9 %
5-40 SYRINGE (ML) INJECTION EVERY 12 HOURS SCHEDULED
Status: DISCONTINUED | OUTPATIENT
Start: 2022-07-01 | End: 2022-07-01 | Stop reason: HOSPADM

## 2022-07-01 ASSESSMENT — PAIN - FUNCTIONAL ASSESSMENT: PAIN_FUNCTIONAL_ASSESSMENT: 0-10

## 2022-07-01 ASSESSMENT — PAIN SCALES - GENERAL: PAINLEVEL_OUTOF10: 0

## 2022-07-01 ASSESSMENT — ENCOUNTER SYMPTOMS
BACK PAIN: 1
RESPIRATORY NEGATIVE: 1

## 2022-07-01 NOTE — H&P
Subjective:      Patient ID: Li Hull is a 56 y.o. female. No chief complaint on file. Neck Pain   Associated symptoms include leg pain and numbness (right arm). Shoulder Pain   Associated symptoms include numbness (right arm). Leg Pain   Associated symptoms include numbness (right arm). Other  Associated symptoms include arthralgias, myalgias, neck pain and numbness (right arm). Hip Pain   Associated symptoms include numbness (right arm). New complaint, right shoulder/cervical, to review MRI    Numbness, limited right shouder accompanied by pain, right sided neck pain and tightness         Allergies   Allergen Reactions    Ace Inhibitors Other (See Comments)     Cough      Penicillins Rash and Other (See Comments)    Sertraline Other (See Comments)     Hypertension       No outpatient medications have been marked as taking for the 7/1/22 encounter [facility] Encounter).        Past Medical History:   Diagnosis Date    Bronchitis     Diabetes type 1, controlled ([facility] [address]     Eczema     GERD (gastroesophageal reflux disease)     Gestational diabetes     Hyperlipidemia     Hypertension     Hypothyroid     Insomnia     Insulin pump in place     Dr. Pan Castillo endocrinology [employer]    Myopia with astigmatism and presbyopia     Tobacco abuse     Under care of team 11/22/2021    endocrine-Dr Crandall 54 visit nov 2021    Under care of team 11/22/2021    pcp- [address] [place] [address]  visit nov 2021       Past Surgical History:   Procedure Laterality Date    BACK INJECTION Right 04/29/2021    Right Sacroiliac Joint Injection performed by Celso Paulson MD at [address] [place] [address]  01/05/2015    Lumbar microdiscectomy L5-S1    BLADDER SUSPENSION  09/22/2009    (transobturator sling)   [address]    (laser cone)    COLONOSCOPY  10/17/2018    hyperplastic polyp, Dr. Juan Gutierrez at [facility]  2006    LUMBAR FUSION N/A 12/9/2021    OLIF L4-5, C-ARM, LATERAL FLAT BOBBI, MEDTRONICS, MICROSCOPE performed by Stephanie Bob DO at [address] N/A 2021    POSTERIOR FIXATION L4-5, O-ARM/STEALTH performed by Stephanie Bob DO at [address]  2017    Dr Gregorio Vasquez  2021    OLIF L4-5, Posterior fixation L4-5    NERVE BLOCK  2017    laser nerve block Dr Sharda Longoria- Maggie Busby  2017    laser nerve block Dr Becka Wood Bilateral 2019    BL5 transforaminal radiculogram- [facility] per Dr Vail Age Right 2019    Right sacroiliac joint injection with arthrography    NERVE BLOCK Bilateral 2019    BL5 transforaminal radiculogram/epidurogram    PAIN MANAGEMENT PROCEDURE Bilateral 2021    Bilateral L4 TRANSFORAMINAL performed by Codey Craig MD at [facility] N/A 2021    L4-L5  INTERLAMINAR performed by Codey Craig MD at [facility]  2015    Lumbar L5-S1    US BREAST NEEDLE BIOPSY RIGHT Right 2021    US BREAST NEEDLE BIOPSY RIGHT 2021 [facility] ULTRASOUND       Family History   Problem Relation Age of Onset    Diabetes Paternal Grandmother     Cancer Father         lymph nodes    Ovarian Cancer Maternal Aunt 79        mother's half sister (same mother)    High Blood Pressure Mother     Glaucoma Neg Hx     Cataracts Neg Hx        Social History     Socioeconomic History    Marital status:      Spouse name: None    Number of children: None    Years of education: None    Highest education level: None   Occupational History    None   Tobacco Use    Smoking status: Former Smoker     Packs/day: 0.00     Years: 28.00     Pack years: 0.00     Types: Cigarettes     Start date: 1985     Quit date: 2014     Years since quittin.8    Smokeless tobacco: Never Used   Vaping Use    Vaping Use: Never used   Substance and Sexual Activity    Alcohol use: Exam  Vitals reviewed. Constitutional:       General: She is not in acute distress. Appearance: She is well-developed. She is not diaphoretic. Interventions: She is not intubated. HENT:      Head: Normocephalic and atraumatic. Neck:      Comments: MRI OF THE CERVICAL SPINE WITHOUT CONTRAST 6/8/2022 5:24 pm     TECHNIQUE:  Multiplanar multisequence MRI of the cervical spine was performed without the  administration of intravenous contrast.     COMPARISON:  None.     HISTORY:  ORDERING SYSTEM PROVIDED HISTORY: Cervical radiculopathy  TECHNOLOGIST PROVIDED HISTORY:  radiculopathy  Reason for Exam: Chronic neck pain. Right shoulder pain and right arm  numbness for approximately two months.   Additional signs and symptoms: Cervical radiculopathy     FINDINGS:  BONES/ALIGNMENT: The vertebral body heights are maintained.  There is  age-appropriate bone marrow signal.  There is multilevel degenerative disc  disease with loss of disc signal.  There is no significant disc space  narrowing.  There is no spondylolisthesis.     SPINAL CORD: The spinal cord is normal in caliber and signal.     SOFT TISSUES: The posterior paraspinal soft tissues are unremarkable.  The  prevertebral soft tissues are unremarkable.     C2-C3: There is no significant disc protrusion, spinal canal stenosis or  neural foraminal narrowing.     C3-C4: There is no significant disc protrusion, spinal canal stenosis or  neural foraminal narrowing.     C4-C5: There is no significant disc protrusion, spinal canal stenosis or  neural foraminal narrowing.     C5-C6: There is a disc osteophyte complex with uncovertebral and facet  hypertrophy.  There is no canal stenosis.  There is moderate bilateral  foraminal narrowing.     C6-C7: There is no significant disc protrusion, spinal canal stenosis or  neural foraminal narrowing.     C7-T1: There is no significant disc protrusion, spinal canal stenosis or  neural foraminal narrowing.        Impression  Multilevel degenerative disc disease with uncovertebral and facet hypertrophy  resulting in moderate bilateral foraminal narrowing at C5-6.         Cardiovascular:      Rate and Rhythm: Normal rate. Pulmonary:      Effort: Pulmonary effort is normal. No tachypnea, bradypnea, accessory muscle usage or respiratory distress. She is not intubated. Musculoskeletal:      Right shoulder: Decreased range of motion. Decreased strength. Cervical back: Spasms present. Decreased range of motion. Lumbar back: No tenderness or bony tenderness. Skin:     General: Skin is warm and dry. Capillary Refill: Capillary refill takes less than 2 seconds. Coloration: Skin is not pale. Nails: There is no clubbing. Neurological:      Mental Status: She is alert and oriented to person, place, and time. GCS: GCS eye subscore is 4. GCS verbal subscore is 5. GCS motor subscore is 6. Cranial Nerves: No cranial nerve deficit. Psychiatric:         Speech: Speech normal.         Behavior: Behavior normal. Behavior is not agitated, aggressive, withdrawn or combative. Behavior is cooperative. Judgment: Judgment normal. Judgment is not impulsive or inappropriate. Assessment:      No diagnosis found. Plan:   MRI reviewed    To schedule right C6 TFE    Informed consent has not been obtained for procedure. Loretta Benitez  on blood thinners. Medications to hold have been reviewed with patient. Blood thinners must be held with permission from your cardiologist or primary care physician. A letter is  required to besent to PCP/Cardiologist regarding holding medications for procedure to decrease bleeding risk.        Rosalinda Schirmer, MD

## 2022-07-01 NOTE — OP NOTE
D/I: VSS, borderline HTN.  No c/o pain, repositioned for comfort.  Neuros intact. No c/o of numbness/tingling in extremities.  Troponins treading downward.  NPO at [facility] for ROLY today.  C/o post op constipation and pt requested another dose of miralax, notified on call MD and gave pt Miralax at 2100. BMx3 after miralax.  Reinforced R groin wound vac dsg x2- small/moderate serosanguinous drainage.   P: Confer w MDs in AM to clarify activity plan- need PT/OT w bilateral groin wound vacs? ROLY today.   TRANSFORAMINAL EPIDURAL STEROID INJECTION    7/1/22    Surgeon: Barbara Arambula MD    Pre-operative Diagnosis:   Hospital Problems           Last Modified POA    * (Principal) Cervical spondylosis 7/1/2022 Yes    Cervical radiculitis 7/1/2022 Yes          Post-operative Diagnosis: Same    Assistants: none    This is a 56 y.o. female patient with pain in the neck. Previous treatment and examination findings are noted in the H&P. RC6 transforaminal epidural injection has been requested for diagnostic and therapeutic reasons. Conservative treatment was ineffective i.e.: ice, NSAIDS, rest, narcotic medication, chiropractic care, physical therapy and message therapy. Patient is unable to perform the following ADL's: toileting, personal cares and ambulating     Pain Assessment: 0-10  Pain Level: 1                Last Plain films: 2020      EXAMINATION:  1. RC6 transforaminal radiculogram/epidurogram.   2. RC6 transforaminal epidural anesthetic injection. 3. RC6 transforaminal epidural steroid injection. CONSENT: Written consent was obtained from the patient on preprinted consent form after explaining the procedure, indications, potential complications and outcomes. Alternative treatments were also discussed. DISCUSSION: The patient was sterilely prepped and draped in the usual fashion in the left decubitus position. Time out was verified for correct patient, side, level and procedure. SEDATION:   No conscious sedation was performed during the procedure. The patient remained awake and conversed throughout the procedure. The patient underwent pulse oximetry and blood pressure monitoring independently by a trained observer, as well as by a physician. PROCEDURE:  Under image-intensifier control, a 25 gauge needle x 2.5 inch spinal needle was guided successfully into the epidural space employing a posterior lateral/oblique approach. Needle aspiration was negative for heme or CSF.      Instillation of  .5 mL of Omnipaque 240 contrast medium opacified the spinal nerve and demonstrated contiguous flow into the epidural space. No vascular spread was noted. Digital subtraction was employed to evaluate for vascular spread. The patient was monitored for any untoward reaction to contrast medium before proceeding with procedure #2. The patient did not report pain reproduction in a concordant distribution. Following needle position verification, a test dose of .5 mL of sterile lidocaine 0.5% was administered and patient monitored for any adverse effects. Then, 1 mL of   was instilled into the epidural space and the patient's response was again monitored. Finally, Dexamethasone (Decadron 10 mg/mL) 1 ml of 0.5% bupivacaine was then instilled. The patient's response was again monitored. The spinal needle was removed. The patient tolerated the procedure well and without complications and was noted to be in stable condition prior to discharge from the procedure center with discharge instructions. EBL: no blood loss    SPECIMEN: none    IMPRESSIONS:  1. RC6 transforaminal epidurogram, epidural anesthesia and epidural steroid injection procedures accomplished without incident. RECOMMENDATIONS:  1. Complete and return Post-Procedure Pain and Activity Diary.   2. Contact the [address] for symptom exacerbation, fever or unusual symptoms. 3. Post-procedure care according to verbal and written discharge instructions    POST-PROCEDURE EPIDUROGRAPHY INTERPRETATION:    EXAMINATION: AP, lateral, and oblique views    FLUORO TIME: 11 seconds    DISCUSSION: Spot views of the spine reveal normal alignment and segmentation. Spinal needle is positioned at the [facility] neuroforamin. Contrast spreads and outlines the RC6 nerve/ neuroforamin and epidural space. The epidurogram reveals excellent contrast flow. Visualized spine reveals See radiology report. Soft tissues reveal no abnormalities.     IMPRESSION: RC6 transforaminal epidurogram/epineurogram reveals satisfactory needle position and contrast spread.      Electronically signed by Larry Catalan MD on 7/1/2022 at 11:46 AM

## 2022-07-01 NOTE — INTERVAL H&P NOTE
I have interviewed and examined the patient and reviewed the recent History and Physical.  There have been no changes to the recent H&P documentation. The surgical consent form has been signed. Last anticoagulant medication use was:2-3 days    Premedication taken for contrast allergy? No    Valium taken for oral sedation? No    No outpatient medications have been marked as taking for the 7/1/22 encounter [facility] Encounter). The patient understands the planned operation and its associated risks and benefits and agrees to proceed.         Electronically signed by Chela Galloway MD on 7/1/2022 at 11:44 AM

## 2022-07-06 ENCOUNTER — HOSPITAL ENCOUNTER (OUTPATIENT)
Dept: PHYSICAL THERAPY | Age: 57
Setting detail: THERAPIES SERIES
Discharge: HOME | End: 2022-07-06

## 2022-07-06 NOTE — PROGRESS NOTES
Physical Therapy  Outpatient Physical Therapy    [x] [place]  Phone: [phone]  Fax: [phone]      [] [place]  Phone: [phone]  Fax: [phone]    Physical Therapy  Cancellation/No-show Note  Patient Name:  Berhane Rivera  :  1965   Date:  2022  Cancelled visits to date: 3   No-shows to date: 0    For today's appointment patient:  [x]  Cancelled  []  Rescheduled appointment  []  No-show     Reason given by patient:  []  Patient ill  []  Conflicting appointment  []  No transportation    []  Conflict with work  []  No reason given  [x]  Other:     Comments:  Steroid fixed pain    Electronically signed by: Ellie Gonzalez PTA

## 2022-07-18 ENCOUNTER — HOSPITAL ENCOUNTER (OUTPATIENT)
Dept: LAB | Age: 57
Discharge: HOME | End: 2022-07-18
Payer: COMMERCIAL

## 2022-07-18 DIAGNOSIS — E78.2 MIXED HYPERLIPIDEMIA: ICD-10-CM

## 2022-07-18 DIAGNOSIS — E55.9 VITAMIN D DEFICIENCY: ICD-10-CM

## 2022-07-18 DIAGNOSIS — Z00.00 WELLNESS EXAMINATION: ICD-10-CM

## 2022-07-18 DIAGNOSIS — E10.9 TYPE 1 DIABETES MELLITUS WITHOUT COMPLICATION (HCC): ICD-10-CM

## 2022-07-18 DIAGNOSIS — I10 ESSENTIAL HYPERTENSION: ICD-10-CM

## 2022-07-18 DIAGNOSIS — Z11.59 ENCOUNTER FOR HEPATITIS C SCREENING TEST FOR LOW RISK PATIENT: ICD-10-CM

## 2022-07-18 LAB
ABSOLUTE EOS #: 0.17 K/UL (ref 0–0.44)
ABSOLUTE IMMATURE GRANULOCYTE: <0.03 K/UL (ref 0–0.3)
ABSOLUTE LYMPH #: 1.24 K/UL (ref 1.1–3.7)
ABSOLUTE MONO #: 0.52 K/UL (ref 0.1–1.2)
ALBUMIN SERPL-MCNC: 4.5 G/DL (ref 3.5–5.2)
ALBUMIN/GLOBULIN RATIO: 1.7 (ref 1–2.5)
ALP BLD-CCNC: 66 U/L (ref 35–104)
ALT SERPL-CCNC: 12 U/L (ref 5–33)
ANION GAP SERPL CALCULATED.3IONS-SCNC: 13 MMOL/L (ref 9–17)
AST SERPL-CCNC: 19 U/L
BASOPHILS # BLD: 1 % (ref 0–2)
BASOPHILS ABSOLUTE: 0.05 K/UL (ref 0–0.2)
BILIRUB SERPL-MCNC: 0.26 MG/DL (ref 0.3–1.2)
BUN BLDV-MCNC: 10 MG/DL (ref 6–20)
BUN/CREAT BLD: 14 (ref 9–20)
CALCIUM SERPL-MCNC: 9.5 MG/DL (ref 8.6–10.4)
CHLORIDE BLD-SCNC: 100 MMOL/L (ref 98–107)
CHOLESTEROL/HDL RATIO: 2.2
CHOLESTEROL: 247 MG/DL
CO2: 24 MMOL/L (ref 20–31)
CREAT SERPL-MCNC: 0.7 MG/DL (ref 0.5–0.9)
CREATININE URINE: 65.1 MG/DL (ref 28–217)
EOSINOPHILS RELATIVE PERCENT: 3 % (ref 1–4)
GFR AFRICAN AMERICAN: >60 ML/MIN
GFR NON-AFRICAN AMERICAN: >60 ML/MIN
GFR SERPL CREATININE-BSD FRML MDRD: ABNORMAL ML/MIN/{1.73_M2}
GLUCOSE BLD-MCNC: 200 MG/DL (ref 70–99)
HCT VFR BLD CALC: 38.1 % (ref 36.3–47.1)
HDLC SERPL-MCNC: 113 MG/DL
HEMOGLOBIN: 13.2 G/DL (ref 11.9–15.1)
HEPATITIS C ANTIBODY: NONREACTIVE
IMMATURE GRANULOCYTES: 0 %
LDL CHOLESTEROL: 124 MG/DL (ref 0–130)
LYMPHOCYTES # BLD: 22 % (ref 24–43)
MCH RBC QN AUTO: 32.8 PG (ref 25.2–33.5)
MCHC RBC AUTO-ENTMCNC: 34.6 G/DL (ref 25.2–33.5)
MCV RBC AUTO: 94.8 FL (ref 82.6–102.9)
MICROALBUMIN/CREAT 24H UR: <12 MG/L
MICROALBUMIN/CREAT UR-RTO: NORMAL MCG/MG CREAT
MONOCYTES # BLD: 9 % (ref 3–12)
NRBC AUTOMATED: 0 PER 100 WBC
PDW BLD-RTO: 12.7 % (ref 11.8–14.4)
PLATELET # BLD: 291 K/UL (ref 138–453)
PMV BLD AUTO: 9.1 FL (ref 8.1–13.5)
POTASSIUM SERPL-SCNC: 4.3 MMOL/L (ref 3.7–5.3)
RBC # BLD: 4.02 M/UL (ref 3.95–5.11)
SEG NEUTROPHILS: 65 % (ref 36–65)
SEGMENTED NEUTROPHILS ABSOLUTE COUNT: 3.68 K/UL (ref 1.5–8.1)
SODIUM BLD-SCNC: 137 MMOL/L (ref 135–144)
TOTAL PROTEIN: 7.1 G/DL (ref 6.4–8.3)
TRIGL SERPL-MCNC: 52 MG/DL
VITAMIN D 25-HYDROXY: 33.8 NG/ML
WBC # BLD: 5.7 K/UL (ref 3.5–11.3)

## 2022-07-18 PROCEDURE — 80053 COMPREHEN METABOLIC PANEL: CPT

## 2022-07-18 PROCEDURE — 80061 LIPID PANEL: CPT

## 2022-07-18 PROCEDURE — 85025 COMPLETE CBC W/AUTO DIFF WBC: CPT

## 2022-07-18 PROCEDURE — 82043 UR ALBUMIN QUANTITATIVE: CPT

## 2022-07-18 PROCEDURE — 82570 ASSAY OF URINE CREATININE: CPT

## 2022-07-18 PROCEDURE — 36415 COLL VENOUS BLD VENIPUNCTURE: CPT

## 2022-07-18 PROCEDURE — 86803 HEPATITIS C AB TEST: CPT

## 2022-07-18 PROCEDURE — 82306 VITAMIN D 25 HYDROXY: CPT

## 2022-07-20 ENCOUNTER — OFFICE VISIT (OUTPATIENT)
Dept: PAIN MANAGEMENT | Age: 57
End: 2022-07-20
Payer: COMMERCIAL

## 2022-07-20 ENCOUNTER — TELEPHONE (OUTPATIENT)
Dept: PAIN MANAGEMENT | Age: 57
End: 2022-07-20

## 2022-07-20 VITALS
RESPIRATION RATE: 18 BRPM | HEIGHT: 62 IN | DIASTOLIC BLOOD PRESSURE: 90 MMHG | WEIGHT: 151.38 LBS | SYSTOLIC BLOOD PRESSURE: 150 MMHG | BODY MASS INDEX: 27.86 KG/M2 | OXYGEN SATURATION: 99 % | HEART RATE: 76 BPM

## 2022-07-20 DIAGNOSIS — G89.29 CHRONIC BILATERAL LOW BACK PAIN WITH BILATERAL SCIATICA: Primary | ICD-10-CM

## 2022-07-20 DIAGNOSIS — M51.26 DISPLACEMENT OF LUMBAR INTERVERTEBRAL DISC WITHOUT MYELOPATHY: ICD-10-CM

## 2022-07-20 DIAGNOSIS — M53.3 CHRONIC RIGHT SACROILIAC JOINT PAIN: ICD-10-CM

## 2022-07-20 DIAGNOSIS — M47.812 CERVICAL SPONDYLOSIS: ICD-10-CM

## 2022-07-20 DIAGNOSIS — M96.1 POST LAMINECTOMY SYNDROME: ICD-10-CM

## 2022-07-20 DIAGNOSIS — M54.42 CHRONIC BILATERAL LOW BACK PAIN WITH BILATERAL SCIATICA: Primary | ICD-10-CM

## 2022-07-20 DIAGNOSIS — G89.29 CHRONIC RIGHT SACROILIAC JOINT PAIN: ICD-10-CM

## 2022-07-20 DIAGNOSIS — M54.41 CHRONIC BILATERAL LOW BACK PAIN WITH BILATERAL SCIATICA: Primary | ICD-10-CM

## 2022-07-20 DIAGNOSIS — M54.12 CERVICAL RADICULITIS: ICD-10-CM

## 2022-07-20 PROCEDURE — 1036F TOBACCO NON-USER: CPT | Performed by: NURSE PRACTITIONER

## 2022-07-20 PROCEDURE — G8417 CALC BMI ABV UP PARAM F/U: HCPCS | Performed by: NURSE PRACTITIONER

## 2022-07-20 PROCEDURE — 99214 OFFICE O/P EST MOD 30 MIN: CPT | Performed by: NURSE PRACTITIONER

## 2022-07-20 PROCEDURE — G8427 DOCREV CUR MEDS BY ELIG CLIN: HCPCS | Performed by: NURSE PRACTITIONER

## 2022-07-20 PROCEDURE — 3017F COLORECTAL CA SCREEN DOC REV: CPT | Performed by: NURSE PRACTITIONER

## 2022-07-20 RX ORDER — CELECOXIB 100 MG/1
100 CAPSULE ORAL 2 TIMES DAILY
Qty: 60 CAPSULE | Refills: 3 | Status: SHIPPED | OUTPATIENT
Start: 2022-07-20 | End: 2022-10-28 | Stop reason: SDUPTHER

## 2022-07-20 ASSESSMENT — ENCOUNTER SYMPTOMS
RESPIRATORY NEGATIVE: 1
BACK PAIN: 1

## 2022-07-20 NOTE — PROGRESS NOTES
Subjective:      Patient ID: Issac Mckoy is a 56 y.o. female. Chief Complaint   Patient presents with    Follow-up     Injection       Neck Pain   Associated symptoms include leg pain and numbness (right arm). Shoulder Pain   Associated symptoms include numbness (right arm). Leg Pain   Associated symptoms include numbness (right arm). Other  Associated symptoms include arthralgias, myalgias, neck pain and numbness (right arm). Hip Pain   Associated symptoms include numbness (right arm). Here today for routine pain clinic recheck. Numbness, limited right shouder accompanied by pain, right sided neck pain and tightness-symptoms improved from physical therapy,/transforaminal satisfied with results. States she is doing much better. Pain Assessment  Location of Pain: Neck  Location Modifiers: Right, Medial  Severity of Pain: 3  Quality of Pain: Aching (numb tingling)  Duration of Pain: Persistent  Frequency of Pain: Constant  Aggravating Factors:  (sitting typing)  Limiting Behavior: Some  Relieving Factors: Ice, Exercise, Rest  Result of Injury: No  Work-Related Injury: No  Are there other pain locations you wish to document?: No    Allergies   Allergen Reactions    Ace Inhibitors Other (See Comments)     Cough      Penicillins Rash and Other (See Comments)    Sertraline Other (See Comments)     Hypertension       Outpatient Medications Marked as Taking for the 7/20/22 encounter (Office Visit) with AARON Doyle CNP   Medication Sig Dispense Refill    celecoxib (CELEBREX) 100 MG capsule Take 1 capsule by mouth in the morning and 1 capsule before bedtime.  60 capsule 3    aspirin 81 MG EC tablet Take 81 mg by mouth daily      fluocinolone (DERMOTIC) 0.01 % OIL oil Apply 4 drops to affected ear(s) twice a day for 7 days and then once a day as needed for itching 20 mL 5    levothyroxine (SYNTHROID) 50 MCG tablet Take 1 tablet by mouth daily 30 tablet 5    losartan-hydroCHLOROthiazide (HYZAAR) 50-12.5 MG per tablet Take 1 tablet by mouth daily 90 tablet 3    Crisaborole (EUCRISA) 2 % OINT Apply 1 applicator topically 2 times daily 1 Tube 5    insulin lispro (HUMALOG) 100 UNIT/ML injection vial Use 60 units per day in pump 1 vial 3    Vitamin D (CHOLECALCIFEROL) 1000 UNITS CAPS capsule Take 4,000 Units by mouth daily       Insulin Lispro, Human, (INSULIN PUMP) FLORENTIN Inject 1 each into the skin See Admin Instructions.          Past Medical History:   Diagnosis Date    Bronchitis     Diabetes type 1, controlled ([address]     Eczema     GERD (gastroesophageal reflux disease)     Gestational diabetes     Hyperlipidemia     Hypertension     Hypothyroid     Insomnia     Insulin pump in place     Dr. Elvia Alfaro endocrinology [address]    Myopia with astigmatism and presbyopia     Tobacco abuse     Under care of team 11/22/2021    endocrine-Dr Crandall 52 visit nov 2021    Under care of team 11/22/2021    pcp- [address] [place] [address] Dr visit nov 2021       Past Surgical History:   Procedure Laterality Date    BACK INJECTION Right 04/29/2021    Right Sacroiliac Joint Injection performed by Leidy Cabrera MD at [address] [place] [address]  01/05/2015    Lumbar microdiscectomy L5-S1    BLADDER SUSPENSION  09/22/2009    (transobturator sling)    CERVIX LESION DESTRUCTION  1993    (laser cone)    COLONOSCOPY  10/17/2018    hyperplastic polyp, Dr. Cee Pacheco at [place] [address].  2006    LUMBAR FUSION N/A 12/9/2021    OLIF L4-5, C-ARM, LATERAL FLAT [place], [employer], MICROSCOPE performed by Renuka Christina DO at [facility],4[facility] 12/9/2021    POSTERIOR FIXATION L4-5, O-ARM/STEALTH performed by Renuka Christina DO at [facility]  09/06/2017    Dr Sindy Leung  12/09/2021    OLIF L4-5, Posterior fixation L4-5    NERVE BLOCK  02/16/2017    [place] [address]  03/09/2017    laser nerve block Dr Murdock Poag Bilateral 03/13/2019 BL5 transforaminal radiculogram- [facility] per Dr Janet العراقي Right 2019    Right sacroiliac joint injection with arthrography    NERVE BLOCK Bilateral 2019    BL5 transforaminal radiculogram/epidurogram    PAIN MANAGEMENT PROCEDURE Bilateral 2021    Bilateral L4 TRANSFORAMINAL performed by Yen Greer MD at [facility] N/A 2021    L4-L5  INTERLAMINAR performed by Yen Greer MD at [facility] Right 2022    Right C6 TRANSFORAMINAL performed by Yen Greer MD at [address]  2015    Lumbar L5-S1    US BREAST NEEDLE BIOPSY RIGHT Right 2021    US BREAST NEEDLE BIOPSY RIGHT 2021 [id number] ULTRASOUND       Family History   Problem Relation Age of Onset    Diabetes Paternal Grandmother     Cancer Father         lymph nodes    Ovarian Cancer Maternal Aunt 79        mother's half sister (same mother)    High Blood Pressure Mother     Glaucoma Neg Hx     Cataracts Neg Hx        Social History     Socioeconomic History    Marital status:      Spouse name: None    Number of children: None    Years of education: None    Highest education level: None   Tobacco Use    Smoking status: Former     Packs/day: 0.00     Years: 28.00     Pack years: 0.00     Types: Cigarettes     Start date: 1985     Quit date: 2014     Years since quittin.9    Smokeless tobacco: Never   Vaping Use    Vaping Use: Never used   Substance and Sexual Activity    Alcohol use: Yes     Alcohol/week: 3.0 standard drinks     Types: 3 Standard drinks or equivalent per week     Comment: Socially. -weekly    Drug use: No    Sexual activity: Yes     Partners: Male     Social Determinants of Health     Financial Resource Strain: Low Risk     Difficulty of Paying Living Expenses: Not hard at all   Food Insecurity: No Food Insecurity    Worried About [address] in the Last Year: Never true    [address] in the Last Year: Never true     Review of Systems   Constitutional:  Positive for activity change. Respiratory: Negative. Cardiovascular: Negative. Genitourinary: Negative. Musculoskeletal:  Positive for arthralgias, back pain, myalgias and neck pain. Skin: Negative. Neurological:  Positive for numbness (right arm). Psychiatric/Behavioral:  Positive for sleep disturbance. Objective:   Physical Exam  Vitals reviewed. Constitutional:       General: She is not in acute distress. Appearance: She is well-developed. She is not diaphoretic. Interventions: She is not intubated. HENT:      Head: Normocephalic and atraumatic. Neck:      Comments: MRI OF THE CERVICAL SPINE WITHOUT CONTRAST 6/8/2022 5:24 pm     TECHNIQUE:  Multiplanar multisequence MRI of the cervical spine was performed without the  administration of intravenous contrast.     COMPARISON:  None. HISTORY:  ORDERING SYSTEM PROVIDED HISTORY: Cervical radiculopathy  TECHNOLOGIST PROVIDED HISTORY:  radiculopathy  Reason for Exam: Chronic neck pain. Right shoulder pain and right arm  numbness for approximately two months. Additional signs and symptoms: Cervical radiculopathy     FINDINGS:  BONES/ALIGNMENT: The vertebral body heights are maintained. There is  age-appropriate bone marrow signal.  There is multilevel degenerative disc  disease with loss of disc signal.  There is no significant disc space  narrowing. There is no spondylolisthesis. SPINAL CORD: The spinal cord is normal in caliber and signal.     SOFT TISSUES: The posterior paraspinal soft tissues are unremarkable. The  prevertebral soft tissues are unremarkable. C2-C3: There is no significant disc protrusion, spinal canal stenosis or  neural foraminal narrowing. C3-C4: There is no significant disc protrusion, spinal canal stenosis or  neural foraminal narrowing.      C4-C5: There is no significant disc protrusion, spinal canal stenosis or  neural foraminal Aretha Godoy CNP

## 2022-07-22 ENCOUNTER — OFFICE VISIT (OUTPATIENT)
Dept: FAMILY MEDICINE CLINIC | Age: 57
End: 2022-07-22
Payer: COMMERCIAL

## 2022-07-22 VITALS
DIASTOLIC BLOOD PRESSURE: 86 MMHG | RESPIRATION RATE: 16 BRPM | TEMPERATURE: 97.2 F | WEIGHT: 153 LBS | HEIGHT: 62 IN | OXYGEN SATURATION: 97 % | SYSTOLIC BLOOD PRESSURE: 140 MMHG | BODY MASS INDEX: 28.16 KG/M2 | HEART RATE: 92 BPM

## 2022-07-22 DIAGNOSIS — I10 ESSENTIAL HYPERTENSION: Primary | ICD-10-CM

## 2022-07-22 DIAGNOSIS — E03.9 ACQUIRED HYPOTHYROIDISM: ICD-10-CM

## 2022-07-22 DIAGNOSIS — E78.2 MIXED HYPERLIPIDEMIA: ICD-10-CM

## 2022-07-22 PROCEDURE — 3017F COLORECTAL CA SCREEN DOC REV: CPT | Performed by: FAMILY MEDICINE

## 2022-07-22 PROCEDURE — G8427 DOCREV CUR MEDS BY ELIG CLIN: HCPCS | Performed by: FAMILY MEDICINE

## 2022-07-22 PROCEDURE — G8417 CALC BMI ABV UP PARAM F/U: HCPCS | Performed by: FAMILY MEDICINE

## 2022-07-22 PROCEDURE — 1036F TOBACCO NON-USER: CPT | Performed by: FAMILY MEDICINE

## 2022-07-22 PROCEDURE — 99214 OFFICE O/P EST MOD 30 MIN: CPT | Performed by: FAMILY MEDICINE

## 2022-07-22 RX ORDER — INSULIN GLARGINE 100 [IU]/ML
INJECTION, SOLUTION SUBCUTANEOUS
COMMUNITY

## 2022-07-22 RX ORDER — LOSARTAN POTASSIUM AND HYDROCHLOROTHIAZIDE 12.5; 1 MG/1; MG/1
1 TABLET ORAL DAILY
Qty: 90 TABLET | Refills: 1 | Status: SHIPPED | OUTPATIENT
Start: 2022-07-22 | End: 2022-09-09 | Stop reason: SDUPTHER

## 2022-07-22 ASSESSMENT — ENCOUNTER SYMPTOMS
VOMITING: 0
BACK PAIN: 1
NAUSEA: 0
DIARRHEA: 0
CONSTIPATION: 0
COUGH: 0
SHORTNESS OF BREATH: 0
PHOTOPHOBIA: 0
CHEST TIGHTNESS: 0
ABDOMINAL PAIN: 0
CHOKING: 0
WHEEZING: 0

## 2022-07-22 NOTE — PROGRESS NOTES
Name: Caren Laurent  DOS: 7/22/2022  MRN: [id number]      Subjective:  Caren Laurent is a 56 y.o. female being seen for   Chief Complaint   Patient presents with    Hypertension    Diabetes     A1C done on 06/27/2022=6.7    Hyperlipidemia    Follow-up     Labs completed on 07/18/2022       Vitals:    07/22/22 1426   BP: (!) 140/86   Pulse: 92   Resp: 16   Temp: 97.2 °F (36.2 °C)   SpO2: 97%     Allergies   Allergen Reactions    Ace Inhibitors Other (See Comments)     Cough      Penicillins Rash and Other (See Comments)    Sertraline Other (See Comments)     Hypertension     Past Medical History:   Diagnosis Date    Bronchitis     Diabetes type 1, controlled ([address]     Eczema     GERD (gastroesophageal reflux disease)     Gestational diabetes     Hyperlipidemia     Hypertension     Hypothyroid     Insomnia     Insulin pump in place     Dr. Xuan Flynn endocrinology [address]    Myopia with astigmatism and presbyopia     Tobacco abuse     Under care of team 11/22/2021    endocrine-Dr Crandall 52 visit nov 2021    Under care of team 11/22/2021    pcp- [address] [place] [address] Dr visit nov 2021     Past Surgical History:   Procedure Laterality Date    BACK INJECTION Right 04/29/2021    Right Sacroiliac Joint Injection performed by Rosalinda Schirmer, MD at [address] [place] [address]  01/05/2015    Lumbar microdiscectomy L5-S1    BLADDER SUSPENSION  09/22/2009    (transobturator sling)    CERVIX LESION DESTRUCTION  1993    (laser cone)    COLONOSCOPY  10/17/2018    hyperplastic polyp, Dr. Christine Orellana at [facility]  2006    LUMBAR FUSION N/A 12/9/2021    OLIF L4-5, C-ARM, LATERAL FLAT [place] [employer], MICROSCOPE performed by Yesi Contreras DO at [address] [place] [address],4[address] 12/9/2021    POSTERIOR FIXATION L4-5, O-ARM/STEALTH performed by Yesi Contreras DO at [facility]  09/06/2017    Dr Krish Porter  12/09/2021    OLIF L4-5, Posterior fixation L4-5    NERVE BLOCK 2017    laser nerve block Dr Sharda Longoria- [place]  2017    laser nerve block Dr Keyla Carl Bilateral 2019    BL5 transforaminal radiculogram- [facility] per Dr Waqas Melendez Right 2019    Right sacroiliac joint injection with arthrography    NERVE BLOCK Bilateral 2019    BL5 transforaminal radiculogram/epidurogram    PAIN MANAGEMENT PROCEDURE Bilateral 2021    Bilateral L4 TRANSFORAMINAL performed by Codey Craig MD at [facility] N/A 2021    L4-L5  INTERLAMINAR performed by Codey Craig MD at [facility] Right 2022    Right C6 TRANSFORAMINAL performed by Codey Craig MD at [address]  2015    Lumbar L5-S1    US BREAST NEEDLE BIOPSY RIGHT Right 2021    US BREAST NEEDLE BIOPSY RIGHT 2021 [facility] [facility] ULTRASOUND     Social History     Socioeconomic History    Marital status:      Spouse name: None    Number of children: None    Years of education: None    Highest education level: None   Tobacco Use    Smoking status: Former     Packs/day: 0.00     Years: 28.00     Pack years: 0.00     Types: Cigarettes     Start date: 1985     Quit date: 2014     Years since quittin.9    Smokeless tobacco: Never   Vaping Use    Vaping Use: Never used   Substance and Sexual Activity    Alcohol use: Yes     Alcohol/week: 3.0 standard drinks     Types: 3 Standard drinks or equivalent per week     Comment: Socially. -weekly    Drug use: No    Sexual activity: Yes     Partners: Male     Social Determinants of Health     Financial Resource Strain: Low Risk     Difficulty of Paying Living Expenses: Not hard at all   Food Insecurity: No Food Insecurity    Worried About Running Out of Food in the Last Year: Never true    Ran Out of Food in the Last Year: Never true       Current Outpatient Medications   Medication Sig Dispense Refill    insulin glargine (LANTUS SOLOSTAR) 100 UNIT/ML injection pen 20 units      losartan-hydroCHLOROthiazide (HYZAAR) 100-12.5 MG per tablet Take 1 tablet by mouth in the morning. 90 tablet 1    celecoxib (CELEBREX) 100 MG capsule Take 1 capsule by mouth in the morning and 1 capsule before bedtime. 60 capsule 3    aspirin 81 MG EC tablet Take 81 mg by mouth daily      fluocinolone (DERMOTIC) 0.01 % OIL oil Apply 4 drops to affected ear(s) twice a day for 7 days and then once a day as needed for itching 20 mL 5    levothyroxine (SYNTHROID) 50 MCG tablet Take 1 tablet by mouth daily 30 tablet 5    losartan-hydroCHLOROthiazide (HYZAAR) 50-12.5 MG per tablet Take 1 tablet by mouth daily 90 tablet 3    Crisaborole (EUCRISA) 2 % OINT Apply 1 applicator topically 2 times daily 1 Tube 5    insulin lispro (HUMALOG) 100 UNIT/ML injection vial Use 60 units per day in pump 1 vial 3    Vitamin D (CHOLECALCIFEROL) 1000 UNITS CAPS capsule Take 4,000 Units by mouth daily       Insulin Lispro, Human, (INSULIN PUMP) FLORENTIN Inject 1 each into the skin See Admin Instructions. No current facility-administered medications for this visit. Objective:  Pt is here for follow up. Pt saw her endocrinologist Dr. Cali Palmer in [facility] for diabetes and hypothyroidism. Pt feels good today without complaints. Review of Systems   Constitutional:  Positive for fatigue (sometimes). Negative for unexpected weight change. Eyes:  Negative for photophobia and visual disturbance. Respiratory:  Negative for cough, choking, chest tightness, shortness of breath and wheezing. Cardiovascular:  Negative for chest pain, palpitations and leg swelling. Gastrointestinal:  Negative for abdominal pain, constipation, diarrhea, nausea and vomiting. Genitourinary:  Negative for difficulty urinating, hematuria, vaginal bleeding, vaginal discharge and vaginal pain. Musculoskeletal:  Positive for back pain (chronic back pain under care of pain management).  Negative for arthralgias, gait problem and myalgias. Skin:  Negative for rash and wound. Neurological:  Negative for dizziness, weakness, light-headedness, numbness and headaches. Hematological:  Negative for adenopathy. Does not bruise/bleed easily. Psychiatric/Behavioral:  Negative for agitation, confusion, decreased concentration and suicidal ideas. Physical Exam  Constitutional:       General: She is not in acute distress. Appearance: Normal appearance. She is not ill-appearing, toxic-appearing or diaphoretic. HENT:      Head: Normocephalic and atraumatic. Right Ear: Tympanic membrane, ear canal and external ear normal. There is no impacted cerumen. Left Ear: Tympanic membrane, ear canal and external ear normal. There is no impacted cerumen. Nose: Nose normal. No congestion or rhinorrhea. Mouth/Throat:      Mouth: Mucous membranes are moist.      Pharynx: Oropharynx is clear. No oropharyngeal exudate or posterior oropharyngeal erythema. Eyes:      Extraocular Movements: Extraocular movements intact. Conjunctiva/sclera: Conjunctivae normal.      Pupils: Pupils are equal, round, and reactive to light. Cardiovascular:      Rate and Rhythm: Normal rate and regular rhythm. Pulses: Normal pulses. Heart sounds: Normal heart sounds. No murmur heard. Pulmonary:      Effort: Pulmonary effort is normal.      Breath sounds: Normal breath sounds. No wheezing, rhonchi or rales. Abdominal:      General: Abdomen is flat. Bowel sounds are normal. There is no distension. Palpations: Abdomen is soft. There is no mass. Tenderness: There is no abdominal tenderness. There is no right CVA tenderness, left CVA tenderness or guarding. Musculoskeletal:         General: Normal range of motion. Cervical back: Normal range of motion and neck supple. Right lower leg: No edema. Left lower leg: No edema. Lymphadenopathy:      Cervical: No cervical adenopathy. Skin:     General: Skin is warm. Capillary Refill: Capillary refill takes less than 2 seconds. Neurological:      General: No focal deficit present. Mental Status: She is alert and oriented to person, place, and time. Motor: No weakness. Coordination: Coordination normal.      Gait: Gait normal.   Psychiatric:         Mood and Affect: Mood normal.         Behavior: Behavior normal.         Thought Content: Thought content normal.        Assessment:   Diagnosis Orders   1. Essential hypertension  losartan-hydroCHLOROthiazide (HYZAAR) 100-12.5 MG per tablet      2. Mixed hyperlipidemia  Hepatic Function Panel    Lipid Panel      3. Acquired hypothyroidism  TSH with Reflex            Plan:  Orders Placed This Encounter   Procedures    Hepatic Function Panel     Standing Status:   Future     Standing Expiration Date:   10/22/2022    Lipid Panel     Standing Status:   Future     Standing Expiration Date:   10/22/2022     Order Specific Question:   Is Patient Fasting?/# of Hours     Answer:   Yes    TSH with Reflex     Standing Status:   Future     Standing Expiration Date:   10/22/2022           Patient Instructions   Nutrition Health Education:    Keep hydrated, walk 30 minutes minimum 3 times weekly as tolerated. Diet should consist of low fat, low sodium and high fiber. Nutritious foods such as fruits (if you're not diabetic), vegetables, lean meats, lean dairy, whole grains such as brown rice, quinoa, and dry beans. [place] with small amounts of heart healthy extra virgin olive oil. Be watchful of any extra salt/sugar/seasoning to your food. You should eat no more than 2 grams or 2,000 mg of salt daily. Salt will raise your BP. Avoid regular/diet sodas, caffeine, energy drinks as these are full of artificial ingredients/sweeteners, sugar, salt and chemicals that spike insulin and are harmful to your health. Sugar intake increases metabolic disfunction, type 2 diabetes, insulin resistance, addictive food behavior and obesity.  Avoid all processed foods, foods from boxes, cans, microwave meals as these contain high salt, sugar or fat content and not much nutrition. Get at least 8 hrs of sleep every night and turn off all electronics at least 1 hour before bedtime as these decreases melatonin production and increases wakefulness. If your cholesterol is high, no greasy, fried, fast or fatty foods. Decrease red meat intake. No butter, ov[place]o, lard or creams, no milk as these things clog your arteries and leads to heart attacks and death. If you smoke, smoking increases risk of lung disease, cancers, high BP, heart attack, stroke and death. Take your daily medications as prescribed and inform your family doctor if you are having any side effects or issues taking medications. DM:   NO sugar, decrease fruit intake, No juice, NO veggies with color other than green, NO sauteed onions or anything that caramelizes, you may eat raw onions. NO alcohol, try not to eat diabetic candies as they may cause diarrhea. Minimize your carbohydrate intake. Elevated Cholesterol:  No greasy, fried, fast, fatty foods  No trans fats  Decreased red meat intake to once every 2 months  No butter, oviedo nor cream cheese, cheese  No egg yolk  NO milk  Decrease your cholesterol in diet     Reviewed labs with pt cbc,cmp,lipids,vit d, microalbumen, hepc    Start red yeast rice over the counter as directedPlease complete your entire course of medication treatment. Often patients will discontinue their medication for example, after just a few days when they begin to feel better this often leads to a return of your illness at a later time which is then more difficult to treat. Please finish your medications as prescribed. If there is a reason you cannot finish, suspect allergy, upset stomach, cost please call the office for assistance as we may be able to offer an alternative treatment.       Increased losartan/hctz 100/12.5 mg    Fasting blood work in 6 weeks follow up in 7

## 2022-07-22 NOTE — PATIENT INSTRUCTIONS
Nutrition Health Education:    Keep hydrated, walk 30 minutes minimum 3 times weekly as tolerated. Diet should consist of low fat, low sodium and high fiber. Nutritious foods such as fruits (if you're not diabetic), vegetables, lean meats, lean dairy, whole grains such as brown rice, quinoa, and dry beans. [place]ial [place] with small amounts of heart healthy extra virgin olive oil. Be watchful of any extra salt/sugar/seasoning to your food. You should eat no more than 2 grams or 2,000 mg of salt daily. Salt will raise your BP. Avoid regular/diet sodas, caffeine, energy drinks as these are full of artificial ingredients/sweeteners, sugar, salt and chemicals that spike insulin and are harmful to your health. Sugar intake increases metabolic disfunction, type 2 diabetes, insulin resistance, addictive food behavior and obesity. Avoid all processed foods, foods from boxes, cans, microwave meals as these contain high salt, sugar or fat content and not much nutrition. Get at least 8 hrs of sleep every night and turn off all electronics at least 1 hour before bedtime as these decreases melatonin production and increases wakefulness. If your cholesterol is high, no greasy, fried, fast or fatty foods. Decrease red meat intake. No butter, ov[place], lard or creams, no milk as these things clog your arteries and leads to heart attacks and death. If you smoke, smoking increases risk of lung disease, cancers, high BP, heart attack, stroke and death. Take your daily medications as prescribed and inform your family doctor if you are having any side effects or issues taking medications. DM:   NO sugar, decrease fruit intake, No juice, NO veggies with color other than green, NO sauteed onions or anything that caramelizes, you may eat raw onions. NO alcohol, try not to eat diabetic candies as they may cause diarrhea. Minimize your carbohydrate intake.      Elevated Cholesterol:  No greasy, fried, fast, fatty foods  No trans fats  Decreased red meat intake to once every 2 months  No butter, oviedo nor cream cheese, cheese  No egg yolk  NO milk  Decrease your cholesterol in diet     Reviewed labs with pt cbc,cmp,lipids,vit d, microalbumen, hepc    Start red yeast rice over the counter as directedPlease complete your entire course of medication treatment. Often patients will discontinue their medication for example, after just a few days when they begin to feel better this often leads to a return of your illness at a later time which is then more difficult to treat. Please finish your medications as prescribed. If there is a reason you cannot finish, suspect allergy, upset stomach, cost please call the office for assistance as we may be able to offer an alternative treatment.       Increased losartan/hctz 100/12.5 mg    Fasting blood work in 6 weeks follow up in 7 weeks

## 2022-08-11 DIAGNOSIS — L30.9 CHRONIC ECZEMA: Primary | ICD-10-CM

## 2022-08-11 RX ORDER — PIMECROLIMUS 10 MG/G
CREAM TOPICAL
Qty: 1 EACH | Refills: 0 | Status: SHIPPED | OUTPATIENT
Start: 2022-08-11 | End: 2022-09-09 | Stop reason: SDUPTHER

## 2022-08-29 NOTE — DISCHARGE SUMMARY
[facility]    [x] [place]  Phone: [phone]  Fax: [phone]      [] [place]  Phone: [phone]  Fax: [phone]    Physical Therapy Discharge Note  Date: 2022        Patient Name:  Annalise Joseph    :  1965  MRN: [id number]  Restrictions/Precautions:   None  Medical/Treatment Diagnosis Information:       Diagnosis: M25.619 (ICD-10-CM) - Limited range of motion (ROM) of shoulder; M25.511, G89.29 (ICD-10-CM) - Chronic right shoulder pain; M54.12 (ICD-10-CM) - Cervical radiculopathy; M79.18 (ICD-10-CM) - Myofascial pain on right side  Insurance/Certification information:    [facility] [place]  Physician Information:    AARON Cardenas - CNP  Plan of care signed (Y/N):  Y  Visit# / total visits:    Pain level:      1/10           Time Period for Report: 22 - 22, last seen 22, D/C - 22  Cancels/No-shows to date:  3/0    Plan of Care/Treatment to date:  [x] Therapeutic Exercise    [] Modalities:  [] Therapeutic Activity     [] Ultrasound  [] Electrical Stimulation  [] Gait Training      [x] Cervical Traction    [] Lumbar Traction  [] Neuromuscular Re-education  [] Cold/hotpack [] Iontophoresis  [x] Instruction in HEP      Other:  [x] Manual Therapy       []    [] Aquatic Therapy       []                              Subjective:   Patient arrives to the clinic reporting 1/10 neck and R shoulder pain. Patient states that she has not noticed that her arm has been numb in the past week. Patient states that she is getting an injection in her neck tomorrow. Patient called to cancel therapy on 22 stating that her injection has \"fixed her pain\" - noting no pain. Objective:      Observation:   Test measurements:  R shoulder IR: T12                    Assessment: Improvements noted in patient's self reported pain level. Unable to assess goals.  Patient had no current complaints signifying improvements in functional abilities. Plan: D/C          Goals: Unable to fully assess goals      Short Term Goals  Time Frame for Short term goals: 3 weeks  Short term goal 1: Provide written HEP  - met (Initiated this date)     Long Term Goals  Time Frame for Long term goals : 6 weeks  Long term goal 1: Patient will report no greater than 1-2/10 neck or right shoulder pain to allow patient to complete her work duties with less impairment. - met (did not want to continue with therapy due to no pain). Long term goal 2: Patient will demonstrate improved right shoulder AROM to 160 flexion, 160 degrees abduction, IR to T2/3 to improve ability to reach overhead with greater ease  - unable to assess, partially met  Long term goal 3: Patient will score less than 10% disability on the NDI to allow patient to complete her daily ADLs with greater ease - unable to assess, partially met  Long term goal 4: Patient will demonstrate right shoulder strength to at least 4/5 in all directions to allow patient to lift/carry objects with less impairment.  - unable to assess, partially met       Percentage of Goals Met: Unable to assess            Discharge Prognosis: [] Excellent [x] Good [] Fair  [] Poor     Goal Status:  [] Achieved [x] Partially Achieved  [] Not Achieved       Electronically signed by:  Jack Campo, PT

## 2022-08-29 NOTE — DISCHARGE SUMMARY
[facility]    [x] [place]  Phone: [phone]  Fax: [phone]      [] [place]  Phone: [phone]  Fax: [phone]    Physical Therapy Discharge Note  Date: 2022        Patient Name:  Sonia Busch    :  1965  MRN: [id number]  Restrictions/Precautions:   None  Medical/Treatment Diagnosis Information:       Diagnosis: M25.619 (ICD-10-CM) - Limited range of motion (ROM) of shoulder; M25.511, G89.29 (ICD-10-CM) - Chronic right shoulder pain; M54.12 (ICD-10-CM) - Cervical radiculopathy; M79.18 (ICD-10-CM) - Myofascial pain on right side  Insurance/Certification information:    [facility] [place]  Physician Information:    AARON Donaldson - CNP  Plan of care signed (Y/N):  Y  Visit# / total visits:    Pain level:      1/10             Time Period for Report: 22 - 22, last seen 22, D/C - 22  Cancels/No-shows to date:  3/0     Plan of Care/Treatment to date:  [x] Therapeutic Exercise                     [] Modalities:  [] Therapeutic Activity                                   [] Ultrasound              [] Electrical Stimulation  [] Gait Training                                              [x] Cervical Traction    [] Lumbar Traction  [] Neuromuscular Re-education                    [] Cold/hotpack          [] Iontophoresis  [x] Instruction in HEP                                      Other:  [x] Manual Therapy                                                     []    [] Aquatic Therapy                                                     []                                                     Subjective:   Patient arrives to the clinic reporting 1/10 neck and R shoulder pain. Patient states that she has not noticed that her arm has been numb in the past week. Patient states that she is getting an injection in her neck tomorrow.       Patient called to cancel therapy on 22 stating that her injection has \"fixed her pain\" - noting no pain. Objective:       Observation:   Test measurements:  R shoulder IR: T12     Assessment: Improvements noted in patient's self reported pain level. Unable to assess goals. Patient had no current complaints signifying improvements in functional abilities. Plan: D/C     Goals: Unable to fully assess goals   Short Term Goals  Time Frame for Short term goals: 3 weeks  Short term goal 1: Provide written HEP  - met (Initiated this date)     Long Term Goals  Time Frame for Long term goals : 6 weeks  Long term goal 1: Patient will report no greater than 1-2/10 neck or right shoulder pain to allow patient to complete her work duties with less impairment. - met (did not want to continue with therapy due to no pain). Long term goal 2: Patient will demonstrate improved right shoulder AROM to 160 flexion, 160 degrees abduction, IR to T2/3 to improve ability to reach overhead with greater ease  - unable to assess, partially met  Long term goal 3: Patient will score less than 10% disability on the NDI to allow patient to complete her daily ADLs with greater ease - unable to assess, partially met  Long term goal 4: Patient will demonstrate right shoulder strength to at least 4/5 in all directions to allow patient to lift/carry objects with less impairment.  - unable to assess, partially met     Percentage of Goals Met: Unable to assess         Discharge Prognosis: [] Excellent [x] Good [] Fair  [] Poor     Goal Status:  [] Achieved [x] Partially Achieved  [] Not Achieved       Electronically signed by:  Jim Baer, PT

## 2022-09-01 ENCOUNTER — HOSPITAL ENCOUNTER (OUTPATIENT)
Dept: LAB | Age: 57
Discharge: HOME | End: 2022-09-01
Payer: COMMERCIAL

## 2022-09-01 DIAGNOSIS — E03.9 ACQUIRED HYPOTHYROIDISM: ICD-10-CM

## 2022-09-01 DIAGNOSIS — E78.2 MIXED HYPERLIPIDEMIA: ICD-10-CM

## 2022-09-01 LAB
ALBUMIN SERPL-MCNC: 4.9 G/DL (ref 3.5–5.2)
ALBUMIN/GLOBULIN RATIO: 1.8 (ref 1–2.5)
ALP BLD-CCNC: 70 U/L (ref 35–104)
ALT SERPL-CCNC: 12 U/L (ref 5–33)
AST SERPL-CCNC: 21 U/L
BILIRUB SERPL-MCNC: 0.4 MG/DL (ref 0.3–1.2)
BILIRUBIN DIRECT: 0.1 MG/DL
BILIRUBIN, INDIRECT: 0.3 MG/DL (ref 0–1)
GLOBULIN: 2.8 G/DL (ref 1.5–3.8)
TOTAL PROTEIN: 7.7 G/DL (ref 6.4–8.3)
TSH SERPL DL<=0.05 MIU/L-ACNC: 0.54 UIU/ML (ref 0.3–5)

## 2022-09-01 PROCEDURE — 84443 ASSAY THYROID STIM HORMONE: CPT

## 2022-09-01 PROCEDURE — 36415 COLL VENOUS BLD VENIPUNCTURE: CPT

## 2022-09-01 PROCEDURE — 80076 HEPATIC FUNCTION PANEL: CPT

## 2022-09-01 PROCEDURE — 80061 LIPID PANEL: CPT

## 2022-09-02 LAB
CHOLESTEROL/HDL RATIO: 2.3
CHOLESTEROL: 270 MG/DL
HDLC SERPL-MCNC: 117 MG/DL
LDL CHOLESTEROL: 145 MG/DL (ref 0–130)
TRIGL SERPL-MCNC: 41 MG/DL

## 2022-09-09 ENCOUNTER — OFFICE VISIT (OUTPATIENT)
Dept: FAMILY MEDICINE CLINIC | Age: 57
End: 2022-09-09
Payer: COMMERCIAL

## 2022-09-09 VITALS
OXYGEN SATURATION: 98 % | HEART RATE: 75 BPM | WEIGHT: 156.4 LBS | DIASTOLIC BLOOD PRESSURE: 70 MMHG | SYSTOLIC BLOOD PRESSURE: 130 MMHG | BODY MASS INDEX: 28.61 KG/M2 | TEMPERATURE: 98.4 F | RESPIRATION RATE: 16 BRPM

## 2022-09-09 DIAGNOSIS — L30.9 CHRONIC ECZEMA: ICD-10-CM

## 2022-09-09 DIAGNOSIS — H60.312 ACUTE DIFFUSE OTITIS EXTERNA OF LEFT EAR: ICD-10-CM

## 2022-09-09 DIAGNOSIS — H60.12 CELLULITIS OF LEFT EAR: Primary | ICD-10-CM

## 2022-09-09 DIAGNOSIS — I10 ESSENTIAL HYPERTENSION: ICD-10-CM

## 2022-09-09 PROCEDURE — G8417 CALC BMI ABV UP PARAM F/U: HCPCS | Performed by: FAMILY MEDICINE

## 2022-09-09 PROCEDURE — 1036F TOBACCO NON-USER: CPT | Performed by: FAMILY MEDICINE

## 2022-09-09 PROCEDURE — 99214 OFFICE O/P EST MOD 30 MIN: CPT | Performed by: FAMILY MEDICINE

## 2022-09-09 PROCEDURE — 4130F TOPICAL PREP RX AOE: CPT | Performed by: FAMILY MEDICINE

## 2022-09-09 PROCEDURE — G8427 DOCREV CUR MEDS BY ELIG CLIN: HCPCS | Performed by: FAMILY MEDICINE

## 2022-09-09 PROCEDURE — 3017F COLORECTAL CA SCREEN DOC REV: CPT | Performed by: FAMILY MEDICINE

## 2022-09-09 RX ORDER — OFLOXACIN 3 MG/ML
5 SOLUTION AURICULAR (OTIC) 3 TIMES DAILY
Qty: 1 EACH | Refills: 0 | Status: SHIPPED | OUTPATIENT
Start: 2022-09-09 | End: 2022-09-19

## 2022-09-09 RX ORDER — LOSARTAN POTASSIUM AND HYDROCHLOROTHIAZIDE 12.5; 1 MG/1; MG/1
1 TABLET ORAL DAILY
Qty: 90 TABLET | Refills: 1 | Status: SHIPPED | OUTPATIENT
Start: 2022-09-09

## 2022-09-09 RX ORDER — CLINDAMYCIN HYDROCHLORIDE 300 MG/1
300 CAPSULE ORAL 3 TIMES DAILY
Qty: 30 CAPSULE | Refills: 0 | Status: SHIPPED | OUTPATIENT
Start: 2022-09-09 | End: 2022-09-19

## 2022-09-09 RX ORDER — PIMECROLIMUS 10 MG/G
CREAM TOPICAL 2 TIMES DAILY
Qty: 60 G | Refills: 0 | Status: SHIPPED | OUTPATIENT
Start: 2022-09-09 | End: 2022-10-09

## 2022-09-09 ASSESSMENT — ENCOUNTER SYMPTOMS
ABDOMINAL PAIN: 0
WHEEZING: 0
SHORTNESS OF BREATH: 0
COUGH: 0
CHEST TIGHTNESS: 0
PHOTOPHOBIA: 0
CHOKING: 0

## 2022-09-09 NOTE — PATIENT INSTRUCTIONS
DM:   NO sugar, decrease fruit intake, No juice, NO veggies with color other than green, NO sauteed onions or anything that caramelizes, you may eat raw onions. NO alcohol, try not to eat diabetic candies as they may cause diarrhea. Minimize your carbohydrate intake. Elevated Cholesterol:  No greasy, fried, fast, fatty foods  No trans fats  Decreased red meat intake to once every 2 months  No butter, oviedo nor cream cheese, cheese  No egg yolk  NO milk  Decrease your cholesterol in diet    Elevated Blood Pressure:  No caffeine  No fast foods  Decrease salt in diet (consume nothing in a can, nothing in a box as these things contain high sodium)  No energy drinks  Buy a BP cuff and take blood pressure 3 times a day and write down blood pressure and pulse and bring in to me when you RTO  Lose weight and increase exercise if you are capable of exercising  Start only walking then may advance to brisk walking and lift low poundage free weights if you are capable     Reviewed labs with pt hepatic , lipids, tsh    Pt is under care of an endocrinologist she has an insulin pump so the way she eats is that she will eat fruit so she will give herself more insulin to bring the sugar down.  Also she says when she eats fruit a lone her sugar will go up in the 200's but if she has a piece of cheese with it the sugar wont spike up     Pt is under care of endocrinologist Dr. Pan Castillo in [address] [place] [address]    Start oflaxin otic solution as directed left ear  Start clindamycin as directed with food  Take probiotics for the GI tract while on clindamycin  NO Q-tips  If no improvement or gets worse within 2-3 days return to office, refer to ENT or hospital er

## 2022-09-09 NOTE — PROGRESS NOTES
BLOCK  2017    laser nerve block Dr Suni Zapien- [address]  2017    laser nerve block Dr Carrie Knight Bilateral 2019    BL5 transforaminal radiculogram- [facility] per Dr Yovani Sands Right 2019    Right sacroiliac joint injection with arthrography    NERVE BLOCK Bilateral 2019    BL5 transforaminal radiculogram/epidurogram    PAIN MANAGEMENT PROCEDURE Bilateral 2021    Bilateral L4 TRANSFORAMINAL performed by Mayra Stearns MD at [address] N/A 2021    L4-L5  INTERLAMINAR performed by Mayra Stearns MD at [address] Right 2022    Right C6 TRANSFORAMINAL performed by Mayra Stearns MD at [address]  2015    Lumbar L5-S1    US BREAST NEEDLE BIOPSY RIGHT Right 2021    US BREAST NEEDLE BIOPSY RIGHT 2021 [facility] ULTRASOUND     Social History     Socioeconomic History    Marital status:    Tobacco Use    Smoking status: Former     Packs/day: 0.00     Years: 28.00     Pack years: 0.00     Types: Cigarettes     Start date: 1985     Quit date: 2014     Years since quittin.0    Smokeless tobacco: Never   Vaping Use    Vaping Use: Never used   Substance and Sexual Activity    Alcohol use: Yes     Alcohol/week: 3.0 standard drinks     Types: 3 Standard drinks or equivalent per week     Comment: Socially. -weekly    Drug use: No    Sexual activity: Yes     Partners: Male     Social Determinants of Health     Financial Resource Strain: Low Risk     Difficulty of Paying Living Expenses: Not hard at all   Food Insecurity: No Food Insecurity    Worried About Running Out of Food in the Last Year: Never true    Ran Out of Food in the Last Year: Never true       Current Outpatient Medications   Medication Sig Dispense Refill    losartan-hydroCHLOROthiazide (HYZAAR) 100-12.5 MG per tablet Take 1 tablet by mouth daily 90 tablet 1    pimecrolimus (ELIDEL) 1 % cream Apply topically 2 times daily Apply topically 2 times daily. 60 g 0    clindamycin (CLEOCIN) 300 MG capsule Take 1 capsule by mouth 3 times daily for 10 days 30 capsule 0    ofloxacin (FLOXIN) 0.3 % otic solution Place 5 drops into the left ear 3 times daily for 10 days 1 each 0    insulin glargine (LANTUS SOLOSTAR) 100 UNIT/ML injection pen 20 units      celecoxib (CELEBREX) 100 MG capsule Take 1 capsule by mouth in the morning and 1 capsule before bedtime. 60 capsule 3    aspirin 81 MG EC tablet Take 81 mg by mouth daily      fluocinolone (DERMOTIC) 0.01 % OIL oil Apply 4 drops to affected ear(s) twice a day for 7 days and then once a day as needed for itching 20 mL 5    levothyroxine (SYNTHROID) 50 MCG tablet Take 1 tablet by mouth daily 30 tablet 5    insulin lispro (HUMALOG) 100 UNIT/ML injection vial Use 60 units per day in pump 1 vial 3    Vitamin D (CHOLECALCIFEROL) 1000 UNITS CAPS capsule Take 4,000 Units by mouth daily       Insulin Lispro, Human, (INSULIN PUMP) FLORENTIN Inject 1 each into the skin See Admin Instructions. No current facility-administered medications for this visit. Objective:  Pt is here for a follow up on cholesterol. She has been taking one pill of red yeast rice nightly. She says she can not take statin drugs as her joints hurt bad. Pt says she has been watching what she eats. Last night she did not eat dinner. Lunch she had an egg salad sandwich for lunch with one whole eggs with hole wheat bread and grapes. Pt has not eaten today. She drank water today. Pt also thinks she has a left ear infection been less than a week    Review of Systems   Constitutional:  Positive for fatigue (a lot since she had covid). Negative for unexpected weight change. HENT:  Positive for ear discharge (less than a week feels wet inside). Negative for congestion, ear pain, hearing loss, postnasal drip and tinnitus. Eyes:  Negative for photophobia and visual disturbance.    Respiratory:  Negative for cough, choking, chest tightness, shortness of breath and wheezing. Cardiovascular:  Negative for chest pain, palpitations and leg swelling. Gastrointestinal:  Negative for abdominal pain. Genitourinary:  Negative for difficulty urinating and hematuria. Musculoskeletal:  Negative for arthralgias, gait problem and myalgias. Skin:  Negative for rash and wound. Neurological:  Negative for dizziness, tremors, seizures, weakness, light-headedness, numbness and headaches. Hematological:  Negative for adenopathy. Does not bruise/bleed easily. Psychiatric/Behavioral:  Negative for agitation, confusion, decreased concentration and suicidal ideas. Physical Exam  Constitutional:       General: She is not in acute distress. Appearance: Normal appearance. She is not ill-appearing, toxic-appearing or diaphoretic. HENT:      Head: Normocephalic and atraumatic. Right Ear: Tympanic membrane, ear canal and external ear normal. There is no impacted cerumen. Left Ear: External ear normal. Drainage (yellow white), swelling (external canal and outer ear) and tenderness present. Tympanic membrane is erythematous. Nose: Nose normal.      Mouth/Throat:      Mouth: Mucous membranes are moist.      Pharynx: Oropharynx is clear. Eyes:      Extraocular Movements: Extraocular movements intact. Conjunctiva/sclera: Conjunctivae normal.      Pupils: Pupils are equal, round, and reactive to light. Cardiovascular:      Rate and Rhythm: Normal rate and regular rhythm. Pulses: Normal pulses. Heart sounds: Normal heart sounds. No murmur heard. Pulmonary:      Effort: Pulmonary effort is normal.      Breath sounds: Normal breath sounds. Abdominal:      General: Abdomen is flat. Bowel sounds are normal. There is no distension. Palpations: Abdomen is soft. There is no mass. Tenderness: There is no abdominal tenderness. There is no guarding.    Musculoskeletal:         General: Normal range of motion. Cervical back: Normal range of motion and neck supple. Lymphadenopathy:      Cervical: No cervical adenopathy. Skin:     General: Skin is warm. Capillary Refill: Capillary refill takes less than 2 seconds. Neurological:      General: No focal deficit present. Mental Status: She is alert and oriented to person, place, and time. Psychiatric:         Mood and Affect: Mood normal.         Behavior: Behavior normal.         Thought Content: Thought content normal.        Assessment:   Diagnosis Orders   1. Cellulitis of left ear  clindamycin (CLEOCIN) 300 MG capsule    ofloxacin (FLOXIN) 0.3 % otic solution      2. Essential hypertension  losartan-hydroCHLOROthiazide (HYZAAR) 100-12.5 MG per tablet      3. Chronic eczema  pimecrolimus (ELIDEL) 1 % cream      4. Acute diffuse otitis externa of left ear  clindamycin (CLEOCIN) 300 MG capsule    ofloxacin (FLOXIN) 0.3 % otic solution            Plan:  No orders of the defined types were placed in this encounter. Patient Instructions   DM:   NO sugar, decrease fruit intake, No juice, NO veggies with color other than green, NO sauteed onions or anything that caramelizes, you may eat raw onions. NO alcohol, try not to eat diabetic candies as they may cause diarrhea. Minimize your carbohydrate intake.      Elevated Cholesterol:  No greasy, fried, fast, fatty foods  No trans fats  Decreased red meat intake to once every 2 months  No butter, ov[place] nor cream cheese, cheese  No egg yolk  NO milk  Decrease your cholesterol in diet    Elevated Blood Pressure:  No caffeine  No fast foods  Decrease salt in diet (consume nothing in a can, nothing in a box as these things contain high sodium)  No energy drinks  Buy a BP cuff and take blood pressure 3 times a day and write down blood pressure and pulse and bring in to me when you RTO  Lose weight and increase exercise if you are capable of exercising  Start only walking then may advance to brisk walking and lift low poundage free weights if you are capable     Reviewed labs with pt hepatic , lipids, tsh    Pt is under care of an endocrinologist she has an insulin pump so the way she eats is that she will eat fruit so she will give herself more insulin to bring the sugar down. Also she says when she eats fruit a lone her sugar will go up in the 200's but if she has a piece of cheese with it the sugar wont spike up     Pt is under care of endocrinologist Dr. Asim Barker in [facility]    Start oflaxin otic solution as directed left ear  Start clindamycin as directed with food  Take probiotics for the GI tract while on clindamycin  NO Q-tips  If no improvement or gets worse within 2-3 days return to office, refer to ENT or hospital er     Return in about 4 months (around 1/9/2023) for REVIEW LABS.      Yonas Shelton, DO

## 2022-09-19 ENCOUNTER — OFFICE VISIT (OUTPATIENT)
Dept: PAIN MANAGEMENT | Age: 57
End: 2022-09-19
Payer: COMMERCIAL

## 2022-09-19 VITALS
SYSTOLIC BLOOD PRESSURE: 118 MMHG | OXYGEN SATURATION: 99 % | HEART RATE: 83 BPM | WEIGHT: 155.8 LBS | HEIGHT: 62 IN | DIASTOLIC BLOOD PRESSURE: 70 MMHG | BODY MASS INDEX: 28.67 KG/M2

## 2022-09-19 DIAGNOSIS — R20.0 RIGHT ARM NUMBNESS: ICD-10-CM

## 2022-09-19 DIAGNOSIS — M54.12 CERVICAL RADICULITIS: Primary | ICD-10-CM

## 2022-09-19 PROCEDURE — G8417 CALC BMI ABV UP PARAM F/U: HCPCS | Performed by: NURSE PRACTITIONER

## 2022-09-19 PROCEDURE — 3017F COLORECTAL CA SCREEN DOC REV: CPT | Performed by: NURSE PRACTITIONER

## 2022-09-19 PROCEDURE — G8427 DOCREV CUR MEDS BY ELIG CLIN: HCPCS | Performed by: NURSE PRACTITIONER

## 2022-09-19 PROCEDURE — 1036F TOBACCO NON-USER: CPT | Performed by: NURSE PRACTITIONER

## 2022-09-19 PROCEDURE — 99214 OFFICE O/P EST MOD 30 MIN: CPT | Performed by: NURSE PRACTITIONER

## 2022-09-19 ASSESSMENT — ENCOUNTER SYMPTOMS
RESPIRATORY NEGATIVE: 1
BACK PAIN: 1

## 2022-09-19 NOTE — PROGRESS NOTES
mouth in the morning and 1 capsule before bedtime. 60 capsule 3    aspirin 81 MG EC tablet Take 81 mg by mouth daily      fluocinolone (DERMOTIC) 0.01 % OIL oil Apply 4 drops to affected ear(s) twice a day for 7 days and then once a day as needed for itching 20 mL 5    levothyroxine (SYNTHROID) 50 MCG tablet Take 1 tablet by mouth daily 30 tablet 5    insulin lispro (HUMALOG) 100 UNIT/ML injection vial Use 60 units per day in pump 1 vial 3    Vitamin D (CHOLECALCIFEROL) 1000 UNITS CAPS capsule Take 4,000 Units by mouth daily       Insulin Lispro, Human, (INSULIN PUMP) FLORENTIN Inject 1 each into the skin See Admin Instructions.          Past Medical History:   Diagnosis Date    Bronchitis     Diabetes type 1, controlled ([address]     Eczema     GERD (gastroesophageal reflux disease)     Gestational diabetes     Hyperlipidemia     Hypertension     Hypothyroid     Insomnia     Insulin pump in place     Dr. Alicia Robins endocrinology [facility]    Myopia with astigmatism and presbyopia     Tobacco abuse     Under care of team 11/22/2021    endocrine-Dr Crandall 54 visit nov 2021    Under care of team 11/22/2021    pcp- [address] [place] [address] Dr visit nov 2021       Past Surgical History:   Procedure Laterality Date    BACK INJECTION Right 04/29/2021    Right Sacroiliac Joint Injection performed by Elena Cuenca MD at [address] [place] [address]  01/05/2015    Lumbar microdiscectomy L5-S1    BLADDER SUSPENSION  09/22/2009    (transobturator sling)    CERVIX LESION DESTRUCTION  1993    (laser cone)    COLONOSCOPY  10/17/2018    hyperplastic polyp, Dr. Migdalia Jordan at [facility]  2006    LUMBAR FUSION N/A 12/9/2021    OLIF L4-5, C-ARM, LATERAL FLAT BOBBI [employer], MICROSCOPE performed by Mauro Luna DO at [address] [facility] 12/9/2021    POSTERIOR FIXATION L4-5, O-ARM/STEALTH performed by Mauro Luna DO at [facility]  09/06/2017    Dr Xavi monroe- [id number]  12/09/2021 OLIF L4-5, Posterior fixation L4-5    NERVE BLOCK  2017    laser nerve block Dr Stephanie [place] [address]  2017    laser nerve block Dr Oskar Pastor Bilateral 2019    BL5 transforaminal radiculogram- [facility] per Dr Sadaf Reynolds Right 2019    Right sacroiliac joint injection with arthrography    NERVE BLOCK Bilateral 2019    BL5 transforaminal radiculogram/epidurogram    PAIN MANAGEMENT PROCEDURE Bilateral 2021    Bilateral L4 TRANSFORAMINAL performed by Reyna Goldman MD at [address] N/A 2021    L4-L5  INTERLAMINAR performed by Reyna Goldman MD at [address] Right 2022    Right C6 TRANSFORAMINAL performed by Reyna Goldman MD at [address]  2015    Lumbar L5-S1    US BREAST NEEDLE BIOPSY RIGHT Right 2021    US BREAST NEEDLE BIOPSY RIGHT 2021 [facility] ULTRASOUND       Family History   Problem Relation Age of Onset    Diabetes Paternal Grandmother     Cancer Father         lymph nodes    Ovarian Cancer Maternal Aunt 79        mother's half sister (same mother)    High Blood Pressure Mother     Glaucoma Neg Hx     Cataracts Neg Hx        Social History     Socioeconomic History    Marital status:      Spouse name: None    Number of children: None    Years of education: None    Highest education level: None   Tobacco Use    Smoking status: Former     Packs/day: 0.00     Years: 28.00     Pack years: 0.00     Types: Cigarettes     Start date: 1985     Quit date: 2014     Years since quittin.0    Smokeless tobacco: Never   Vaping Use    Vaping Use: Never used   Substance and Sexual Activity    Alcohol use: Yes     Alcohol/week: 3.0 standard drinks     Types: 3 Standard drinks or equivalent per week     Comment: Socially. -weekly    Drug use: No    Sexual activity: Yes     Partners: Male     Social Determinants of Health     Financial Resource Strain: Low Risk     Difficulty of Paying Living Expenses: Not hard at all   Food Insecurity: No Food Insecurity    Worried About Running Out of Food in the Last Year: Never true    Ran Out of Food in the Last Year: Never true     Review of Systems   Constitutional:  Positive for activity change. Respiratory: Negative. Cardiovascular: Negative. Genitourinary: Negative. Musculoskeletal:  Positive for arthralgias, back pain, myalgias and neck pain. Skin: Negative. Neurological:  Positive for numbness (right arm). Psychiatric/Behavioral:  Positive for sleep disturbance. Objective:   Physical Exam  Vitals reviewed. Constitutional:       General: She is not in acute distress. Appearance: She is well-developed. She is not diaphoretic. Interventions: She is not intubated. HENT:      Head: Normocephalic and atraumatic. Neck:      Comments: MRI OF THE CERVICAL SPINE WITHOUT CONTRAST 6/8/2022 5:24 pm     TECHNIQUE:  Multiplanar multisequence MRI of the cervical spine was performed without the  administration of intravenous contrast.     COMPARISON:  None. HISTORY:  ORDERING SYSTEM PROVIDED HISTORY: Cervical radiculopathy  TECHNOLOGIST PROVIDED HISTORY:  radiculopathy  Reason for Exam: Chronic neck pain. Right shoulder pain and right arm  numbness for approximately two months. Additional signs and symptoms: Cervical radiculopathy     FINDINGS:  BONES/ALIGNMENT: The vertebral body heights are maintained. There is  age-appropriate bone marrow signal.  There is multilevel degenerative disc  disease with loss of disc signal.  There is no significant disc space  narrowing. There is no spondylolisthesis. SPINAL CORD: The spinal cord is normal in caliber and signal.     SOFT TISSUES: The posterior paraspinal soft tissues are unremarkable. The  prevertebral soft tissues are unremarkable. C2-C3: There is no significant disc protrusion, spinal canal stenosis or  neural foraminal narrowing. C3-C4: There is no significant disc protrusion, spinal canal stenosis or  neural foraminal narrowing. C4-C5: There is no significant disc protrusion, spinal canal stenosis or  neural foraminal narrowing. C5-C6: There is a disc osteophyte complex with uncovertebral and facet  hypertrophy. There is no canal stenosis. There is moderate bilateral  foraminal narrowing. C6-C7: There is no significant disc protrusion, spinal canal stenosis or  neural foraminal narrowing. C7-T1: There is no significant disc protrusion, spinal canal stenosis or  neural foraminal narrowing. Impression  Multilevel degenerative disc disease with uncovertebral and facet hypertrophy  resulting in moderate bilateral foraminal narrowing at C5-6. Cardiovascular:      Rate and Rhythm: Normal rate. Pulmonary:      Effort: Pulmonary effort is normal. No tachypnea, bradypnea, accessory muscle usage or respiratory distress. She is not intubated. Musculoskeletal:      Right shoulder: Decreased range of motion. Decreased strength. Cervical back: Spasms present. Decreased range of motion. Lumbar back: No tenderness or bony tenderness. Skin:     General: Skin is warm and dry. Capillary Refill: Capillary refill takes less than 2 seconds. Coloration: Skin is not pale. Nails: There is no clubbing. Neurological:      Mental Status: She is alert and oriented to person, place, and time. GCS: GCS eye subscore is 4. GCS verbal subscore is 5. GCS motor subscore is 6. Cranial Nerves: No cranial nerve deficit. Psychiatric:         Speech: Speech normal.         Behavior: Behavior normal. Behavior is not agitated, aggressive, withdrawn or combative. Behavior is cooperative. Judgment: Judgment normal. Judgment is not impulsive or inappropriate. Assessment:      1. Cervical radiculitis    2. Right arm numbness          Plan:   Chronic pain diagnoses such as   1.  Cervical radiculitis    2. Right arm numbness     controlled on current medication regime, wll continue current pain medications to improve quality of life and function.      EMG bilateral upper extremities      Clair Asher, APRN - CNP

## 2022-09-20 DIAGNOSIS — H92.02 LEFT EAR PAIN: Primary | ICD-10-CM

## 2022-09-21 ENCOUNTER — TELEPHONE (OUTPATIENT)
Dept: OTOLARYNGOLOGY | Age: 57
End: 2022-09-21

## 2022-09-21 NOTE — TELEPHONE ENCOUNTER
Dr Jose Baires sent referral for this patient to see Dr So Diaz for  Left Ear:  Drainage (yellow white), swelling (external canal and outer ear) and tenderness present. Tympanic membrane is erythematous. ,   Patient states to writer she was put on an antibiotic on 9/9/2022 and 10 days later notified Dr Jose Baires office she did not believe infection was better. She has seen Dr So Diaz in the past March 2022 for eczematous otitis externa . Please call patient to schedule an appointment with Dr So Diaz as she does not have any appointments in the near future.   [phone]

## 2022-09-21 NOTE — TELEPHONE ENCOUNTER
I spoke to [facility] and she stated that her ear is still plugged and she noticed that some of the wetness of the ear seemed to be coming back. .  She finished her antibiotics on the 9-. I scheduled her for an appointment on 10-7-2022 to see Dr. Beth Yanez. I did explain that after an ear infection it can take a couple of months for the fluid behind the eardrum to clear but due to the wetness she is feeling she may want to call Dr. Angelina Carson to see if she may need another round of antibiotics . Loretta stated that the last time this happened it took two rounds of antibiotics. I told her if get a cancellation I will call her with a sooner appt.

## 2022-09-22 ENCOUNTER — OFFICE VISIT (OUTPATIENT)
Dept: FAMILY MEDICINE CLINIC | Age: 57
End: 2022-09-22
Payer: COMMERCIAL

## 2022-09-22 VITALS
RESPIRATION RATE: 16 BRPM | BODY MASS INDEX: 29.3 KG/M2 | OXYGEN SATURATION: 99 % | TEMPERATURE: 96.9 F | SYSTOLIC BLOOD PRESSURE: 126 MMHG | HEART RATE: 84 BPM | WEIGHT: 157.6 LBS | DIASTOLIC BLOOD PRESSURE: 60 MMHG

## 2022-09-22 DIAGNOSIS — E78.2 MIXED HYPERLIPIDEMIA: Primary | ICD-10-CM

## 2022-09-22 PROCEDURE — G8427 DOCREV CUR MEDS BY ELIG CLIN: HCPCS | Performed by: FAMILY MEDICINE

## 2022-09-22 PROCEDURE — 99214 OFFICE O/P EST MOD 30 MIN: CPT | Performed by: FAMILY MEDICINE

## 2022-09-22 PROCEDURE — 1036F TOBACCO NON-USER: CPT | Performed by: FAMILY MEDICINE

## 2022-09-22 PROCEDURE — 3017F COLORECTAL CA SCREEN DOC REV: CPT | Performed by: FAMILY MEDICINE

## 2022-09-22 PROCEDURE — G8417 CALC BMI ABV UP PARAM F/U: HCPCS | Performed by: FAMILY MEDICINE

## 2022-09-22 ASSESSMENT — ENCOUNTER SYMPTOMS
SHORTNESS OF BREATH: 0
CHOKING: 0
WHEEZING: 0
PHOTOPHOBIA: 0
SORE THROAT: 0
RHINORRHEA: 0
COUGH: 1
CHEST TIGHTNESS: 0
SINUS PAIN: 0
SINUS PRESSURE: 0
ABDOMINAL PAIN: 0

## 2022-09-22 NOTE — PROGRESS NOTES
Name: Issac Mckoy  DOS: 9/22/2022  MRN: [id number]      Subjective:  Issac Mckoy is a 56 y.o. female being seen for   Chief Complaint   Patient presents with    Otalgia     Follow up. Left ear started itching, wet in am.  Sx worsened after she finished abx. No pain.         Vitals:    09/22/22 1438   BP: 126/60   Pulse: 84   Resp: 16   Temp: 96.9 °F (36.1 °C)   SpO2: 99%     Allergies   Allergen Reactions    Ace Inhibitors Other (See Comments)     Cough      Penicillins Rash and Other (See Comments)    Sertraline Other (See Comments)     Hypertension     Past Medical History:   Diagnosis Date    Bronchitis     Diabetes type 1, controlled ([address]     Eczema     GERD (gastroesophageal reflux disease)     Gestational diabetes     Hyperlipidemia     Hypertension     Hypothyroid     Insomnia     Insulin pump in place     Dr. Jojo Arboleda endocrinology [address]    Myopia with astigmatism and presbyopia     Tobacco abuse     Under care of team 11/22/2021    endocrine-Dr Crandall 54 visit nov 2021    Under care of team 11/22/2021    pcp- [address] [place] [address] Dr visit nov 2021     Past Surgical History:   Procedure Laterality Date    BACK INJECTION Right 04/29/2021    Right Sacroiliac Joint Injection performed by Larry Catalan MD at [address] [place] [address]  01/05/2015    Lumbar microdiscectomy L5-S1    BLADDER SUSPENSION  09/22/2009    (transobturator sling)    CERVIX LESION DESTRUCTION  1993    (laser cone)    COLONOSCOPY  10/17/2018    hyperplastic polyp, Dr. Dana Hall at [place] [address].  2006    LUMBAR FUSION N/A 12/9/2021    OLIF L4-5, C-ARM, LATERAL FLAT IRMA MARTINES, MICROSCOPE performed by Nilam Tucker DO at [facility] N/A 12/9/2021    POSTERIOR FIXATION L4-5, O-ARM/STEALTH performed by Nilam Tucker DO at [facility]  09/06/2017    Dr Dakota Corley  12/09/2021    OLIF L4-5, Posterior fixation L4-5    NERVE BLOCK  02/16/2017 laser nerve block Dr Nitza Schultz- [facility]  2017    laser nerve block Dr Meyer Check Bilateral 2019    BL5 transforaminal radiculogram- [facility] per Dr Елена Watkins Right 2019    Right sacroiliac joint injection with arthrography    NERVE BLOCK Bilateral 2019    BL5 transforaminal radiculogram/epidurogram    PAIN MANAGEMENT PROCEDURE Bilateral 2021    Bilateral L4 TRANSFORAMINAL performed by See Hsu MD at [address] [facility] N/A 2021    L4-L5  INTERLAMINAR performed by See Hsu MD at [address] [facility] Right 2022    Right C6 TRANSFORAMINAL performed by See Hsu MD at [address]  2015    Lumbar L5-S1    US BREAST NEEDLE BIOPSY RIGHT Right 2021    US BREAST NEEDLE BIOPSY RIGHT 2021 [facility] ULTRASOUND     Social History     Socioeconomic History    Marital status:    Tobacco Use    Smoking status: Former     Packs/day: 0.00     Years: 28.00     Pack years: 0.00     Types: Cigarettes     Start date: 1985     Quit date: 2014     Years since quittin.1    Smokeless tobacco: Never   Vaping Use    Vaping Use: Never used   Substance and Sexual Activity    Alcohol use: Yes     Alcohol/week: 3.0 standard drinks     Types: 3 Standard drinks or equivalent per week     Comment: Socially. -weekly    Drug use: No    Sexual activity: Yes     Partners: Male     Social Determinants of Health     Financial Resource Strain: Low Risk     Difficulty of Paying Living Expenses: Not hard at all   Food Insecurity: No Food Insecurity    Worried About Running Out of Food in the Last Year: Never true    Ran Out of Food in the Last Year: Never true       Current Outpatient Medications   Medication Sig Dispense Refill    losartan-hydroCHLOROthiazide (HYZAAR) 100-12.5 MG per tablet Take 1 tablet by mouth daily 90 tablet 1    pimecrolimus (ELIDEL) 1 % cream Apply topically 2 times daily Apply topically 2 times daily. 60 g 0    insulin glargine (LANTUS SOLOSTAR) 100 UNIT/ML injection pen 20 units      celecoxib (CELEBREX) 100 MG capsule Take 1 capsule by mouth in the morning and 1 capsule before bedtime. 60 capsule 3    aspirin 81 MG EC tablet Take 81 mg by mouth daily      levothyroxine (SYNTHROID) 50 MCG tablet Take 1 tablet by mouth daily 30 tablet 5    insulin lispro (HUMALOG) 100 UNIT/ML injection vial Use 60 units per day in pump 1 vial 3    Vitamin D (CHOLECALCIFEROL) 1000 UNITS CAPS capsule Take 4,000 Units by mouth daily       Insulin Lispro, Human, (INSULIN PUMP) FLORENTIN Inject 1 each into the skin See Admin Instructions. fluocinolone (DERMOTIC) 0.01 % OIL oil Apply 4 drops to affected ear(s) twice a day for 7 days and then once a day as needed for itching (Patient not taking: Reported on 9/22/2022) 20 mL 5     No current facility-administered medications for this visit. Objective:  Pt is here because her left ear has is not better. I placed her on oflaxin otic soln and clindamycin and told her if this does not get better that I will refer her to ENT. Also she says that she has allergies and that she has a little dry cough from a PND this happens every year. No sore throat, No sinus pressure nor pain. Review of Systems   Constitutional:  Negative for fatigue and unexpected weight change. HENT:  Positive for sneezing (1 week nor much). Negative for ear pain, postnasal drip, rhinorrhea, sinus pressure, sinus pain and sore throat. Eyes:  Negative for photophobia and visual disturbance. Respiratory:  Positive for cough (from allergies she says for 1 week, no productive cough). Negative for choking, chest tightness, shortness of breath and wheezing. Cardiovascular:  Negative for chest pain, palpitations and leg swelling. Gastrointestinal:  Negative for abdominal pain. Genitourinary:  Negative for difficulty urinating and hematuria.    Musculoskeletal:  Negative for arthralgias and myalgias. Skin:  Negative for rash and wound. Neurological:  Negative for dizziness, weakness, light-headedness and headaches. Hematological:  Negative for adenopathy. Does not bruise/bleed easily. Psychiatric/Behavioral:  Negative for agitation, confusion, decreased concentration and suicidal ideas. Physical Exam  Constitutional:       General: She is not in acute distress. Appearance: Normal appearance. She is not ill-appearing, toxic-appearing or diaphoretic. HENT:      Head: Normocephalic and atraumatic. Right Ear: Tympanic membrane, ear canal and external ear normal. There is no impacted cerumen. Left Ear: Tympanic membrane and external ear normal.      Ears:      Comments: PT has left ceruminosis but also has white exudate at the inferior aspect of the external canal. NO pain when pulling ear. Also external ear is not as erythematous as last visit but just slightly     Nose: Nose normal. No congestion or rhinorrhea. Mouth/Throat:      Mouth: Mucous membranes are moist.      Pharynx: Oropharynx is clear. No oropharyngeal exudate or posterior oropharyngeal erythema. Eyes:      Extraocular Movements: Extraocular movements intact. Conjunctiva/sclera: Conjunctivae normal.      Pupils: Pupils are equal, round, and reactive to light. Cardiovascular:      Rate and Rhythm: Normal rate and regular rhythm. Pulses: Normal pulses. Heart sounds: Normal heart sounds. No murmur heard. Pulmonary:      Effort: Pulmonary effort is normal.      Breath sounds: Rhonchi (when patient coughed once I heard anterior chest wall rhonci) present. No wheezing or rales. Abdominal:      General: Abdomen is flat. Bowel sounds are normal. There is no distension. Palpations: Abdomen is soft. There is no mass. Tenderness: There is no abdominal tenderness. There is no right CVA tenderness, left CVA tenderness or guarding.    Musculoskeletal:         General: Normal range of motion. Cervical back: Normal range of motion and neck supple. Right lower leg: No edema. Left lower leg: No edema. Lymphadenopathy:      Cervical: No cervical adenopathy. Skin:     General: Skin is warm. Capillary Refill: Capillary refill takes less than 2 seconds. Findings: No rash. Neurological:      General: No focal deficit present. Mental Status: She is alert and oriented to person, place, and time. Motor: No weakness. Coordination: Coordination normal.      Gait: Gait normal.   Psychiatric:         Mood and Affect: Mood normal.         Behavior: Behavior normal.         Thought Content: Thought content normal.        Assessment:   Diagnosis Orders   1. Mixed hyperlipidemia  Lipid Panel            Plan:  Orders Placed This Encounter   Procedures    Lipid Panel     Standing Status:   Future     Standing Expiration Date:   1/23/2023     Order Specific Question:   Is Patient Fasting?/# of Hours     Answer:   Yes         Patient Instructions   Nutrition Health Education:    Keep hydrated, walk 30 minutes minimum 3 times weekly as tolerated. Diet should consist of low fat, low sodium and high fiber. Nutritious foods such as fruits (if you're not diabetic), vegetables, lean meats, lean dairy, whole grains such as brown rice, quinoa, and dry beans. Gerson Peace with small amounts of heart healthy extra virgin olive oil. Be watchful of any extra salt/sugar/seasoning to your food. You should eat no more than 2 grams or 2,000 mg of salt daily. Salt will raise your BP. Avoid regular/diet sodas, caffeine, energy drinks as these are full of artificial ingredients/sweeteners, sugar, salt and chemicals that spike insulin and are harmful to your health. Sugar intake increases metabolic disfunction, type 2 diabetes, insulin resistance, addictive food behavior and obesity.  Avoid all processed foods, foods from boxes, cans, microwave meals as these contain high salt, sugar or fat

## 2022-09-22 NOTE — PATIENT INSTRUCTIONS
Nutrition Health Education:    Keep hydrated, walk 30 minutes minimum 3 times weekly as tolerated. Diet should consist of low fat, low sodium and high fiber. Nutritious foods such as fruits (if you're not diabetic), vegetables, lean meats, lean dairy, whole grains such as brown rice, quinoa, and dry beans. Michial Dorian with small amounts of heart healthy extra virgin olive oil. Be watchful of any extra salt/sugar/seasoning to your food. You should eat no more than 2 grams or 2,000 mg of salt daily. Salt will raise your BP. Avoid regular/diet sodas, caffeine, energy drinks as these are full of artificial ingredients/sweeteners, sugar, salt and chemicals that spike insulin and are harmful to your health. Sugar intake increases metabolic disfunction, type 2 diabetes, insulin resistance, addictive food behavior and obesity. Avoid all processed foods, foods from boxes, cans, microwave meals as these contain high salt, sugar or fat content and not much nutrition. Get at least 8 hrs of sleep every night and turn off all electronics at least 1 hour before bedtime as these decreases melatonin production and increases wakefulness. If your cholesterol is high, no greasy, fried, fast or fatty foods. Decrease red meat intake. No butter, ov[place]o, lard or creams, no milk as these things clog your arteries and leads to heart attacks and death. If you smoke, smoking increases risk of lung disease, cancers, high BP, heart attack, stroke and death. Take your daily medications as prescribed and inform your family doctor if you are having any side effects or issues taking medications.      Pt has an appt with ENT 10/07/2022 in the beginning    Spoke with [facility]. pharmacist I called it in  Clotrimazole 1% soln 2 drops twice a day x 10 days left ear   Rub a little on the inside of outer ear when you apply the drops  If not improved within 2-3 days or gets worse return to office, ENT or er    Pt has an appt with me in the future

## 2022-10-07 ENCOUNTER — OFFICE VISIT (OUTPATIENT)
Dept: OTOLARYNGOLOGY | Age: 57
End: 2022-10-07
Payer: COMMERCIAL

## 2022-10-07 VITALS
RESPIRATION RATE: 16 BRPM | SYSTOLIC BLOOD PRESSURE: 132 MMHG | BODY MASS INDEX: 29.74 KG/M2 | DIASTOLIC BLOOD PRESSURE: 72 MMHG | OXYGEN SATURATION: 99 % | HEART RATE: 83 BPM | WEIGHT: 161.6 LBS | HEIGHT: 62 IN

## 2022-10-07 DIAGNOSIS — H60.8X3 CHRONIC ECZEMATOUS OTITIS EXTERNA OF BOTH EARS: Primary | ICD-10-CM

## 2022-10-07 DIAGNOSIS — B36.9 OTOMYCOSIS: ICD-10-CM

## 2022-10-07 DIAGNOSIS — H62.40 OTOMYCOSIS: ICD-10-CM

## 2022-10-07 PROCEDURE — 99213 OFFICE O/P EST LOW 20 MIN: CPT | Performed by: OTOLARYNGOLOGY

## 2022-10-07 PROCEDURE — 4130F TOPICAL PREP RX AOE: CPT | Performed by: OTOLARYNGOLOGY

## 2022-10-07 PROCEDURE — 3017F COLORECTAL CA SCREEN DOC REV: CPT | Performed by: OTOLARYNGOLOGY

## 2022-10-07 PROCEDURE — 1036F TOBACCO NON-USER: CPT | Performed by: OTOLARYNGOLOGY

## 2022-10-07 PROCEDURE — G8427 DOCREV CUR MEDS BY ELIG CLIN: HCPCS | Performed by: OTOLARYNGOLOGY

## 2022-10-07 PROCEDURE — G8484 FLU IMMUNIZE NO ADMIN: HCPCS | Performed by: OTOLARYNGOLOGY

## 2022-10-07 PROCEDURE — G8417 CALC BMI ABV UP PARAM F/U: HCPCS | Performed by: OTOLARYNGOLOGY

## 2022-10-07 RX ORDER — CLOTRIMAZOLE 1 G/ML
SOLUTION TOPICAL
COMMUNITY
Start: 2022-09-22 | End: 2022-10-07 | Stop reason: ALTCHOICE

## 2022-10-07 NOTE — PROGRESS NOTES
10/7/2022 2:39 PM EST Saint Rad (:  1965) is a 56 y.o. female,New patient, here for evaluation of the following chief complaint(s):  Ear Problem (Fungal infection within last month. )      ASSESSMENT/PLAN:  1. Chronic eczematous otitis externa of both ears    2. Otomycosis      1. Chronic eczematous otitis externa of both ears  2. Otomycosis    Previously Discussed the diagnosis of chronic otitis externa and chronic eczematous otitis externa  Continue Derm otic drops  Also discussed prednisolone drops for dexamethasone ophthalmic suspension  Discussed boric acid powder    Today do clotrimazole soln to the AS x 4 more days   Fu in 2 weeks to do repeat cleaning and powder treatment     Doing well fu PRN     No follow-ups on file. SUBJECTIVE/OBJECTIVE:  Ear Problem     55yo woman with chronic history of bilateral ear canal itching, flaking. She has a strong personal history of eczema across the face. For the past 2 years she has noticed dryness, cracking, itching and intermittent wetness. It worsened more recently and she has been on multiple antibiotic drops followed by antifungal drops as well as oral antibiotics. She believes that the bacteria and fungus have improved but continues to have is bothersome flaking and itching quality. No hearing change. She follows up about 2 weeks later. States that while she was on the drops her symptoms significantly improved. She is been off and on for about a week and has not used any but over the last day or 2 she started noticing a little bit of irritation and itching around the tragus and the meatus. Today she follows up about 6 months later. Is recovering from a fungal infection of the AS. Was treated with clotrimazole drops for 10 days. Has discontinued them after 10 days. Noticed a significant improvement. Continues to have a little bit of itching and clogged feeling in the left ear.     Past Medical History:   Diagnosis Date    Bronchitis Diabetes type 1, controlled ([facility] [address]     Eczema     GERD (gastroesophageal reflux disease)     Gestational diabetes     Hyperlipidemia     Hypertension     Hypothyroid     Insomnia     Insulin pump in place     Dr. Champ Cevallos endocrinology [address]    Myopia with astigmatism and presbyopia     Tobacco abuse     Under care of team 11/22/2021    endocrine-Dr Crandall 54 visit nov 2021    Under care of team 11/22/2021    pcp-Dr Chairez [facility]  visit nov 2021     Past Surgical History:   Procedure Laterality Date    BACK INJECTION Right 04/29/2021    Right Sacroiliac Joint Injection performed by Bandar Berman MD at [facility]  01/05/2015    Lumbar microdiscectomy L5-S1    BLADDER SUSPENSION  09/22/2009    (transobturator sling)    CERVIX LESION DESTRUCTION  1993    (laser cone)    COLONOSCOPY  10/17/2018    hyperplastic polyp, Dr. Irving Fermin at [facility] [address]  2006    LUMBAR FUSION N/A 12/9/2021    OLIF L4-5, C-ARM, LATERAL FLAT BOBBI, MEDTRONICS, MICROSCOPE performed by Vishal Matamoros DO at [address] [place] [address],[address] 12/9/2021    POSTERIOR FIXATION L4-5, O-ARM/STEALTH performed by Vishal Matamoros DO at [facility]  [facility]06/2017    [facility]  [facility]    OLIF L4-5, Posterior fixation L4-5    NERVE BLOCK  02/16/2017    [facility]   [facility]    laser nerve block Dr Lilian Nails Bilateral 03/13/2019    BL5 transforaminal radiculogram- [facility] per Dr Katherin Bentley Right 05/29/2019    Right sacroiliac joint injection with arthrography    NERVE BLOCK Bilateral 07/31/2019    BL5 transforaminal radiculogram/epidurogram    PAIN MANAGEMENT PROCEDURE Bilateral 08/13/2021    Bilateral L4 TRANSFORAMINAL performed by Bandar Berman MD at [address] [place] [address] N/A 09/02/2021    L4-L5  INTERLAMINAR performed by Bandar Berman MD at [address] [place] [address] Right 7/1/2022 Right C6 TRANSFORAMINAL performed by Jn Johnson MD at [address]  07/06/2015    Lumbar L5-S1    US BREAST NEEDLE BIOPSY RIGHT Right 07/12/2021    US BREAST NEEDLE BIOPSY RIGHT 7/12/2021 [facility] ULTRASOUND     Social History       Tobacco History       Smoking Status  Former Smoker Smoking Start Date  1/1/1985 Quit date  8/16/2014 Smoking Frequency  0 packs/day for 28 years (0 pk yrs)    Smoking Tobacco Type  Cigarettes      Smokeless Tobacco Use  Never Used              Alcohol History       Alcohol Use Status  Yes Drinks/Week  3 Standard drinks or equivalent per week Amount  3.0 standard drinks of alcohol/wk Comment  Socially. -weekly              Drug Use       Drug Use Status  No              Sexual Activity       Sexually Active  Yes Partners  Male                  Family History   Problem Relation Age of Onset    Diabetes Paternal Grandmother     Cancer Father         lymph nodes    Ovarian Cancer Maternal Aunt 79        mother's half sister (same mother)    High Blood Pressure Mother     Glaucoma Neg Hx     Cataracts Neg Hx      Current Outpatient Medications   Medication Instructions    aspirin 81 mg, Oral, DAILY    celecoxib (CELEBREX) 100 mg, Oral, 2 TIMES DAILY    fluocinolone (DERMOTIC) 0.01 % OIL oil Apply 4 drops to affected ear(s) twice a day for 7 days and then once a day as needed for itching    insulin glargine (LANTUS SOLOSTAR) 100 UNIT/ML injection pen 20 units    insulin lispro (HUMALOG) 100 UNIT/ML injection vial Use 60 units per day in pump    Insulin Lispro, Human, (INSULIN PUMP) FLORENTIN 1 each, SEE ADMIN INSTRUCTIONS    levothyroxine (SYNTHROID) 50 mcg, Oral, DAILY    losartan-hydroCHLOROthiazide (HYZAAR) 100-12.5 MG per tablet 1 tablet, Oral, DAILY    pimecrolimus (ELIDEL) 1 % cream Topical, 2 TIMES DAILY, Apply topically 2 times daily.     Vitamin D (CHOLECALCIFEROL) 4,000 Units, Oral, DAILY     Allergies   Allergen Reactions    Ace Inhibitors Other (See Comments)     Cough Penicillins Rash and Other (See Comments)    Sertraline Other (See Comments)     Hypertension       ENT ROS: positive for - ear itching     General: The patient is found to be alert and normally responsive female. Hearing: grossly normal   Voice: Clear   Skin: The skin has normal colour and turgor. Face: The facial contour is symmetric at rest and with movement. Ears: The pinnae have normal contours. Right tragus with mild dry, flaking, irritated raw characteristics of the skin, left tragus appears normal but there is some irritation to the skin of the meatus. AD: EAC clear, TM intact, no effusion/erythema/retraction   AS: EAC with white fungal debris, suctioned #5 and #3, thin membrane over the tympanic membrane, TM intact, no effusion/erythema/retraction   Eye: The ocular movements are full and symmetric, the conjunctiva is unremarkable; sclera are anicteric, pupillary response is symmetric. No nystagmus is found. Nose:   The external nasal contour is normal  Anterior clear  Oral cavity:   Anterior clear  Neck: The neck has a normal contour; no masses are found on palpation        An electronic signature was used to authenticate this note.     --Genevieve Wells MD     10/7/2022 2:39 PM EST

## 2022-10-22 ENCOUNTER — TELEPHONE (OUTPATIENT)
Dept: GENERAL RADIOLOGY | Age: 57
End: 2022-10-22

## 2022-10-22 DIAGNOSIS — R92.8 ABNORMAL MAMMOGRAM OF RIGHT BREAST: Primary | ICD-10-CM

## 2022-10-22 NOTE — TELEPHONE ENCOUNTER
Could you please put in a Bilateral Diagnostic Mammogram order [id number] Thank you   This patient is schedule on Monday Oct 24th for this exam .

## 2022-10-24 ENCOUNTER — HOSPITAL ENCOUNTER (OUTPATIENT)
Dept: MAMMOGRAPHY | Age: 57
Discharge: HOME | End: 2022-10-26
Payer: COMMERCIAL

## 2022-10-24 DIAGNOSIS — R92.8 ABNORMAL MAMMOGRAM OF RIGHT BREAST: ICD-10-CM

## 2022-10-24 PROCEDURE — 77066 DX MAMMO INCL CAD BI: CPT

## 2022-10-25 ENCOUNTER — OFFICE VISIT (OUTPATIENT)
Dept: OTOLARYNGOLOGY | Age: 57
End: 2022-10-25
Payer: COMMERCIAL

## 2022-10-25 VITALS
SYSTOLIC BLOOD PRESSURE: 128 MMHG | WEIGHT: 161.6 LBS | BODY MASS INDEX: 29.74 KG/M2 | HEIGHT: 62 IN | OXYGEN SATURATION: 99 % | DIASTOLIC BLOOD PRESSURE: 68 MMHG | HEART RATE: 88 BPM | RESPIRATION RATE: 14 BRPM

## 2022-10-25 DIAGNOSIS — H60.8X3 CHRONIC ECZEMATOUS OTITIS EXTERNA OF BOTH EARS: Primary | ICD-10-CM

## 2022-10-25 DIAGNOSIS — H62.40 OTOMYCOSIS: ICD-10-CM

## 2022-10-25 DIAGNOSIS — B36.9 OTOMYCOSIS: ICD-10-CM

## 2022-10-25 PROCEDURE — 3017F COLORECTAL CA SCREEN DOC REV: CPT | Performed by: OTOLARYNGOLOGY

## 2022-10-25 PROCEDURE — 4130F TOPICAL PREP RX AOE: CPT | Performed by: OTOLARYNGOLOGY

## 2022-10-25 PROCEDURE — 99213 OFFICE O/P EST LOW 20 MIN: CPT | Performed by: OTOLARYNGOLOGY

## 2022-10-25 PROCEDURE — G8417 CALC BMI ABV UP PARAM F/U: HCPCS | Performed by: OTOLARYNGOLOGY

## 2022-10-25 PROCEDURE — G8484 FLU IMMUNIZE NO ADMIN: HCPCS | Performed by: OTOLARYNGOLOGY

## 2022-10-25 PROCEDURE — G8427 DOCREV CUR MEDS BY ELIG CLIN: HCPCS | Performed by: OTOLARYNGOLOGY

## 2022-10-25 PROCEDURE — 1036F TOBACCO NON-USER: CPT | Performed by: OTOLARYNGOLOGY

## 2022-10-25 NOTE — PROGRESS NOTES
10/25/2022 2:39 PM NIMO Barry (:  1965) is a 56 y.o. female,New patient, here for evaluation of the following chief complaint(s):  Ear Problem (2 wks ck ears)      ASSESSMENT/PLAN:  1. Chronic eczematous otitis externa of both ears    2. Otomycosis      1. Chronic eczematous otitis externa of both ears  2. Otomycosis    Previously Discussed the diagnosis of chronic otitis externa and chronic eczematous otitis externa  Also discussed prednisolone drops for dexamethasone ophthalmic suspension  Discussed boric acid powder    2 more days of clotrimazole AS  Follow-up as needed      No follow-ups on file. SUBJECTIVE/OBJECTIVE:  Ear Problem     57yo woman with chronic history of bilateral ear canal itching, flaking. She has a strong personal history of eczema across the face. For the past 2 years she has noticed dryness, cracking, itching and intermittent wetness. It worsened more recently and she has been on multiple antibiotic drops followed by antifungal drops as well as oral antibiotics. She believes that the bacteria and fungus have improved but continues to have is bothersome flaking and itching quality. No hearing change. She follows up about 2 weeks later. States that while she was on the drops her symptoms significantly improved. She is been off and on for about a week and has not used any but over the last day or 2 she started noticing a little bit of irritation and itching around the tragus and the meatus. She followed up about 6 months later. Is recovering from a fungal infection of the AS. Was treated with clotrimazole drops for 10 days. Has discontinued them after 10 days. Noticed a significant improvement. Continues to have a little bit of itching and clogged feeling in the left ear. We had her do a couple more days of the clotrimazole drops to the left ear. She reports no further itching or drainage.     Past Medical History:   Diagnosis Date    Bronchitis Diabetes type 1, controlled ([address]     Eczema     GERD (gastroesophageal reflux disease)     Gestational diabetes     Hyperlipidemia     Hypertension     Hypothyroid     Insomnia     Insulin pump in place     Dr. Aury [facility]hma [facility]ocrin[facility]    Myopia with astigmatism and presbyopia     Tobacco abuse     Under care of team 11/22/2021    endocrine-Dr Crandall 54 visit nov 2021    Under care of team 11/22/2021    pcp- [address] [place] [address]  visit nov 2021     Past Surgical History:   Procedure Laterality Date    BACK INJECTION Right 04/29/2021    Right Sacroiliac Joint Injection performed by Shannon Awad MD at [facility]  01/05/2015    Lumbar microdiscectomy L5-S1    BLADDER SUSPENSION  09/22/2009    (transobturator sling)    CERVIX LESION DESTRUCTION  1993    (laser cone)    COLONOSCOPY  10/17/2018    hyperplastic polyp, Dr. Rodger Carlin at [place] [address].  2006    LUMBAR FUSION N/A 12/9/2021    OLIF L4-5, C-ARM, LATERAL FLAT BOBBI, MEDTRONICS, MICROSCOPE performed by Layla Soni DO at [address] [place] [address],[address] 12/9/2021    POSTERIOR FIXATION L4-5, O-ARM/STEALTH performed by Layla Soni DO at [facility]  09/06/2017    Dr Muna Velazquez  12/09/2021    OLIF L4-5, Posterior fixation L4-5    NERVE BLOCK  02/16/2017    [place] [address]  03/09/2017    laser nerve block Dr Niki Bloom Bilateral 03/13/2019    BL5 transforaminal radiculogram- [facility] per Dr Trinidad Negro Right 05/29/2019    Right sacroiliac joint injection with arthrography    NERVE BLOCK Bilateral 07/31/2019    BL5 transforaminal radiculogram/epidurogram    PAIN MANAGEMENT PROCEDURE Bilateral 08/13/2021    Bilateral L4 TRANSFORAMINAL performed by Shannon Awad MD at [address] [place] [address] N/A 09/02/2021    L4-L5  INTERLAMINAR performed by Shannon Awad MD at [address] [place] [address] Right 7/1/2022 Right C6 TRANSFORAMINAL performed by Johnnie Heredia MD at [address]  07/06/2015    Lumbar L5-S1    US BREAST NEEDLE BIOPSY RIGHT Right 07/12/2021    US BREAST NEEDLE BIOPSY RIGHT 7/12/2021 [facility] ULTRASOUND     Social History       Tobacco History       Smoking Status  Former Smoker Smoking Start Date  1/1/1985 Quit date  8/16/2014 Smoking Frequency  0 packs/day for 28 years (0 pk yrs)    Smoking Tobacco Type  Cigarettes      Smokeless Tobacco Use  Never Used              Alcohol History       Alcohol Use Status  Yes Drinks/Week  3 Standard drinks or equivalent per week Amount  3.0 standard drinks of alcohol/wk Comment  Socially. -weekly              Drug Use       Drug Use Status  No              Sexual Activity       Sexually Active  Yes Partners  Male                  Family History   Problem Relation Age of Onset    Diabetes Paternal Grandmother     Cancer Father         lymph nodes    Ovarian Cancer Maternal Aunt 79        mother's half sister (same mother)    High Blood Pressure Mother     Glaucoma Neg Hx     Cataracts Neg Hx      Current Outpatient Medications   Medication Instructions    aspirin 81 mg, Oral, DAILY    celecoxib (CELEBREX) 100 mg, Oral, 2 TIMES DAILY    fluocinolone (DERMOTIC) 0.01 % OIL oil Apply 4 drops to affected ear(s) twice a day for 7 days and then once a day as needed for itching    insulin glargine (LANTUS SOLOSTAR) 100 UNIT/ML injection pen 20 units    insulin lispro (HUMALOG) 100 UNIT/ML injection vial Use 60 units per day in pump    Insulin Lispro, Human, (INSULIN PUMP) FLORENTIN 1 each, SEE ADMIN INSTRUCTIONS    levothyroxine (SYNTHROID) 50 mcg, Oral, DAILY    losartan-hydroCHLOROthiazide (HYZAAR) 100-12.5 MG per tablet 1 tablet, Oral, DAILY    Vitamin D (CHOLECALCIFEROL) 4,000 Units, Oral, DAILY     Allergies   Allergen Reactions    Ace Inhibitors Other (See Comments)     Cough      Penicillins Rash and Other (See Comments)    Sertraline Other (See Comments) Hypertension       ENT ROS: positive for - ear itching     General: The patient is found to be alert and normally responsive female. Hearing: grossly normal   Voice: Clear   Skin: The skin has normal colour and turgor. Face: The facial contour is symmetric at rest and with movement. Ears: The pinnae have normal contours. Right tragus with mild dry, flaking, irritated raw characteristics of the skin, left tragus appears normal but there is some irritation to the skin of the meatus. AD: EAC clear, TM intact, no effusion/erythema/retraction   AS: EAC with small amount of residue along floor of EAC, suctioned with #5, TM intact, no effusion/erythema/retraction   Eye: The ocular movements are full and symmetric, the conjunctiva is unremarkable; sclera are anicteric, pupillary response is symmetric. No nystagmus is found. Nose:   The external nasal contour is normal  Anterior clear  Oral cavity:   Anterior clear  Neck: The neck has a normal contour; no masses are found on palpation        An electronic signature was used to authenticate this note.     --Radha Ulrich MD     10/25/2022 2:39 PM EST

## 2022-10-28 NOTE — TELEPHONE ENCOUNTER
Celebrex 100 mg   Last Appt:  9/19/2022  Next Appt:   11/18/2022  Med verified in [address] [place] [address] Rd

## 2022-10-31 RX ORDER — CELECOXIB 100 MG/1
100 CAPSULE ORAL 2 TIMES DAILY
Qty: 60 CAPSULE | Refills: 3 | Status: SHIPPED | OUTPATIENT
Start: 2022-10-31 | End: 2022-11-18 | Stop reason: SDUPTHER

## 2022-11-03 ENCOUNTER — HOSPITAL ENCOUNTER (OUTPATIENT)
Dept: NEUROLOGY | Age: 57
Discharge: HOME | End: 2022-11-03
Payer: COMMERCIAL

## 2022-11-03 PROCEDURE — 95886 MUSC TEST DONE W/N TEST COMP: CPT

## 2022-11-03 PROCEDURE — 95912 NRV CNDJ TEST 11-12 STUDIES: CPT

## 2022-11-07 NOTE — PROCEDURES
Ludger Opitz is a 56 y.o. female patient. No diagnosis found. Past Medical History:   Diagnosis Date    Bronchitis     Diabetes type 1, controlled ([address]     Eczema     GERD (gastroesophageal reflux disease)     Gestational diabetes     Hyperlipidemia     Hypertension     Hypothyroid     Insomnia     Insulin pump in place     Dr. Damon Kelley endocrinology [facility]    Myopia with astigmatism and presbyopia     Tobacco abuse     Under care of team 11/22/2021    endocrine-Dr Crandall 54 visit nov 2021    Under care of team 11/22/2021    pcp-Dr Chairez [facility]  visit nov 2021     not currently breastfeeding. Procedures Electromyography/ Nerve Conduction Study  (EMG/NCV)    Patient Name: Ludger Opitz   MRN: [id number]  Date of Procedure:11/3/22. Procedure BUE EMG/NCV    Chief Complaint[de-identified] R hand pain numbness and weakness     Ludger Opitz is a 56 y.o.  female  Referred  By No primary care provider on file. for electrodiagnostic medicine consultation. Patient complains of No chief complaint on file. . Patient has pain and numbness and lack of feeling in R hand more than Left   This been going on for about 1 years without any specific injury. Patient has abnormal  sensation in the  B hands R  more . Pain does not radiate . There is not history of fractures    Patient  does have Diabetes Mellitus. Patient has neck pain,   There are new XRays  Imaging:     EXAMINATION:   MRI OF THE CERVICAL SPINE WITHOUT CONTRAST 6/8/2022 5:24 pm       TECHNIQUE:   Multiplanar multisequence MRI of the cervical spine was performed without the   administration of intravenous contrast.       COMPARISON:   None. HISTORY:   ORDERING SYSTEM PROVIDED HISTORY: Cervical radiculopathy   TECHNOLOGIST PROVIDED HISTORY:   radiculopathy   Reason for Exam: Chronic neck pain. Right shoulder pain and right arm   numbness for approximately two months.    Additional signs and symptoms: Cervical radiculopathy FINDINGS:   BONES/ALIGNMENT: The vertebral body heights are maintained. There is   age-appropriate bone marrow signal.  There is multilevel degenerative disc   disease with loss of disc signal.  There is no significant disc space   narrowing. There is no spondylolisthesis. SPINAL CORD: The spinal cord is normal in caliber and signal.       SOFT TISSUES: The posterior paraspinal soft tissues are unremarkable. The   prevertebral soft tissues are unremarkable. C2-C3: There is no significant disc protrusion, spinal canal stenosis or   neural foraminal narrowing. C3-C4: There is no significant disc protrusion, spinal canal stenosis or   neural foraminal narrowing. C4-C5: There is no significant disc protrusion, spinal canal stenosis or   neural foraminal narrowing. C5-C6: There is a disc osteophyte complex with uncovertebral and facet   hypertrophy. There is no canal stenosis. There is moderate bilateral   foraminal narrowing. C6-C7: There is no significant disc protrusion, spinal canal stenosis or   neural foraminal narrowing. C7-T1: There is no significant disc protrusion, spinal canal stenosis or   neural foraminal narrowing. Impression   Multilevel degenerative disc disease with uncovertebral and facet hypertrophy   resulting in moderate bilateral foraminal narrowing at C5-6.        RECOMMENDATIONS:   Unavailable       Labs:      70 - 99 mg/dL 200 High    110 High    196 High   127 High     BUN 6 - 20 mg/dL 10    12  11  7    Creatinine 0.50 - 0.90 mg/dL 0.70    0.75  0.63  0.74    Bun/Cre Ratio 9 - 20 14     17  9    Calcium 8.6 - 10.4 mg/dL 9.5     9.2  10.0    Sodium 135 - 144 mmol/L 137  137    141  135    Potassium 3.7 - 5.3 mmol/L 4.3  3.8    4.4  4.4    Chloride 98 - 107 mmol/L 100  100    102  95 Low     CO2 20 - 31 mmol/L 24  23    29  28    Anion Gap 9 - 17 mmol/L 13  14    10  12    Alkaline Phosphatase 35 - 104 U/L 66     63  79    ALT 5 - 33 U/L 12     12  13    AST <32 U/L 19     22  21    Total Bilirubin 0.3 - 1.2 mg/dL 0.26 Low      0.44  0.59    Total Protein 6.4 - 8.3 g/dL 7.1     7.1  8.1    Albumin 3.5 - 5.2 g/dL 4.5     4.3  4.7    Albumin/Globulin Ratio 1.0 - 2.5 1.7     1.5  1.4    GFR Non-African American >60 mL/min >60    >60  >60  >60    GFR  >60 mL/min >60    >60  >60  >60    GFR Comment               CM       CM       CM    Comment: Average GFR for 52-63 years old:    80 mL/min/1.73sq m   Chronic Kidney Disease:    <60 mL/min/1.73sq m   Kidney failure:    <15 mL/min/1.73sq m               GFR is a calculated value that has proven clinically to  be a more effective measure of   kidney function when reported with serum creatinine. GFR Staging     NOT REPORTED  NOT REPORTED  NOT REPORTED    Resulting Agency  [facility] [address] [facility]              Specimen Collected: 07/18/22 10:45 EDT               Pain Scale:  Pain and numbness reported as 3 out of 10. Her present pain began 1 yeare. Pain has not changed since onset. Pain is associated with:numbness    Patient does report numbness. Patient does report tingling. There is  associated weakness. Difficulty controlling bowels: No.  Difficulty controlling bladder: No.  Electrical shock sensation in her legs: No.  Difficulty with balance while standing or walking: No.    Patient reports that nothing help to alleviate the pain. The following changes pain for the worse: overhead work .     The following treatment has been tried:  Physical therapy: No.   Chiropractic treatment: No.  Epidural steroid injection/block: No.   Prescription medications: No.   Over-the-counter medications: No.      Past Medical History:  Past Medical History:   Diagnosis Date    Bronchitis     Diabetes type 1, controlled ([facility] [address]     Eczema     GERD (gastroesophageal reflux disease) Gestational diabetes     Hyperlipidemia     Hypertension     Hypothyroid     Insomnia     Insulin pump in place     Dr. Loredo endocrinology [address]    Myopia with astigmatism and presbyopia     Tobacco abuse     Under care of team 11/22/2021    endocrine-Dr Crandall 54 visit nov 2021    Under care of team 11/22/2021    pcp- [address] [place] [address]  visit nov 2021        Past Surgical History  Past Surgical History:   Procedure Laterality Date    BACK INJECTION Right 04/29/2021    Right Sacroiliac Joint Injection performed by Hema Rolle MD at [address] [facility]  01/05/2015    Lumbar microdiscectomy L5-S1    BLADDER SUSPENSION  09/22/2009    (transobturator sling)    CERVIX LESION DESTRUCTION  1993    (laser cone)    COLONOSCOPY  10/17/2018    hyperplastic polyp, Dr. Vinicius Victoria at [address]  2006    LUMBAR FUSION N/A 12/9/2021    OLIF L4-5, C-ARM, LATERAL FLAT BOBBI, MEDTRONICS, MICROSCOPE [facility] at [facility] 12/9/2021    POSTERIOR FIXATION L4-5, O-ARM/STEALTH performed by [facility],  at [facility]  09/06/2017    Dr Kedar Fonseca  12/09/2021    OLIF L4-5, Posterior fixation L4-5    NERVE BLOCK  02/16/2017    laser nerve block [place] [id number]  03/09/2017    laser nerve block Dr Jorge Jiang Bilateral 03/13/2019    BL5 transforaminal radiculogram- [facility] per Dr Clyde Doyle Right 05/29/2019    Right sacroiliac joint injection with arthrography    NERVE BLOCK Bilateral 07/31/2019    BL5 transforaminal radiculogram/epidurogram    PAIN MANAGEMENT PROCEDURE Bilateral 08/13/2021    Bilateral L4 TRANSFORAMINAL performed by Hema Rolle MD at [address] [place] [address] N/A 09/02/2021    L4-L5  INTERLAMINAR performed by Hema Rolle MD at [address] [place] [address] Right 7/1/2022    Right C6 TRANSFORAMINAL performed by Hema Rolle MD at [address] [facility] OR    SPINAL FUSION  2015    Lumbar L5-S1    US BREAST NEEDLE BIOPSY RIGHT Right 2021    US BREAST NEEDLE BIOPSY RIGHT 2021 [facility]TR[facility]         Family Medical History :  Family History   Problem Relation Age of Onset    Diabetes Paternal Grandmother     Cancer Father         lymph nodes    Ovarian Cancer Maternal Aunt 79        mother's half sister (same mother)    High Blood Pressure Mother     Glaucoma Neg Hx     Cataracts Neg Hx          Social History   Social History     Socioeconomic History    Marital status:    Tobacco Use    Smoking status: Former     Packs/day: 0.00     Years: 28.00     Pack years: 0.00     Types: Cigarettes     Start date: 1985     Quit date: 2014     Years since quittin.2    Smokeless tobacco: Never   Vaping Use    Vaping Use: Never used   Substance and Sexual Activity    Alcohol use: Yes     Alcohol/week: 3.0 standard drinks     Types: 3 Standard drinks or equivalent per week     Comment: Socially. -weekly    Drug use: No    Sexual activity: Yes     Partners: Male         Review of Systems  Allergies   Allergen Reactions    Ace Inhibitors Other (See Comments)     Cough      Penicillins Rash and Other (See Comments)    Sertraline Other (See Comments)     Hypertension     Current Outpatient Medications on File Prior to Encounter   Medication Sig Dispense Refill    celecoxib (CELEBREX) 100 MG capsule Take 1 capsule by mouth 2 times daily 60 capsule 3    losartan-hydroCHLOROthiazide (HYZAAR) 100-12.5 MG per tablet Take 1 tablet by mouth daily 90 tablet 1    insulin glargine (LANTUS SOLOSTAR) 100 UNIT/ML injection pen 20 units      aspirin 81 MG EC tablet Take 81 mg by mouth daily      fluocinolone (DERMOTIC) 0.01 % OIL oil Apply 4 drops to affected ear(s) twice a day for 7 days and then once a day as needed for itching 20 mL 5    levothyroxine (SYNTHROID) 50 MCG tablet Take 1 tablet by mouth daily 30 tablet 5    insulin lispro (HUMALOG) 100 UNIT/ML injection vial Use 60 units per day in pump 1 vial 3    Vitamin D (CHOLECALCIFEROL) 1000 UNITS CAPS capsule Take 4,000 Units by mouth daily       Insulin Lispro, Human, (INSULIN PUMP) FLORENTIN Inject 1 each into the skin See Admin Instructions. No current facility-administered medications on file prior to encounter. Constitutional- no chills fever fatigue  HENT: no congestion   Eyes: no discharge itching  Respiratory: Negative for choking chest tightness, short of breath  Gastrointestinal: no nausea vomiting  Abdominal pain  Musculoskeletal: negative for arthralgias back pain currently  Behavioral: mild anxiety    Physical Exam:   General Appearance:  alert, well appearing, and in no acute distress  Mental status: oriented to person, place, and time with normal affect  Head:  normocephalic, atraumatic. Eye: no icterus, redness, pupils equal and reactive, extraocular eye movements intact, conjunctiva clear  Ear: normal external ear, no discharge, hearing intact  Nose:  no drainage noted  Mouth: mucous membranes moist  Neck: supple, no carotid bruits, thyroid not palpable  Lungs: Bilateral equal air entry, clear to ausculation, no wheezing, rales or rhonchi, normal effort  Cardiovascular: normal rate, regular rhythm, no murmur, gallop, rub.   Abdomen: Soft, nontender, nondistended, normal bowel sounds, no hepatomegaly or splenomegaly  Neurologic: normal muscle tone and bulk, sensory deficits decreased light touch  digits finger tips 1,2,   4+/5 R hand normal   _ tinels at  ulnar B  elbows no swelling at ulnar grooves B speech, cranial nerves II through XII grossly intact  Musculoskeletal:  No bony deformities  Skin: No gross lesions, rashes, bruising or bleeding on exposed skin area  Extremities:  peripheral pulses palpable, no pedal edema or calf pain with palpation  Psych: normal affect     EMG/NCV Findings:                                  Right Median Sensory Distal Latency  prolonged compared to  R  Ulnar Sensory DL                              Right Median Motor DL is prolonged compared to R  Ulnar  Motor DL                                R Ulnar Motor Nerve Conduction Velocity  Above Elbow is >10 m/s slower than Below Elbow                                      Left Ulnar Motor Nerve Conduction  Velocity                   Is >10m/s slower than Below  Elbow Velocity    EMG  shows                     No B Cervical Radiculopathy                    No B Brachial Plexopathy                  No other mononeuropathy nor Peripheral Neuropathy                                            Impression: ABNORMAL                           Right Carpal Tunnel Syndrome                         Affecting   sensory fibers to mild moderate degree                                              Motor fibers to mild moderate degree                   On Nerve conduction Studies, Right Ulnar Neuropathy at Elbow (UNE) not  correlate well clinically                    On Nerve Conduction Studies, Left Ulnar Neuropathy                    Not correlated well clinically                             TABLES      Findings of Nerve Conductions: Please see NCV table      Impression: Right  Carpal Tunnel Syndrome                      Right Cervical Radiculitis                     Cervical Neuroforaminal Stenosis                       Cervical Spondylosis                       Diabetes Type 1                         Post Lumbar Laminectomy Syndrome    Plan: Conservative Management for Right Carpal tunnel - Hand Exercises,  Carpal Tunnel Splint    Consider Right Cervical C 5 Transforaminal Epidural Steroid Injection  based on MRI Findings                       Electronically signed by Trung Padron MD on 11/7/2022 at 3:01 PM      Trung Padron MD  11/7/2022

## 2022-11-09 NOTE — PROCEDURES
EMG Nerve Conduction Study    Patient Name: Fernando Mann   MRN: [id number]  Date of Procedure: 11/3/22    Procedure: Bilateral Upper Extremity EMG        EMG Summary Table     Spontaneous MUAP Recruitment    IA Fib PSW Fasc H.F. Amp Dur. PPP Pattern   R. FIRST D INTEROSS N None None None None N N N N            N            N   R. Abd [place] Brev N None None None None N N N N   R. Abd Dig Min N None None None None N N N N   R. BICEPS N None None None None N N N N   R. DELTOID N None None None None N N N N   R. TRICEPS N None None None None N N N N   R. CERV PSP (U) N None None None None       R. CERV PSP (M) N None None None None       R. CERV PSP (L) N None None None None       R. Flexor Dig Longus N None None None None                               R. Extensor Dig. Radialis N None None None None       R. Extensor Dig. Ulnaris N None None None None             EMG Summary Table     Spontaneous MUAP Recruitment    IA Fib PSW Fasc H.F. Amp Dur. PPP Pattern   L. FIRST D INTEROSS N None None None None N N N N            N            N   L. Abd Poll Brev N None None None None       L. Abd Dig Min N None None None None       L. BICEPS N None None None None N N N N   L. DELTOID N None None None None N N N N   L. TRICEPS N None None None None N N N N   L. CERV PSP (U) N None None None        L. CERV PSP (M) N None None None        L. CERV PSP (L) N None None None        L. Flexor Dig Longus N None None None                                L. Extensor Dig. Radialis N None None None        R. Extensor Dig.  Ulnaris N None None None          Electronically signed by Hugo Potter MD on 11/8/2022 at 9:47 PM

## 2022-11-18 ENCOUNTER — HOSPITAL ENCOUNTER (OUTPATIENT)
Dept: GENERAL RADIOLOGY | Age: 57
Discharge: HOME | End: 2022-11-20
Payer: COMMERCIAL

## 2022-11-18 ENCOUNTER — OFFICE VISIT (OUTPATIENT)
Dept: PAIN MANAGEMENT | Age: 57
End: 2022-11-18
Payer: COMMERCIAL

## 2022-11-18 ENCOUNTER — TELEPHONE (OUTPATIENT)
Dept: PAIN MANAGEMENT | Age: 57
End: 2022-11-18

## 2022-11-18 VITALS
SYSTOLIC BLOOD PRESSURE: 138 MMHG | BODY MASS INDEX: 31.42 KG/M2 | RESPIRATION RATE: 16 BRPM | WEIGHT: 169 LBS | OXYGEN SATURATION: 99 % | HEART RATE: 72 BPM | DIASTOLIC BLOOD PRESSURE: 78 MMHG

## 2022-11-18 DIAGNOSIS — R10.32 BILATERAL GROIN PAIN: ICD-10-CM

## 2022-11-18 DIAGNOSIS — M47.812 CERVICAL SPONDYLOSIS: Primary | ICD-10-CM

## 2022-11-18 DIAGNOSIS — R10.31 BILATERAL GROIN PAIN: ICD-10-CM

## 2022-11-18 DIAGNOSIS — M54.12 CERVICAL RADICULITIS: ICD-10-CM

## 2022-11-18 DIAGNOSIS — G56.01 CARPAL TUNNEL SYNDROME, RIGHT: ICD-10-CM

## 2022-11-18 PROCEDURE — 3017F COLORECTAL CA SCREEN DOC REV: CPT | Performed by: NURSE PRACTITIONER

## 2022-11-18 PROCEDURE — 1036F TOBACCO NON-USER: CPT | Performed by: NURSE PRACTITIONER

## 2022-11-18 PROCEDURE — 73521 X-RAY EXAM HIPS BI 2 VIEWS: CPT

## 2022-11-18 PROCEDURE — G8484 FLU IMMUNIZE NO ADMIN: HCPCS | Performed by: NURSE PRACTITIONER

## 2022-11-18 PROCEDURE — G8427 DOCREV CUR MEDS BY ELIG CLIN: HCPCS | Performed by: NURSE PRACTITIONER

## 2022-11-18 PROCEDURE — 3078F DIAST BP <80 MM HG: CPT | Performed by: NURSE PRACTITIONER

## 2022-11-18 PROCEDURE — G8417 CALC BMI ABV UP PARAM F/U: HCPCS | Performed by: NURSE PRACTITIONER

## 2022-11-18 PROCEDURE — 3074F SYST BP LT 130 MM HG: CPT | Performed by: NURSE PRACTITIONER

## 2022-11-18 PROCEDURE — 99214 OFFICE O/P EST MOD 30 MIN: CPT | Performed by: NURSE PRACTITIONER

## 2022-11-18 RX ORDER — CELECOXIB 100 MG/1
100 CAPSULE ORAL 2 TIMES DAILY
Qty: 180 CAPSULE | Refills: 3 | Status: SHIPPED | OUTPATIENT
Start: 2022-11-18

## 2022-11-18 ASSESSMENT — PATIENT HEALTH QUESTIONNAIRE - PHQ9
SUM OF ALL RESPONSES TO PHQ9 QUESTIONS 1 & 2: 0
SUM OF ALL RESPONSES TO PHQ QUESTIONS 1-9: 0
SUM OF ALL RESPONSES TO PHQ QUESTIONS 1-9: 0
1. LITTLE INTEREST OR PLEASURE IN DOING THINGS: 0
SUM OF ALL RESPONSES TO PHQ QUESTIONS 1-9: 0
2. FEELING DOWN, DEPRESSED OR HOPELESS: 0
SUM OF ALL RESPONSES TO PHQ QUESTIONS 1-9: 0

## 2022-11-18 ASSESSMENT — ENCOUNTER SYMPTOMS
RESPIRATORY NEGATIVE: 1
BACK PAIN: 1

## 2022-11-18 NOTE — TELEPHONE ENCOUNTER
I spoke to pt. She is scheduled for 12/8 at [facility]:  Right TFE C5 with valium which will need sent to [facility]. Instructions reviewed. Pt states understanding. Jamir Harding at [facility] aware.

## 2022-11-18 NOTE — PROGRESS NOTES
Subjective:      Patient ID: Alli Thompson is a 56 y.o. female. Chief Complaint   Patient presents with    Back Pain     2 mo f/u back pain. Neck Pain   Associated symptoms include leg pain and numbness (right arm). Shoulder Pain   Associated symptoms include numbness (right arm). Leg Pain   Associated symptoms include numbness (right arm). Other  Associated symptoms include arthralgias, myalgias, neck pain and numbness (right arm). Hip Pain   Associated symptoms include numbness (right arm). Back Pain  Associated symptoms include leg pain and numbness (right arm). Here today for routine pain clinic recheck.     Numbness, limited right shouder accompanied by pain, right sided neck pain and tightness-now bilateral.    Low back pain, groin pain    Pain Assessment  Location of Pain: Back (hips)  Location Modifiers: Left, Right, Medial, Inferior, Posterior  Severity of Pain: 4  Quality of Pain: Dull, Aching (\"Stabbing\")  Duration of Pain: Persistent  Frequency of Pain: Constant  Aggravating Factors: Bending, Stretching, Straightening, Exercise, Kneeling, Squatting, Standing, Walking, Stairs (sitting, sleepin in one position)  Limiting Behavior: Yes  Relieving Factors: Rest, Nsaids, Heat, Ice (Rx)  Result of Injury: No  Work-Related Injury: No    Allergies   Allergen Reactions    Ace Inhibitors Other (See Comments)     Cough      Penicillins Rash and Other (See Comments)    Sertraline Other (See Comments)     Hypertension       Outpatient Medications Marked as Taking for the 11/18/22 encounter (Office Visit) with AARON Le CNP   Medication Sig Dispense Refill    celecoxib (CELEBREX) 100 MG capsule Take 1 capsule by mouth 2 times daily 180 capsule 3    losartan-hydroCHLOROthiazide (HYZAAR) 100-12.5 MG per tablet Take 1 tablet by mouth daily 90 tablet 1    insulin glargine (LANTUS SOLOSTAR) 100 UNIT/ML injection pen 20 units      aspirin 81 MG EC tablet Take 81 mg by mouth daily fluocinolone (DERMOTIC) 0.01 % OIL oil Apply 4 drops to affected ear(s) twice a day for 7 days and then once a day as needed for itching 20 mL 5    levothyroxine (SYNTHROID) 50 MCG tablet Take 1 tablet by mouth daily 30 tablet 5    insulin lispro (HUMALOG) 100 UNIT/ML injection vial Use 60 units per day in pump 1 vial 3    Vitamin D (CHOLECALCIFEROL) 1000 UNITS CAPS capsule Take 4,000 Units by mouth daily       Insulin Lispro, Human, (INSULIN PUMP) FLORENTIN Inject 1 each into the skin See Admin Instructions.          Past Medical History:   Diagnosis Date    Bronchitis     Diabetes type 1, controlled ([address]     Eczema     GERD (gastroesophageal reflux disease)     Gestational diabetes     Hyperlipidemia     Hypertension     Hypothyroid     Insomnia     Insulin pump in place     Dr. Nadine Carmona endocrinology [facility]    Myopia with astigmatism and presbyopia     Tobacco abuse     Under care of team 11/22/2021    endocrine-Dr Crandall 54 visit nov 2021    Under care of team 11/22/2021    pcp- [address] [place] [address]  visit nov 2021       Past Surgical History:   Procedure Laterality Date    BACK INJECTION Right 04/29/2021    Right Sacroiliac Joint Injection performed by Mauri John MD at [address] [place] [address]  01/05/2015    Lumbar microdiscectomy L5-S1    BLADDER SUSPENSION  09/22/2009    (transobturator sling)    CERVIX LESION DESTRUCTION  1993    (laser cone)    COLONOSCOPY  10/17/2018    hyperplastic polyp, Dr. Odalis Elaine at [facility] [address] [facility]  2006    LUMBAR FUSION N/A 12/9/2021    OLIF L4-5, C-ARM, LATERAL FLAT BOBBI MEDTRONICS, MICROSCOPE performed by Екатерина Whitaker DO at [facility] 12/9/2021    POSTERIOR FIXATION L4-5, O-ARM/STEALTH performed by Екатерина Whitaker DO at [facility]  09/06/2017    Dr Baldomero Wilcox  12/09/2021    OLIF L4-5, Posterior fixation L4-5    NERVE BLOCK  02/16/2017    [facility]    laser nerve block Dr Myrtle Richter Bilateral 2019    BL5 transforaminal radiculogram- [facility] per Dr Sydnee Pool Right 2019    Right sacroiliac joint injection with arthrography    NERVE BLOCK Bilateral 2019    BL5 transforaminal radiculogram/epidurogram    PAIN MANAGEMENT PROCEDURE Bilateral 2021    Bilateral L4 TRANSFORAMINAL performed by Brian Mosley MD at [facility] N/A 2021    L4-L5  INTERLAMINAR performed by Brian Mosley MD at [facility] Right 2022    Right C6 TRANSFORAMINAL performed by [facility]ley MD at [facility]  2015    Lumbar L5-S1    US BREAST NEEDLE BIOPSY RIGHT Right 2021    US BREAST NEEDLE BIOPSY RIGHT 2021 [facility] ULTRASOUND       Family History   Problem Relation Age of Onset    Diabetes Paternal Grandmother     Cancer Father         lymph nodes    Ovarian Cancer Maternal Aunt 79        mother's half sister (same mother)    High Blood Pressure Mother     Glaucoma Neg Hx     Cataracts Neg Hx        Social History     Socioeconomic History    Marital status:      Spouse name: None    Number of children: None    Years of education: None    Highest education level: None   Tobacco Use    Smoking status: Former     Packs/day: 0.00     Years: 28.00     Pack years: 0.00     Types: Cigarettes     Start date: 1985     Quit date: 2014     Years since quittin.2    Smokeless tobacco: Never   Vaping Use    Vaping Use: Never used   Substance and Sexual Activity    Alcohol use: Yes     Alcohol/week: 3.0 standard drinks     Types: 3 Standard drinks or equivalent per week     Comment: Socially. -weekly    Drug use: No    Sexual activity: Yes     Partners: Male     Review of Systems   Constitutional:  Positive for activity change. Respiratory: Negative. Cardiovascular: Negative. Genitourinary: Negative.     Musculoskeletal:  Positive for arthralgias, back pain, myalgias and neck pain. Skin: Negative. Neurological:  Positive for numbness (right arm). Psychiatric/Behavioral:  Positive for sleep disturbance. Objective:   Physical Exam  Vitals reviewed. Constitutional:       General: She is not in acute distress. Appearance: She is well-developed. She is not diaphoretic. Interventions: She is not intubated. HENT:      Head: Normocephalic and atraumatic. Neck:      Comments: MRI OF THE CERVICAL SPINE WITHOUT CONTRAST 6/8/2022 5:24 pm     TECHNIQUE:  Multiplanar multisequence MRI of the cervical spine was performed without the  administration of intravenous contrast.     COMPARISON:  None. HISTORY:  ORDERING SYSTEM PROVIDED HISTORY: Cervical radiculopathy  TECHNOLOGIST PROVIDED HISTORY:  radiculopathy  Reason for Exam: Chronic neck pain. Right shoulder pain and right arm  numbness for approximately two months. Additional signs and symptoms: Cervical radiculopathy     FINDINGS:  BONES/ALIGNMENT: The vertebral body heights are maintained. There is  age-appropriate bone marrow signal.  There is multilevel degenerative disc  disease with loss of disc signal.  There is no significant disc space  narrowing. There is no spondylolisthesis. SPINAL CORD: The spinal cord is normal in caliber and signal.     SOFT TISSUES: The posterior paraspinal soft tissues are unremarkable. The  prevertebral soft tissues are unremarkable. C2-C3: There is no significant disc protrusion, spinal canal stenosis or  neural foraminal narrowing. C3-C4: There is no significant disc protrusion, spinal canal stenosis or  neural foraminal narrowing. C4-C5: There is no significant disc protrusion, spinal canal stenosis or  neural foraminal narrowing. C5-C6: There is a disc osteophyte complex with uncovertebral and facet  hypertrophy. There is no canal stenosis. There is moderate bilateral  foraminal narrowing.      C6-C7: There is no significant disc protrusion, spinal canal stenosis or  neural foraminal narrowing. C7-T1: There is no significant disc protrusion, spinal canal stenosis or  neural foraminal narrowing. Impression  Multilevel degenerative disc disease with uncovertebral and facet hypertrophy  resulting in moderate bilateral foraminal narrowing at C5-6. Cardiovascular:      Rate and Rhythm: Normal rate. Pulmonary:      Effort: Pulmonary effort is normal. No tachypnea, bradypnea, accessory muscle usage or respiratory distress. She is not intubated. Musculoskeletal:      Right shoulder: Decreased range of motion. Decreased strength. Cervical back: Spasms present. Decreased range of motion. Lumbar back: No tenderness or bony tenderness. Skin:     General: Skin is warm and dry. Capillary Refill: Capillary refill takes less than 2 seconds. Coloration: Skin is not pale. Nails: There is no clubbing. Neurological:      Mental Status: She is alert and oriented to person, place, and time. GCS: GCS eye subscore is 4. GCS verbal subscore is 5. GCS motor subscore is 6. Cranial Nerves: No cranial nerve deficit. Psychiatric:         Speech: Speech normal.         Behavior: Behavior normal. Behavior is not agitated, aggressive, withdrawn or combative. Behavior is cooperative. Judgment: Judgment normal. Judgment is not impulsive or inappropriate. Assessment:      1. Cervical spondylosis    2. Cervical radiculitis    3. Carpal tunnel syndrome, right    4. Bilateral groin pain          Plan:   Chronic pain diagnoses such as   1. Cervical spondylosis    2. Cervical radiculitis    3. Carpal tunnel syndrome, right    4. Bilateral groin pain     controlled on current medication regime, wll continue current pain medications to improve quality of life and function. EMG bilateral upper extremities reviewed. carpal tunnel syndrome and C5 radiculopathy.  Recommended wrist splint and will schedule right C5 TFE.  May also consider neurosurgery consult    Bilateral hip xray      Darrel Yeager, APRN - CNP

## 2022-11-21 RX ORDER — DIAZEPAM 5 MG/1
TABLET ORAL
Qty: 2 TABLET | Refills: 0 | Status: SHIPPED | OUTPATIENT
Start: 2022-11-21 | End: 2023-03-18

## 2022-11-29 ENCOUNTER — OFFICE VISIT (OUTPATIENT)
Dept: OBGYN | Age: 57
End: 2022-11-29
Payer: COMMERCIAL

## 2022-11-29 ENCOUNTER — HOSPITAL ENCOUNTER (OUTPATIENT)
Age: 57
Setting detail: SPECIMEN
Discharge: HOME | End: 2022-11-29
Payer: COMMERCIAL

## 2022-11-29 VITALS
WEIGHT: 166.6 LBS | HEIGHT: 62 IN | SYSTOLIC BLOOD PRESSURE: 132 MMHG | HEART RATE: 97 BPM | OXYGEN SATURATION: 98 % | BODY MASS INDEX: 30.66 KG/M2 | DIASTOLIC BLOOD PRESSURE: 70 MMHG

## 2022-11-29 DIAGNOSIS — Z12.4 CERVICAL CANCER SCREENING: ICD-10-CM

## 2022-11-29 DIAGNOSIS — Z12.31 ENCOUNTER FOR SCREENING MAMMOGRAM FOR MALIGNANT NEOPLASM OF BREAST: Primary | ICD-10-CM

## 2022-11-29 PROCEDURE — G8484 FLU IMMUNIZE NO ADMIN: HCPCS | Performed by: NURSE PRACTITIONER

## 2022-11-29 PROCEDURE — 3078F DIAST BP <80 MM HG: CPT | Performed by: NURSE PRACTITIONER

## 2022-11-29 PROCEDURE — G0145 SCR C/V CYTO,THINLAYER,RESCR: HCPCS

## 2022-11-29 PROCEDURE — 3074F SYST BP LT 130 MM HG: CPT | Performed by: NURSE PRACTITIONER

## 2022-11-29 PROCEDURE — 99396 PREV VISIT EST AGE 40-64: CPT | Performed by: NURSE PRACTITIONER

## 2022-11-29 PROCEDURE — 87624 HPV HI-RISK TYP POOLED RSLT: CPT

## 2022-11-29 ASSESSMENT — PATIENT HEALTH QUESTIONNAIRE - PHQ9
SUM OF ALL RESPONSES TO PHQ QUESTIONS 1-9: 0
2. FEELING DOWN, DEPRESSED OR HOPELESS: 0
1. LITTLE INTEREST OR PLEASURE IN DOING THINGS: 0
SUM OF ALL RESPONSES TO PHQ QUESTIONS 1-9: 0
SUM OF ALL RESPONSES TO PHQ9 QUESTIONS 1 & 2: 0
SUM OF ALL RESPONSES TO PHQ QUESTIONS 1-9: 0
SUM OF ALL RESPONSES TO PHQ QUESTIONS 1-9: 0

## 2022-11-29 ASSESSMENT — ENCOUNTER SYMPTOMS
RESPIRATORY NEGATIVE: 1
EYES NEGATIVE: 1
GASTROINTESTINAL NEGATIVE: 1
ALLERGIC/IMMUNOLOGIC NEGATIVE: 1

## 2022-11-29 NOTE — PROGRESS NOTES
Subjective:      Patient ID: Ivy Wilson  is a 56 y.o. [id number]  ,female coming into office regarding   Chief Complaint   Patient presents with    Annual Exam       OB History    Para Term  AB Living   5 4 4   1 4   SAB IAB Ectopic Molar Multiple Live Births   1         4      # Outcome Date GA Lbr Christopher/2nd Weight Sex Delivery Anes PTL Lv   5 Term 2000 40w0d  7 lb 3 oz (3.26 kg) M Vag-Spont         Birth Comments: in [facility] - Gestational Diabetes   4 Term 1997 40w0d  8 lb 1 oz (3.657 kg) F Vag-Spont         Birth Comments: PEV delivered   3 Term 1993 40w0d  8 lb 11 oz (3.941 kg) M Vag-Spont         Birth Comments: PEV delivered   2 Term 1987 40w0d  6 lb 8 oz (2.948 kg) M Vag-Spont         Birth Comments: Dr. Clyde Mercer delivered   1 1986               This is a 55 y/o [address] [facility]   female here for her well woman examination. She is postmenopausal and denies vaginal bleeding, discharge or dryness. She has been  for past 33 years. She had an endometrial ablation in  due to menorrhagia. She does have a hx. Of cerviacl dysplasia with cryo in ; but all subsequent pap smears have been normal.     She's had her covid, flu and pneumoniae vaccines and plans on getting her Shingrix vaccine. She had GDM with her last pregnancy and now has DM and uses an insulin monitor. Fam Hx: dad with lymphoma and mets. To lung. She denies smoking, excessive ETOH or drug abuse.         Past Medical History:   Diagnosis Date    Bronchitis     Diabetes type 1, controlled ([address]     Eczema     GERD (gastroesophageal reflux disease)     Gestational diabetes     Gestational diabetes mellitus     Hyperlipidemia     Hypertension     Hypothyroid     Insomnia     Insulin pump in place     Dr. Trinidad Nunez endocrinology [address]    Myopia with astigmatism and presbyopia     Rh incompatibility     Tobacco abuse     Under care of team 2021    endocrine-Dr Linsey Harris visit nov 2021    Under care of team 11/22/2021    pcp-Dr Cash-last visit nov 2021     Review of Systems   Constitutional: Negative. HENT: Negative. Eyes: Negative. Respiratory: Negative. Cardiovascular: Negative. Gastrointestinal: Negative. Endocrine: Negative. Genitourinary: Negative. Musculoskeletal: Negative. Skin: Negative. Allergic/Immunologic: Negative. Neurological: Negative. Hematological: Negative. Psychiatric/Behavioral: Negative. Objective:     Vitals:    11/29/22 0939 11/29/22 0952   BP: (!) 144/70 132/70   Site: Left Upper Arm Right Lower Arm   Position: Sitting Sitting   Cuff Size: Medium Adult Medium Adult   Pulse: 97    SpO2: 98%    Weight: 166 lb 9.6 oz (75.6 kg)    Height: 5' 2\" (1.575 m)       Physical Exam  Vitals and nursing note reviewed. Constitutional:       Appearance: Normal appearance. She is normal weight. HENT:      Head: Normocephalic. Cardiovascular:      Rate and Rhythm: Normal rate and regular rhythm. Pulses: Normal pulses. Heart sounds: Normal heart sounds. Abdominal:      General: Abdomen is flat. Palpations: Abdomen is soft. Genitourinary:     General: Normal vulva. Musculoskeletal:         General: Normal range of motion. Cervical back: Normal range of motion and neck supple. Skin:     General: Skin is warm and dry. Neurological:      Mental Status: She is alert. Psychiatric:         Mood and Affect: Mood normal.   Breast Exam:  Inspection negative. No nipple discharge or bleeding.  No palpable mass    Vulva:normal appearance, no lesions  Vagina pink, rugae, leukorrhea  Cervixparous, no CMT  Uterus: small, NT, mobile  Ovaries: small, no adnexal adenopathy    Post Menopausal Yes    Sexually Active:Yes    Any bleeding or pain with intercourse: No    Last Sexual Encounter Date 1 week ago    Protected or Unprotected: Yes, postmenopausal, monogamous    Last Pap: neg    Last HPV:  unknown High Risk HPV: unknown    Last Mammogram:  10/24/22 neg    Last Dexascan: never    Last Colonoscopy 10/17/18 @ [facility]; polyp; repeat in 5 years    Do you do self breast exams: Yes      Assessment:      Diagnosis Orders   1. Encounter for screening mammogram for malignant neoplasm of breast  PREETI HEBER DIGITAL SCREEN BILATERAL      2. Cervical cancer screening  PAP SMEAR      3. Postmenopausal  4. Hx. Of cervical dysplasia; cryo  5. Hx. Of benign breast lump in July 2021  6. Well woman examination        Plan:   1.cervical pap with HPV and if both neg; repeat cotesting in 5 years  2.mammogram in Nov. 2023  3. Consider DEXA scan next year  4. I spent 30 min. With pt. Discussing medical status and management options  5. Get Shingrix vaccine  6. RTO 1 yr. prn    No follow-ups on file. Orders Placed This Encounter   Procedures    PREETI HEBER DIGITAL SCREEN BILATERAL     Standing Status:   Future     Standing Expiration Date:   1/29/2024     Order Specific Question:   Reason for exam:     Answer:   breast ca scr    PAP SMEAR     Patient History:    No LMP recorded (lmp unknown). Patient has had an ablation.   OBGYN Status: Ablation  Past Surgical History:  04/29/2021: BACK INJECTION; Right      Comment:  Right Sacroiliac Joint Injection performed by Deedee Gracia MD at [facility] OR  01/05/2015: BACK SURGERY      Comment:  Lumbar microdiscectomy L5-S1  09/22/2009: BLADDER SUSPENSION      Comment:  (transobturator sling)  1993: CERVIX LESION DESTRUCTION      Comment:  (laser cone)  10/17/2018: COLONOSCOPY      Comment:  hyperplastic polyp, Dr. Jossue Feliz at [facility]  2006: ENDOMETRIAL ABLATION  12/09/2021: LUMBAR FUSION; N/A      Comment:  OLIF L4-5, C-ARM, LATERAL FLAT BOBBI MEDTRONICS,                MICROSCOPE performed by Albino Wagoner DO at [facility]  12/09/2021: LUMBAR FUSION; N/A      Comment:  POSTERIOR FIXATION L4-5, O-ARM/STEALTH performed by                Albino Wagoner DO at [facility] OR  2017: LUMBAR LAMINECTOMY      [facility]  [facility]      Comment:  OLIF L4-5, Posterior fixation L4-5  2017: NERVE BLOCK      [facility]  [facility]: [facility]E [facility]      Comment:  laser nerve block Dr Maryann Triplett  2019: NERVE BLOCK; Bilateral      Comment:  BL5 transforaminal radiculogram- [facility] per Dr Jory Madsen   2019: NERVE BLOCK; Right      Comment:  Right sacroiliac joint injection with arthrography  2019: NERVE BLOCK; Bilateral      Comment:  BL5 transforaminal radiculogram/epidurogram  2021: PAIN MANAGEMENT PROCEDURE; Bilateral      Comment:  Bilateral L4 TRANSFORAMINAL performed by Faisal Catalan MD at [address] [place] [address]  2021: PAIN MANAGEMENT PROCEDURE; N/A      Comment:  L4-L5  INTERLAMINAR performed by Faisal Catalan MD at                [address] [place] [address]  2022: PAIN MANAGEMENT PROCEDURE; Right      Comment:  Right C6 TRANSFORAMINAL performed by Faisal Catalan MD                at [address] [place] [address]  2015: SPINAL FUSION      Comment:  Lumbar L5-S1  2021: US BREAST NEEDLE BIOPSY RIGHT; Right      Comment:  US BREAST NEEDLE BIOPSY RIGHT 2021 [facility] ULTRASOUND      Social History    Tobacco Use      Smoking status: Former        Packs/day: 0.00        Years: 28.00        Pack years: 0        Types: Cigarettes        Start date: 1985        Quit date: 2014        Years since quittin.2      Smokeless tobacco: Never       Standing Status:   Future     Number of Occurrences:   1     Standing Expiration Date:   2023     Order Specific Question:   Collection Type     Answer: Thin Prep     Order Specific Question:   Prior Abnormal Pap Test     Answer:   No     Order Specific Question:   Screening or Diagnostic     Answer:   Screening     Order Specific Question:   HPV Requested?      Answer:   Yes     Order Specific Question:   High Risk Patient     Answer:   N/A       Electronically signed by:  Chanell Masters, APRN - CNP

## 2022-12-01 LAB
HPV SAMPLE: NORMAL
HPV, GENOTYPE 16: NOT DETECTED
HPV, GENOTYPE 18: NOT DETECTED
HPV, HIGH RISK OTHER: NOT DETECTED
HPV, INTERPRETATION: NORMAL
SPECIMEN DESCRIPTION: NORMAL

## 2022-12-02 LAB — CYTOLOGY REPORT: NORMAL

## 2022-12-12 NOTE — TELEPHONE ENCOUNTER
Patient does not have a PCP but is aware that she needs to hold her aspirin 81 mg for 3 days prior to procedure.

## 2022-12-22 ENCOUNTER — TELEPHONE (OUTPATIENT)
Dept: PAIN MANAGEMENT | Age: 57
End: 2022-12-22

## 2022-12-22 NOTE — TELEPHONE ENCOUNTER
LM informing patient that 12/23/22 procedure with dr. Rosa Rodriguez was canceled due to weather and patient is to call office to reschedule.

## 2023-01-12 ENCOUNTER — APPOINTMENT (OUTPATIENT)
Dept: GENERAL RADIOLOGY | Age: 58
End: 2023-01-12
Attending: PHYSICAL MEDICINE & REHABILITATION
Payer: COMMERCIAL

## 2023-01-12 ENCOUNTER — HOSPITAL ENCOUNTER (OUTPATIENT)
Age: 58
Setting detail: OUTPATIENT SURGERY
Discharge: HOME | End: 2023-01-12
Attending: PHYSICAL MEDICINE & REHABILITATION | Admitting: PHYSICAL MEDICINE & REHABILITATION
Payer: COMMERCIAL

## 2023-01-12 VITALS
SYSTOLIC BLOOD PRESSURE: 145 MMHG | BODY MASS INDEX: 30.55 KG/M2 | HEIGHT: 62 IN | WEIGHT: 166 LBS | TEMPERATURE: 98.4 F | HEART RATE: 73 BPM | OXYGEN SATURATION: 99 % | RESPIRATION RATE: 16 BRPM | DIASTOLIC BLOOD PRESSURE: 73 MMHG

## 2023-01-12 PROCEDURE — 6360000002 HC RX W HCPCS: Performed by: PHYSICAL MEDICINE & REHABILITATION

## 2023-01-12 PROCEDURE — 2709999900 HC NON-CHARGEABLE SUPPLY: Performed by: PHYSICAL MEDICINE & REHABILITATION

## 2023-01-12 PROCEDURE — 6360000004 HC RX CONTRAST MEDICATION: Performed by: PHYSICAL MEDICINE & REHABILITATION

## 2023-01-12 PROCEDURE — 7100000010 HC PHASE II RECOVERY - FIRST 15 MIN: Performed by: PHYSICAL MEDICINE & REHABILITATION

## 2023-01-12 PROCEDURE — 2500000003 HC RX 250 WO HCPCS: Performed by: PHYSICAL MEDICINE & REHABILITATION

## 2023-01-12 PROCEDURE — 3600000054 HC PAIN LEVEL 3 BASE: Performed by: PHYSICAL MEDICINE & REHABILITATION

## 2023-01-12 PROCEDURE — 3209999900 FLUORO FOR SURGICAL PROCEDURES

## 2023-01-12 RX ORDER — BUPIVACAINE HYDROCHLORIDE 2.5 MG/ML
INJECTION, SOLUTION EPIDURAL; INFILTRATION; INTRACAUDAL PRN
Status: DISCONTINUED | OUTPATIENT
Start: 2023-01-12 | End: 2023-01-12 | Stop reason: ALTCHOICE

## 2023-01-12 RX ORDER — DEXAMETHASONE SODIUM PHOSPHATE 10 MG/ML
INJECTION INTRAMUSCULAR; INTRAVENOUS PRN
Status: DISCONTINUED | OUTPATIENT
Start: 2023-01-12 | End: 2023-01-12 | Stop reason: ALTCHOICE

## 2023-01-12 RX ORDER — SODIUM CHLORIDE 0.9 % (FLUSH) 0.9 %
5-40 SYRINGE (ML) INJECTION EVERY 12 HOURS SCHEDULED
Status: DISCONTINUED | OUTPATIENT
Start: 2023-01-12 | End: 2023-01-12 | Stop reason: HOSPADM

## 2023-01-12 RX ORDER — SODIUM CHLORIDE 9 MG/ML
INJECTION, SOLUTION INTRAVENOUS PRN
Status: DISCONTINUED | OUTPATIENT
Start: 2023-01-12 | End: 2023-01-12 | Stop reason: HOSPADM

## 2023-01-12 RX ORDER — SODIUM CHLORIDE 0.9 % (FLUSH) 0.9 %
5-40 SYRINGE (ML) INJECTION PRN
Status: DISCONTINUED | OUTPATIENT
Start: 2023-01-12 | End: 2023-01-12 | Stop reason: HOSPADM

## 2023-01-12 RX ORDER — LIDOCAINE HYDROCHLORIDE 10 MG/ML
INJECTION, SOLUTION EPIDURAL; INFILTRATION; INTRACAUDAL; PERINEURAL PRN
Status: DISCONTINUED | OUTPATIENT
Start: 2023-01-12 | End: 2023-01-12 | Stop reason: ALTCHOICE

## 2023-01-12 ASSESSMENT — ENCOUNTER SYMPTOMS
ALLERGIC/IMMUNOLOGIC NEGATIVE: 1
EYES NEGATIVE: 1
DIARRHEA: 0
BACK PAIN: 1
CONSTIPATION: 0
RESPIRATORY NEGATIVE: 1

## 2023-01-12 ASSESSMENT — PAIN DESCRIPTION - DESCRIPTORS: DESCRIPTORS: ACHING;DULL

## 2023-01-12 ASSESSMENT — PAIN SCALES - GENERAL: PAINLEVEL_OUTOF10: 0

## 2023-01-12 ASSESSMENT — PAIN - FUNCTIONAL ASSESSMENT: PAIN_FUNCTIONAL_ASSESSMENT: 0-10

## 2023-01-12 NOTE — DISCHARGE INSTRUCTIONS
Home Care after Transforaminal Epidural Steroid Injection/Nerve Block    The doctor has done an injection in your neck; upper back; or lower back to       decrease pain and inflammation. This may help diagnose the source of your pain.     You may feel sore at the injection site for the next 2-4 days. You may apply ice to the site for 20 minutes on and 20 minutes off to decrease pain and discomfort, if needed, for the first 24 hours. After 24 hours, you may use heat if needed. Remove the band-aid in 24 hours from the injection site.     Your pain may subside right away, or it may take a number of days. This is because two medicines were used in the injection. The first, a local anesthetic, will only work for a few hours. The second, a steroid, may not start working for 2-5 days. Some patients have noticed no changes in their pain for up to 2 weeks.    There may be a time after the local anesthetic wears off that you feel like you have more pain. This is called pain flare. If this happens  Limit your activities for the first 24 hours to those that you can do without pain.  Keep on taking your pain medicine as prescribed.    Sometimes, some patients have had facial and neck flushing, anxiety or nervousness, and mood swings with the use of steroids. These symptoms most often occur within the first 24-48 hours and do not require any treatment. They should go away on their own within one week.    If you have diabetes, steroids will cause your blood sugar to increase. Make sure your primary doctor is aware of this and that you have orders to treat your blood sugar to keep it within your normal range.    You may have some weakness for the next 3-5 hours due to the anesthetic used. Take it easy. No baths or soaking the injection site for 24 hours after the procedure. Taking a shower is okay.    You may resume taking your routine medicines after the procedure including pain medicines as prescribed. Resume any  medications held for the procedure (blood thinners, aspirin, anti-inflammatories)       If you do not already have a follow-up appointment, call your referring doctors office to make one to discuss your results within 2-4 weeks after the treatment. Your doctor will have the report within 7-10 days. You will be given a pain log to complete for the next 14 days. Complete this form and make a copy for your own records. Then, mail it back to us or drop it off at the pain management clinic. We will need this information to decide the next step in your treatment plan. Signs of infection  Fever greater than 100.4°F by mouth for 2 readings taken 4 hours apart  Increased redness, swelling around the site  Any drainage from the site    If you have any new symptoms or any signs of infection, please call [phone] during business hours to notify us. You can also notify your primary care physician. After hours, nights and weekends, call [phone].

## 2023-01-12 NOTE — H&P
Subjective:       Patient is here today for a diagnostic/therapeutic injection. 1/12/23    Active Hospital Problems    Diagnosis Date Noted    Cervical radiculitis [M54.12] 07/01/2022     Priority: Medium    Cervical spondylosis [O24.609] 07/01/2022     Priority: Medium       HPI: Patient is here today for adiagnostic/therapeutic injection. The most recent [facility] Pain Management office visit notes have been reviewed and are unchanged. Review of Systems   Constitutional:  Positive for fatigue. HENT: Negative. Eyes: Negative. Respiratory: Negative. Cardiovascular: Negative. Gastrointestinal:  Negative for constipation and diarrhea. Endocrine: Negative. Genitourinary:  Negative for difficulty urinating and flank pain. Musculoskeletal:  Positive for back pain and myalgias. Skin: Negative. Allergic/Immunologic: Negative. Neurological:  Positive for weakness and numbness. Hematological: Negative. Psychiatric/Behavioral:  Positive for sleep disturbance. Allergies   Allergen Reactions    Ace Inhibitors Other (See Comments)     Cough      Penicillins Rash and Other (See Comments)    Sertraline Other (See Comments)     Hypertension          Prior to Admission medications    Medication Sig Start Date End Date Taking? Authorizing Provider   diazePAM (VALIUM) 5 MG tablet Take one tab 12 hours prior to procedure. Then one tab two hours prior.   Patient not taking: No sig reported 11/21/22 3/18/23  AARON Nelson CNP   celecoxib (CELEBREX) 100 MG capsule Take 1 capsule by mouth 2 times daily 11/18/22   AARON Nelson CNP   losartan-hydroCHLOROthiazide [facility]) 100-12.5 MG per tablet Take 1 tablet by mouth daily 9/9/22   Priyanka Dalton DO   insulin glargine (LANTUS SOLOSTAR) 100 UNIT/ML injection pen as needed    Historical Provider, MD   aspirin 81 MG EC tablet Take 81 mg by mouth daily    Historical Provider, MD   fluocinolone (DERMOTIC) 0.01 % OIL oil Apply 4 drops to affected ear(s) twice a day for 7 days and then once a day as needed for itching 3/17/22   Willian Villatoro MD   levothyroxine (SYNTHROID) 50 MCG tablet Take 1 tablet by mouth daily 10/29/20   Candance Bigger, MD   insulin lispro (HUMALOG) 100 UNIT/ML injection vial Use 60 units per day in pump 3/24/17   Candance Bigger, MD   Vitamin D (CHOLECALCIFEROL) 1000 UNITS CAPS capsule Take 4,000 Units by mouth daily     Historical Provider, MD   Insulin Lispro, Human, (INSULIN PUMP) FLORENTIN Inject 1 each into the skin See Admin Instructions.     Historical Provider, MD       Past Medical History:   Diagnosis Date    Bronchitis     Diabetes type 1, controlled ([address]     Eczema     GERD (gastroesophageal reflux disease)     Gestational diabetes     Gestational diabetes mellitus     Hyperlipidemia     Hypertension     Hypothyroid     Insomnia     Insulin pump in place     Dr. Leonides Lawson endocrinology [address]    Myopia with astigmatism and presbyopia     Rh incompatibility     Tobacco abuse     Under care of team 11/22/2021    endocrine-Dr Crandall 52 visit nov 2021    Under care of team 11/22/2021    pcp- [address] [place] [address] Dr visit nov 2021       Past Surgical History:   Procedure Laterality Date    BACK INJECTION Right 04/29/2021    Right Sacroiliac Joint Injection performed by Lisette West MD at [address] [place] [address]  01/05/2015    Lumbar microdiscectomy L5-S1    BLADDER SUSPENSION  09/22/2009    (transobturator sling)    CERVIX LESION DESTRUCTION  1993    (laser cone)    COLONOSCOPY  10/17/2018    hyperplastic polyp, Dr. Babs Bumpers at [facility] [address]  2006    LUMBAR FUSION N/A 12/09/2021    OLIF L4-5, C-ARM, LATERAL FLAT B[employer], MICROSCOPE performed by Salome Jones DO at [facility] 12/09/2021    POSTERIOR FIXATION L4-5, O-ARM/STEALTH performed by Salome Jones DO at [facility]  09/06/2017    Dr Antonella Joseph- [id number]  12/09/2021 OLIF L4-5, Posterior fixation L4-5    NERVE BLOCK  02/16/2017    [facility]  [facility]/09[facility]    laser nerve block Dr Josiane Rowe Bilateral 03/13/2019    BL5 transforaminal radiculogram- [facility] per Dr Driscoll Officer Right 05/29/2019    Right sacroiliac joint injection with arthrography    NERVE BLOCK Bilateral 07/31/2019    BL5 transforaminal radiculogram/epidurogram    PAIN MANAGEMENT PROCEDURE Bilateral 08/13/2021    Bilateral L4 TRANSFORAMINAL performed by Hortencia Tena MD at [address] N/A 09/02/2021    L4-L5  INTERLAMINAR performed by Hortencia Tena MD at [address] Right 07/01/2022    Right C6 TRANSFORAMINAL performed by Hortencia Tena MD at [address]  07/06/2015    Lumbar L5-S1    US BREAST NEEDLE BIOPSY RIGHT Right 07/12/2021    US BREAST NEEDLE BIOPSY RIGHT 7/12/2021 [address] [facility] ULTRASOUND       Family andSocial History reviewed in the electronic medical record. Imaging: Reviewed available imaging in oursystem with the patient. No results found. Objective:     Vitals:    01/12/23 0948   BP: 130/66   Pulse: 69   Resp: 16   Temp: 98.3 °F (36.8 °C)   SpO2: 99%          Physical Exam  Constitutional:       General: She is not in acute distress. Appearance: Normal appearance. She is well-developed. She is not diaphoretic. HENT:      Head: Normocephalic and atraumatic. Right Ear: External ear normal. No decreased hearing noted. Left Ear: External ear normal. No decreased hearing noted. Nose: Nose normal.   Eyes:      General: Lids are normal. No scleral icterus. Conjunctiva/sclera: Conjunctivae normal.   Neck:      Trachea: Phonation normal.   Cardiovascular:      Comments: No BLE edema present  Pulmonary:      Effort: Pulmonary effort is normal. No accessory muscle usage or respiratory distress. Abdominal:      General: Abdomen is flat.       Palpations: Abdomen is soft.   Genitourinary:     Comments: deferred  Musculoskeletal:      Lumbar back: Negative right straight leg raise test and negative left straight leg raise test.   Skin:     General: Skin is warm and dry. Coloration: Skin is not pale. Findings: No erythema or rash. Neurological:      Mental Status: She is alert and oriented to person, place, and time. Psychiatric:         Speech: Speech normal.         Behavior: Behavior normal.     Neurologic Exam     Mental Status   Oriented to person, place, and time.    Speech: speech is normal     Motor Exam     Strength   Right quadriceps: 5/5  Left quadriceps: 5/5  Right hamstrin/5  Left hamstrin/5  Back Exam     Range of Motion   Extension:  normal   Flexion:  normal   Lateral bend right:  normal   Lateral bend left:  normal   Rotation right:  normal   Rotation left:  normal     Muscle Strength   Right Quadriceps:  5/5   Left Quadriceps:  5/5   Right Hamstrings:  5/5   Left Hamstrings:  5/5     Tests   Straight leg raise right: negative  Straight leg raise left: negative    Other   Toe walk: normal  Heel walk: normal  Sensation: normal  Gait: normal           Lab Results   Component Value Date    WBC 5.7 2022    HGB 13.2 2022    HCT 38.1 2022     2022    CHOL 270 (H) 2022    TRIG 41 2022     2022    ALT 12 2022    AST 21 2022     2022    K 4.3 2022     2022    CREATININE 0.70 2022    BUN 10 2022    CO2 24 2022    TSH 0.54 2022    INR 1.0 12/10/2021    LABA1C 6.7 2022    LABMICR Can not be calculated 2022       Assessment:                            Active Hospital Problems    Diagnosis Date Noted    Cervical radiculitis [M54.12] 2022     Priority: Medium    Cervical spondylosis [M47.812] 2022     Priority: Medium                  Plan:   Proceed with planned procedure  - R C 5 Transforaminal Epidural Steroid The patientunderstands the planned operation and its associated risks and benefits and agrees to proceed. The surgical consent form has been signed. Last NSAID/anticoagulant medication use was:3-5 days    Premedication taken for contrast allergy? No    Valium taken for oral sedation?  No

## 2023-01-12 NOTE — INTERVAL H&P NOTE
I have interviewed and examined the patient and reviewed the recent History and Physical.  There have been no changes to the recent H&P documentation. The surgical consent form has been signed. Last anticoagulant medication use was:3-5 days    Premedication taken for contrast allergy? No    Valium taken for oral sedation? No    No outpatient medications have been marked as taking for the 1/12/23 encounter [facility] Encounter). The patient understands the planned operation and its associated risks and benefits and agrees to proceed.         Electronically signed by John Mcclendon MD on 1/12/2023 at 10:33 AM

## 2023-01-12 NOTE — OP NOTE
TRANSFORAMINAL EPIDURAL STEROID INJECTION    1/12/23    Surgeon: Mauri John MD    Pre-operative Diagnosis:   Hospital Problems             Last Modified POA    * (Principal) Cervical radiculitis 1/12/2023 Yes    Cervical spondylosis 1/12/2023 Yes       Post-operative Diagnosis: Same    Assistants: none    This is a 56 y.o. female patient with pain in the neck. Previous treatment and examination findings are noted in the H&P. R C 5  transforaminal epidural injection has been requested for diagnostic and therapeutic reasons. Conservative treatment was ineffective i.e.: ice, NSAIDS, rest, narcotic medication, chiropractic care, physical therapy and message therapy. Patient is unable to perform the following ADL's: toileting and personal cares     Pain Assessment: 0-10  Pain Level: 2     Pain Orientation: Right  Pain Location: Neck  Pain Descriptors: Aching, Dull    Last Plain films: -      EXAMINATION:  R C5 transforaminal radiculogram/epidurogram.   RC5 transforaminal epidural anesthetic injection. RC5 transforaminal epidural steroid injection. CONSENT: Written consent was obtained from the patient on preprinted consent form after explaining the procedure, indications, potential complications and outcomes. Alternative treatments were also discussed. DISCUSSION: The patient was sterilely prepped and draped in the usual fashion in the left decubitus position. Time out was verified for correct patient, side, level and procedure. SEDATION:   No conscious sedation was performed during the procedure. The patient remained awake and conversed throughout the procedure. The patient underwent pulse oximetry and blood pressure monitoring independently by a trained observer, as well as by a physician. PROCEDURE:  Under image-intensifier control, a 25 gauge needle x 2.5 inch spinal needle was guided successfully into the epidural space employing a posterior lateral/oblique approach.  Needle aspiration was negative for heme or CSF. Instillation of  .5 mL of Isovue Mcontrast medium opacified the spinal nerve and demonstrated contiguous flow into the epidural space. No vascular spread was noted. Digital subtraction was not employed to evaluate for vascular spread. The patient was monitored for any untoward reaction to contrast medium before proceeding with procedure #2. The patient did not report pain reproduction in a concordant distribution. Following needle position verification, a test dose of .5 mL of sterile lidocaine 0.5% was administered and patient monitored for any adverse effects. Then, 1 mL of   was instilled into the epidural space and the patient's response was again monitored. Finally, Dexamethasone (Decadron 10 mg/mL) 1 ml of 0.5% bupivacaine was then instilled. The patient's response was again monitored. The spinal needle was removed. The patient tolerated the procedure well and without complications and was noted to be in stable condition prior to discharge from the procedure center with discharge instructions. EBL: no blood loss    SPECIMEN: none    IMPRESSIONS:  R C5 transforaminal epidurogram, epidural anesthesia and epidural steroid injection procedures accomplished without incident. RECOMMENDATIONS:  Complete and return Post-Procedure Pain and Activity Diary.   Contact the [address] for symptom exacerbation, fever or unusual symptoms. Post-procedure care according to verbal and written discharge instructions    POST-PROCEDURE EPIDUROGRAPHY INTERPRETATION:    EXAMINATION: AP, lateral, and oblique views    FLUORO TIME: 12 seconds    DISCUSSION: Spot views of the spine reveal normal alignment and segmentation. Spinal needle is positioned at the RC5 neuroforamin. Contrast spreads and outlines the RC5 nerve/ neuroforamin and epidural space. The epidurogram reveals excellent contrast flow. Visualized spine reveals See radiology report.  Soft tissues reveal no abnormalities. IMPRESSION: RC5 transforaminal epidurogram/epineurogram reveals satisfactory needle position and contrast spread.      Electronically signed by Brian Mosley MD on 1/12/2023 at 10:34 AM

## 2023-01-26 ENCOUNTER — OFFICE VISIT (OUTPATIENT)
Dept: PAIN MANAGEMENT | Age: 58
End: 2023-01-26
Payer: COMMERCIAL

## 2023-01-26 VITALS
HEIGHT: 62 IN | WEIGHT: 168 LBS | OXYGEN SATURATION: 99 % | HEART RATE: 85 BPM | SYSTOLIC BLOOD PRESSURE: 136 MMHG | RESPIRATION RATE: 19 BRPM | BODY MASS INDEX: 30.91 KG/M2 | DIASTOLIC BLOOD PRESSURE: 72 MMHG

## 2023-01-26 DIAGNOSIS — G56.01 CARPAL TUNNEL SYNDROME, RIGHT: ICD-10-CM

## 2023-01-26 DIAGNOSIS — M47.812 CERVICAL SPONDYLOSIS: Primary | ICD-10-CM

## 2023-01-26 DIAGNOSIS — M54.12 CERVICAL RADICULITIS: ICD-10-CM

## 2023-01-26 PROCEDURE — G8484 FLU IMMUNIZE NO ADMIN: HCPCS | Performed by: NURSE PRACTITIONER

## 2023-01-26 PROCEDURE — 3017F COLORECTAL CA SCREEN DOC REV: CPT | Performed by: NURSE PRACTITIONER

## 2023-01-26 PROCEDURE — 3074F SYST BP LT 130 MM HG: CPT | Performed by: NURSE PRACTITIONER

## 2023-01-26 PROCEDURE — G8427 DOCREV CUR MEDS BY ELIG CLIN: HCPCS | Performed by: NURSE PRACTITIONER

## 2023-01-26 PROCEDURE — 1036F TOBACCO NON-USER: CPT | Performed by: NURSE PRACTITIONER

## 2023-01-26 PROCEDURE — G8417 CALC BMI ABV UP PARAM F/U: HCPCS | Performed by: NURSE PRACTITIONER

## 2023-01-26 PROCEDURE — 3078F DIAST BP <80 MM HG: CPT | Performed by: NURSE PRACTITIONER

## 2023-01-26 PROCEDURE — 99214 OFFICE O/P EST MOD 30 MIN: CPT | Performed by: NURSE PRACTITIONER

## 2023-01-26 RX ORDER — SYRINGE WITH NEEDLE, 1 ML 25GX5/8"
SYRINGE, EMPTY DISPOSABLE MISCELLANEOUS
COMMUNITY
Start: 2023-01-12

## 2023-01-26 RX ORDER — LEVOTHYROXINE SODIUM 0.07 MG/1
TABLET ORAL
COMMUNITY
Start: 2023-01-11

## 2023-01-26 RX ORDER — CYANOCOBALAMIN 1000 UG/ML
1000 INJECTION, SOLUTION INTRAMUSCULAR; SUBCUTANEOUS
COMMUNITY
Start: 2023-01-11

## 2023-01-26 ASSESSMENT — ENCOUNTER SYMPTOMS
BACK PAIN: 1
RESPIRATORY NEGATIVE: 1

## 2023-01-26 NOTE — PROGRESS NOTES
Subjective:      Patient ID: Diana Willis is a 56 y.o. female. Chief Complaint   Patient presents with    Arm Pain     Right        Neck Pain   Associated symptoms include leg pain and numbness (right arm). Shoulder Pain   Associated symptoms include numbness (right arm). Leg Pain   Associated symptoms include numbness (right arm). Other  Associated symptoms include arthralgias, myalgias, neck pain and numbness (right arm). Hip Pain   Associated symptoms include numbness (right arm). Back Pain  Associated symptoms include leg pain and numbness (right arm). Arm Pain   Associated symptoms include numbness (right arm). Here today for routine pain clinic recheck.     Numbness, limited right shouder accompanied by pain, right sided neck pain and tightness-now bilateral.    Low back pain, groin pain    Joint pain improved since she received a B12 injection    Pain Assessment  Location of Pain: Arm  Location Modifiers: Right  Severity of Pain: 0 (weakness heavyness numbness)  Quality of Pain:  (lifting using arm)  Duration of Pain: Persistent  Frequency of Pain: Constant  Aggravating Factors:  (everything)  Limiting Behavior: Some  Relieving Factors: Rest, Ice  Result of Injury: No  Work-Related Injury: No  Are there other pain locations you wish to document?: No    Allergies   Allergen Reactions    Ace Inhibitors Other (See Comments)     Cough      Penicillins Rash and Other (See Comments)    Sertraline Other (See Comments)     Hypertension       Outpatient Medications Marked as Taking for the 1/26/23 encounter (Office Visit) with AARON Esteves CNP   Medication Sig Dispense Refill    cyanocobalamin 1000 MCG/ML injection Inject 1,000 mcg into the muscle every 14 days      levothyroxine (SYNTHROID) 75 MCG tablet take 1 tablet by mouth every morning      B-D 3CC LUER-FRANCOIS SYR 44QY1-2/2 23G X 1-1/2\" 3 ML MISC USE EVERY 14 DAYS      diazePAM (VALIUM) 5 MG tablet Take one tab 12 hours prior to procedure. Then one tab two hours prior. 2 tablet 0    celecoxib (CELEBREX) 100 MG capsule Take 1 capsule by mouth 2 times daily 180 capsule 3    losartan-hydroCHLOROthiazide (HYZAAR) 100-12.5 MG per tablet Take 1 tablet by mouth daily 90 tablet 1    insulin glargine (LANTUS SOLOSTAR) 100 UNIT/ML injection pen as needed      aspirin 81 MG EC tablet Take 81 mg by mouth daily      fluocinolone (DERMOTIC) 0.01 % OIL oil Apply 4 drops to affected ear(s) twice a day for 7 days and then once a day as needed for itching 20 mL 5    insulin lispro (HUMALOG) 100 UNIT/ML injection vial Use 60 units per day in pump 1 vial 3    Vitamin D (CHOLECALCIFEROL) 1000 UNITS CAPS capsule Take 4,000 Units by mouth daily       Insulin Lispro, Human, (INSULIN PUMP) FLORENTIN Inject 1 each into the skin See Admin Instructions.          Past Medical History:   Diagnosis Date    Bronchitis     Diabetes type 1, controlled ([address]     Eczema     GERD (gastroesophageal reflux disease)     Gestational diabetes     Gestational diabetes mellitus     Hyperlipidemia     Hypertension     Hypothyroid     Insomnia     Insulin pump in place     Dr. Renan Giron endocrinology [address]    Myopia with astigmatism and presbyopia     Rh incompatibility     Tobacco abuse     Under care of team 11/22/2021    endocrine-Dr Crandall 52 visit nov 2021    Under care of team 11/22/2021    pcp- [address] [place] [address]  visit nov 2021       Past Surgical History:   Procedure Laterality Date    BACK INJECTION Right 04/29/2021    Right Sacroiliac Joint Injection performed by Tucker Busch MD at [address] [place] [address]  01/05/2015    Lumbar microdiscectomy L5-S1    BLADDER SUSPENSION  09/22/2009    (transobturator sling)    CERVIX LESION DESTRUCTION  1993    (laser cone)    COLONOSCOPY  10/17/2018    hyperplastic polyp, Dr. Jossue Feliz at [facility] [address]  2006    LUMBAR FUSION N/A 12/09/2021    OLIF L4-5, C-ARM, LATERAL FLAT IRMA MARTINES, MICROSCOPE performed by Priya Boswell DO at [address] N/A 12/09/2021    POSTERIOR FIXATION L4-5, O-ARM/STEALTH performed by Priya Boswell DO at [facility]  09/06/2017    Dr Marcy Raymundo  12/09/2021    OLIF L4-5, Posterior fixation L4-5    NERVE BLOCK  02/16/2017    laser nerve block Dr Tiff Roman  03/09/2017    laser nerve block Dr Zainab Dos Santos Bilateral 03/13/2019    BL5 transforaminal radiculogram- [facility] per Dr Luis Alfredo Núñez Right 05/29/2019    Right sacroiliac joint injection with arthrography    NERVE BLOCK Bilateral 07/31/2019    BL5 transforaminal radiculogram/epidurogram    PAIN MANAGEMENT PROCEDURE Bilateral 08/13/2021    Bilateral L4 TRANSFORAMINAL performed by Rosalva Garcia MD at [address] N/A 09/02/2021    L4-L5  INTERLAMINAR performed by Rosalva Garcia MD at [address] Right 07/01/2022    Right C6 TRANSFORAMINAL performed by Rosalva Garcia MD at [address] [place] [address] Right 1/12/2023    Right C5 TRANSFORAMINAL performed by Rosalva Garcia MD at [address]  07/06/2015    Lumbar L5-S1    US BREAST BIOPSY W LOC DEVICE 1ST LESION RIGHT Right 07/12/2021    US BREAST NEEDLE BIOPSY RIGHT 7/12/2021 [address] [facility] ULTRASOUND       Family History   Problem Relation Age of Onset    Diabetes Paternal Grandmother     Cancer Father         lymph nodes    Ovarian Cancer Maternal Aunt 79        mother's half sister (same mother)    High Blood Pressure Mother     Glaucoma Neg Hx     Cataracts Neg Hx        Social History     Socioeconomic History    Marital status:      Spouse name: None    Number of children: None    Years of education: None    Highest education level: None   Tobacco Use    Smoking status: Former     Packs/day: 0.00     Years: 28.00     Pack years: 0.00     Types: Cigarettes     Start date: 1/1/1985     Quit date: 8/16/2014     Years since quittin.4    Smokeless tobacco: Never   Vaping Use    Vaping Use: Never used   Substance and Sexual Activity    Alcohol use: Yes     Alcohol/week: 3.0 standard drinks     Types: 3 Standard drinks or equivalent per week     Comment: Socially. -weekly    Drug use: No    Sexual activity: Yes     Partners: Male     Review of Systems   Constitutional:  Positive for activity change. Respiratory: Negative. Cardiovascular: Negative. Genitourinary: Negative. Musculoskeletal:  Positive for arthralgias, back pain, myalgias and neck pain. Skin: Negative. Neurological:  Positive for numbness (right arm). Psychiatric/Behavioral:  Positive for sleep disturbance. Objective:   Physical Exam  Vitals reviewed. Constitutional:       General: She is not in acute distress. Appearance: She is well-developed. She is not diaphoretic. Interventions: She is not intubated. HENT:      Head: Normocephalic and atraumatic. Neck:      Comments: MRI OF THE CERVICAL SPINE WITHOUT CONTRAST 2022 5:24 pm     TECHNIQUE:  Multiplanar multisequence MRI of the cervical spine was performed without the  administration of intravenous contrast.     COMPARISON:  None. HISTORY:  ORDERING SYSTEM PROVIDED HISTORY: Cervical radiculopathy  TECHNOLOGIST PROVIDED HISTORY:  radiculopathy  Reason for Exam: Chronic neck pain. Right shoulder pain and right arm  numbness for approximately two months. Additional signs and symptoms: Cervical radiculopathy     FINDINGS:  BONES/ALIGNMENT: The vertebral body heights are maintained. There is  age-appropriate bone marrow signal.  There is multilevel degenerative disc  disease with loss of disc signal.  There is no significant disc space  narrowing. There is no spondylolisthesis. SPINAL CORD: The spinal cord is normal in caliber and signal.     SOFT TISSUES: The posterior paraspinal soft tissues are unremarkable. The  prevertebral soft tissues are unremarkable.      C2-C3: There is no significant disc protrusion, spinal canal stenosis or  neural foraminal narrowing. C3-C4: There is no significant disc protrusion, spinal canal stenosis or  neural foraminal narrowing. C4-C5: There is no significant disc protrusion, spinal canal stenosis or  neural foraminal narrowing. C5-C6: There is a disc osteophyte complex with uncovertebral and facet  hypertrophy. There is no canal stenosis. There is moderate bilateral  foraminal narrowing. C6-C7: There is no significant disc protrusion, spinal canal stenosis or  neural foraminal narrowing. C7-T1: There is no significant disc protrusion, spinal canal stenosis or  neural foraminal narrowing. Impression  Multilevel degenerative disc disease with uncovertebral and facet hypertrophy  resulting in moderate bilateral foraminal narrowing at C5-6. Cardiovascular:      Rate and Rhythm: Normal rate. Pulmonary:      Effort: Pulmonary effort is normal. No tachypnea, bradypnea, accessory muscle usage or respiratory distress. She is not intubated. Musculoskeletal:      Right shoulder: Decreased range of motion. Decreased strength. Cervical back: Spasms present. Decreased range of motion. Lumbar back: No tenderness or bony tenderness. Skin:     General: Skin is warm and dry. Capillary Refill: Capillary refill takes less than 2 seconds. Coloration: Skin is not pale. Nails: There is no clubbing. Neurological:      Mental Status: She is alert and oriented to person, place, and time. GCS: GCS eye subscore is 4. GCS verbal subscore is 5. GCS motor subscore is 6. Cranial Nerves: No cranial nerve deficit. Psychiatric:         Speech: Speech normal.         Behavior: Behavior normal. Behavior is not agitated, aggressive, withdrawn or combative. Behavior is cooperative. Judgment: Judgment normal. Judgment is not impulsive or inappropriate. Assessment:      1.  Cervical spondylosis    2. Carpal tunnel syndrome, right    3. Cervical radiculitis          Plan:   Chronic pain diagnoses such as   1. Cervical spondylosis    2. Carpal tunnel syndrome, right    3. Cervical radiculitis     controlled on current medication regime, wll continue current pain medications to improve quality of life and function.      Schedule right carpal tunnel injection, if no relief, consider right shoulder MRI      Severo Sieving, APRN - CNP

## 2023-01-30 ENCOUNTER — PROCEDURE VISIT (OUTPATIENT)
Dept: PAIN MANAGEMENT | Age: 58
End: 2023-01-30

## 2023-01-30 VITALS
OXYGEN SATURATION: 99 % | BODY MASS INDEX: 30.91 KG/M2 | WEIGHT: 168 LBS | DIASTOLIC BLOOD PRESSURE: 82 MMHG | SYSTOLIC BLOOD PRESSURE: 124 MMHG | HEART RATE: 72 BPM | HEIGHT: 62 IN

## 2023-01-30 DIAGNOSIS — G56.01 CARPAL TUNNEL SYNDROME, RIGHT: Primary | ICD-10-CM

## 2023-01-30 NOTE — PROGRESS NOTES
Rosalio Bruno  1965 1/30/23      PAIN MANAGEMENT CLINIC PROCEDURE NOTE    CHIEF COMPLAINT: This is a 56 y.o. female patient who presents to the Pain Management Clinic with a history of pain in the right wrist(s). PRE-PROCEDURE DIAGNOSIS: Right carpal tunnel syndrome      POST-PROCEDURE DIAGNOSIS: same as pre-procedure diagnosis    Vitals:    01/30/23 1040   BP: 124/82   Pulse: 72   SpO2: 99%          PROCEDURE:     The procedure was explained, including risks and benefits, and the patient has agreed to proceed. The injection site was marked on the patients skin. A large area around the injection site was cleaned with chlora-prep. Carpal Tunnel Injection:  A 25-gauge 1.5 inch needle was inserted distal to theright  wrist crease between the palmaris longus and flexor carpi radialis tendons bilateral. Once the needle tip was estimated to be in the carpal tunnel, the needle was aspirated and negative for heme and slowly a combination of 1% lidocaine and 10 mg of Kenalog  (NDC# 6773-1835-39) was injected along the bilateral median nerve. The patient remained neurovascularly intact during and after the procedure and was discharged in stable condition. Numbness in the median nerve distribution was noted with in minutes of the injection. The injection site was cleaned and dressed with a spot band aid. The patient tolerated the procedure well and with out complication The patient was instructed avoid heat and excessive activity for 24-48 hours.

## 2023-04-26 ENCOUNTER — PATIENT MESSAGE (OUTPATIENT)
Dept: PAIN MANAGEMENT | Age: 58
End: 2023-04-26

## 2023-04-27 RX ORDER — GABAPENTIN 300 MG/1
300 CAPSULE ORAL 3 TIMES DAILY
Qty: 90 CAPSULE | Refills: 1 | Status: SHIPPED | OUTPATIENT
Start: 2023-04-27 | End: 2023-05-27

## 2023-04-27 NOTE — TELEPHONE ENCOUNTER
From: Rock Verdugo  To: Kelby Goldman  Sent: 4/26/2023 4:19 PM EDT  Subject: Gabapentin     Sebastian Singh, the gabapentin does seem to be helping. Do you think we can try increasing the dose? Also I have found it most helpful when I take it all 3 times a day.

## 2023-05-26 ENCOUNTER — TELEPHONE (OUTPATIENT)
Dept: PAIN MANAGEMENT | Age: 58
End: 2023-05-26

## 2023-05-26 ENCOUNTER — OFFICE VISIT (OUTPATIENT)
Dept: PAIN MANAGEMENT | Age: 58
End: 2023-05-26
Payer: COMMERCIAL

## 2023-05-26 VITALS
DIASTOLIC BLOOD PRESSURE: 75 MMHG | SYSTOLIC BLOOD PRESSURE: 126 MMHG | HEIGHT: 61 IN | OXYGEN SATURATION: 98 % | RESPIRATION RATE: 18 BRPM | WEIGHT: 174 LBS | HEART RATE: 75 BPM | BODY MASS INDEX: 32.85 KG/M2

## 2023-05-26 DIAGNOSIS — M54.41 CHRONIC BILATERAL LOW BACK PAIN WITH BILATERAL SCIATICA: ICD-10-CM

## 2023-05-26 DIAGNOSIS — M54.12 CERVICAL RADICULITIS: ICD-10-CM

## 2023-05-26 DIAGNOSIS — M51.26 DISPLACEMENT OF LUMBAR INTERVERTEBRAL DISC WITHOUT MYELOPATHY: ICD-10-CM

## 2023-05-26 DIAGNOSIS — G89.29 CHRONIC RIGHT SACROILIAC JOINT PAIN: ICD-10-CM

## 2023-05-26 DIAGNOSIS — M96.1 POST LAMINECTOMY SYNDROME: ICD-10-CM

## 2023-05-26 DIAGNOSIS — M53.3 CHRONIC RIGHT SACROILIAC JOINT PAIN: ICD-10-CM

## 2023-05-26 DIAGNOSIS — M47.812 CERVICAL SPONDYLOSIS: Primary | ICD-10-CM

## 2023-05-26 DIAGNOSIS — G89.29 CHRONIC BILATERAL LOW BACK PAIN WITH BILATERAL SCIATICA: ICD-10-CM

## 2023-05-26 DIAGNOSIS — M54.42 CHRONIC BILATERAL LOW BACK PAIN WITH BILATERAL SCIATICA: ICD-10-CM

## 2023-05-26 PROCEDURE — G8427 DOCREV CUR MEDS BY ELIG CLIN: HCPCS | Performed by: NURSE PRACTITIONER

## 2023-05-26 PROCEDURE — 3078F DIAST BP <80 MM HG: CPT | Performed by: NURSE PRACTITIONER

## 2023-05-26 PROCEDURE — 3074F SYST BP LT 130 MM HG: CPT | Performed by: NURSE PRACTITIONER

## 2023-05-26 PROCEDURE — 99214 OFFICE O/P EST MOD 30 MIN: CPT | Performed by: NURSE PRACTITIONER

## 2023-05-26 PROCEDURE — 1036F TOBACCO NON-USER: CPT | Performed by: NURSE PRACTITIONER

## 2023-05-26 PROCEDURE — 3017F COLORECTAL CA SCREEN DOC REV: CPT | Performed by: NURSE PRACTITIONER

## 2023-05-26 PROCEDURE — G8417 CALC BMI ABV UP PARAM F/U: HCPCS | Performed by: NURSE PRACTITIONER

## 2023-05-26 RX ORDER — PIMECROLIMUS 10 MG/G
1 CREAM TOPICAL 2 TIMES DAILY
COMMUNITY
Start: 2023-05-03

## 2023-05-26 RX ORDER — GABAPENTIN 100 MG/1
CAPSULE ORAL
Qty: 240 CAPSULE | Refills: 3 | Status: SHIPPED | OUTPATIENT
Start: 2023-05-26 | End: 2023-05-26

## 2023-05-26 RX ORDER — GABAPENTIN 300 MG/1
300 CAPSULE ORAL 3 TIMES DAILY
Qty: 270 CAPSULE | Refills: 1 | Status: SHIPPED | OUTPATIENT
Start: 2023-05-26 | End: 2023-05-26

## 2023-05-26 RX ORDER — GABAPENTIN 100 MG/1
CAPSULE ORAL
Qty: 360 CAPSULE | Refills: 2
Start: 2023-05-26 | End: 2023-08-31

## 2023-05-26 RX ORDER — GABAPENTIN 300 MG/1
300 CAPSULE ORAL
Qty: 90 CAPSULE | Refills: 3 | Status: SHIPPED | OUTPATIENT
Start: 2023-05-26 | End: 2023-08-24

## 2023-05-26 ASSESSMENT — ENCOUNTER SYMPTOMS
RESPIRATORY NEGATIVE: 1
BACK PAIN: 1

## 2023-05-26 NOTE — PROGRESS NOTES
Years since quittin.7    Smokeless tobacco: Never   Vaping Use    Vaping Use: Never used   Substance and Sexual Activity    Alcohol use: Yes     Alcohol/week: 3.0 standard drinks     Types: 3 Standard drinks or equivalent per week     Comment: Socially. -weekly    Drug use: No    Sexual activity: Yes     Partners: Male     Review of Systems   Constitutional:  Positive for activity change. Respiratory: Negative. Cardiovascular: Negative. Genitourinary: Negative. Musculoskeletal:  Positive for arthralgias, back pain, myalgias and neck pain. Skin: Negative. Neurological:  Positive for numbness (right arm). Psychiatric/Behavioral:  Positive for sleep disturbance. Objective:   Physical Exam  Vitals reviewed. Constitutional:       General: She is not in acute distress. Appearance: She is well-developed. She is not diaphoretic. Interventions: She is not intubated. HENT:      Head: Normocephalic and atraumatic. Neck:      Comments: MRI OF THE CERVICAL SPINE WITHOUT CONTRAST 2022 5:24 pm     TECHNIQUE:  Multiplanar multisequence MRI of the cervical spine was performed without the  administration of intravenous contrast.     COMPARISON:  None. HISTORY:  ORDERING SYSTEM PROVIDED HISTORY: Cervical radiculopathy  TECHNOLOGIST PROVIDED HISTORY:  radiculopathy  Reason for Exam: Chronic neck pain. Right shoulder pain and right arm  numbness for approximately two months. Additional signs and symptoms: Cervical radiculopathy     FINDINGS:  BONES/ALIGNMENT: The vertebral body heights are maintained. There is  age-appropriate bone marrow signal.  There is multilevel degenerative disc  disease with loss of disc signal.  There is no significant disc space  narrowing. There is no spondylolisthesis. SPINAL CORD: The spinal cord is normal in caliber and signal.     SOFT TISSUES: The posterior paraspinal soft tissues are unremarkable.   The  prevertebral soft tissues are

## 2023-05-26 NOTE — TELEPHONE ENCOUNTER
[facility] sent clarification for patient's 100mg gabapentin. SIG did not match the qty - this was adjusted via faxed order per NM.   Adjusted on medication review tab as well

## 2023-07-12 ENCOUNTER — PATIENT MESSAGE (OUTPATIENT)
Dept: PAIN MANAGEMENT | Age: 58
End: 2023-07-12

## 2023-07-12 NOTE — TELEPHONE ENCOUNTER
From: Breanna Expose  To: Jae Cass  Sent: 7/12/2023 12:59 PM EDT  Subject: Carpal tunnel    My hand and arm are starting to fall asleep and go numb again can I get another shot at my recheck in August or does that need to be a separate appointment

## 2023-08-25 ENCOUNTER — PROCEDURE VISIT (OUTPATIENT)
Dept: PAIN MANAGEMENT | Age: 58
End: 2023-08-25

## 2023-08-25 VITALS
HEART RATE: 68 BPM | RESPIRATION RATE: 16 BRPM | OXYGEN SATURATION: 99 % | HEIGHT: 62 IN | DIASTOLIC BLOOD PRESSURE: 86 MMHG | WEIGHT: 170 LBS | SYSTOLIC BLOOD PRESSURE: 132 MMHG | BODY MASS INDEX: 31.28 KG/M2

## 2023-08-25 DIAGNOSIS — G56.01 CARPAL TUNNEL SYNDROME, RIGHT: Primary | ICD-10-CM

## 2023-12-04 RX ORDER — CELECOXIB 100 MG/1
100 CAPSULE ORAL 2 TIMES DAILY
Qty: 180 CAPSULE | Refills: 3 | Status: SHIPPED | OUTPATIENT
Start: 2023-12-04

## 2023-12-04 NOTE — TELEPHONE ENCOUNTER
Celebrex 100 mg  Last Appt:  8/25/2023  Next Appt:   Visit date not found  Med verified in [address] [place] [address]

## 2023-12-05 ENCOUNTER — HOSPITAL ENCOUNTER (OUTPATIENT)
Dept: MAMMOGRAPHY | Age: 58
Discharge: HOME | End: 2023-12-07
Payer: COMMERCIAL

## 2023-12-05 ENCOUNTER — TELEPHONE (OUTPATIENT)
Dept: SURGERY | Age: 58
End: 2023-12-05

## 2023-12-05 ENCOUNTER — OFFICE VISIT (OUTPATIENT)
Dept: OBGYN | Age: 58
End: 2023-12-05

## 2023-12-05 VITALS
DIASTOLIC BLOOD PRESSURE: 80 MMHG | WEIGHT: 164.2 LBS | SYSTOLIC BLOOD PRESSURE: 138 MMHG | HEIGHT: 63 IN | BODY MASS INDEX: 29.09 KG/M2 | HEART RATE: 90 BPM | OXYGEN SATURATION: 98 %

## 2023-12-05 VITALS — WEIGHT: 163 LBS | BODY MASS INDEX: 29.17 KG/M2

## 2023-12-05 DIAGNOSIS — Z12.11 COLON CANCER SCREENING: ICD-10-CM

## 2023-12-05 DIAGNOSIS — E11.51 DM (DIABETES MELLITUS) WITH PERIPHERAL VASCULAR COMPLICATION (HCC): ICD-10-CM

## 2023-12-05 DIAGNOSIS — Z12.31 ENCOUNTER FOR SCREENING MAMMOGRAM FOR MALIGNANT NEOPLASM OF BREAST: ICD-10-CM

## 2023-12-05 DIAGNOSIS — Z01.419 ENCOUNTER FOR WELL WOMAN EXAM WITH ROUTINE GYNECOLOGICAL EXAM: ICD-10-CM

## 2023-12-05 DIAGNOSIS — N95.1 MENOPAUSAL AND FEMALE CLIMACTERIC STATES: Primary | ICD-10-CM

## 2023-12-05 PROCEDURE — 77063 BREAST TOMOSYNTHESIS BI: CPT

## 2023-12-05 SDOH — ECONOMIC STABILITY: INCOME INSECURITY: HOW HARD IS IT FOR YOU TO PAY FOR THE VERY BASICS LIKE FOOD, HOUSING, MEDICAL CARE, AND HEATING?: NOT HARD AT ALL

## 2023-12-05 SDOH — ECONOMIC STABILITY: FOOD INSECURITY: WITHIN THE PAST 12 MONTHS, YOU WORRIED THAT YOUR FOOD WOULD RUN OUT BEFORE YOU GOT MONEY TO BUY MORE.: NEVER TRUE

## 2023-12-05 SDOH — ECONOMIC STABILITY: FOOD INSECURITY: WITHIN THE PAST 12 MONTHS, THE FOOD YOU BOUGHT JUST DIDN'T LAST AND YOU DIDN'T HAVE MONEY TO GET MORE.: NEVER TRUE

## 2023-12-05 SDOH — ECONOMIC STABILITY: HOUSING INSECURITY
IN THE LAST 12 MONTHS, WAS THERE A TIME WHEN YOU DID NOT HAVE A STEADY PLACE TO SLEEP OR SLEPT IN A SHELTER (INCLUDING NOW)?: NO

## 2023-12-05 NOTE — TELEPHONE ENCOUNTER
Mailed Dr Juan Jose Galloway colonoscopy paperwork-Colonoscopy recall letter mailed previously to patient.

## 2023-12-10 RX ORDER — SERTRALINE HYDROCHLORIDE 25 MG/1
TABLET, FILM COATED ORAL
Qty: 7 TABLET | Refills: 0 | Status: SHIPPED | OUTPATIENT
Start: 2023-12-10

## 2023-12-10 ASSESSMENT — ENCOUNTER SYMPTOMS
GASTROINTESTINAL NEGATIVE: 1
EYES NEGATIVE: 1
RESPIRATORY NEGATIVE: 1
ALLERGIC/IMMUNOLOGIC NEGATIVE: 1

## 2024-01-10 ENCOUNTER — OFFICE VISIT (OUTPATIENT)
Dept: PAIN MANAGEMENT | Age: 59
End: 2024-01-10
Payer: COMMERCIAL

## 2024-01-10 ENCOUNTER — TELEPHONE (OUTPATIENT)
Dept: PAIN MANAGEMENT | Age: 59
End: 2024-01-10

## 2024-01-10 VITALS — SYSTOLIC BLOOD PRESSURE: 122 MMHG | DIASTOLIC BLOOD PRESSURE: 82 MMHG | OXYGEN SATURATION: 99 % | HEART RATE: 75 BPM

## 2024-01-10 DIAGNOSIS — M96.1 POST LAMINECTOMY SYNDROME: ICD-10-CM

## 2024-01-10 DIAGNOSIS — M54.42 CHRONIC BILATERAL LOW BACK PAIN WITH BILATERAL SCIATICA: Primary | ICD-10-CM

## 2024-01-10 DIAGNOSIS — M54.16 LUMBAR RADICULOPATHY: ICD-10-CM

## 2024-01-10 DIAGNOSIS — G89.29 CHRONIC BILATERAL LOW BACK PAIN WITH BILATERAL SCIATICA: Primary | ICD-10-CM

## 2024-01-10 DIAGNOSIS — M54.41 CHRONIC BILATERAL LOW BACK PAIN WITH BILATERAL SCIATICA: Primary | ICD-10-CM

## 2024-01-10 PROCEDURE — 3017F COLORECTAL CA SCREEN DOC REV: CPT | Performed by: NURSE PRACTITIONER

## 2024-01-10 PROCEDURE — G8427 DOCREV CUR MEDS BY ELIG CLIN: HCPCS | Performed by: NURSE PRACTITIONER

## 2024-01-10 PROCEDURE — G8484 FLU IMMUNIZE NO ADMIN: HCPCS | Performed by: NURSE PRACTITIONER

## 2024-01-10 PROCEDURE — 1036F TOBACCO NON-USER: CPT | Performed by: NURSE PRACTITIONER

## 2024-01-10 PROCEDURE — 3079F DIAST BP 80-89 MM HG: CPT | Performed by: NURSE PRACTITIONER

## 2024-01-10 PROCEDURE — G8417 CALC BMI ABV UP PARAM F/U: HCPCS | Performed by: NURSE PRACTITIONER

## 2024-01-10 PROCEDURE — 3074F SYST BP LT 130 MM HG: CPT | Performed by: NURSE PRACTITIONER

## 2024-01-10 PROCEDURE — 99214 OFFICE O/P EST MOD 30 MIN: CPT | Performed by: NURSE PRACTITIONER

## 2024-01-10 ASSESSMENT — ENCOUNTER SYMPTOMS
NAUSEA: 1
SHORTNESS OF BREATH: 1

## 2024-01-10 NOTE — TELEPHONE ENCOUNTER
CPT 91121 TFE Bli L5  Dx M54.16, M96.1    I called [facility] provider line and pushed 7 for auth's [id number].  No PA required.  Limit one in one day.  Ref #[id number]    Pt notified per phone now.  Instructions reviewed.  She will hold ASA 3 days prior per self.  She said she takes it on her own.    Pre procedure valium request on different encounter.

## 2024-01-10 NOTE — PROGRESS NOTES
[facility]  [address] [place]. [address]    Patient Name: Loretta Odom  MRN: [id number]  Encounter Date: 1/10/2024     SUBJECTIVE:  Loretta Odom is a 58 y.o., female being seen today regarding   Chief Complaint   Patient presents with    Pain     Lower back and legs.     Follow-up     6 month     and routine medication management.    Functionality Assessment & Goals:  On a scale of 0 (Does not Interfere) to 10 (Completely Interferes)  Which number describes how during the past week pain has interfered with the following:   A.  General Activity: 6    B.  Mood:  4   C.  Walking Ability:  4   D.  Normal Work (Includes both work outside the home and housework):  6   E.  Relations with Other People:  3   F.  Sleep:  6    G.  Enjoyment of Life:  5  2.  Patient prefers to Take their Pain Medications:   [] On a regular basis   [x] Only when necessary   [] Does not take pain medications  3.  What are the Patient's Goals/ Expectations for Visiting Pain Management?   [] Learn about my pain   [] Physical Therapy   [x] Receive Injections   [] Deal with Anxiety and Stress   [x] Receive Medication   [] Treat Depression    [] Treat Sleep   [] Treat Opioid Dependence/ Addiction    Recent Imaging since last appointment related to chief complaint:  no  Recent Interventional Procedures since last appointment related to chief complaint:  no  Patient reports a n/a  % improvement of pain and ADL's after procedure.    Most recent Oswestry Disability Questionnaire completed by patient on n/a      Current Pain Assessment:         1/10/2024     1:10 PM   AMB PAIN ASSESSMENT   Location of Pain Back   Severity of Pain 5   Quality of Pain Other (Comment)   Duration of Pain Persistent   Frequency of Pain Constant   Aggravating Factors Bending;Stretching;Straightening;Exercise;Kneeling;Squatting;Standing;Walking   Limiting Behavior Yes   Relieving Factors Ice;Nsaids;Rest        Current Medications &

## 2024-01-10 NOTE — PATIENT INSTRUCTIONS
you should develop a cold, sore throat, cough, fever or other new indication of illness or infection, or are started on antibiotics within 2 weeks of the scheduled procedure, please notify the Doctor’s office as early as possible at [phone].

## 2024-01-11 RX ORDER — METHOCARBAMOL 750 MG/1
750 TABLET, FILM COATED ORAL 4 TIMES DAILY
Qty: 40 TABLET | Refills: 0 | Status: SHIPPED | OUTPATIENT
Start: 2024-01-11 | End: 2024-01-21

## 2024-01-11 RX ORDER — DIAZEPAM 5 MG/1
TABLET ORAL
Qty: 2 TABLET | Refills: 0 | Status: SHIPPED | OUTPATIENT
Start: 2024-01-11 | End: 2024-05-10

## 2024-01-18 ENCOUNTER — OFFICE VISIT (OUTPATIENT)
Dept: DIABETES SERVICES | Age: 59
End: 2024-01-18
Payer: COMMERCIAL

## 2024-01-18 VITALS
SYSTOLIC BLOOD PRESSURE: 130 MMHG | HEART RATE: 90 BPM | WEIGHT: 167 LBS | BODY MASS INDEX: 30.73 KG/M2 | OXYGEN SATURATION: 98 % | HEIGHT: 62 IN | DIASTOLIC BLOOD PRESSURE: 80 MMHG

## 2024-01-18 DIAGNOSIS — E10.9 TYPE 1 DIABETES MELLITUS WITHOUT COMPLICATION (HCC): Primary | ICD-10-CM

## 2024-01-18 DIAGNOSIS — E78.5 HYPERLIPIDEMIA, UNSPECIFIED HYPERLIPIDEMIA TYPE: ICD-10-CM

## 2024-01-18 DIAGNOSIS — I10 ESSENTIAL HYPERTENSION: ICD-10-CM

## 2024-01-18 PROCEDURE — 3079F DIAST BP 80-89 MM HG: CPT | Performed by: NURSE PRACTITIONER

## 2024-01-18 PROCEDURE — 3046F HEMOGLOBIN A1C LEVEL >9.0%: CPT | Performed by: NURSE PRACTITIONER

## 2024-01-18 PROCEDURE — 95251 CONT GLUC MNTR ANALYSIS I&R: CPT | Performed by: NURSE PRACTITIONER

## 2024-01-18 PROCEDURE — 99205 OFFICE O/P NEW HI 60 MIN: CPT | Performed by: NURSE PRACTITIONER

## 2024-01-18 PROCEDURE — G8427 DOCREV CUR MEDS BY ELIG CLIN: HCPCS | Performed by: NURSE PRACTITIONER

## 2024-01-18 PROCEDURE — G8484 FLU IMMUNIZE NO ADMIN: HCPCS | Performed by: NURSE PRACTITIONER

## 2024-01-18 PROCEDURE — 3075F SYST BP GE 130 - 139MM HG: CPT | Performed by: NURSE PRACTITIONER

## 2024-01-18 PROCEDURE — G8417 CALC BMI ABV UP PARAM F/U: HCPCS | Performed by: NURSE PRACTITIONER

## 2024-01-18 PROCEDURE — 3017F COLORECTAL CA SCREEN DOC REV: CPT | Performed by: NURSE PRACTITIONER

## 2024-01-18 PROCEDURE — 1036F TOBACCO NON-USER: CPT | Performed by: NURSE PRACTITIONER

## 2024-01-18 PROCEDURE — 2022F DILAT RTA XM EVC RTNOPTHY: CPT | Performed by: NURSE PRACTITIONER

## 2024-01-18 RX ORDER — INSULIN LISPRO 100 [IU]/ML
INJECTION, SOLUTION INTRAVENOUS; SUBCUTANEOUS
Qty: 60 ML | Refills: 3 | Status: SHIPPED | OUTPATIENT
Start: 2024-01-18

## 2024-01-18 RX ORDER — ONDANSETRON 4 MG/1
4 TABLET, ORALLY DISINTEGRATING ORAL EVERY 8 HOURS PRN
Qty: 20 TABLET | Refills: 3 | Status: SHIPPED | OUTPATIENT
Start: 2024-01-18

## 2024-01-18 RX ORDER — INSULIN GLARGINE 100 [IU]/ML
15 INJECTION, SOLUTION SUBCUTANEOUS NIGHTLY
Qty: 5 ADJUSTABLE DOSE PRE-FILLED PEN SYRINGE | Refills: 0 | Status: SHIPPED | OUTPATIENT
Start: 2024-01-18

## 2024-01-18 ASSESSMENT — ENCOUNTER SYMPTOMS
ABDOMINAL PAIN: 0
RESPIRATORY NEGATIVE: 1
DIARRHEA: 0
SHORTNESS OF BREATH: 0

## 2024-01-18 NOTE — PROGRESS NOTES
[facility]  [address] [facility] [address] [phone]        HISTORY:    Loretta Odom presents today for evaluation and management of:  Chief Complaint   Patient presents with    Diabetes     NEW TO ESTABLISH CARE       Patient Care Team:  Amita Reeves as PCP - General      Interval History:    Past DM Medications   none    Current Diabetic Medications  Humalog for use with tandem tslim. Max daily dose 60 units  Lantus 15 units for pump back up   DKA episodes:       01/18/24   Loretta Odom is a 58 y.o. female patient who presents to clinic today for her diabetes. she has a history of HTN, hyperlipidemia which contributes to her diabetes. She is here for initial dm managment. she denies any current signs or symptoms of hyper/hypoglycemia. she states they are taking their medications as prescribed without any adverse effects.   Diet: counting carbs  Exercise: none  BS testing: uses cgm daily with success and is adherent to cgm therapy  Hypoglycemia: Yes    Sweats and Tremors     Hypoglycemia Frequency: 1 per month  Hypoglycemia as needed treatment: juice or glucose tabs  Issues:   Diabetic foot exam up-to-date: No  Diabetic retinal exam up-to-date: No    Diabetes complications:none      High cholesterol-    Watches diet and exercise.     Hypertension-  Takes hyzaar and denies any adverse effects with their use. Watches diet and exercise. Denies any chest pain, dizziness or edema.      Obesity- Working on weight loss.         Past Medical History:   Diagnosis Date    Bronchitis     Diabetes type 1, controlled (HCC)     Eczema     GERD (gastroesophageal reflux disease)     Gestational diabetes     Gestational diabetes mellitus     Hyperlipidemia     Hypertension     Hypothyroid     Insomnia     Insulin pump in place     Dr. Johnson endocrinology [facility]    Myopia with astigmatism and presbyopia     Rh

## 2024-01-22 ENCOUNTER — TELEPHONE (OUTPATIENT)
Dept: SURGERY | Age: 59
End: 2024-01-22

## 2024-01-24 ENCOUNTER — PATIENT MESSAGE (OUTPATIENT)
Dept: OBGYN | Age: 59
End: 2024-01-24

## 2024-01-24 ENCOUNTER — PROCEDURE VISIT (OUTPATIENT)
Dept: PAIN MANAGEMENT | Age: 59
End: 2024-01-24
Payer: COMMERCIAL

## 2024-01-24 DIAGNOSIS — M54.16 LUMBAR RADICULOPATHY: Primary | ICD-10-CM

## 2024-01-24 DIAGNOSIS — M51.26 DISPLACEMENT OF LUMBAR INTERVERTEBRAL DISC WITHOUT MYELOPATHY: ICD-10-CM

## 2024-01-24 PROCEDURE — 64483 NJX AA&/STRD TFRM EPI L/S 1: CPT | Performed by: PHYSICAL MEDICINE & REHABILITATION

## 2024-01-24 NOTE — TELEPHONE ENCOUNTER
From: Loretta Odom  To: Citlaly Mojica  Sent: 1/24/2024 8:18 AM EST  Subject: Itch    Good morning! I am having what I think is my eczema on my outer lady parts. It is just like the spots I get on my face and hairline on my head. I am wondering what is safe to put on that to clear it up as I am not sure my regular eczema cream is a good idea on the more sensitive skin there. I know it is contraindicated for when I get it in my ears and the more sensitive skin in the ear canal. i have a second cream for that. Can you tell me a safe way to clear this up. It comes and goes in cycles like my other eczema spots do. It’s embarrassing and uncomfortable to itch there especially not knowing what to do about it     Thanks for any advise you have

## 2024-01-24 NOTE — PROGRESS NOTES
TRANSFORAMINAL EPIDURAL STEROID INJECTION    1/24/24    Surgeon: Darnell Cowan MD    Pre-operative Diagnosis: lumbar radiculits  lumbsr spoondylosis    Post-operative Diagnosis: Same    Assistants: none    This is a 58 y.o. female patient with pain in the Back, left leg, right leg.  Previous treatment and examination findings are noted in the H&P.BL5 transforaminal epidural injection has been requested for diagnostic and therapeutic reasons.    Conservative treatment was ineffective i.e.: ice, NSAIDS, rest, narcotic medication, chiropractic care, physical therapy and message therapy.    Patient is unable to perform the following ADL's: ambulating and grooming                         Last Plain films: 2022      EXAMINATION:  L5 transforaminal radiculogram/epidurogram.   BL5 transforaminal epidural anesthetic injection.   BL5 transforaminal epidural steroid injection.    CONSENT: Written consent was obtained from the patient on preprinted consent form after explaining the procedure, indications, potential complications and outcomes. Alternative treatments were also discussed.    DISCUSSION: The patient was sterilely prepped and draped in the usual fashion in the prone position. “Time out” was verified for correct patient, side, level and procedure.    SEDATION:   No conscious sedation was performed during the procedure.  The patient remained awake and conversed throughout the procedure.  The patient underwent pulse oximetry and blood pressure monitoring independently by a trained observer, as well as by a physician.    PROCEDURE:  Under image-intensifier control, a 22 gauge needle x 5 inch spinal needle was guided successfully into the epidural space employing a posterior lateral/oblique approach. Needle aspiration was negative for heme or CSF.     Instillation of  .5 mL of Omnipaque 240 contrast medium opacified the spinal nerve and demonstrated contiguous flow into the epidural space. No vascular spread was noted.

## 2024-01-30 NOTE — TELEPHONE ENCOUNTER
Called patient and she states that issue cleared up when her face did. Patient was unsure what she could safely use to help when this flares up. Patient was scheduled for 02/13/2024 to see TBC. Patient was agreeable to scheduling.

## 2024-01-31 ENCOUNTER — PATIENT MESSAGE (OUTPATIENT)
Dept: DIABETES SERVICES | Age: 59
End: 2024-01-31

## 2024-02-01 RX ORDER — INSULIN LISPRO 100 [IU]/ML
INJECTION, SOLUTION INTRAVENOUS; SUBCUTANEOUS
Qty: 6 ML | Refills: 3 | Status: SHIPPED | OUTPATIENT
Start: 2024-02-01

## 2024-02-01 NOTE — TELEPHONE ENCOUNTER
From: Loretta Odom  To: Shayna Craft  Sent: 2024 9:50 PM EST  Subject: Pump     My pump . I got ahold of tandem they are sending out a replacement arriving Friday. I took 15 units of lantus as we discussed. Would you send me in a fast acting insulin pen and tips to [facility] in [place]. I have syringes but it would be easier for me to use a pen till the replacement gets here for my humalog.

## 2024-02-07 NOTE — TELEPHONE ENCOUNTER
Patient canceled appointment via mychart stating that she is no longer having a problem. Closing encounter.

## 2024-02-21 ENCOUNTER — OFFICE VISIT (OUTPATIENT)
Dept: PAIN MANAGEMENT | Age: 59
End: 2024-02-21
Payer: COMMERCIAL

## 2024-02-21 VITALS
BODY MASS INDEX: 30.36 KG/M2 | DIASTOLIC BLOOD PRESSURE: 84 MMHG | SYSTOLIC BLOOD PRESSURE: 132 MMHG | HEIGHT: 62 IN | WEIGHT: 165 LBS

## 2024-02-21 DIAGNOSIS — G89.29 CHRONIC BILATERAL LOW BACK PAIN WITH BILATERAL SCIATICA: Primary | ICD-10-CM

## 2024-02-21 DIAGNOSIS — M96.1 POST LAMINECTOMY SYNDROME: ICD-10-CM

## 2024-02-21 DIAGNOSIS — M54.42 CHRONIC BILATERAL LOW BACK PAIN WITH BILATERAL SCIATICA: Primary | ICD-10-CM

## 2024-02-21 DIAGNOSIS — M54.16 LUMBAR RADICULOPATHY: ICD-10-CM

## 2024-02-21 DIAGNOSIS — M54.41 CHRONIC BILATERAL LOW BACK PAIN WITH BILATERAL SCIATICA: Primary | ICD-10-CM

## 2024-02-21 DIAGNOSIS — M51.26 DISPLACEMENT OF LUMBAR INTERVERTEBRAL DISC WITHOUT MYELOPATHY: ICD-10-CM

## 2024-02-21 PROCEDURE — G8427 DOCREV CUR MEDS BY ELIG CLIN: HCPCS | Performed by: NURSE PRACTITIONER

## 2024-02-21 PROCEDURE — 99214 OFFICE O/P EST MOD 30 MIN: CPT | Performed by: NURSE PRACTITIONER

## 2024-02-21 PROCEDURE — 3079F DIAST BP 80-89 MM HG: CPT | Performed by: NURSE PRACTITIONER

## 2024-02-21 PROCEDURE — 3017F COLORECTAL CA SCREEN DOC REV: CPT | Performed by: NURSE PRACTITIONER

## 2024-02-21 PROCEDURE — 3075F SYST BP GE 130 - 139MM HG: CPT | Performed by: NURSE PRACTITIONER

## 2024-02-21 PROCEDURE — G8484 FLU IMMUNIZE NO ADMIN: HCPCS | Performed by: NURSE PRACTITIONER

## 2024-02-21 PROCEDURE — G8417 CALC BMI ABV UP PARAM F/U: HCPCS | Performed by: NURSE PRACTITIONER

## 2024-02-21 PROCEDURE — 1036F TOBACCO NON-USER: CPT | Performed by: NURSE PRACTITIONER

## 2024-02-21 ASSESSMENT — ENCOUNTER SYMPTOMS
NAUSEA: 1
SHORTNESS OF BREATH: 1

## 2024-02-21 NOTE — PROGRESS NOTES
[facility] Pain Management  [address] [place]. [address]    Patient Name: Loretta Odom  MRN: [id number]  Encounter Date: 2/23/2024     SUBJECTIVE:  Loretta Odom is a 58 y.o., female being seen today regarding   No chief complaint on file.   and routine medication management.    Functionality Assessment & Goals:  On a scale of 0 (Does not Interfere) to 10 (Completely Interferes)  Which number describes how during the past week pain has interfered with the following:   A.  General Activity: 6    B.  Mood:  4   C.  Walking Ability:  4   D.  Normal Work (Includes both work outside the home and housework):  6   E.  Relations with Other People:  3   F.  Sleep:  6    G.  Enjoyment of Life:  5  2.  Patient prefers to Take their Pain Medications:   [] On a regular basis   [x] Only when necessary   [] Does not take pain medications  3.  What are the Patient's Goals/ Expectations for Visiting Pain Management?   [] Learn about my pain   [] Physical Therapy   [x] Receive Injections   [] Deal with Anxiety and Stress   [x] Receive Medication   [] Treat Depression    [] Treat Sleep   [] Treat Opioid Dependence/ Addiction    Recent Imaging since last appointment related to chief complaint:  no  Recent Interventional Procedures since last appointment related to chief complaint:  Bilateral L5 TFE 1/10/2024    Patient reports a  70% improvement of pain and ADL's after procedure.    Most recent Oswestry Disability Questionnaire completed by patient on 2/21/2024     Current Pain Assessment:         1/10/2024     1:10 PM   AMB PAIN ASSESSMENT   Location of Pain Back   Severity of Pain 5   Quality of Pain Other (Comment)   Duration of Pain Persistent   Frequency of Pain Constant   Aggravating Factors Bending;Stretching;Straightening;Exercise;Kneeling;Squatting;Standing;Walking   Limiting Behavior Yes   Relieving Factors Ice;Nsaids;Rest        Current Medications & Allergies:   Current Outpatient Medications

## 2024-04-24 ENCOUNTER — OFFICE VISIT (OUTPATIENT)
Dept: DIABETES SERVICES | Age: 59
End: 2024-04-24
Payer: COMMERCIAL

## 2024-04-24 VITALS
HEART RATE: 94 BPM | BODY MASS INDEX: 32.02 KG/M2 | HEIGHT: 62 IN | DIASTOLIC BLOOD PRESSURE: 68 MMHG | OXYGEN SATURATION: 97 % | WEIGHT: 174 LBS | SYSTOLIC BLOOD PRESSURE: 120 MMHG

## 2024-04-24 DIAGNOSIS — E78.5 HYPERLIPIDEMIA, UNSPECIFIED HYPERLIPIDEMIA TYPE: ICD-10-CM

## 2024-04-24 DIAGNOSIS — E10.9 TYPE 1 DIABETES MELLITUS WITHOUT COMPLICATION (HCC): Primary | ICD-10-CM

## 2024-04-24 DIAGNOSIS — I10 ESSENTIAL HYPERTENSION: ICD-10-CM

## 2024-04-24 PROCEDURE — 2022F DILAT RTA XM EVC RTNOPTHY: CPT | Performed by: NURSE PRACTITIONER

## 2024-04-24 PROCEDURE — G8427 DOCREV CUR MEDS BY ELIG CLIN: HCPCS | Performed by: NURSE PRACTITIONER

## 2024-04-24 PROCEDURE — 3017F COLORECTAL CA SCREEN DOC REV: CPT | Performed by: NURSE PRACTITIONER

## 2024-04-24 PROCEDURE — 95251 CONT GLUC MNTR ANALYSIS I&R: CPT | Performed by: NURSE PRACTITIONER

## 2024-04-24 PROCEDURE — G2211 COMPLEX E/M VISIT ADD ON: HCPCS | Performed by: NURSE PRACTITIONER

## 2024-04-24 PROCEDURE — 3074F SYST BP LT 130 MM HG: CPT | Performed by: NURSE PRACTITIONER

## 2024-04-24 PROCEDURE — 99214 OFFICE O/P EST MOD 30 MIN: CPT | Performed by: NURSE PRACTITIONER

## 2024-04-24 PROCEDURE — 1036F TOBACCO NON-USER: CPT | Performed by: NURSE PRACTITIONER

## 2024-04-24 PROCEDURE — G8417 CALC BMI ABV UP PARAM F/U: HCPCS | Performed by: NURSE PRACTITIONER

## 2024-04-24 PROCEDURE — 3078F DIAST BP <80 MM HG: CPT | Performed by: NURSE PRACTITIONER

## 2024-04-24 PROCEDURE — 3046F HEMOGLOBIN A1C LEVEL >9.0%: CPT | Performed by: NURSE PRACTITIONER

## 2024-04-24 ASSESSMENT — ENCOUNTER SYMPTOMS
SHORTNESS OF BREATH: 0
RESPIRATORY NEGATIVE: 1
ABDOMINAL PAIN: 0
DIARRHEA: 0

## 2024-04-24 NOTE — PROGRESS NOTES
[facility]  [address] [facility] [address] [phone]        HISTORY:    Loretta Odom presents today for evaluation and management of:  Chief Complaint   Patient presents with    Diabetes     3 month follow up       Patient Care Team:  Amita Reeves APRN - NP as PCP - General      Interval History:    Past DM Medications   none     Current Diabetic Medications  Humalog for use with tandem tslim. Max daily dose 60 units  Lantus 15 units for pump back up   DKA episodes:        01/18/24   Loretta Odom is a 58 y.o. female patient who presents to clinic today for her diabetes. she has a history of HTN, hyperlipidemia which contributes to her diabetes. She is here for initial dm managment. she denies any current signs or symptoms of hyper/hypoglycemia. she states they are taking their medications as prescribed without any adverse effects.   Diet: counting carbs  Exercise: none  BS testing: uses cgm daily with success and is adherent to cgm therapy  Hypoglycemia: Yes    Sweats and Tremors                 Hypoglycemia Frequency: 1 per month  Hypoglycemia as needed treatment: juice or glucose tabs  Issues:   Diabetic foot exam up-to-date: No  Diabetic retinal exam up-to-date: No     Diabetes complications:none    04/24/24   Loretta Odom is a 58 y.o. female patient who presents to clinic today for her diabetes. she has a history of HTN, hyperlipidemia which contributes to her diabetes. At previous visit diabetes counseling was provided. she denies any current signs or symptoms of hyper/hypoglycemia. she states they are taking their medications as prescribed without any adverse effects.   Diet: counting carbs  Exercise: none  BS testing: uses cgm daily with success and is adherent to cgm therapy  Hypoglycemia: Yes    Sweats and Tremors                 Hypoglycemia Frequency: 1 per month  Hypoglycemia as needed

## 2024-05-15 ENCOUNTER — OFFICE VISIT (OUTPATIENT)
Dept: PAIN MANAGEMENT | Age: 59
End: 2024-05-15
Payer: COMMERCIAL

## 2024-05-15 ENCOUNTER — TELEPHONE (OUTPATIENT)
Dept: PAIN MANAGEMENT | Age: 59
End: 2024-05-15

## 2024-05-15 VITALS — HEART RATE: 71 BPM | OXYGEN SATURATION: 97 % | SYSTOLIC BLOOD PRESSURE: 122 MMHG | DIASTOLIC BLOOD PRESSURE: 70 MMHG

## 2024-05-15 DIAGNOSIS — M54.41 CHRONIC BILATERAL LOW BACK PAIN WITH BILATERAL SCIATICA: Primary | ICD-10-CM

## 2024-05-15 DIAGNOSIS — M54.16 LUMBAR RADICULOPATHY: ICD-10-CM

## 2024-05-15 DIAGNOSIS — G89.29 CHRONIC BILATERAL LOW BACK PAIN WITH BILATERAL SCIATICA: Primary | ICD-10-CM

## 2024-05-15 DIAGNOSIS — M54.42 CHRONIC BILATERAL LOW BACK PAIN WITH BILATERAL SCIATICA: Primary | ICD-10-CM

## 2024-05-15 DIAGNOSIS — M96.1 POST LAMINECTOMY SYNDROME: ICD-10-CM

## 2024-05-15 DIAGNOSIS — M96.1 POST LAMINECTOMY SYNDROME: Primary | ICD-10-CM

## 2024-05-15 DIAGNOSIS — M51.26 DISPLACEMENT OF LUMBAR INTERVERTEBRAL DISC WITHOUT MYELOPATHY: ICD-10-CM

## 2024-05-15 PROCEDURE — G8417 CALC BMI ABV UP PARAM F/U: HCPCS | Performed by: NURSE PRACTITIONER

## 2024-05-15 PROCEDURE — 3017F COLORECTAL CA SCREEN DOC REV: CPT | Performed by: NURSE PRACTITIONER

## 2024-05-15 PROCEDURE — 3078F DIAST BP <80 MM HG: CPT | Performed by: NURSE PRACTITIONER

## 2024-05-15 PROCEDURE — 1036F TOBACCO NON-USER: CPT | Performed by: NURSE PRACTITIONER

## 2024-05-15 PROCEDURE — 99214 OFFICE O/P EST MOD 30 MIN: CPT | Performed by: NURSE PRACTITIONER

## 2024-05-15 PROCEDURE — G8427 DOCREV CUR MEDS BY ELIG CLIN: HCPCS | Performed by: NURSE PRACTITIONER

## 2024-05-15 PROCEDURE — 3074F SYST BP LT 130 MM HG: CPT | Performed by: NURSE PRACTITIONER

## 2024-05-15 RX ORDER — DIAZEPAM 5 MG/1
TABLET ORAL
Qty: 2 TABLET | Refills: 0 | Status: SHIPPED | OUTPATIENT
Start: 2024-05-15 | End: 2024-06-06

## 2024-05-15 RX ORDER — GABAPENTIN 300 MG/1
300 CAPSULE ORAL 3 TIMES DAILY
Qty: 270 CAPSULE | Refills: 3 | Status: SHIPPED | OUTPATIENT
Start: 2024-05-15 | End: 2024-08-13

## 2024-05-15 ASSESSMENT — ENCOUNTER SYMPTOMS: NAUSEA: 1

## 2024-05-15 NOTE — PROGRESS NOTES
[facility] Pain Management  [address] [place]. [address]    Patient Name: Loretta Odom  MRN: [id number]  Encounter Date: 5/15/2024     SUBJECTIVE:  Loretta Odom is a 58 y.o., female being seen today regarding   Chief Complaint   Patient presents with    Pain     Lower back, bilateral hips, right wrist     and routine medication management.    Functionality Assessment & Goals:  On a scale of 0 (Does not Interfere) to 10 (Completely Interferes)  Which number describes how during the past week pain has interfered with the following:   A.  General Activity: 6    B.  Mood:  4   C.  Walking Ability:  4   D.  Normal Work (Includes both work outside the home and housework):  6   E.  Relations with Other People:  3   F.  Sleep:  6    G.  Enjoyment of Life:  5  2.  Patient prefers to Take their Pain Medications:   [] On a regular basis   [x] Only when necessary   [] Does not take pain medications  3.  What are the Patient's Goals/ Expectations for Visiting Pain Management?   [] Learn about my pain   [] Physical Therapy   [x] Receive Injections   [] Deal with Anxiety and Stress   [x] Receive Medication   [] Treat Depression    [] Treat Sleep   [] Treat Opioid Dependence/ Addiction    Recent Imaging since last appointment related to chief complaint:  no  Recent Interventional Procedures since last appointment related to chief complaint:  January 2024 bilateral L5 TFE  Patient reports >80% % improvement of pain and ADL's after procedure, lasted >3 months.    Most recent Oswestry Disability Questionnaire completed by patient on n/a      Current Pain Assessment:         5/15/2024     1:12 PM   AMB PAIN ASSESSMENT   Location of Pain Back   Severity of Pain 4   Quality of Pain Aching;Other (Comment)   Duration of Pain Persistent   Frequency of Pain Constant   Aggravating Factors Bending   Limiting Behavior Some   Relieving Factors Rest;Other (Comment)        Current Medications & Allergies:   Current

## 2024-05-15 NOTE — PATIENT INSTRUCTIONS
[place] [address]    PROCEDURE INSTRUCTIONS FOR PAIN MANAGEMENT PROCEDURES    You are scheduled to see Dr. Cowan to undergo the following procedure:  Transforaminal Epidural steroid injection Both sides L5 with valium    Procedure Date:  Wed 5/29 or 6/12 if approved by insurance    Arrival Time: You will receive a call from [facility] to inform you of your arrival time at least a couple days prior to your procedure.    Enter the facility through the ER doors and take the elevator to the 2nd floor.  Check in at Same Day Surgery.     1. Stop the following medications prior to the procedure:     Stop blood thinners as directed before the injection, with permission from your cardiologist or primary care physician.  We will send a letter to them requesting permission to hold the blood thinners.     2.  Take all routine medications unless otherwise instructed. Ok to take vitamins and antiinflammatory medications    3.  EATING & DRINKING:  NO FOOD OR LIQUIDS FOR 4 HOURS PRIOR TO THE PROCEDURE    4. If you are allergic to contrast or iodine, you must take benadryl and prednisone prior to the injection to prevent an allergic reaction.  Follow the directions on the prescription for the times to take the medication.    5.  Wear simple loose clothing, which can be easily changed.      6.  Leave jewelry (including rings) and other valuables at home.     7.  Make arrangements for a family member or friend to drive you to the surgery center.  Your ride must stay in the hospital while you are having the injection done.  The sedation may affect your judgment following the procedure and driving a vehicle within 24 hours after the sedation could be dangerous.     8.  You will be asked to sign several forms prior to surgery; patients under the age of 18 must have a parent or legal guardian sign the permit to be able to do the procedure.      9. Bring inhalers

## 2024-05-15 NOTE — TELEPHONE ENCOUNTER
5/29 at  if approved.    CPT 63279 TFE Ilan L5  Dx M54.16, M96.1     I called [facility] mutual provider line and pushed 7 for auth's [id number].  Code now needs PA thru [employer].  Attempted to submit online.  Could not without uploading.  Draft Tracking [id number].  Request and Records faxed to [phone].    (Instructions reviewed at office visit and on AVS today.  She will hold ASA 3 days prior per self. )     Pre procedure valium sent at OV today to [facility].

## 2024-05-15 NOTE — TELEPHONE ENCOUNTER
Check with [facility] for TFE shayna L5 w/ valium FC 5/29.  Holding ASA 3-5 days prior per pt.  No doctor ever told her to take per pt report.  She just holds ASA because she takes it on her own.

## 2024-05-20 ENCOUNTER — PROCEDURE VISIT (OUTPATIENT)
Dept: PAIN MANAGEMENT | Age: 59
End: 2024-05-20

## 2024-05-20 VITALS
OXYGEN SATURATION: 97 % | DIASTOLIC BLOOD PRESSURE: 70 MMHG | BODY MASS INDEX: 31.46 KG/M2 | WEIGHT: 172 LBS | SYSTOLIC BLOOD PRESSURE: 120 MMHG | HEART RATE: 70 BPM

## 2024-05-20 DIAGNOSIS — G56.01 CARPAL TUNNEL SYNDROME, RIGHT: Primary | ICD-10-CM

## 2024-05-20 NOTE — PROGRESS NOTES
Loretta Odom  1965 1/30/23      PAIN MANAGEMENT CLINIC PROCEDURE NOTE    CHIEF COMPLAINT: This is a 58 y.o. female patient who presents to the Pain Management Clinic with a history of pain in the right wrist(s).    PRE-PROCEDURE DIAGNOSIS: Right carpal tunnel syndrome      POST-PROCEDURE DIAGNOSIS: same as pre-procedure diagnosis    Vitals:    05/20/24 1047   BP: 120/70   Pulse: 70   SpO2: 97%          PROCEDURE:     The procedure was explained, including risks and benefits, and the patient has agreed to proceed. The injection site was marked on the patient’s skin. A large area around the injection site was cleaned with chlora-prep.    Carpal Tunnel Injection:  A 25-gauge 1.5 inch needle was inserted distal to theright  wrist crease between the palmaris longus and flexor carpi radialis tendons bilateral. Once the needle tip was estimated to be in the carpal tunnel, the needle was aspirated and negative for heme and slowly a combination of 1% lidocaine and 10 mg of Kenalog  (NDC# 8783-2564-87) was injected along the bilateral median nerve. The patient remained neurovascularly intact during and after the procedure and was discharged in stable condition. Numbness in the median nerve distribution was noted with in minutes of the injection.     The injection site was cleaned and dressed with a spot band aid.  The patient tolerated the procedure well and with out complication The patient was instructed avoid heat and excessive activity for 24-48 hours.

## 2024-05-21 NOTE — TELEPHONE ENCOUNTER
Form received from [employer], requesting it be completed and faxed back to them with clinicals.  Done, even though form said [employer] and pt is not [employer].  Await response.

## 2024-05-21 NOTE — TELEPHONE ENCOUNTER
Pending review by [facility]  Authorization #[id number]  Tracking #[id number]   Per [employer] online.    Still says there are no clinicals. Even though I faxed them.  I will fax again, asking for an update.  I would like to know by tomorrow 5/22 so pt can keep     I called [employer] to check status with automated system.  I was transferred to an .  They cannot help with authorizations for MMO, only trouble shooting.  I am told to wait the review online.

## 2024-05-22 NOTE — TELEPHONE ENCOUNTER
I called [facility] to inquire on auth status.  Representative told me they had not received clinicals which I tried to fax twice.  She gave me a new fax for [employer] [phone].  I faxed clinicals again.     Lm to notify pt that we cannot proceed on 5/29 due to  surgery center's requirement to close the schedule at noon the Wednesday prior to Wednesday injections.  Tentatively moved to 6/12 if approved.

## 2024-06-05 NOTE — TELEPHONE ENCOUNTER
The pain injection denial states they need an MRI to demonstrate that pt does not have acute spinal cord compression.    Can MRI be ordered?  If yes,  I have pended an order below.

## 2024-06-07 NOTE — TELEPHONE ENCOUNTER
Pt notified thru pt message of MRI.  Submitted PA thru [phone] #[phone] and spoke to Ira BISHOP  Case #[id number].  Clinicals faxed to [phone].

## 2024-08-26 ENCOUNTER — OFFICE VISIT (OUTPATIENT)
Dept: DIABETES SERVICES | Age: 59
End: 2024-08-26
Payer: COMMERCIAL

## 2024-08-26 VITALS
SYSTOLIC BLOOD PRESSURE: 138 MMHG | BODY MASS INDEX: 30.36 KG/M2 | OXYGEN SATURATION: 97 % | HEIGHT: 62 IN | DIASTOLIC BLOOD PRESSURE: 86 MMHG | HEART RATE: 83 BPM | WEIGHT: 165 LBS

## 2024-08-26 DIAGNOSIS — I10 ESSENTIAL HYPERTENSION: ICD-10-CM

## 2024-08-26 DIAGNOSIS — E10.9 TYPE 1 DIABETES MELLITUS WITHOUT COMPLICATION (HCC): Primary | ICD-10-CM

## 2024-08-26 DIAGNOSIS — E66.09 CLASS 1 OBESITY DUE TO EXCESS CALORIES WITH SERIOUS COMORBIDITY AND BODY MASS INDEX (BMI) OF 30.0 TO 30.9 IN ADULT: ICD-10-CM

## 2024-08-26 DIAGNOSIS — E78.5 HYPERLIPIDEMIA, UNSPECIFIED HYPERLIPIDEMIA TYPE: ICD-10-CM

## 2024-08-26 PROCEDURE — 3017F COLORECTAL CA SCREEN DOC REV: CPT | Performed by: NURSE PRACTITIONER

## 2024-08-26 PROCEDURE — 3079F DIAST BP 80-89 MM HG: CPT | Performed by: NURSE PRACTITIONER

## 2024-08-26 PROCEDURE — 3046F HEMOGLOBIN A1C LEVEL >9.0%: CPT | Performed by: NURSE PRACTITIONER

## 2024-08-26 PROCEDURE — 99214 OFFICE O/P EST MOD 30 MIN: CPT | Performed by: NURSE PRACTITIONER

## 2024-08-26 PROCEDURE — 95251 CONT GLUC MNTR ANALYSIS I&R: CPT | Performed by: NURSE PRACTITIONER

## 2024-08-26 PROCEDURE — 3075F SYST BP GE 130 - 139MM HG: CPT | Performed by: NURSE PRACTITIONER

## 2024-08-26 PROCEDURE — G2211 COMPLEX E/M VISIT ADD ON: HCPCS | Performed by: NURSE PRACTITIONER

## 2024-08-26 PROCEDURE — 2022F DILAT RTA XM EVC RTNOPTHY: CPT | Performed by: NURSE PRACTITIONER

## 2024-08-26 PROCEDURE — 1036F TOBACCO NON-USER: CPT | Performed by: NURSE PRACTITIONER

## 2024-08-26 PROCEDURE — G8427 DOCREV CUR MEDS BY ELIG CLIN: HCPCS | Performed by: NURSE PRACTITIONER

## 2024-08-26 PROCEDURE — G8417 CALC BMI ABV UP PARAM F/U: HCPCS | Performed by: NURSE PRACTITIONER

## 2024-08-26 ASSESSMENT — ENCOUNTER SYMPTOMS
ABDOMINAL PAIN: 0
SHORTNESS OF BREATH: 0
RESPIRATORY NEGATIVE: 1
DIARRHEA: 0

## 2024-08-26 NOTE — PROGRESS NOTES
[facility]  [address] [facility] [address] [phone]        HISTORY:    Loretta Odom presents today for evaluation and management of:  Chief Complaint   Patient presents with    Diabetes       Patient Care Team:  Amita Reeves APRN - NP as PCP - General      Interval History:    Past DM Medications   none     Current Diabetic Medications  Humalog for use with tandem tslim. Max daily dose 60 units  Lantus 15 units for pump back up     DKA episodes: 0    04/24/24   Loretta Oodm is a 58 y.o. female patient who presents to clinic today for her diabetes. she has a history of HTN, hyperlipidemia which contributes to her diabetes. At previous visit diabetes counseling was provided. she denies any current signs or symptoms of hyper/hypoglycemia. she states they are taking their medications as prescribed without any adverse effects.   Diet: counting carbs  Exercise: none  BS testing: uses cgm daily with success and is adherent to cgm therapy  Hypoglycemia: Yes    Sweats and Tremors                 Hypoglycemia Frequency: 1 per month  Hypoglycemia as needed treatment: juice or glucose tabs  Issues:   Diabetic foot exam up-to-date: No  Diabetic retinal exam up-to-date: No     Diabetes complications:none    08/26/24   Loretta Odom is a 59 y.o. female patient who presents to clinic today for her diabetes. she has a history of  HTN, hyperlipidemia which contributes to her diabetes. At previous visit diabetes counseling was provided. she denies any current signs or symptoms of hyper/hypoglycemia. she states they are taking their medications as prescribed without any adverse effects.   Diet: counting carbs  Exercise: none  BS testing: uses cgm daily with success and is adherent to cgm therapy  Hypoglycemia: Yes    Sweats and Tremors                 Hypoglycemia Frequency: 1 per month  Hypoglycemia as needed treatment:  Sig: Semaglutide/cyanocobalamin 2 mg-0.4/mg/ml  Inject 0.125 ml (0.25 mg) once weekly       1. Type 1 diabetes mellitus without complication (HCC)  - Stable  HbA1C goal is less than 7%.  - Fasting blood glucose goal is 70-120mg/dl and postprandial blood sugar goal is less than 180 mg/dl.   - Labs reviewed including most recent A1c, UACR and kidney function. Repeat labs due in 3 months.    -We discussed in great detail dietary modifications they can make to better improve their blood sugars.  -follow up diabetes education completed, all questions answered.  CGM report downloaded and reviewed from the past 2 weeks scanned to media tab.   Insulin pump settings downloaded and reviewed. Scanned into media tab.   -she will  utilize sleep mode to prevent lows from autocorrection during the day.       Discussed signs and symptoms of hyper/hypoglycemia and how to treat. Encouraged 150 minutes of physical activity per week. Follow a low carbohydrate diet. Encouraged at least 7 hours of sleep.The patient was informed of the goals of diabetes management. This can only be accomplished by watching their diet and exercise levels. We certainly use medicines to help attain these goals. The consequences of not controlling blood sugars were discussed. These include blindness, heart disease, stroke, kidney disease, and possibly need for dialysis. They were told to be careful with their foot care as diabetics often have nerve damage, infections and risk for limb amutations . They also need a dilated eye exam yearly. We discussed the issues of diet, exercise, medication, complication avoidance, reviewed the signs and symptoms of diabetes, hypoglycemic episodes, significance of HbA1C.       2. Hyperlipidemia, unspecified hyperlipidemia type  stable, lipid panel reviewed, continue current medications. Diet and exercise.       3. Essential hypertension   stable, continue current medications. Diet and exercise Seek emergent care if chest

## 2024-08-29 ENCOUNTER — PATIENT MESSAGE (OUTPATIENT)
Dept: DIABETES SERVICES | Age: 59
End: 2024-08-29

## 2024-08-29 RX ORDER — SEMAGLUTIDE 0.25 MG/.5ML
0.25 INJECTION, SOLUTION SUBCUTANEOUS
Qty: 6 ML | Refills: 3 | Status: SHIPPED | OUTPATIENT
Start: 2024-08-29

## 2024-09-03 ENCOUNTER — OFFICE VISIT (OUTPATIENT)
Dept: PRIMARY CARE CLINIC | Age: 59
End: 2024-09-03
Payer: COMMERCIAL

## 2024-09-03 VITALS
OXYGEN SATURATION: 99 % | HEART RATE: 70 BPM | DIASTOLIC BLOOD PRESSURE: 78 MMHG | SYSTOLIC BLOOD PRESSURE: 130 MMHG | HEIGHT: 62 IN | BODY MASS INDEX: 30.55 KG/M2 | WEIGHT: 166 LBS | TEMPERATURE: 98.3 F | RESPIRATION RATE: 16 BRPM

## 2024-09-03 DIAGNOSIS — L08.9 FINGER INFECTION: Primary | ICD-10-CM

## 2024-09-03 PROCEDURE — 1036F TOBACCO NON-USER: CPT | Performed by: FAMILY MEDICINE

## 2024-09-03 PROCEDURE — 3075F SYST BP GE 130 - 139MM HG: CPT | Performed by: FAMILY MEDICINE

## 2024-09-03 PROCEDURE — 99213 OFFICE O/P EST LOW 20 MIN: CPT | Performed by: FAMILY MEDICINE

## 2024-09-03 PROCEDURE — G8417 CALC BMI ABV UP PARAM F/U: HCPCS | Performed by: FAMILY MEDICINE

## 2024-09-03 PROCEDURE — 3078F DIAST BP <80 MM HG: CPT | Performed by: FAMILY MEDICINE

## 2024-09-03 PROCEDURE — 3017F COLORECTAL CA SCREEN DOC REV: CPT | Performed by: FAMILY MEDICINE

## 2024-09-03 PROCEDURE — G8427 DOCREV CUR MEDS BY ELIG CLIN: HCPCS | Performed by: FAMILY MEDICINE

## 2024-09-03 RX ORDER — SULFAMETHOXAZOLE/TRIMETHOPRIM 800-160 MG
1 TABLET ORAL 2 TIMES DAILY
Qty: 14 TABLET | Refills: 0 | Status: SHIPPED | OUTPATIENT
Start: 2024-09-03

## 2024-09-03 ASSESSMENT — PATIENT HEALTH QUESTIONNAIRE - PHQ9
SUM OF ALL RESPONSES TO PHQ9 QUESTIONS 1 & 2: 0
SUM OF ALL RESPONSES TO PHQ QUESTIONS 1-9: 0
2. FEELING DOWN, DEPRESSED OR HOPELESS: NOT AT ALL
1. LITTLE INTEREST OR PLEASURE IN DOING THINGS: NOT AT ALL

## 2024-09-03 ASSESSMENT — ENCOUNTER SYMPTOMS
WHEEZING: 0
SHORTNESS OF BREATH: 0
CHEST TIGHTNESS: 0
COUGH: 0

## 2024-09-03 NOTE — PROGRESS NOTES
[facility]  [address]  [facility] [address]  Dept: [phone]  Dept Fax: [phone]    Loretta Odom is a 59 y.o. female who presents today for her medical conditions/complaints as noted below.  Loretta Odom is c/o of   Chief Complaint   Patient presents with    Finger Pain     Cut left ring finger with paring knife 6 days ago       HPI:     HPI Here today for finger pain.    She cut her finger about 6 days ago with a kitchen knife. She has not had any fevers. Her finger is swollen. She has noticed a tiny bit of drainage from the finger. She has not felt unwell or had any systemic symptoms. She is not putting anything on the finger and she is not doing any soaks.      Past Medical History:   Diagnosis Date    Bronchitis     Diabetes type 1, controlled (HCC)     Eczema     GERD (gastroesophageal reflux disease)     Gestational diabetes     Gestational diabetes mellitus     Hyperlipidemia     Hypertension     Hypothyroid     Insomnia     Insulin pump in place     Dr. Johnson endocrinology [facility]    Myopia with astigmatism and presbyopia     Rh incompatibility     Tobacco abuse     Under care of team 11/22/2021    endocrine-Dr Johnson-alexandria-last visit nov 2021    Under care of team 11/22/2021    pcp-Dr Cash-last visit nov 2021          Social History     Tobacco Use    Smoking status: Former     Current packs/day: 0.00     Types: Cigarettes     Start date: 1/1/1985     Quit date: 8/16/2014     Years since quitting: 10.0    Smokeless tobacco: Never   Substance Use Topics    Alcohol use: Yes     Alcohol/week: 3.0 standard drinks of alcohol     Types: 3 Standard drinks or equivalent per week     Comment: Socially.-weekly     Current Outpatient Medications   Medication Sig Dispense Refill    sulfamethoxazole-trimethoprim (BACTRIM DS) 800-160 MG per tablet Take 1 tablet by

## 2024-09-22 ENCOUNTER — PATIENT MESSAGE (OUTPATIENT)
Dept: PAIN MANAGEMENT | Age: 59
End: 2024-09-22

## 2024-09-25 ENCOUNTER — OFFICE VISIT (OUTPATIENT)
Dept: PAIN MANAGEMENT | Age: 59
End: 2024-09-25
Payer: COMMERCIAL

## 2024-09-25 VITALS
HEART RATE: 78 BPM | WEIGHT: 166 LBS | DIASTOLIC BLOOD PRESSURE: 75 MMHG | OXYGEN SATURATION: 99 % | SYSTOLIC BLOOD PRESSURE: 134 MMHG | RESPIRATION RATE: 16 BRPM | BODY MASS INDEX: 30.55 KG/M2 | HEIGHT: 62 IN

## 2024-09-25 DIAGNOSIS — G89.29 CHRONIC BILATERAL LOW BACK PAIN WITH BILATERAL SCIATICA: ICD-10-CM

## 2024-09-25 DIAGNOSIS — M51.26 DISPLACEMENT OF LUMBAR INTERVERTEBRAL DISC WITHOUT MYELOPATHY: ICD-10-CM

## 2024-09-25 DIAGNOSIS — M54.16 LUMBAR RADICULOPATHY: ICD-10-CM

## 2024-09-25 DIAGNOSIS — M54.41 CHRONIC BILATERAL LOW BACK PAIN WITH BILATERAL SCIATICA: ICD-10-CM

## 2024-09-25 DIAGNOSIS — M54.42 CHRONIC BILATERAL LOW BACK PAIN WITH BILATERAL SCIATICA: ICD-10-CM

## 2024-09-25 DIAGNOSIS — M96.1 POST LAMINECTOMY SYNDROME: Primary | ICD-10-CM

## 2024-09-25 PROCEDURE — 3017F COLORECTAL CA SCREEN DOC REV: CPT | Performed by: NURSE PRACTITIONER

## 2024-09-25 PROCEDURE — 3075F SYST BP GE 130 - 139MM HG: CPT | Performed by: NURSE PRACTITIONER

## 2024-09-25 PROCEDURE — 3078F DIAST BP <80 MM HG: CPT | Performed by: NURSE PRACTITIONER

## 2024-09-25 PROCEDURE — G8427 DOCREV CUR MEDS BY ELIG CLIN: HCPCS | Performed by: NURSE PRACTITIONER

## 2024-09-25 PROCEDURE — G8417 CALC BMI ABV UP PARAM F/U: HCPCS | Performed by: NURSE PRACTITIONER

## 2024-09-25 PROCEDURE — 1036F TOBACCO NON-USER: CPT | Performed by: NURSE PRACTITIONER

## 2024-09-25 PROCEDURE — 99214 OFFICE O/P EST MOD 30 MIN: CPT | Performed by: NURSE PRACTITIONER

## 2024-09-25 RX ORDER — GABAPENTIN 600 MG/1
600 TABLET ORAL DAILY
Qty: 90 TABLET | Refills: 3 | Status: SHIPPED | OUTPATIENT
Start: 2024-09-25 | End: 2024-10-25

## 2024-09-25 ASSESSMENT — ENCOUNTER SYMPTOMS
SHORTNESS OF BREATH: 1
NAUSEA: 1

## 2024-09-26 ENCOUNTER — HOSPITAL ENCOUNTER (OUTPATIENT)
Dept: PHYSICAL THERAPY | Age: 59
Setting detail: THERAPIES SERIES
Discharge: HOME | End: 2024-09-26
Payer: COMMERCIAL

## 2024-09-26 PROCEDURE — 97161 PT EVAL LOW COMPLEX 20 MIN: CPT

## 2024-09-26 PROCEDURE — 97110 THERAPEUTIC EXERCISES: CPT

## 2024-09-26 ASSESSMENT — PAIN SCALES - GENERAL: PAINLEVEL_OUTOF10: 5

## 2024-09-26 ASSESSMENT — PAIN DESCRIPTION - PAIN TYPE: TYPE: CHRONIC PAIN

## 2024-09-26 ASSESSMENT — PAIN DESCRIPTION - ORIENTATION: ORIENTATION: LEFT;RIGHT;LOWER

## 2024-09-26 ASSESSMENT — PAIN DESCRIPTION - LOCATION: LOCATION: BACK;LEG

## 2024-09-30 ENCOUNTER — HOSPITAL ENCOUNTER (OUTPATIENT)
Dept: PHYSICAL THERAPY | Age: 59
Setting detail: THERAPIES SERIES
Discharge: HOME | End: 2024-09-30
Payer: COMMERCIAL

## 2024-09-30 PROCEDURE — 97110 THERAPEUTIC EXERCISES: CPT | Performed by: PHYSICAL THERAPIST

## 2024-09-30 NOTE — FLOWSHEET NOTE
to the patient for ambulation re-education. (56895)    Self-Care/ADL's  [] Self-care/home management training and compensatory training, meal preparation, safety procedures, and instructions in use of assistive technology devices/adaptive equipment, direct one-on-one contact. (37634)    Home Exercise Program:     [] Reviewed/Progressed HEP activities related to strengthening, flexibility, endurance, ROM. (30091)  [] Reviewed/Progressed HEP activities related to improving balance, coordination, kinesthetic sense, posture, motor skill, proprioception.  (74904)    Manual Treatments:    [] Provided manual therapy to mobilize soft tissue/joints for the purpose of modulating pain, promoting relaxation,  increasing ROM, reducing/eliminating soft tissue swelling/inflammation/restriction, improving soft tissue extensibility. (27931)    Service Based Modalities:      Timed Code Treatment Minutes:   23' ther ex     Total Treatment Minutes:   23'    Treatment/Activity Tolerance:  [x] Patient tolerated treatment well [] Patient limited by fatique  [] Patient limited by pain  [] Patient limited by other medical complications  [] Other:     Prognosis: [x] Good [] Fair  [] Poor    Patient Requires Follow-up: [x] Yes  [] No      Goals:  Short Term Goals  Time Frame for Short Term Goals: 3 weeks  Short Term Goal 1: Initiate HEP    Long Term Goals  Time Frame for Long Term Goals : 6 weeks  Long Term Goal 1: Independent in HEP  Long Term Goal 2: Improve L LE strength to 5/5 to ease standing and walking.  Long Term Goal 3: Reduce Radicular symptoms by 50%  Long Term Goal 4: Improve Oswestry to 15% or less to improve tolerance to ADLs          Plan:   [x] Continue per plan of care [] Alter current plan (see comments)  [] Plan of care initiated [] Hold pending MD visit [] Discharge  Plan for Next Session:      Electronically signed by:  Alphonse Wild PT

## 2024-10-03 ENCOUNTER — HOSPITAL ENCOUNTER (OUTPATIENT)
Dept: PHYSICAL THERAPY | Age: 59
Setting detail: THERAPIES SERIES
Discharge: HOME | End: 2024-10-03
Payer: COMMERCIAL

## 2024-10-03 PROCEDURE — 97110 THERAPEUTIC EXERCISES: CPT

## 2024-10-03 NOTE — FLOWSHEET NOTE
Physical Therapy Daily Treatment Note    Date:  10/3/2024    Patient Name:  Loretta Odom    :  1965  MRN: [id number]  Restrictions/Precautions: L4-5 fusion                                               L5-S1 fusion     Medical/Treatment Diagnosis Information:    Post laminectomy syndrome [M96.1]  Chronic bilateral low back pain with bilateral sciatica [M54.42, M54.41, G89.29]  Lumbar radiculopathy [M54.16]  Displacement of lumbar intervertebral disc without myelopathy [M51.26]    Insurance/Certification information:   [employer]   Physician Information:   Sharmin Saul APRN   Plan of care signed (Y/N):  n  Visit# / total visits: 3/10  Pain level: 3/10       Time In: 10:58   Time Out: 11:22    Progress Note: []  Yes  [x]  No  Next due by: Visit #10      Subjective:  Pt reports doing well. Was sore after last session lasting a day.     Objective: KAYLA complete per flow chart to facilitate strength, motion and stability to allow ease with daily activities. Verbal cueing for sequencing.    Observation:   Test measurements:    Using extension directional preference to protect the L3-4 level, above the 2 areas of fusion.    Exercises:   Exercise/Equipment Resistance/Repetitions Other comments         Prone Prop 2'  HEP     Prone Press up  10x, x2  HEP    Prone BKF 10x, x2     Prone hip extension 10x, x2           clamshell 10x ea     LTR 10x L, 10x R     bridges 10x     PPT -----            Modified ext  10x  HEP     standing ext  10x  HEP     HR/TR, HS curls        hip abd/ext  10x              TB rows/ext  10x     SPO 10x                [] Provided verbal/tactile cueing for activities related to strengthening, flexibility, endurance, ROM. (31863)  [] Provided verbal/tactile cueing for activities related to improving balance, coordination, kinesthetic sense, posture, motor skill, proprioception. (26916)    Therapeutic Activities:     [] Therapeutic activities, direct (one-on-one) patient contact

## 2024-10-07 ENCOUNTER — HOSPITAL ENCOUNTER (OUTPATIENT)
Dept: PHYSICAL THERAPY | Age: 59
Setting detail: THERAPIES SERIES
Discharge: HOME | End: 2024-10-07
Payer: COMMERCIAL

## 2024-10-07 PROCEDURE — 97110 THERAPEUTIC EXERCISES: CPT | Performed by: PHYSICAL THERAPIST

## 2024-10-07 NOTE — FLOWSHEET NOTE
Physical Therapy Daily Treatment Note    Date:  10/7/2024    Patient Name:  Loretta Odom    :  1965  MRN: [id number]  Restrictions/Precautions: L4-5 fusion                                               L5-S1 fusion     Medical/Treatment Diagnosis Information:    Post laminectomy syndrome [M96.1]  Chronic bilateral low back pain with bilateral sciatica [M54.42, M54.41, G89.29]  Lumbar radiculopathy [M54.16]  Displacement of lumbar intervertebral disc without myelopathy [M51.26]    Insurance/Certification information:   [employer]   Physician Information:   Sharmin Saul APRN   Plan of care signed (Y/N):  y  Visit# / total visits: 4/10  Pain level: 3/10       Time In: 12:27   Time Out: 12:50    Progress Note: []  Yes  [x]  No  Next due by: Visit #10      Subjective:   Overall is feeling good. Occassionally gets a shooting pain into R LE, but not very often now.    Objective:   Observation:   Test measurements:    Using extension directional preference to protect the L3-4 level, above the 2 areas of fusion.  Stabilization ex for further protection  B SLRE 85-90 deg, negative dural tension    Exercises:   Exercise/Equipment Resistance/Repetitions Other comments         Prone Prop 2'  HEP     Prone Press up 10x, x2  HEP    Prone BKF 10x, x2     Prone hip extension 10x, x2     Arch/sag 10x x2    Hip ext all 4's 10x x2     clamshell 20x ea     LTR 10x L, 10x R     bridges 10x     PPT -----            Modified ext  10x  HEP     standing ext  10x  HEP     HR/TR, HS curls        hip abd/ext  10x              TB rows/ext       SPO                 [] Provided verbal/tactile cueing for activities related to strengthening, flexibility, endurance, ROM. (98575)  [] Provided verbal/tactile cueing for activities related to improving balance, coordination, kinesthetic sense, posture, motor skill, proprioception. (38987)    Therapeutic Activities:     [] Therapeutic activities, direct (one-on-one) patient

## 2024-10-10 ENCOUNTER — HOSPITAL ENCOUNTER (OUTPATIENT)
Dept: PHYSICAL THERAPY | Age: 59
Setting detail: THERAPIES SERIES
Discharge: HOME | End: 2024-10-10
Payer: COMMERCIAL

## 2024-10-10 PROCEDURE — 97110 THERAPEUTIC EXERCISES: CPT | Performed by: PHYSICAL THERAPIST

## 2024-10-14 ENCOUNTER — HOSPITAL ENCOUNTER (OUTPATIENT)
Dept: PHYSICAL THERAPY | Age: 59
Setting detail: THERAPIES SERIES
Discharge: HOME | End: 2024-10-14
Payer: COMMERCIAL

## 2024-10-14 PROCEDURE — 97110 THERAPEUTIC EXERCISES: CPT

## 2024-10-14 NOTE — FLOWSHEET NOTE
Independent in HEP  Long Term Goal 2: Improve L LE strength to 5/5 to ease standing and walking.  Long Term Goal 3: Reduce Radicular symptoms by 50%  Long Term Goal 4: Improve Oswestry to 15% or less to improve tolerance to ADLs        Plan:  [x] Continue per plan of care [] Alter current plan (see comments)  [] Plan of care initiated [] Hold pending MD visit [] Discharge    Plan for Next Session:      Electronically signed by:  REY ARMAS PTA

## 2024-10-17 ENCOUNTER — HOSPITAL ENCOUNTER (OUTPATIENT)
Dept: PHYSICAL THERAPY | Age: 59
Setting detail: THERAPIES SERIES
Discharge: HOME | End: 2024-10-17
Payer: COMMERCIAL

## 2024-10-17 PROCEDURE — 97110 THERAPEUTIC EXERCISES: CPT

## 2024-10-17 PROCEDURE — 20561 NDL INSJ W/O NJX 3+ MUSC: CPT

## 2024-10-17 NOTE — FLOWSHEET NOTE
Physical Therapy Daily Treatment Note    Date:  10/17/2024    Patient Name:  Loretta Odom    :  1965  MRN: [id number]  Restrictions/Precautions: L4-5 fusion                                               L5-S1 fusion     Medical/Treatment Diagnosis Information:    Post laminectomy syndrome [M96.1]  Chronic bilateral low back pain with bilateral sciatica [M54.42, M54.41, G89.29]  Lumbar radiculopathy [M54.16]  Displacement of lumbar intervertebral disc without myelopathy [M51.26]    Insurance/Certification information:   [employer]   Physician Information:   Sharmin Saul APRN   Plan of care signed (Y/N):  y  Visit# / total visits: 7/10  Pain level: 3/10       Time In: 12:30   Time Out: 1:14    Progress Note: []  Yes  [x]  No  Next due by: Visit #10      Subjective:  Pt reports pain of 3/10, states has had the last 2 days with less pain. Completes home exercises 2x a day.  Patient noting     Objective: KAYLA complete per flow chart to facilitate strength, motion and stability to allow ease with daily activities. Verbal cueing for sequencing, progression, order of exercises. Added IDN this date, with good tolerance.    Observation:   Test measurements:    Using extension directional preference to protect the L3-4 level, above the 2 areas of fusion.  Stabilization ex for further protection  B SLRE 85-90 deg, negative dural tension    Exercises:   Exercise/Equipment Resistance/Repetitions Other comments         Prone Prop 2'  HEP     Prone Press up 10x, x2  HEP    Prone BKF 10x, x2     Prone hip extension 10x, x2     Arch/sag 10x x2    Hip ext all 4's 10x x2     Multifidi Stabilization 10x x2 Knee on disc   clamshell 20x ea     LTR 10x L, 10x R     bridges 15x ea  Narrow, wide                 Modified ext  10x  HEP     standing ext  10x  HEP     HR/TR, HS curls  10x      hip abd/ext  15x      Body Blade  5'      TB rows/ext       SPO                 [] Provided verbal/tactile cueing for activities

## 2024-10-21 ENCOUNTER — HOSPITAL ENCOUNTER (OUTPATIENT)
Dept: PHYSICAL THERAPY | Age: 59
Setting detail: THERAPIES SERIES
Discharge: HOME | End: 2024-10-21
Payer: COMMERCIAL

## 2024-10-21 NOTE — PROGRESS NOTES
Physical Therapy  Outpatient Physical Therapy    [x] [place]  Phone: [phone]  Fax: [phone]      [] [place]  Phone: [phone]  Fax: [phone]    Physical Therapy  Cancellation/No-show Note  Patient Name:  Loretta Odom  :  1965   Date:  10/21/2024  Cancelled visits to date: 1  No-shows to date: 0    For today's appointment patient:  [x]  Cancelled  []  Rescheduled appointment  []  No-show     Reason given by patient:  [x]  Patient ill  []  Conflicting appointment  []  No transportation    []  Conflict with work  []  No reason given  []  Other:     Comments:  has pneumonia    Electronically signed by: Avelina Walsh PTA

## 2024-10-24 ENCOUNTER — HOSPITAL ENCOUNTER (OUTPATIENT)
Dept: PHYSICAL THERAPY | Age: 59
Setting detail: THERAPIES SERIES
Discharge: HOME | End: 2024-10-24
Payer: COMMERCIAL

## 2024-10-24 PROCEDURE — 97110 THERAPEUTIC EXERCISES: CPT

## 2024-10-24 PROCEDURE — 20561 NDL INSJ W/O NJX 3+ MUSC: CPT

## 2024-10-24 NOTE — FLOWSHEET NOTE
Improve L LE strength to 5/5 to ease standing and walking.  Long Term Goal 3: Reduce Radicular symptoms by 50%  Long Term Goal 4: Improve Oswestry to 15% or less to improve tolerance to ADLs        Plan:  [x] Continue per plan of care [] Alter current plan (see comments)  [] Plan of care initiated [] Hold pending MD visit [] Discharge    Plan for Next Session:      Electronically signed by:  Cruz Mosley PT

## 2024-10-25 RX ORDER — GABAPENTIN 600 MG/1
600 TABLET ORAL 3 TIMES DAILY
Qty: 90 TABLET | Refills: 2 | Status: SHIPPED | OUTPATIENT
Start: 2024-10-25 | End: 2025-01-23

## 2024-10-28 ENCOUNTER — HOSPITAL ENCOUNTER (OUTPATIENT)
Dept: PHYSICAL THERAPY | Age: 59
Setting detail: THERAPIES SERIES
Discharge: HOME | End: 2024-10-28
Payer: COMMERCIAL

## 2024-10-28 PROCEDURE — 20560 NDL INSJ W/O NJX 1 OR 2 MUSC: CPT | Performed by: PHYSICAL THERAPIST

## 2024-10-28 PROCEDURE — 97110 THERAPEUTIC EXERCISES: CPT | Performed by: PHYSICAL THERAPIST

## 2024-10-28 NOTE — FLOWSHEET NOTE
Physical Therapy Daily Treatment Note    Date:  10/28/2024    Patient Name:  Loretta Odom    :  1965  MRN: [id number]  Restrictions/Precautions: L4-5 fusion                                               L5-S1 fusion     Medical/Treatment Diagnosis Information:    Post laminectomy syndrome [M96.1]  Chronic bilateral low back pain with bilateral sciatica [M54.42, M54.41, G89.29]  Lumbar radiculopathy [M54.16]  Displacement of lumbar intervertebral disc without myelopathy [M51.26]    Insurance/Certification information:   [employer]   Physician Information:   Sharmin Saul APRN   Plan of care signed (Y/N):  y  Visit# / total visits: 9/10  Pain level: 3/10       Time In: 12:38   Time Out: 1:20    Progress Note: []  Yes  [x]  No  Next due by: Visit #10      Subjective:  \"I'm still having the same symptoms throughout the day. The exercises help, but the results don't last. I try to get the exercises in once per day and the sand-up exercises I do throughout the day.\"    Objective: KAYLA complete per flow chart to facilitate strength, motion and stability to allow ease with daily activities. Verbal cueing for sequencing, progression, order of exercises. Added IDN this date, with good tolerance.    Observation:   Test measurements:    Using extension directional preference to protect the L3-4 level, above the 2 areas of fusion.  Stabilization ex for further protection  B SLRE 85-90 deg, negative dural tension    Exercises:   Exercise/Equipment Resistance/Repetitions Other comments         Prone Prop 5'  HEP     Prone Press up 10x, x2  HEP    Prone BKF 10x, x2     Prone hip extension 10x, x2     Arch/sag 10x x2    Hip ext all 4's 10x x2     Multifidi Stabilization 10x x2 Knee on disc   clamshell 20x ea     LTR 15x L, 15x R     bridges 15x ea  Narrow, wide                 Modified ext  15x  HEP     standing ext  15x  HEP     HR/TR, HS curls  10x      hip abd/ext  15x      Body Blade  5'      TB rows/ext

## 2024-10-31 ENCOUNTER — HOSPITAL ENCOUNTER (OUTPATIENT)
Dept: PHYSICAL THERAPY | Age: 59
Setting detail: THERAPIES SERIES
Discharge: HOME | End: 2024-10-31
Payer: COMMERCIAL

## 2024-10-31 PROCEDURE — 97110 THERAPEUTIC EXERCISES: CPT

## 2024-10-31 NOTE — FLOWSHEET NOTE
Physical Therapy Daily Treatment Note    Date:  10/31/2024    Patient Name:  Loretta Odom    :  1965  MRN: [id number]  Restrictions/Precautions: L4-5 fusion                                               L5-S1 fusion     Medical/Treatment Diagnosis Information:    Post laminectomy syndrome [M96.1]  Chronic bilateral low back pain with bilateral sciatica [M54.42, M54.41, G89.29]  Lumbar radiculopathy [M54.16]  Displacement of lumbar intervertebral disc without myelopathy [M51.26]    Insurance/Certification information:   [employer]   Physician Information:   Sharmin Saul APRN   Plan of care signed (Y/N):  y  Visit# / total visits: 10/10  Pain level: 5/10       Time In:      12:34   Time Out:       1:12    Progress Note: [x]  Yes  []  No  Next due by: Visit #10      Subjective:  Pt reports pain is worse today than usual. She always has increased pain in the mornings and then the more she moves around throughout the day, the better she feels. Pain temporarily improves with ex, but she does not get lasting relief from ex or IDN. Pt has a follow up with pain management next week.    Objective: KAYLA complete per flow chart to facilitate strength, motion and stability to allow ease with daily activities. Verbal cueing for sequencing, progression, order of exercises.   Observation:   Test measurements:  L hip flexion 4+/5  Using extension directional preference to protect the L3-4 level, above the 2 areas of fusion.  Stabilization ex for further protection  B SLRE 85-90 deg, negative dural tension    Exercises:   Exercise/Equipment Resistance/Repetitions Other comments         Prone Prop 5'  HEP     Prone Press up 10x, x2  HEP    Prone BKF 10x, x2     Prone hip extension 10x, x2     Arch/sag 10x x2    Hip ext all 4's 10x x2     Multifidi Stabilization 10x x2 Knee on disc   clamshell 20x ea     LTR 15x L, 15x R     bridges 15x ea  Narrow, wide                 Modified ext  15x  HEP     standing ext

## 2024-11-06 ENCOUNTER — OFFICE VISIT (OUTPATIENT)
Dept: PAIN MANAGEMENT | Age: 59
End: 2024-11-06
Payer: COMMERCIAL

## 2024-11-06 VITALS
BODY MASS INDEX: 29.81 KG/M2 | OXYGEN SATURATION: 96 % | RESPIRATION RATE: 14 BRPM | HEART RATE: 95 BPM | HEIGHT: 62 IN | WEIGHT: 162 LBS | SYSTOLIC BLOOD PRESSURE: 132 MMHG | DIASTOLIC BLOOD PRESSURE: 70 MMHG

## 2024-11-06 DIAGNOSIS — G89.29 CHRONIC RIGHT SACROILIAC JOINT PAIN: ICD-10-CM

## 2024-11-06 DIAGNOSIS — M54.16 LUMBAR RADICULOPATHY: ICD-10-CM

## 2024-11-06 DIAGNOSIS — M53.3 CHRONIC RIGHT SACROILIAC JOINT PAIN: ICD-10-CM

## 2024-11-06 DIAGNOSIS — M96.1 POST LAMINECTOMY SYNDROME: Primary | ICD-10-CM

## 2024-11-06 DIAGNOSIS — G89.29 CHRONIC BILATERAL LOW BACK PAIN WITH BILATERAL SCIATICA: ICD-10-CM

## 2024-11-06 DIAGNOSIS — M54.41 CHRONIC BILATERAL LOW BACK PAIN WITH BILATERAL SCIATICA: ICD-10-CM

## 2024-11-06 DIAGNOSIS — M54.42 CHRONIC BILATERAL LOW BACK PAIN WITH BILATERAL SCIATICA: ICD-10-CM

## 2024-11-06 PROCEDURE — 3078F DIAST BP <80 MM HG: CPT | Performed by: NURSE PRACTITIONER

## 2024-11-06 PROCEDURE — G8417 CALC BMI ABV UP PARAM F/U: HCPCS | Performed by: NURSE PRACTITIONER

## 2024-11-06 PROCEDURE — 3017F COLORECTAL CA SCREEN DOC REV: CPT | Performed by: NURSE PRACTITIONER

## 2024-11-06 PROCEDURE — 1036F TOBACCO NON-USER: CPT | Performed by: NURSE PRACTITIONER

## 2024-11-06 PROCEDURE — G8484 FLU IMMUNIZE NO ADMIN: HCPCS | Performed by: NURSE PRACTITIONER

## 2024-11-06 PROCEDURE — G8427 DOCREV CUR MEDS BY ELIG CLIN: HCPCS | Performed by: NURSE PRACTITIONER

## 2024-11-06 PROCEDURE — 3075F SYST BP GE 130 - 139MM HG: CPT | Performed by: NURSE PRACTITIONER

## 2024-11-06 PROCEDURE — 99214 OFFICE O/P EST MOD 30 MIN: CPT | Performed by: NURSE PRACTITIONER

## 2024-11-06 ASSESSMENT — ENCOUNTER SYMPTOMS
SHORTNESS OF BREATH: 1
NAUSEA: 1

## 2024-11-06 NOTE — PROGRESS NOTES
[facility]  [address] [place] [address]    Patient Name: Loretta Odom  MRN: [id number]  Encounter Date: 11/6/2024     SUBJECTIVE:  Loretta Odom is a 59 y.o., female being seen today regarding   Chief Complaint   Patient presents with    Back Pain     6 wks, pt states things are better but not great     and routine medication management.    Underwent PT, short term relief, continues home exercises    Functionality Assessment & Goals:  On a scale of 0 (Does not Interfere) to 10 (Completely Interferes)  Which number describes how during the past week pain has interfered with the following:   A.  General Activity: 6    B.  Mood:  4   C.  Walking Ability:  4   D.  Normal Work (Includes both work outside the home and housework):  6   E.  Relations with Other People:  3   F.  Sleep:  6    G.  Enjoyment of Life:  5  2.  Patient prefers to Take their Pain Medications:   [] On a regular basis   [x] Only when necessary   [] Does not take pain medications  3.  What are the Patient's Goals/ Expectations for Visiting Pain Management?   [] Learn about my pain   [] Physical Therapy   [x] Receive Injections   [] Deal with Anxiety and Stress   [x] Receive Medication   [] Treat Depression    [] Treat Sleep   [] Treat Opioid Dependence/ Addiction    Recent Imaging since last appointment related to chief complaint:  no  Recent Interventional Procedures since last appointment related to chief complaint:  January 2024 bilateral L5 TFE  Patient reports >80% % improvement of pain and ADL's after procedure, lasted >3 months.    Most recent Oswestry Disability Questionnaire completed by patient on n/a      Current Pain Assessment:         11/6/2024     8:37 AM   AMB PAIN ASSESSMENT   Location of Pain Back   Location Modifiers Other (Comment)   Severity of Pain 4   Quality of Pain Aching   Duration of Pain Persistent   Frequency of Pain Constant   Aggravating Factors Other (Comment);Stairs

## 2024-11-07 ENCOUNTER — PROCEDURE VISIT (OUTPATIENT)
Dept: PAIN MANAGEMENT | Age: 59
End: 2024-11-07

## 2024-11-07 VITALS
HEIGHT: 62 IN | SYSTOLIC BLOOD PRESSURE: 126 MMHG | HEART RATE: 73 BPM | DIASTOLIC BLOOD PRESSURE: 80 MMHG | WEIGHT: 162 LBS | BODY MASS INDEX: 29.81 KG/M2 | OXYGEN SATURATION: 97 %

## 2024-11-07 DIAGNOSIS — G56.01 CARPAL TUNNEL SYNDROME, RIGHT: Primary | ICD-10-CM

## 2024-11-07 RX ORDER — LIDOCAINE HYDROCHLORIDE 20 MG/ML
1 INJECTION, SOLUTION EPIDURAL; INFILTRATION; INTRACAUDAL; PERINEURAL ONCE
Status: COMPLETED | OUTPATIENT
Start: 2024-11-07 | End: 2024-11-07

## 2024-11-07 RX ADMIN — LIDOCAINE HYDROCHLORIDE 1 ML: 20 INJECTION, SOLUTION EPIDURAL; INFILTRATION; INTRACAUDAL; PERINEURAL at 11:44

## 2024-11-07 NOTE — PROGRESS NOTES
Loretta Odom  1965 1/30/23      PAIN MANAGEMENT CLINIC PROCEDURE NOTE    CHIEF COMPLAINT: This is a 59 y.o. female patient who presents to the Pain Management Clinic with a history of pain in the right wrist(s).    PRE-PROCEDURE DIAGNOSIS: Right carpal tunnel syndrome      POST-PROCEDURE DIAGNOSIS: same as pre-procedure diagnosis    Vitals:    11/07/24 1123   BP: 126/80   Pulse: 73   SpO2: 97%          PROCEDURE:     The procedure was explained, including risks and benefits, and the patient has agreed to proceed. The injection site was marked on the patient’s skin. A large area around the injection site was cleaned with chlora-prep.    Carpal Tunnel Injection:  A 25-gauge 1.5 inch needle was inserted distal to theright  wrist crease between the palmaris longus and flexor carpi radialis tendons bilateral. Once the needle tip was estimated to be in the carpal tunnel, the needle was aspirated and negative for heme and slowly a combination of 1% lidocaine and 10 mg of Kenalog  (NDC# 3402-6321-40) was injected along the bilateral median nerve. The patient remained neurovascularly intact during and after the procedure and was discharged in stable condition. Numbness in the median nerve distribution was noted with in minutes of the injection.     The injection site was cleaned and dressed with a spot band aid.  The patient tolerated the procedure well and with out complication The patient was instructed avoid heat and excessive activity for 24-48 hours.    
Chu Willis MD

## 2024-11-13 ENCOUNTER — HOSPITAL ENCOUNTER (OUTPATIENT)
Dept: MRI IMAGING | Age: 59
Discharge: HOME | End: 2024-11-15
Payer: COMMERCIAL

## 2024-11-13 DIAGNOSIS — M96.1 POST LAMINECTOMY SYNDROME: ICD-10-CM

## 2024-11-13 DIAGNOSIS — M54.16 LUMBAR RADICULOPATHY: ICD-10-CM

## 2024-11-13 PROCEDURE — 72148 MRI LUMBAR SPINE W/O DYE: CPT

## 2024-11-24 ENCOUNTER — PATIENT MESSAGE (OUTPATIENT)
Dept: PAIN MANAGEMENT | Age: 59
End: 2024-11-24

## 2024-11-26 RX ORDER — CYCLOBENZAPRINE HCL 10 MG
10 TABLET ORAL 3 TIMES DAILY PRN
Qty: 21 TABLET | Refills: 0 | Status: SHIPPED | OUTPATIENT
Start: 2024-11-26 | End: 2024-12-06

## 2024-12-02 ENCOUNTER — OFFICE VISIT (OUTPATIENT)
Dept: DIABETES SERVICES | Age: 59
End: 2024-12-02
Payer: COMMERCIAL

## 2024-12-02 VITALS
OXYGEN SATURATION: 100 % | WEIGHT: 163 LBS | DIASTOLIC BLOOD PRESSURE: 78 MMHG | HEIGHT: 62 IN | BODY MASS INDEX: 30 KG/M2 | SYSTOLIC BLOOD PRESSURE: 132 MMHG | HEART RATE: 84 BPM

## 2024-12-02 DIAGNOSIS — E10.9 TYPE 1 DIABETES MELLITUS WITHOUT COMPLICATION (HCC): Primary | ICD-10-CM

## 2024-12-02 DIAGNOSIS — E78.5 HYPERLIPIDEMIA, UNSPECIFIED HYPERLIPIDEMIA TYPE: ICD-10-CM

## 2024-12-02 DIAGNOSIS — I10 ESSENTIAL HYPERTENSION: ICD-10-CM

## 2024-12-02 PROCEDURE — 3075F SYST BP GE 130 - 139MM HG: CPT | Performed by: NURSE PRACTITIONER

## 2024-12-02 PROCEDURE — G8417 CALC BMI ABV UP PARAM F/U: HCPCS | Performed by: NURSE PRACTITIONER

## 2024-12-02 PROCEDURE — G2211 COMPLEX E/M VISIT ADD ON: HCPCS | Performed by: NURSE PRACTITIONER

## 2024-12-02 PROCEDURE — 99214 OFFICE O/P EST MOD 30 MIN: CPT | Performed by: NURSE PRACTITIONER

## 2024-12-02 PROCEDURE — 95251 CONT GLUC MNTR ANALYSIS I&R: CPT | Performed by: NURSE PRACTITIONER

## 2024-12-02 PROCEDURE — 3078F DIAST BP <80 MM HG: CPT | Performed by: NURSE PRACTITIONER

## 2024-12-02 PROCEDURE — G8427 DOCREV CUR MEDS BY ELIG CLIN: HCPCS | Performed by: NURSE PRACTITIONER

## 2024-12-02 PROCEDURE — 3017F COLORECTAL CA SCREEN DOC REV: CPT | Performed by: NURSE PRACTITIONER

## 2024-12-02 PROCEDURE — 1036F TOBACCO NON-USER: CPT | Performed by: NURSE PRACTITIONER

## 2024-12-02 PROCEDURE — 3046F HEMOGLOBIN A1C LEVEL >9.0%: CPT | Performed by: NURSE PRACTITIONER

## 2024-12-02 PROCEDURE — G8484 FLU IMMUNIZE NO ADMIN: HCPCS | Performed by: NURSE PRACTITIONER

## 2024-12-02 PROCEDURE — 2022F DILAT RTA XM EVC RTNOPTHY: CPT | Performed by: NURSE PRACTITIONER

## 2024-12-02 ASSESSMENT — ENCOUNTER SYMPTOMS
RESPIRATORY NEGATIVE: 1
SHORTNESS OF BREATH: 0

## 2024-12-02 NOTE — PROGRESS NOTES
Never   Vaping Use    Vaping status: Never Used   Substance and Sexual Activity    Alcohol use: Yes     Alcohol/week: 3.0 standard drinks of alcohol     Types: 3 Standard drinks or equivalent per week     Comment: Socially.-weekly    Drug use: No    Sexual activity: Yes     Partners: Male   Other Topics Concern    Not on file   Social History Narrative    Not on file     Social Determinants of Health     Financial Resource Strain: Low Risk  (9/22/2024)    Received from [facility]    Overall Financial Resource Strain (CARDIA)     Difficulty of Paying Living Expenses: Not hard at all   Food Insecurity: No Food Insecurity (9/22/2024)    Received from [facility]    Hunger Screening     Within the past 12 months we worried whether our food would run out before we got money to buy more.: Never True     Within the past 12 months the food we bought just didn't last and we didn't have money to get more.: Never True   Transportation Needs: No Transportation Needs (9/22/2024)    Received from [facility]    PRAPARE - Transportation     Lack of Transportation (Medical): No     Lack of Transportation (Non-Medical): No   Physical Activity: Insufficiently Active (1/5/2023)    Received from [facility], [facility]    Exercise Vital Sign     Days of Exercise per Week: 1 day     Minutes of Exercise per Session: 20 min   Stress: No Stress Concern Present (1/5/2023)    Received from [facility], [employer] [facility]    [place] [employer] - Occupational Stress Questionnaire     Feeling of Stress : Not at all   Social Connections: Moderately Isolated (1/5/2023)    [facility], [facility] [employer] [facility]    Social Connection and Isolation Panel [NHANES]     Frequency of Communication with Friends and Family: More than three times a week     Frequency of Social Gatherings with Friends and Family: Twice a week      Clindamycin Counseling: I counseled the patient regarding use of clindamycin as an antibiotic for prophylactic and/or therapeutic purposes. Clindamycin is active against numerous classes of bacteria, including skin bacteria. Side effects may include nausea, diarrhea, gastrointestinal upset, rash, hives, yeast infections, and in rare cases, colitis.

## 2024-12-04 ENCOUNTER — OFFICE VISIT (OUTPATIENT)
Dept: PAIN MANAGEMENT | Age: 59
End: 2024-12-04
Payer: COMMERCIAL

## 2024-12-04 VITALS
RESPIRATION RATE: 16 BRPM | WEIGHT: 163 LBS | BODY MASS INDEX: 30 KG/M2 | SYSTOLIC BLOOD PRESSURE: 122 MMHG | DIASTOLIC BLOOD PRESSURE: 80 MMHG | HEART RATE: 88 BPM | HEIGHT: 62 IN | OXYGEN SATURATION: 99 %

## 2024-12-04 DIAGNOSIS — M48.061 SPINAL STENOSIS OF LUMBAR REGION WITHOUT NEUROGENIC CLAUDICATION: ICD-10-CM

## 2024-12-04 DIAGNOSIS — G89.29 CHRONIC BILATERAL LOW BACK PAIN WITH BILATERAL SCIATICA: ICD-10-CM

## 2024-12-04 DIAGNOSIS — G89.29 CHRONIC RIGHT SACROILIAC JOINT PAIN: ICD-10-CM

## 2024-12-04 DIAGNOSIS — M96.1 POST LAMINECTOMY SYNDROME: Primary | ICD-10-CM

## 2024-12-04 DIAGNOSIS — M54.41 CHRONIC BILATERAL LOW BACK PAIN WITH BILATERAL SCIATICA: ICD-10-CM

## 2024-12-04 DIAGNOSIS — M47.812 CERVICAL SPONDYLOSIS: ICD-10-CM

## 2024-12-04 DIAGNOSIS — M54.16 LUMBAR RADICULOPATHY: ICD-10-CM

## 2024-12-04 DIAGNOSIS — M54.42 CHRONIC BILATERAL LOW BACK PAIN WITH BILATERAL SCIATICA: ICD-10-CM

## 2024-12-04 DIAGNOSIS — M53.3 CHRONIC RIGHT SACROILIAC JOINT PAIN: ICD-10-CM

## 2024-12-04 PROCEDURE — G8417 CALC BMI ABV UP PARAM F/U: HCPCS | Performed by: NURSE PRACTITIONER

## 2024-12-04 PROCEDURE — 99215 OFFICE O/P EST HI 40 MIN: CPT | Performed by: NURSE PRACTITIONER

## 2024-12-04 PROCEDURE — 3079F DIAST BP 80-89 MM HG: CPT | Performed by: NURSE PRACTITIONER

## 2024-12-04 PROCEDURE — 3017F COLORECTAL CA SCREEN DOC REV: CPT | Performed by: NURSE PRACTITIONER

## 2024-12-04 PROCEDURE — G8484 FLU IMMUNIZE NO ADMIN: HCPCS | Performed by: NURSE PRACTITIONER

## 2024-12-04 PROCEDURE — 3074F SYST BP LT 130 MM HG: CPT | Performed by: NURSE PRACTITIONER

## 2024-12-04 PROCEDURE — 1036F TOBACCO NON-USER: CPT | Performed by: NURSE PRACTITIONER

## 2024-12-04 PROCEDURE — G8427 DOCREV CUR MEDS BY ELIG CLIN: HCPCS | Performed by: NURSE PRACTITIONER

## 2024-12-04 RX ORDER — DIAZEPAM 5 MG/1
TABLET ORAL
Qty: 2 TABLET | Refills: 0 | Status: SHIPPED | OUTPATIENT
Start: 2024-12-04 | End: 2024-12-26

## 2024-12-04 RX ORDER — LEVOTHYROXINE SODIUM 50 MCG
TABLET ORAL
COMMUNITY
Start: 2024-11-27

## 2024-12-04 ASSESSMENT — ENCOUNTER SYMPTOMS
NAUSEA: 1
SHORTNESS OF BREATH: 1

## 2024-12-04 NOTE — PROGRESS NOTES
[facility]  [address] [place]. [address]    Patient Name: Loretta Odom  MRN: [id number]  Encounter Date: 12/4/2024     SUBJECTIVE:  Loretta Odom is a 59 y.o., female being seen today regarding   Chief Complaint   Patient presents with    post laminectomy syndrome     Review mri    and routine medication management.    Underwent PT, short term relief, continues home exercises    Functionality Assessment & Goals:  On a scale of 0 (Does not Interfere) to 10 (Completely Interferes)  Which number describes how during the past week pain has interfered with the following:   A.  General Activity: 6    B.  Mood:  4   C.  Walking Ability:  4   D.  Normal Work (Includes both work outside the home and housework):  6   E.  Relations with Other People:  3   F.  Sleep:  6    G.  Enjoyment of Life:  5  2.  Patient prefers to Take their Pain Medications:   [] On a regular basis   [x] Only when necessary   [] Does not take pain medications  3.  What are the Patient's Goals/ Expectations for Visiting Pain Management?   [] Learn about my pain   [] Physical Therapy   [x] Receive Injections   [] Deal with Anxiety and Stress   [x] Receive Medication   [] Treat Depression    [] Treat Sleep   [] Treat Opioid Dependence/ Addiction    Recent Imaging since last appointment related to chief complaint:  no  Recent Interventional Procedures since last appointment related to chief complaint:  January 2024 bilateral L5 TFE  Patient reports >80% % improvement of pain and ADL's after procedure, lasted >3 months.    Most recent Oswestry Disability Questionnaire completed by patient on n/a      Current Pain Assessment:         12/4/2024     8:39 AM   AMB PAIN ASSESSMENT   Location of Pain Back   Location Modifiers Left;Right   Severity of Pain 6   Quality of Pain Dull;Aching   Duration of Pain Persistent   Frequency of Pain Constant   Aggravating Factors Standing;Stairs   Limiting Behavior Yes   Relieving

## 2024-12-09 ENCOUNTER — TELEPHONE (OUTPATIENT)
Dept: PAIN MANAGEMENT | Age: 59
End: 2024-12-09

## 2024-12-09 PROBLEM — M48.061 SPINAL STENOSIS, LUMBAR REGION, WITHOUT NEUROGENIC CLAUDICATION: Status: ACTIVE | Noted: 2024-12-04

## 2024-12-09 NOTE — TELEPHONE ENCOUNTER
L3-L4 IESI  with no sedation scheduled for 12/20/24 with surgery center notified.     ASA 81 mg self prescribed with patient acknowledged understanding to hold medication 3 days prior to procedure.     BS & preg lab added.    Patient has decided to not take valium prior to procedure.    Pre op instructions sent via my chart.

## 2024-12-10 ENCOUNTER — TELEPHONE (OUTPATIENT)
Dept: SURGERY | Age: 59
End: 2024-12-10

## 2024-12-10 ENCOUNTER — HOSPITAL ENCOUNTER (OUTPATIENT)
Dept: BONE DENSITY | Age: 59
Discharge: HOME | End: 2024-12-12
Payer: COMMERCIAL

## 2024-12-10 ENCOUNTER — HOSPITAL ENCOUNTER (OUTPATIENT)
Dept: MAMMOGRAPHY | Age: 59
Discharge: HOME | End: 2024-12-12
Payer: COMMERCIAL

## 2024-12-10 VITALS — BODY MASS INDEX: 30.21 KG/M2 | HEIGHT: 61 IN | WEIGHT: 160 LBS

## 2024-12-10 DIAGNOSIS — Z12.31 ENCOUNTER FOR SCREENING MAMMOGRAM FOR MALIGNANT NEOPLASM OF BREAST: ICD-10-CM

## 2024-12-10 DIAGNOSIS — N95.1 MENOPAUSAL AND FEMALE CLIMACTERIC STATES: ICD-10-CM

## 2024-12-10 PROCEDURE — 77080 DXA BONE DENSITY AXIAL: CPT

## 2024-12-10 PROCEDURE — 77063 BREAST TOMOSYNTHESIS BI: CPT

## 2024-12-10 NOTE — TELEPHONE ENCOUNTER
Mailed Dr Hernandez colonoscopy paperwork for repeat colonoscopy.  Referral of SHOLA Christianson (   Records in Care Everywhere

## 2024-12-20 ENCOUNTER — APPOINTMENT (OUTPATIENT)
Dept: GENERAL RADIOLOGY | Age: 59
End: 2024-12-20
Attending: PHYSICAL MEDICINE & REHABILITATION
Payer: COMMERCIAL

## 2024-12-20 ENCOUNTER — HOSPITAL ENCOUNTER (OUTPATIENT)
Age: 59
Setting detail: OUTPATIENT SURGERY
Discharge: HOME | End: 2024-12-20
Attending: PHYSICAL MEDICINE & REHABILITATION | Admitting: PHYSICAL MEDICINE & REHABILITATION
Payer: COMMERCIAL

## 2024-12-20 VITALS
TEMPERATURE: 97.6 F | WEIGHT: 160 LBS | HEART RATE: 73 BPM | SYSTOLIC BLOOD PRESSURE: 132 MMHG | RESPIRATION RATE: 16 BRPM | BODY MASS INDEX: 30.21 KG/M2 | OXYGEN SATURATION: 96 % | HEIGHT: 61 IN | DIASTOLIC BLOOD PRESSURE: 74 MMHG

## 2024-12-20 PROCEDURE — 3600000054 HC PAIN LEVEL 3 BASE: Performed by: PHYSICAL MEDICINE & REHABILITATION

## 2024-12-20 PROCEDURE — 2709999900 HC NON-CHARGEABLE SUPPLY: Performed by: PHYSICAL MEDICINE & REHABILITATION

## 2024-12-20 PROCEDURE — 7100000010 HC PHASE II RECOVERY - FIRST 15 MIN: Performed by: PHYSICAL MEDICINE & REHABILITATION

## 2024-12-20 PROCEDURE — 6360000002 HC RX W HCPCS: Performed by: PHYSICAL MEDICINE & REHABILITATION

## 2024-12-20 PROCEDURE — 6360000004 HC RX CONTRAST MEDICATION: Performed by: PHYSICAL MEDICINE & REHABILITATION

## 2024-12-20 PROCEDURE — 2500000003 HC RX 250 WO HCPCS: Performed by: PHYSICAL MEDICINE & REHABILITATION

## 2024-12-20 PROCEDURE — 2580000003 HC RX 258: Performed by: PHYSICAL MEDICINE & REHABILITATION

## 2024-12-20 RX ORDER — DEXAMETHASONE SODIUM PHOSPHATE 10 MG/ML
INJECTION INTRAMUSCULAR; INTRAVENOUS PRN
Status: DISCONTINUED | OUTPATIENT
Start: 2024-12-20 | End: 2024-12-20 | Stop reason: ALTCHOICE

## 2024-12-20 RX ORDER — SODIUM CHLORIDE 9 MG/ML
INJECTION, SOLUTION INTRAVENOUS PRN
Status: DISCONTINUED | OUTPATIENT
Start: 2024-12-20 | End: 2024-12-20 | Stop reason: HOSPADM

## 2024-12-20 RX ORDER — SODIUM CHLORIDE 0.9 % (FLUSH) 0.9 %
5-40 SYRINGE (ML) INJECTION PRN
Status: CANCELLED | OUTPATIENT
Start: 2024-12-20

## 2024-12-20 RX ORDER — SODIUM CHLORIDE 0.9 % (FLUSH) 0.9 %
5-40 SYRINGE (ML) INJECTION PRN
Status: DISCONTINUED | OUTPATIENT
Start: 2024-12-20 | End: 2024-12-20 | Stop reason: HOSPADM

## 2024-12-20 RX ORDER — SODIUM CHLORIDE 9 MG/ML
INJECTION, SOLUTION INTRAMUSCULAR; INTRAVENOUS; SUBCUTANEOUS PRN
Status: DISCONTINUED | OUTPATIENT
Start: 2024-12-20 | End: 2024-12-20 | Stop reason: ALTCHOICE

## 2024-12-20 RX ORDER — SODIUM CHLORIDE 0.9 % (FLUSH) 0.9 %
5-40 SYRINGE (ML) INJECTION EVERY 12 HOURS SCHEDULED
Status: CANCELLED | OUTPATIENT
Start: 2024-12-20

## 2024-12-20 RX ORDER — BUPIVACAINE HYDROCHLORIDE 5 MG/ML
INJECTION, SOLUTION EPIDURAL; INTRACAUDAL PRN
Status: DISCONTINUED | OUTPATIENT
Start: 2024-12-20 | End: 2024-12-20 | Stop reason: ALTCHOICE

## 2024-12-20 RX ORDER — SODIUM CHLORIDE 9 MG/ML
INJECTION, SOLUTION INTRAVENOUS PRN
Status: CANCELLED | OUTPATIENT
Start: 2024-12-20

## 2024-12-20 RX ORDER — SODIUM CHLORIDE 0.9 % (FLUSH) 0.9 %
5-40 SYRINGE (ML) INJECTION EVERY 12 HOURS SCHEDULED
Status: DISCONTINUED | OUTPATIENT
Start: 2024-12-20 | End: 2024-12-20 | Stop reason: HOSPADM

## 2024-12-20 RX ORDER — IOPAMIDOL 408 MG/ML
INJECTION, SOLUTION INTRATHECAL PRN
Status: DISCONTINUED | OUTPATIENT
Start: 2024-12-20 | End: 2024-12-20 | Stop reason: ALTCHOICE

## 2024-12-20 ASSESSMENT — ENCOUNTER SYMPTOMS
NAUSEA: 1
SHORTNESS OF BREATH: 1

## 2024-12-20 ASSESSMENT — PAIN - FUNCTIONAL ASSESSMENT
PAIN_FUNCTIONAL_ASSESSMENT: NONE - DENIES PAIN
PAIN_FUNCTIONAL_ASSESSMENT: 0-10
PAIN_FUNCTIONAL_ASSESSMENT: ACTIVITIES ARE NOT PREVENTED

## 2024-12-20 ASSESSMENT — PAIN DESCRIPTION - DESCRIPTORS: DESCRIPTORS: DULL;SHARP

## 2024-12-20 NOTE — H&P
WITHOUT CONTRAST 6/8/2022 5:24 pm     TECHNIQUE:  Multiplanar multisequence MRI of the cervical spine was performed without the  administration of intravenous contrast.     COMPARISON:  None.     HISTORY:  ORDERING SYSTEM PROVIDED HISTORY: Cervical radiculopathy  TECHNOLOGIST PROVIDED HISTORY:  radiculopathy  Reason for Exam: Chronic neck pain. Right shoulder pain and right arm  numbness for approximately two months.  Additional signs and symptoms: Cervical radiculopathy     FINDINGS:  BONES/ALIGNMENT: The vertebral body heights are maintained.  There is  age-appropriate bone marrow signal.  There is multilevel degenerative disc  disease with loss of disc signal.  There is no significant disc space  narrowing.  There is no spondylolisthesis.     SPINAL CORD: The spinal cord is normal in caliber and signal.     SOFT TISSUES: The posterior paraspinal soft tissues are unremarkable.  The  prevertebral soft tissues are unremarkable.     C2-C3: There is no significant disc protrusion, spinal canal stenosis or  neural foraminal narrowing.     C3-C4: There is no significant disc protrusion, spinal canal stenosis or  neural foraminal narrowing.     C4-C5: There is no significant disc protrusion, spinal canal stenosis or  neural foraminal narrowing.     C5-C6: There is a disc osteophyte complex with uncovertebral and facet  hypertrophy.  There is no canal stenosis.  There is moderate bilateral  foraminal narrowing.     C6-C7: There is no significant disc protrusion, spinal canal stenosis or  neural foraminal narrowing.     C7-T1: There is no significant disc protrusion, spinal canal stenosis or  neural foraminal narrowing.        Impression  Multilevel degenerative disc disease with uncovertebral and facet hypertrophy  resulting in moderate bilateral foraminal narrowing at C5-6.         Cardiovascular:      Rate and Rhythm: Normal rate.   Pulmonary:      Effort: Pulmonary effort is normal. No tachypnea, bradypnea, accessory

## 2024-12-20 NOTE — INTERVAL H&P NOTE
I have interviewed and examined the patient and reviewed the recent History and Physical.  There have been no changes to the recent H&P documentation. The surgical consent form has been signed.    Last anticoagulant medication use was:2-3 days    Premedication taken for contrast allergy?No    Valium taken for oral sedation? No    Outpatient Medications Marked as Taking for the 12/20/24 encounter (Hospital Encounter)   Medication Sig Dispense Refill    SYNTHROID 50 MCG tablet       gabapentin (NEURONTIN) 600 MG tablet Take 1 tablet by mouth 3 times daily for 90 days. 90 tablet 2    NONFORMULARY METHYL B COMPLEX      NONFORMULARY PANTOTHENIC ACID      NONFORMULARY DHEA      NONFORMULARY SPECTRAZYME METAGEST      Omega-3 Fatty Acids (FISH OIL OMEGA-3 PO) Take by mouth      COLLAGEN PO Take by mouth      insulin lispro (HUMALOG) 100 UNIT/ML SOLN injection vial For use with insulin pump max daily dose is 60 units 60 mL 3    losartan-hydroCHLOROthiazide (HYZAAR) 100-12.5 MG per tablet Take 1 tablet by mouth daily 90 tablet 1    Vitamin D (CHOLECALCIFEROL) 1000 UNITS CAPS capsule Take 4 capsules by mouth daily         The patient understands the planned operation and its associated risks and benefits and agrees to proceed.        Electronically signed by Darnell Cowan MD on 12/20/2024 at 9:32 AM

## 2024-12-20 NOTE — OP NOTE
INTERLAMINAR EPIDURAL STEROID INJECTION    12/20/24    Surgeon: Darnell Cowan MD    Pre-operative Diagnosis: Principal Problem:    Spinal stenosis, lumbar region, without neurogenic claudication  Active Problems:    Lumbar radiculopathy    Post laminectomy syndrome  Resolved Problems:    * No resolved hospital problems. *      Post-operative Diagnosis: Same    Assistants: none    INDICATION:  Interlaminar epidural injection has been requested for diagnostic and therapeutic reasons.  Please see H&P for details on previous treatments, examination findings, and work up.    Conservative treatment was ineffective i.e.: ice, NSAIDS, rest,     Patient is unable to perform the following ADL's: ambulating, grooming, sitting, and standing     Pain Assessment: 0-10  Pain Level: 4     Pain Orientation: Lower  Pain Location: Back  Pain Descriptors: Dull, Sharp    Last Plain films: 2024    EXAMINATION:  L3-4 Interlaminar radiculogram/epidurogram.   L3-4 Interlaminar epidural anesthetic injection.   L3-4 Interlaminar epidural steroid injection.    CONSENT: Written consent was obtained from the patient on preprinted consent form after explaining the procedure, indications, potential complications and outcomes. Alternative treatments were also discussed.    DISCUSSION: The patient was sterilely prepped and draped in the usual fashion in the   prone position. “Time out” was verified for correct patient, side, level and procedure.    SEDATION: No conscious sedation was performed during the procedure.  The patient remained awake and conversed throughout the procedure.  The patient underwent pulse oximetry and blood pressure monitoring independently by a trained observer, as well as by a physician.    PROCEDURE:  Under image-intensifier control, a 22 gauge needle x 5 inch spinal needle was guided successfully into the epidural space employing a posterior midline/paramedian interlaminar approach. Needle aspiration was negative for

## 2024-12-20 NOTE — DISCHARGE INSTRUCTIONS
Home Care after Interlaminar Epidural Steroid Injection/Nerve Block    The doctor has done an injection in your neck; upper back; or lower back to decrease pain and inflammation. This may help diagnose the source of your pain.     You may feel sore at the injection site for the next 2-4 days. You may apply ice to the site for 20 minutes on and 20 minutes off to decrease pain and discomfort, if needed, for the first 24 hours. After 24 hours, you may use heat if needed. Remove the band-aid in 24 hours from the injection site.     Your pain may subside right away, or it may take a number of days. This is because two medicines were used in the injection. The first, a local anesthetic, will only work for a few hours. The second, a steroid, may not start working for 2-5 days. Some patients have noticed no changes in their pain for up to 2 weeks.    There may be a time after the local anesthetic wears off that you feel like you have more pain. This is called pain flare. If this happens  Limit your activities for the first 24 hours to those that you can do without pain.  Keep on taking your pain medicine as prescribed.    Sometimes, some patients have had facial and neck flushing, anxiety or nervousness, and mood swings with the use of steroids. These symptoms most often occur within the first 24-48 hours and do not require any treatment. They should go away on their own within one week.    If you have diabetes, steroids will cause your blood sugar to increase. Make sure your primary doctor is aware of this and that you have orders to treat your blood sugar to keep it within your normal range.    You may have some weakness for the next 3-5 hours due to the anesthetic used. Take it easy. No baths or soaking the injection site for 24 hours after the procedure. Taking a shower is okay.    You may resume taking your routine medicines after the procedure including pain medicines as prescribed. Resume any medications held

## 2025-01-09 ENCOUNTER — TELEMEDICINE (OUTPATIENT)
Dept: PAIN MANAGEMENT | Age: 60
End: 2025-01-09
Payer: COMMERCIAL

## 2025-01-09 DIAGNOSIS — G56.01 CARPAL TUNNEL SYNDROME, RIGHT: ICD-10-CM

## 2025-01-09 DIAGNOSIS — M51.26 DISPLACEMENT OF LUMBAR INTERVERTEBRAL DISC WITHOUT MYELOPATHY: ICD-10-CM

## 2025-01-09 DIAGNOSIS — M54.42 CHRONIC BILATERAL LOW BACK PAIN WITH BILATERAL SCIATICA: Primary | ICD-10-CM

## 2025-01-09 DIAGNOSIS — G89.29 CHRONIC BILATERAL LOW BACK PAIN WITH BILATERAL SCIATICA: Primary | ICD-10-CM

## 2025-01-09 DIAGNOSIS — M54.41 CHRONIC BILATERAL LOW BACK PAIN WITH BILATERAL SCIATICA: Primary | ICD-10-CM

## 2025-01-09 PROCEDURE — 99212 OFFICE O/P EST SF 10 MIN: CPT | Performed by: NURSE PRACTITIONER

## 2025-01-09 PROCEDURE — 99214 OFFICE O/P EST MOD 30 MIN: CPT | Performed by: NURSE PRACTITIONER

## 2025-01-09 RX ORDER — GABAPENTIN 600 MG/1
600 TABLET ORAL 3 TIMES DAILY
Qty: 90 TABLET | Refills: 2 | Status: SHIPPED | OUTPATIENT
Start: 2025-01-09 | End: 2025-04-09

## 2025-01-09 NOTE — ASSESSMENT & PLAN NOTE
Chronic pain diagnoses such as   1. Chronic bilateral low back pain with bilateral sciatica    2. Displacement of lumbar intervertebral disc without myelopathy    3. Carpal tunnel syndrome, right     controlled on current medication regime, wll continue current pain medications to improve quality of life and function.     Repeat procedure prn return of pain

## 2025-01-09 NOTE — PROGRESS NOTES
Loretta Odom, was evaluated through a synchronous (real-time) audio-video encounter. The patient (or guardian if applicable) is aware that this is a billable service, which includes applicable co-pays. This Virtual Visit was conducted with patient's (and/or legal guardian's) consent. Patient identification was verified, and a caregiver was present when appropriate.   The patient was located at Home: [address] [address]  Provider was located at Facility (Appt Dept): [address] [place] [address]  Confirm you are appropriately licensed, registered, or certified to deliver care in the state where the patient is located as indicated above. If you are not or unsure, please re-schedule the visit: Yes, I confirm.     Loretta Odom (:  1965) is a Established patient, presenting virtually for evaluation of the following:      Below is the assessment and plan developed based on review of pertinent history, physical exam, labs, studies, and medications.     Assessment & Plan  Chronic bilateral low back pain with bilateral sciatica            Displacement of lumbar intervertebral disc without myelopathy            Carpal tunnel syndrome, right          Chronic pain diagnoses such as   1. Chronic bilateral low back pain with bilateral sciatica    2. Displacement of lumbar intervertebral disc without myelopathy    3. Carpal tunnel syndrome, right     controlled on current medication regime, wll continue current pain medications to improve quality of life and function.     Repeat procedure prn return of pain    No follow-ups on file.       Subjective   L3-L4 interlaminar 12/20 >80% pain relief. She also continues with home exercises that provides additional relief as well.      Review of Systems       Objective   Patient-Reported Vitals  No data recorded     Physical Exam             --Sharmin Saul, AARON - CNP

## 2025-01-16 ENCOUNTER — TELEPHONE (OUTPATIENT)
Dept: SURGERY | Age: 60
End: 2025-01-16

## 2025-01-16 ENCOUNTER — HOSPITAL ENCOUNTER (OUTPATIENT)
Age: 60
Setting detail: SPECIMEN
Discharge: HOME | End: 2025-01-16
Payer: COMMERCIAL

## 2025-01-16 ENCOUNTER — OFFICE VISIT (OUTPATIENT)
Dept: OBGYN | Age: 60
End: 2025-01-16
Payer: COMMERCIAL

## 2025-01-16 VITALS
SYSTOLIC BLOOD PRESSURE: 112 MMHG | DIASTOLIC BLOOD PRESSURE: 78 MMHG | HEIGHT: 61 IN | BODY MASS INDEX: 31.15 KG/M2 | WEIGHT: 165 LBS | OXYGEN SATURATION: 99 % | RESPIRATION RATE: 16 BRPM | HEART RATE: 83 BPM

## 2025-01-16 DIAGNOSIS — Z12.4 SCREENING FOR CERVICAL CANCER: ICD-10-CM

## 2025-01-16 DIAGNOSIS — M81.0 OSTEOPOROSIS, UNSPECIFIED OSTEOPOROSIS TYPE, UNSPECIFIED PATHOLOGICAL FRACTURE PRESENCE: ICD-10-CM

## 2025-01-16 DIAGNOSIS — Z12.11 SCREEN FOR COLON CANCER: ICD-10-CM

## 2025-01-16 DIAGNOSIS — N95.1 MENOPAUSAL AND FEMALE CLIMACTERIC STATES: ICD-10-CM

## 2025-01-16 DIAGNOSIS — E03.9 ACQUIRED HYPOTHYROIDISM: ICD-10-CM

## 2025-01-16 DIAGNOSIS — E10.9 TYPE 1 DIABETES MELLITUS WITHOUT COMPLICATION (HCC): ICD-10-CM

## 2025-01-16 DIAGNOSIS — Z12.31 ENCOUNTER FOR SCREENING MAMMOGRAM FOR MALIGNANT NEOPLASM OF BREAST: ICD-10-CM

## 2025-01-16 DIAGNOSIS — Z01.419 ENCOUNTER FOR WELL WOMAN EXAM WITH ROUTINE GYNECOLOGICAL EXAM: Primary | ICD-10-CM

## 2025-01-16 DIAGNOSIS — Z98.890 H/O LEEP: ICD-10-CM

## 2025-01-16 DIAGNOSIS — I10 ESSENTIAL HYPERTENSION: ICD-10-CM

## 2025-01-16 DIAGNOSIS — M46.1 SACROILIITIS (HCC): ICD-10-CM

## 2025-01-16 DIAGNOSIS — Z98.1 S/P LUMBAR FUSION: ICD-10-CM

## 2025-01-16 PROCEDURE — 99396 PREV VISIT EST AGE 40-64: CPT | Performed by: OBSTETRICS & GYNECOLOGY

## 2025-01-16 PROCEDURE — G0145 SCR C/V CYTO,THINLAYER,RESCR: HCPCS

## 2025-01-16 PROCEDURE — 3078F DIAST BP <80 MM HG: CPT | Performed by: OBSTETRICS & GYNECOLOGY

## 2025-01-16 PROCEDURE — 3074F SYST BP LT 130 MM HG: CPT | Performed by: OBSTETRICS & GYNECOLOGY

## 2025-01-16 PROCEDURE — 87624 HPV HI-RISK TYP POOLED RSLT: CPT

## 2025-01-16 RX ORDER — CYCLOSPORINE 0.5 MG/ML
EMULSION OPHTHALMIC
COMMUNITY
Start: 2024-12-27

## 2025-01-16 RX ORDER — FERROUS GLUCONATE 324(38)MG
324 TABLET ORAL
COMMUNITY

## 2025-01-16 SDOH — ECONOMIC STABILITY: FOOD INSECURITY: WITHIN THE PAST 12 MONTHS, THE FOOD YOU BOUGHT JUST DIDN'T LAST AND YOU DIDN'T HAVE MONEY TO GET MORE.: NEVER TRUE

## 2025-01-16 SDOH — ECONOMIC STABILITY: FOOD INSECURITY: WITHIN THE PAST 12 MONTHS, YOU WORRIED THAT YOUR FOOD WOULD RUN OUT BEFORE YOU GOT MONEY TO BUY MORE.: NEVER TRUE

## 2025-01-16 ASSESSMENT — PATIENT HEALTH QUESTIONNAIRE - PHQ9
1. LITTLE INTEREST OR PLEASURE IN DOING THINGS: NOT AT ALL
SUM OF ALL RESPONSES TO PHQ9 QUESTIONS 1 & 2: 0
2. FEELING DOWN, DEPRESSED OR HOPELESS: NOT AT ALL
SUM OF ALL RESPONSES TO PHQ QUESTIONS 1-9: 0

## 2025-01-16 NOTE — TELEPHONE ENCOUNTER
Mailed Dr Hernandez colonoscopy paperwork for repeat colonoscopy   Problem: Adult Inpatient Plan of Care  Goal: Plan of Care Review  Outcome: Ongoing (interventions implemented as appropriate)  Patient remained free from falls throughout shift, call bell within reach. Day -1 for an Auto BMT. Melphalan scheduled for today. IVF initiated last night. Complained of sciatic pain to legs and back, prn tramadol and zanaflex given with minimal relief. -170's. +2 bilateral ankle swelling noted. Vitals stable, will continue to monitor.

## 2025-01-16 NOTE — PROGRESS NOTES
[facility]  [facility]  [address] [facility] [address]  Phone: [phone]  Fax: [phone]    Nursing Intake GYN Preventative History Information  10/8/24            Last Pap Smear Date and Results:   Date: 11/29/2022  TZ present: Yes  Cytology Result: neg  HPV Result: neg   LEEP History: Yes: path Unknown  Date: 1993      2.  Last Mammogram Date and Results:   Date: 12/10/2024  Results: Birads 1  Other: Mat Aunt with ovarian cancer Tyrer Cuzick Score: 3.62%        3.  Last Colonoscopy Date and Results:   Date: 10/17/2018  Results: Hyperplastic polyp  Repeat in 5 years      4.  Last Bone Density Date and Results:   Date: 12/10/2024  Results: Osteoporosis      Electronically signed by Kayleen Bui LPN on 1/14/2025 at 10:59 AM     
Maintenance:  Health Maintenance Due   Topic Date Due    Hepatitis B vaccine (1 of 3 - 19+ 3-dose series) Never done    Pneumococcal 0-64 years Vaccine (2 of 2 - PCV) 02/14/2021    A1C test (Diabetic or Prediabetic)  06/27/2023    Diabetic Alb to Cr ratio (uACR) test  07/18/2023    GFR test (Diabetes, CKD 3-4, OR last GFR 15-59)  07/18/2023    Lipids  09/01/2023    Colorectal Cancer Screen  10/17/2023    COVID-19 Vaccine (4 - 2023-24 season) 09/01/2024       Date of Last Pap Smear:   Lab Results   Component Value Date/Time    CYTORPT  11/29/2022 04:31 PM     INTERPRETATION    Cervical material, (ThinPrep vial, Imaging-assisted review):  Specimen Adequacy:       Satisfactory for evaluation.  Descriptive Diagnosis:       Negative for intraepithelial lesion or malignancy.          Cytotechnologist:   MEGHA SMALL(ASCP)  **Electronically Signed Out**  ey/12/2/2022        Procedure/Addendum  HPV Procedure Report       Date Ordered:     11/30/2022     Status: Signed Out       Date Complete:     12/1/2022     By: System Interface       Date Reported:     12/1/2022              Sample:  HPV Type 16      Result:   Not Detected      Ref Range:  (Not Detected)  Sample:  HPV Type 18      Result:   Not Detected      Ref Range: (Not  Detected)  Sample:  Other High Risk HPV      Result:   Not Detected      Ref  Range: (Not Detected)  Sample:  HPV Interp      Result:         Ref Range: (Not Detected)  This test amplifies and detects DNA of 14 high-risk HPV types  associated with cervical cancer and its precursor lesions   (HPV types 16,18, 31, 33, 35, 39, 45, 51, 52, 56, 58, 59, 66, and  68).        Sensitivity may be affected by specimen collection methods, stage of  infection, and the presence of interfering   substances. Results should be interpreted in conjunction with other  available laboratory and clinical data. A negative   high-risk HPV result does not exclude the possibility of future  cytologic HSIL or underlying

## 2025-01-19 LAB
HPV I/H RISK 4 DNA CVX QL NAA+PROBE: NOT DETECTED
HPV SAMPLE: NORMAL
HPV, INTERPRETATION: NORMAL
HPV16 DNA CVX QL NAA+PROBE: NOT DETECTED
HPV18 DNA CVX QL NAA+PROBE: NOT DETECTED
SPECIMEN DESCRIPTION: NORMAL

## 2025-01-24 LAB — CYTOLOGY REPORT: NORMAL

## 2025-02-07 DIAGNOSIS — E10.9 TYPE 1 DIABETES MELLITUS WITHOUT COMPLICATION (HCC): ICD-10-CM

## 2025-02-07 RX ORDER — INSULIN LISPRO 100 [IU]/ML
INJECTION, SOLUTION INTRAVENOUS; SUBCUTANEOUS
Qty: 60 ML | Refills: 3 | Status: SHIPPED | OUTPATIENT
Start: 2025-02-07

## 2025-02-07 NOTE — TELEPHONE ENCOUNTER
Loretta called requesting a refill of the below medication which has been pended for you:     Requested Prescriptions     Pending Prescriptions Disp Refills    insulin lispro (HUMALOG) 100 UNIT/ML SOLN injection vial 60 mL 3     Sig: For use with insulin pump max daily dose is 60 units       Last Appointment Date: 12/2/2024  Next Appointment Date: 4/2/2025    Allergies   Allergen Reactions    Ace Inhibitors Other (See Comments)     Cough

## 2025-02-11 ENCOUNTER — PATIENT MESSAGE (OUTPATIENT)
Dept: DIABETES SERVICES | Age: 60
End: 2025-02-11

## 2025-02-14 ENCOUNTER — PATIENT MESSAGE (OUTPATIENT)
Dept: PAIN MANAGEMENT | Age: 60
End: 2025-02-14

## 2025-02-17 NOTE — TELEPHONE ENCOUNTER
Usually   look for  4 m onths good relief  , but 2 mo  is enough  to  eval sometimes  can still repeat or  change  injection   can schedule  ffice visit

## 2025-04-02 ENCOUNTER — OFFICE VISIT (OUTPATIENT)
Dept: DIABETES SERVICES | Age: 60
End: 2025-04-02
Payer: COMMERCIAL

## 2025-04-02 VITALS
DIASTOLIC BLOOD PRESSURE: 80 MMHG | HEIGHT: 61 IN | HEART RATE: 76 BPM | WEIGHT: 170.8 LBS | OXYGEN SATURATION: 97 % | BODY MASS INDEX: 32.25 KG/M2 | SYSTOLIC BLOOD PRESSURE: 126 MMHG

## 2025-04-02 DIAGNOSIS — I10 ESSENTIAL HYPERTENSION: ICD-10-CM

## 2025-04-02 DIAGNOSIS — E66.811 CLASS 1 OBESITY DUE TO EXCESS CALORIES WITH SERIOUS COMORBIDITY AND BODY MASS INDEX (BMI) OF 32.0 TO 32.9 IN ADULT: ICD-10-CM

## 2025-04-02 DIAGNOSIS — F51.8 DISRUPTED SLEEP-WAKE CYCLE: Primary | ICD-10-CM

## 2025-04-02 DIAGNOSIS — E66.09 CLASS 1 OBESITY DUE TO EXCESS CALORIES WITH SERIOUS COMORBIDITY AND BODY MASS INDEX (BMI) OF 32.0 TO 32.9 IN ADULT: ICD-10-CM

## 2025-04-02 DIAGNOSIS — E78.5 HYPERLIPIDEMIA, UNSPECIFIED HYPERLIPIDEMIA TYPE: ICD-10-CM

## 2025-04-02 DIAGNOSIS — G47.20 DISRUPTED SLEEP-WAKE CYCLE: Primary | ICD-10-CM

## 2025-04-02 DIAGNOSIS — E10.9 TYPE 1 DIABETES MELLITUS WITHOUT COMPLICATION: Primary | ICD-10-CM

## 2025-04-02 PROCEDURE — 3074F SYST BP LT 130 MM HG: CPT | Performed by: NURSE PRACTITIONER

## 2025-04-02 PROCEDURE — G2211 COMPLEX E/M VISIT ADD ON: HCPCS | Performed by: NURSE PRACTITIONER

## 2025-04-02 PROCEDURE — 99214 OFFICE O/P EST MOD 30 MIN: CPT | Performed by: NURSE PRACTITIONER

## 2025-04-02 PROCEDURE — 3079F DIAST BP 80-89 MM HG: CPT | Performed by: NURSE PRACTITIONER

## 2025-04-02 ASSESSMENT — ENCOUNTER SYMPTOMS
SHORTNESS OF BREATH: 0
RESPIRATORY NEGATIVE: 1

## 2025-04-02 NOTE — PROGRESS NOTES
Patient Care Team:  Amita Reeves APRN - NP as PCP - General    Past DM Medications   none     Current Diabetic Medications  Humalog for use with tandem tslim. Max daily dose 70 units  Lantus 15 units for pump back up      DKA episodes: 0    History of Present Illness  The patient presents for evaluation of diabetes mellitus, hypertension, hyperlipidemia, sleep disturbances, and vitamin B deficiency.    Diabetes mellitus is managed with Lantus as a supplementary therapy and Humalog administered via a tandem insulin pump, with a maximum daily dose of 60 units. No issues with insulin supply are reported. Blood glucose levels are monitored using the Dexcom G6 continuous glucose monitoring system. The insulin reservoir is typically filled with 2 to 2.5 mL of insulin, and the infusion site is changed as needed, with the reservoir being replaced only when empty. Episodes of hyperglycemia have occurred, one due to a dislodged infusion site and another following the consumption of pizza. The patient has initiated self-correction of blood glucose levels, which has been beneficial. The target blood glucose level has been adjusted to 80 mg/dL from 70 mg/dL, as the previous setting did not prompt the pump to shut off promptly. A prescription for the T:slim X2 insulin pump from [facility] has been requested.    Hypertension is managed with a combination of losartan and hydrochlorothiazide.    No pharmacological therapy is currently administered for hyperlipidemia.    Sleep disturbances have escalated to panic attacks. The patient either wakes at 4:00 AM and struggles to return to sleep or finds it difficult to wake up. Zoloft was previously recommended during an OB/GYN visit last year but was declined. A consultation with Neetu led to a recommendation to see Luis Eduardo, who prescribed several vitamins and supplements. Initial improvement was noted, but symptoms of seasonal affective disorder persist. Consideration is being

## 2025-04-07 ENCOUNTER — HOSPITAL ENCOUNTER (OUTPATIENT)
Age: 60
Discharge: HOME | End: 2025-04-07
Payer: COMMERCIAL

## 2025-04-07 DIAGNOSIS — G47.20 DISRUPTED SLEEP-WAKE CYCLE: ICD-10-CM

## 2025-04-07 DIAGNOSIS — F51.8 DISRUPTED SLEEP-WAKE CYCLE: ICD-10-CM

## 2025-04-07 LAB
FOLATE SERPL-MCNC: 15.6 NG/ML (ref 4.8–24.2)
VIT B12 SERPL-MCNC: 704 PG/ML (ref 232–1245)

## 2025-04-07 PROCEDURE — 36415 COLL VENOUS BLD VENIPUNCTURE: CPT

## 2025-04-07 PROCEDURE — 82746 ASSAY OF FOLIC ACID SERUM: CPT

## 2025-04-07 PROCEDURE — 82607 VITAMIN B-12: CPT

## 2025-04-08 ENCOUNTER — RESULTS FOLLOW-UP (OUTPATIENT)
Dept: INTERNAL MEDICINE | Age: 60
End: 2025-04-08

## 2025-04-09 ENCOUNTER — HOSPITAL ENCOUNTER (OUTPATIENT)
Age: 60
Setting detail: SPECIMEN
Discharge: HOME | End: 2025-04-09
Payer: COMMERCIAL

## 2025-04-09 PROCEDURE — 82533 TOTAL CORTISOL: CPT

## 2025-04-10 DIAGNOSIS — G47.20 DISRUPTED SLEEP-WAKE CYCLE: ICD-10-CM

## 2025-04-10 DIAGNOSIS — F51.8 DISRUPTED SLEEP-WAKE CYCLE: ICD-10-CM

## 2025-04-16 LAB
CORTISOL SALIVARY: 0.07 UG/DL
CORTISOL SALIVARY: 0.77 UG/DL
CORTISOL SALIVARY: <0.025 UG/DL

## 2025-04-18 LAB — CORTISOL SALIVARY: <0.025 UG/DL

## 2025-04-21 ENCOUNTER — RESULTS FOLLOW-UP (OUTPATIENT)
Dept: INTERNAL MEDICINE | Age: 60
End: 2025-04-21

## 2025-05-02 ENCOUNTER — PATIENT MESSAGE (OUTPATIENT)
Dept: DIABETES SERVICES | Age: 60
End: 2025-05-02

## 2025-05-07 ENCOUNTER — OFFICE VISIT (OUTPATIENT)
Dept: PAIN MANAGEMENT | Age: 60
End: 2025-05-07

## 2025-05-07 VITALS
BODY MASS INDEX: 32.85 KG/M2 | TEMPERATURE: 97.2 F | OXYGEN SATURATION: 99 % | WEIGHT: 174 LBS | HEART RATE: 80 BPM | HEIGHT: 61 IN

## 2025-05-07 DIAGNOSIS — G56.01 CARPAL TUNNEL SYNDROME, RIGHT: Primary | ICD-10-CM

## 2025-05-07 RX ORDER — LIDOCAINE HYDROCHLORIDE 20 MG/ML
1 INJECTION, SOLUTION EPIDURAL; INFILTRATION; INTRACAUDAL; PERINEURAL ONCE
Status: COMPLETED | OUTPATIENT
Start: 2025-05-07 | End: 2025-05-07

## 2025-05-07 RX ADMIN — LIDOCAINE HYDROCHLORIDE 1 ML: 20 INJECTION, SOLUTION EPIDURAL; INFILTRATION; INTRACAUDAL; PERINEURAL at 10:42

## 2025-05-09 NOTE — PROGRESS NOTES
Loretta Odom  1965 1/30/23      PAIN MANAGEMENT CLINIC PROCEDURE NOTE    CHIEF COMPLAINT: This is a 59 y.o. female patient who presents to the Pain Management Clinic with a history of pain in the right wrist(s).    PRE-PROCEDURE DIAGNOSIS: Right carpal tunnel syndrome      POST-PROCEDURE DIAGNOSIS: same as pre-procedure diagnosis    Vitals:    05/07/25 1031   Pulse: 80   Temp: 97.2 °F (36.2 °C)   SpO2: 99%          PROCEDURE:     The procedure was explained, including risks and benefits, and the patient has agreed to proceed. The injection site was marked on the patient’s skin. A large area around the injection site was cleaned with chlora-prep.    Carpal Tunnel Injection:  A 25-gauge 1.5 inch needle was inserted distal to theright  wrist crease between the palmaris longus and flexor carpi radialis tendons bilateral. Once the needle tip was estimated to be in the carpal tunnel, the needle was aspirated and negative for heme and slowly a combination of 1% lidocaine and 10 mg of Kenalog  (NDC# 2097-7734-01) was injected along the bilateral median nerve. The patient remained neurovascularly intact during and after the procedure and was discharged in stable condition. Numbness in the median nerve distribution was noted with in minutes of the injection.     The injection site was cleaned and dressed with a spot band aid.  The patient tolerated the procedure well and with out complication The patient was instructed avoid heat and excessive activity for 24-48 hours.

## 2025-05-12 ENCOUNTER — OFFICE VISIT (OUTPATIENT)
Dept: PAIN MANAGEMENT | Age: 60
End: 2025-05-12
Payer: COMMERCIAL

## 2025-05-12 VITALS
BODY MASS INDEX: 32.66 KG/M2 | HEIGHT: 61 IN | WEIGHT: 173 LBS | OXYGEN SATURATION: 98 % | RESPIRATION RATE: 16 BRPM | DIASTOLIC BLOOD PRESSURE: 68 MMHG | HEART RATE: 79 BPM | SYSTOLIC BLOOD PRESSURE: 112 MMHG

## 2025-05-12 DIAGNOSIS — M54.42 CHRONIC BILATERAL LOW BACK PAIN WITH BILATERAL SCIATICA: ICD-10-CM

## 2025-05-12 DIAGNOSIS — M54.41 CHRONIC BILATERAL LOW BACK PAIN WITH BILATERAL SCIATICA: ICD-10-CM

## 2025-05-12 DIAGNOSIS — G89.29 CHRONIC BILATERAL LOW BACK PAIN WITH BILATERAL SCIATICA: ICD-10-CM

## 2025-05-12 DIAGNOSIS — M96.1 POST LAMINECTOMY SYNDROME: ICD-10-CM

## 2025-05-12 DIAGNOSIS — M48.061 SPINAL STENOSIS, LUMBAR REGION, WITHOUT NEUROGENIC CLAUDICATION: ICD-10-CM

## 2025-05-12 DIAGNOSIS — M54.16 LUMBAR RADICULOPATHY: Primary | ICD-10-CM

## 2025-05-12 PROCEDURE — 3078F DIAST BP <80 MM HG: CPT | Performed by: NURSE PRACTITIONER

## 2025-05-12 PROCEDURE — 3074F SYST BP LT 130 MM HG: CPT | Performed by: NURSE PRACTITIONER

## 2025-05-12 PROCEDURE — 99215 OFFICE O/P EST HI 40 MIN: CPT | Performed by: NURSE PRACTITIONER

## 2025-05-12 RX ORDER — NAPROXEN 500 MG/1
TABLET ORAL
COMMUNITY
Start: 2025-03-25

## 2025-05-12 RX ORDER — DIAZEPAM 5 MG/1
TABLET ORAL
Qty: 2 TABLET | Refills: 0 | Status: SHIPPED | OUTPATIENT
Start: 2025-05-12 | End: 2025-05-28

## 2025-05-12 ASSESSMENT — ENCOUNTER SYMPTOMS
BACK PAIN: 1
NAUSEA: 1

## 2025-05-12 NOTE — PROGRESS NOTES
[facility]  [address] [place]. [address]    Patient Name: Loretta Odom  MRN: [id number]  Encounter Date: 5/15/2025     SUBJECTIVE:  Loretta Odom is a 59 y.o., female being seen today regarding   Chief Complaint   Patient presents with    Back Pain     lumbar    and routine medication management.    History of Present Illness  The patient is a 59-year-old female who presents for back pain.    She reports an improvement in her wrist condition, attributing this to a previous injection. She is seeking a similar treatment for her back pain today. Her last injection was administered in December, targeting the L3-L4 interlaminar region, which provided significant relief. She estimates that the injection alleviated approximately 80 percent of her pain, with the effects lasting over three months. She has been experiencing discomfort for some time but has not sought immediate treatment. This morning, she experienced a pinching sensation radiating down her leg while lying in bed with her knees elevated on a pillow. She continues to perform home exercises at least once daily, sometimes twice, to manage her symptoms. She was prescribed Valium during her last visit but did not take it as she drove herself home. She is requesting a prescription for Valium for potential use post-injection. She is also on gabapentin and will contact us for a refill when necessary.    MEDICATIONS  Current: Gabapentin.  Past: Valium.    Functionality Assessment & Goals:  On a scale of 0 (Does not Interfere) to 10 (Completely Interferes)  Which number describes how during the past week pain has interfered with the following:   A.  General Activity: 6    B.  Mood:  4   C.  Walking Ability:  4   D.  Normal Work (Includes both work outside the home and housework):  6   E.  Relations with Other People:  3   F.  Sleep:  6    G.  Enjoyment of Life:  5  2.  Patient prefers to Take their Pain Medications:   [] On a

## 2025-05-14 ENCOUNTER — TELEPHONE (OUTPATIENT)
Dept: PAIN MANAGEMENT | Age: 60
End: 2025-05-14

## 2025-05-14 NOTE — TELEPHONE ENCOUNTER
L3/L4 IESI  with no sedation scheduled for 6/13/25 with surgery center notified.     Might take valium if patient does she is aware that she will need a .    Educated to stop ASA 81 mg, self prescribed.    DM lab needs added.     Will need to enter order due to NM not signing visit.

## 2025-05-15 PROBLEM — G89.29 CHRONIC LOW BACK PAIN WITH BILATERAL SCIATICA: Status: ACTIVE | Noted: 2025-05-12

## 2025-05-15 PROBLEM — M54.42 CHRONIC LOW BACK PAIN WITH BILATERAL SCIATICA: Status: ACTIVE | Noted: 2025-05-12

## 2025-05-15 PROBLEM — M54.41 CHRONIC LOW BACK PAIN WITH BILATERAL SCIATICA: Status: ACTIVE | Noted: 2025-05-12

## 2025-05-15 ASSESSMENT — ENCOUNTER SYMPTOMS: RESPIRATORY NEGATIVE: 1

## 2025-05-20 RX ORDER — ONDANSETRON 4 MG/1
4 TABLET, ORALLY DISINTEGRATING ORAL EVERY 8 HOURS PRN
Qty: 20 TABLET | Refills: 3 | Status: SHIPPED | OUTPATIENT
Start: 2025-05-20 | End: 2025-05-20 | Stop reason: SDUPTHER

## 2025-05-20 RX ORDER — GABAPENTIN 600 MG/1
600 TABLET ORAL 3 TIMES DAILY
Qty: 90 TABLET | Refills: 11 | Status: SHIPPED | OUTPATIENT
Start: 2025-05-20 | End: 2026-05-15

## 2025-06-13 ENCOUNTER — HOSPITAL ENCOUNTER (OUTPATIENT)
Age: 60
Setting detail: OUTPATIENT SURGERY
Discharge: HOME | End: 2025-06-13
Attending: PHYSICAL MEDICINE & REHABILITATION | Admitting: PHYSICAL MEDICINE & REHABILITATION
Payer: COMMERCIAL

## 2025-06-13 ENCOUNTER — APPOINTMENT (OUTPATIENT)
Dept: GENERAL RADIOLOGY | Age: 60
End: 2025-06-13
Attending: PHYSICAL MEDICINE & REHABILITATION
Payer: COMMERCIAL

## 2025-06-13 VITALS
WEIGHT: 173 LBS | TEMPERATURE: 98 F | RESPIRATION RATE: 16 BRPM | DIASTOLIC BLOOD PRESSURE: 87 MMHG | SYSTOLIC BLOOD PRESSURE: 154 MMHG | HEART RATE: 66 BPM | BODY MASS INDEX: 32.69 KG/M2 | OXYGEN SATURATION: 100 %

## 2025-06-13 PROCEDURE — 3600000056 HC PAIN LEVEL 4 BASE: Performed by: PHYSICAL MEDICINE & REHABILITATION

## 2025-06-13 PROCEDURE — 6360000004 HC RX CONTRAST MEDICATION: Performed by: PHYSICAL MEDICINE & REHABILITATION

## 2025-06-13 PROCEDURE — 2709999900 HC NON-CHARGEABLE SUPPLY: Performed by: PHYSICAL MEDICINE & REHABILITATION

## 2025-06-13 PROCEDURE — 7100000010 HC PHASE II RECOVERY - FIRST 15 MIN: Performed by: PHYSICAL MEDICINE & REHABILITATION

## 2025-06-13 PROCEDURE — 2500000003 HC RX 250 WO HCPCS: Performed by: PHYSICAL MEDICINE & REHABILITATION

## 2025-06-13 PROCEDURE — 6360000002 HC RX W HCPCS: Performed by: PHYSICAL MEDICINE & REHABILITATION

## 2025-06-13 RX ORDER — IOPAMIDOL 408 MG/ML
INJECTION, SOLUTION INTRATHECAL PRN
Status: DISCONTINUED | OUTPATIENT
Start: 2025-06-13 | End: 2025-06-13 | Stop reason: ALTCHOICE

## 2025-06-13 RX ORDER — SODIUM CHLORIDE 9 MG/ML
INJECTION, SOLUTION INTRAVENOUS PRN
Status: CANCELLED | OUTPATIENT
Start: 2025-06-13

## 2025-06-13 RX ORDER — DEXAMETHASONE SODIUM PHOSPHATE 10 MG/ML
INJECTION, SOLUTION INTRA-ARTICULAR; INTRALESIONAL; INTRAMUSCULAR; INTRAVENOUS; SOFT TISSUE PRN
Status: DISCONTINUED | OUTPATIENT
Start: 2025-06-13 | End: 2025-06-13 | Stop reason: ALTCHOICE

## 2025-06-13 RX ORDER — SODIUM CHLORIDE 0.9 % (FLUSH) 0.9 %
5-40 SYRINGE (ML) INJECTION PRN
Status: CANCELLED | OUTPATIENT
Start: 2025-06-13

## 2025-06-13 RX ORDER — SODIUM CHLORIDE 9 MG/ML
INJECTION, SOLUTION INTRAMUSCULAR; INTRAVENOUS; SUBCUTANEOUS PRN
Status: DISCONTINUED | OUTPATIENT
Start: 2025-06-13 | End: 2025-06-13 | Stop reason: ALTCHOICE

## 2025-06-13 RX ORDER — SODIUM CHLORIDE 0.9 % (FLUSH) 0.9 %
5-40 SYRINGE (ML) INJECTION EVERY 12 HOURS SCHEDULED
Status: CANCELLED | OUTPATIENT
Start: 2025-06-13

## 2025-06-13 ASSESSMENT — PAIN - FUNCTIONAL ASSESSMENT
PAIN_FUNCTIONAL_ASSESSMENT: 0-10
PAIN_FUNCTIONAL_ASSESSMENT: 0-10
PAIN_FUNCTIONAL_ASSESSMENT: PREVENTS OR INTERFERES SOME ACTIVE ACTIVITIES AND ADLS

## 2025-06-13 ASSESSMENT — ENCOUNTER SYMPTOMS
CONSTIPATION: 0
EYES NEGATIVE: 1
BACK PAIN: 1
ALLERGIC/IMMUNOLOGIC NEGATIVE: 1
DIARRHEA: 0
RESPIRATORY NEGATIVE: 1

## 2025-06-13 ASSESSMENT — PAIN DESCRIPTION - DESCRIPTORS: DESCRIPTORS: ACHING;GNAWING;THROBBING

## 2025-06-13 NOTE — H&P
Subjective:       Patient is here today for a diagnostic/therapeutic injection.    6/13/25    Active Hospital Problems    Diagnosis Date Noted    Chronic low back pain with bilateral sciatica [M54.41, M54.42, G89.29] 05/12/2025    Lumbar radiculopathy [M54.16] 03/01/2019       HPI: Patient is here today for adiagnostic/therapeutic injection. The most recent [facility] office visit notes have been reviewed and are unchanged.     Review of Systems   Constitutional:  Positive for fatigue.   HENT: Negative.     Eyes: Negative.    Respiratory: Negative.     Cardiovascular: Negative.    Gastrointestinal:  Negative for constipation and diarrhea.   Endocrine: Negative.    Genitourinary:  Negative for difficulty urinating and flank pain.   Musculoskeletal:  Positive for back pain and myalgias.   Skin: Negative.    Allergic/Immunologic: Negative.    Neurological:  Positive for weakness and numbness.   Hematological: Negative.    Psychiatric/Behavioral:  Positive for sleep disturbance.        Allergies   Allergen Reactions    Ace Inhibitors Other (See Comments)     Cough            Prior to Admission medications    Medication Sig Start Date End Date Taking? Authorizing Provider   gabapentin (NEURONTIN) 600 MG tablet Take 1 tablet by mouth 3 times daily for 360 days. 5/20/25 5/15/26 Yes Darnell Cowan MD   ondansetron (ZOFRAN-ODT) 4 MG disintegrating tablet Take 1 tablet by mouth every 8 hours as needed for Nausea 5/20/25  Yes Shayna Craft APRN - CNP   naproxen (NAPROSYN) 500 MG tablet TAKE 1 TABLET BY MOUTH EVERY MORNING AND EVERY EVENING FOR 14 DAYS WITH MEALS 3/25/25  Yes Provider, MD Loy   Continuous Glucose Sensor (DEXCOM G6 SENSOR) MISC Change sensor every 10 days as directed 2/19/25  Yes Shayna Craft APRN - CNP   Continuous Glucose Transmitter (DEXCOM G6 TRANSMITTER) MISC Change transmitter once every 3 months. 2/19/25  Yes Shayna Craft APRN - CNP   insulin lispro

## 2025-06-13 NOTE — OP NOTE
INTERLAMINAR EPIDURAL STEROID INJECTION    6/13/25    Surgeon: Darnell Cowan MD    Pre-operative Diagnosis: Principal Problem:    Lumbar radiculopathy  Active Problems:    Chronic low back pain with bilateral sciatica  Resolved Problems:    * No resolved hospital problems. *      Post-operative Diagnosis: Same    Assistants: none    INDICATION:  Interlaminar epidural injection has been requested for diagnostic and therapeutic reasons.  Please see H&P for details on previous treatments, examination findings, and work up.    Conservative treatment was ineffective i.e.: ice, NSAIDS, rest,     Patient is unable to perform the following ADL's: personal cares, ambulating, sitting, and standing     Pain Assessment: 0-10  Pain Level: 5     Pain Orientation: Lower  Pain Location: Back  Pain Descriptors: Aching, Gnawing, Throbbing    Last Plain films: 2024    EXAMINATION:  L3-4 Interlaminar radiculogram/epidurogram.   L3-4 Interlaminar epidural anesthetic injection.   L3-4 Interlaminar epidural steroid injection.    CONSENT: Written consent was obtained from the patient on preprinted consent form after explaining the procedure, indications, potential complications and outcomes. Alternative treatments were also discussed.    DISCUSSION: The patient was sterilely prepped and draped in the usual fashion in the   prone position. “Time out” was verified for correct patient, side, level and procedure.    SEDATION: No conscious sedation was performed during the procedure.  The patient remained awake and conversed throughout the procedure.  The patient underwent pulse oximetry and blood pressure monitoring independently by a trained observer, as well as by a physician.    PROCEDURE:  Under image-intensifier control, a 22 gauge needle x 5 inch spinal needle was guided successfully into the epidural space employing a posterior midline/paramedian interlaminar approach. Needle aspiration was negative for heme or CSF.Instillation of

## 2025-06-13 NOTE — INTERVAL H&P NOTE
losartan-hydroCHLOROthiazide (HYZAAR) 100-12.5 MG per tablet Take 1 tablet by mouth daily 90 tablet 1    aspirin 81 MG EC tablet Take 1 tablet by mouth daily      Vitamin D (CHOLECALCIFEROL) 1000 UNITS CAPS capsule Take 4 capsules by mouth daily         The patient understands the planned operation and its associated risks and benefits and agrees to proceed.        Electronically signed by Darnell Cowan MD on 6/13/2025 at 8:31 AM

## 2025-06-27 ENCOUNTER — OFFICE VISIT (OUTPATIENT)
Dept: PAIN MANAGEMENT | Age: 60
End: 2025-06-27
Payer: COMMERCIAL

## 2025-06-27 ENCOUNTER — TELEPHONE (OUTPATIENT)
Dept: PAIN MANAGEMENT | Age: 60
End: 2025-06-27

## 2025-06-27 VITALS
BODY MASS INDEX: 31.53 KG/M2 | WEIGHT: 167 LBS | RESPIRATION RATE: 12 BRPM | DIASTOLIC BLOOD PRESSURE: 76 MMHG | SYSTOLIC BLOOD PRESSURE: 124 MMHG | OXYGEN SATURATION: 97 % | HEART RATE: 68 BPM | HEIGHT: 61 IN

## 2025-06-27 DIAGNOSIS — M54.42 CHRONIC BILATERAL LOW BACK PAIN WITH BILATERAL SCIATICA: ICD-10-CM

## 2025-06-27 DIAGNOSIS — M53.3 PAIN OF BOTH SACROILIAC JOINTS: ICD-10-CM

## 2025-06-27 DIAGNOSIS — G89.29 CHRONIC BILATERAL LOW BACK PAIN WITH BILATERAL SCIATICA: ICD-10-CM

## 2025-06-27 DIAGNOSIS — M54.41 CHRONIC BILATERAL LOW BACK PAIN WITH BILATERAL SCIATICA: ICD-10-CM

## 2025-06-27 DIAGNOSIS — G57.03 BILATERAL PIRIFORMIS SYNDROME: Primary | ICD-10-CM

## 2025-06-27 DIAGNOSIS — M51.26 DISPLACEMENT OF LUMBAR INTERVERTEBRAL DISC WITHOUT MYELOPATHY: ICD-10-CM

## 2025-06-27 PROCEDURE — 99215 OFFICE O/P EST HI 40 MIN: CPT | Performed by: NURSE PRACTITIONER

## 2025-06-27 PROCEDURE — 3074F SYST BP LT 130 MM HG: CPT | Performed by: NURSE PRACTITIONER

## 2025-06-27 PROCEDURE — 3078F DIAST BP <80 MM HG: CPT | Performed by: NURSE PRACTITIONER

## 2025-06-27 ASSESSMENT — ENCOUNTER SYMPTOMS
NAUSEA: 1
RESPIRATORY NEGATIVE: 1
BACK PAIN: 1

## 2025-06-27 NOTE — PROGRESS NOTES
radiculopathy     FINDINGS:  BONES/ALIGNMENT: The vertebral body heights are maintained.  There is  age-appropriate bone marrow signal.  There is multilevel degenerative disc  disease with loss of disc signal.  There is no significant disc space  narrowing.  There is no spondylolisthesis.     SPINAL CORD: The spinal cord is normal in caliber and signal.     SOFT TISSUES: The posterior paraspinal soft tissues are unremarkable.  The  prevertebral soft tissues are unremarkable.     C2-C3: There is no significant disc protrusion, spinal canal stenosis or  neural foraminal narrowing.     C3-C4: There is no significant disc protrusion, spinal canal stenosis or  neural foraminal narrowing.     C4-C5: There is no significant disc protrusion, spinal canal stenosis or  neural foraminal narrowing.     C5-C6: There is a disc osteophyte complex with uncovertebral and facet  hypertrophy.  There is no canal stenosis.  There is moderate bilateral  foraminal narrowing.     C6-C7: There is no significant disc protrusion, spinal canal stenosis or  neural foraminal narrowing.     C7-T1: There is no significant disc protrusion, spinal canal stenosis or  neural foraminal narrowing.        Impression  Multilevel degenerative disc disease with uncovertebral and facet hypertrophy  resulting in moderate bilateral foraminal narrowing at C5-6.         Cardiovascular:      Rate and Rhythm: Normal rate.   Pulmonary:      Effort: Pulmonary effort is normal. No tachypnea, bradypnea, accessory muscle usage or respiratory distress. She is not intubated.   Musculoskeletal:      Lumbar back: No tenderness or bony tenderness (bilateral PSIS. Positive Fabers, gaenslens, fabers). Decreased range of motion: Positive cart sign.      Comments: MRI OF THE LUMBAR SPINE WITHOUT CONTRAST, 11/13/2024 4:46 pm     TECHNIQUE:  Multiplanar multisequence MRI of the lumbar spine was performed without the  administration of intravenous contrast.     COMPARISON:  July

## 2025-07-09 ENCOUNTER — PATIENT MESSAGE (OUTPATIENT)
Dept: PAIN MANAGEMENT | Age: 60
End: 2025-07-09

## 2025-07-10 RX ORDER — CYCLOBENZAPRINE HCL 10 MG
10 TABLET ORAL 3 TIMES DAILY PRN
Qty: 90 TABLET | Refills: 2 | Status: SHIPPED | OUTPATIENT
Start: 2025-07-10 | End: 2025-07-11 | Stop reason: ALTCHOICE

## 2025-07-11 RX ORDER — CYCLOBENZAPRINE HCL 10 MG
10 TABLET ORAL 3 TIMES DAILY PRN
Qty: 90 TABLET | Refills: 0 | Status: SHIPPED | OUTPATIENT
Start: 2025-07-11 | End: 2025-08-10

## 2025-07-16 ENCOUNTER — TELEPHONE (OUTPATIENT)
Dept: DIABETES SERVICES | Age: 60
End: 2025-07-16

## 2025-07-16 ENCOUNTER — HOSPITAL ENCOUNTER (OUTPATIENT)
Age: 60
Discharge: HOME | End: 2025-07-16
Payer: COMMERCIAL

## 2025-07-16 ENCOUNTER — OFFICE VISIT (OUTPATIENT)
Dept: DIABETES SERVICES | Age: 60
End: 2025-07-16

## 2025-07-16 ENCOUNTER — RESULTS FOLLOW-UP (OUTPATIENT)
Dept: DIABETES SERVICES | Age: 60
End: 2025-07-16

## 2025-07-16 ENCOUNTER — PATIENT MESSAGE (OUTPATIENT)
Dept: DIABETES SERVICES | Age: 60
End: 2025-07-16

## 2025-07-16 VITALS
SYSTOLIC BLOOD PRESSURE: 130 MMHG | BODY MASS INDEX: 31.34 KG/M2 | HEART RATE: 72 BPM | OXYGEN SATURATION: 98 % | WEIGHT: 166 LBS | DIASTOLIC BLOOD PRESSURE: 88 MMHG | HEIGHT: 61 IN

## 2025-07-16 DIAGNOSIS — E10.9 TYPE 1 DIABETES MELLITUS WITHOUT COMPLICATION (HCC): Primary | ICD-10-CM

## 2025-07-16 DIAGNOSIS — I10 ESSENTIAL HYPERTENSION: ICD-10-CM

## 2025-07-16 DIAGNOSIS — E78.5 HYPERLIPIDEMIA, UNSPECIFIED HYPERLIPIDEMIA TYPE: ICD-10-CM

## 2025-07-16 DIAGNOSIS — E10.9 TYPE 1 DIABETES MELLITUS WITHOUT COMPLICATION (HCC): ICD-10-CM

## 2025-07-16 DIAGNOSIS — E66.811 CLASS 1 OBESITY DUE TO EXCESS CALORIES WITH SERIOUS COMORBIDITY AND BODY MASS INDEX (BMI) OF 31.0 TO 31.9 IN ADULT: ICD-10-CM

## 2025-07-16 DIAGNOSIS — E66.09 CLASS 1 OBESITY DUE TO EXCESS CALORIES WITH SERIOUS COMORBIDITY AND BODY MASS INDEX (BMI) OF 31.0 TO 31.9 IN ADULT: ICD-10-CM

## 2025-07-16 LAB
ALBUMIN SERPL-MCNC: 4.5 G/DL (ref 3.5–5.2)
ALBUMIN/GLOB SERPL: 1.6 {RATIO} (ref 1–2.5)
ALP SERPL-CCNC: 52 U/L (ref 35–104)
ALT SERPL-CCNC: 19 U/L (ref 10–35)
ANION GAP SERPL CALCULATED.3IONS-SCNC: 12 MMOL/L (ref 9–16)
AST SERPL-CCNC: 26 U/L (ref 10–35)
BASOPHILS # BLD: 0.03 K/UL (ref 0–0.2)
BASOPHILS NFR BLD: 1 % (ref 0–2)
BILIRUB SERPL-MCNC: 0.3 MG/DL (ref 0–1.2)
BUN SERPL-MCNC: 11 MG/DL (ref 6–20)
BUN/CREAT SERPL: 12 (ref 9–20)
CALCIUM SERPL-MCNC: 10.3 MG/DL (ref 8.6–10.4)
CHLORIDE SERPL-SCNC: 97 MMOL/L (ref 98–107)
CHOLEST SERPL-MCNC: 257 MG/DL (ref 0–199)
CHOLESTEROL/HDL RATIO: 2.5
CO2 SERPL-SCNC: 28 MMOL/L (ref 20–31)
CREAT SERPL-MCNC: 0.9 MG/DL (ref 0.6–0.9)
CREAT UR-MCNC: 116 MG/DL (ref 28–217)
EOSINOPHIL # BLD: 0.18 K/UL (ref 0–0.44)
EOSINOPHILS RELATIVE PERCENT: 4 % (ref 1–4)
ERYTHROCYTE [DISTWIDTH] IN BLOOD BY AUTOMATED COUNT: 12.6 % (ref 11.8–14.4)
EST. AVERAGE GLUCOSE BLD GHB EST-MCNC: 160 MG/DL
GFR, ESTIMATED: 74 ML/MIN/1.73M2
GLUCOSE SERPL-MCNC: 123 MG/DL (ref 74–99)
HBA1C MFR BLD: 7.2 % (ref 4–6)
HCT VFR BLD AUTO: 37.6 % (ref 36.3–47.1)
HDLC SERPL-MCNC: 104 MG/DL
HGB BLD-MCNC: 12.9 G/DL (ref 11.9–15.1)
IMM GRANULOCYTES # BLD AUTO: <0.03 K/UL (ref 0–0.3)
IMM GRANULOCYTES NFR BLD: 0 %
LDLC SERPL CALC-MCNC: 142 MG/DL (ref 0–100)
LYMPHOCYTES NFR BLD: 1.1 K/UL (ref 1.1–3.7)
LYMPHOCYTES RELATIVE PERCENT: 22 % (ref 24–43)
MCH RBC QN AUTO: 32.6 PG (ref 25.2–33.5)
MCHC RBC AUTO-ENTMCNC: 34.3 G/DL (ref 25.2–33.5)
MCV RBC AUTO: 94.9 FL (ref 82.6–102.9)
MICROALBUMIN UR-MCNC: <12 MG/L (ref 0–20)
MICROALBUMIN/CREAT UR-RTO: NORMAL MCG/MG CREAT (ref 0–25)
MONOCYTES NFR BLD: 0.51 K/UL (ref 0.1–1.2)
MONOCYTES NFR BLD: 10 % (ref 3–12)
NEUTROPHILS NFR BLD: 63 % (ref 36–65)
NEUTS SEG NFR BLD: 3.12 K/UL (ref 1.5–8.1)
NRBC BLD-RTO: 0 PER 100 WBC
PLATELET # BLD AUTO: 290 K/UL (ref 138–453)
PMV BLD AUTO: 9.3 FL (ref 8.1–13.5)
POTASSIUM SERPL-SCNC: 4 MMOL/L (ref 3.7–5.3)
PROT SERPL-MCNC: 7.3 G/DL (ref 6.6–8.7)
RBC # BLD AUTO: 3.96 M/UL (ref 3.95–5.11)
SODIUM SERPL-SCNC: 137 MMOL/L (ref 136–145)
TRIGL SERPL-MCNC: 54 MG/DL
VLDLC SERPL CALC-MCNC: 11 MG/DL (ref 1–30)
WBC OTHER # BLD: 5 K/UL (ref 3.5–11.3)

## 2025-07-16 PROCEDURE — 83036 HEMOGLOBIN GLYCOSYLATED A1C: CPT

## 2025-07-16 PROCEDURE — 82043 UR ALBUMIN QUANTITATIVE: CPT

## 2025-07-16 PROCEDURE — 85025 COMPLETE CBC W/AUTO DIFF WBC: CPT

## 2025-07-16 PROCEDURE — 80061 LIPID PANEL: CPT

## 2025-07-16 PROCEDURE — 82570 ASSAY OF URINE CREATININE: CPT

## 2025-07-16 PROCEDURE — 80053 COMPREHEN METABOLIC PANEL: CPT

## 2025-07-16 PROCEDURE — 36415 COLL VENOUS BLD VENIPUNCTURE: CPT

## 2025-07-16 RX ORDER — SYRINGE WITH NEEDLE, 1 ML 25GX5/8"
SYRINGE, EMPTY DISPOSABLE MISCELLANEOUS
Qty: 100 EACH | Refills: 1 | Status: SHIPPED | OUTPATIENT
Start: 2025-07-16

## 2025-07-16 RX ORDER — INSULIN GLARGINE 100 [IU]/ML
15 INJECTION, SOLUTION SUBCUTANEOUS NIGHTLY
Qty: 1 ADJUSTABLE DOSE PRE-FILLED PEN SYRINGE | Refills: 0 | Status: SHIPPED | OUTPATIENT
Start: 2025-07-16

## 2025-07-16 RX ORDER — ACYCLOVIR 400 MG/1
1 TABLET ORAL
Qty: 3 EACH | Refills: 5 | Status: SHIPPED | OUTPATIENT
Start: 2025-07-16

## 2025-07-16 RX ORDER — INSULIN LISPRO 100 [IU]/ML
INJECTION, SOLUTION INTRAVENOUS; SUBCUTANEOUS
Status: CANCELLED | OUTPATIENT
Start: 2025-07-16

## 2025-07-16 RX ORDER — ATORVASTATIN CALCIUM 10 MG/1
10 TABLET, FILM COATED ORAL DAILY
Qty: 90 TABLET | Refills: 3 | Status: SHIPPED | OUTPATIENT
Start: 2025-07-16

## 2025-07-16 ASSESSMENT — ENCOUNTER SYMPTOMS
RESPIRATORY NEGATIVE: 1
SHORTNESS OF BREATH: 0

## 2025-07-16 NOTE — PROGRESS NOTES
Patient Care Team:  Amita Reeves APRN - NP as PCP - General    Past DM Medications   none     Current Diabetic Medications  Humalog for use with tandem tslim. Max daily dose 70 units  Lantus 15 units for pump back up      DKA episodes: 0       History of Present Illness  The patient presents for evaluation and management of diabetes mellitus, hyperlipidemia, hypertension, and obesity.    The patient has been utilizing Humalog vials in conjunction with an insulin pump and is currently employing the Dexcom G6 continuous glucose monitoring system for glycemic control. Additionally, they have Lantus as a contingency for pump malfunction and use a NovoLog pen for similar purposes, though they are uncertain about the expiration date of their current NovoLog supply. The patient reports two episodes of significant hyperglycemia, one attributed to a bent cannula and the other potentially due to scar tissue interference. Despite administering insulin 15 minutes prior to meals, their blood glucose levels remain elevated. They have not modified their bolus dosing with the pump. To mitigate hypoglycemic events, they have adjusted their correction factor from 1:100 to 1:120. The insulin pump is programmed to alert them when blood glucose levels drop to 80 mg/dL, as levels tend to decrease to 50 mg/dL if they do not consume a snack when levels are between 70 and 80 mg/dL.    For hypertension management, the patient has been on a regimen of losartan hydrochlorothiazide.    The patient has been receiving compound semaglutide injections, which have facilitated some weight loss. However, they have been informed that these injections are no longer available. They are contemplating transitioning to the Britney system due to its smaller size. The patient reports adherence to a healthier diet.    Social History:  Diet: The patient reports adherence to a healthier diet.      ICD-10-CM    1. Type 1 diabetes mellitus without

## 2025-07-16 NOTE — TELEPHONE ENCOUNTER
Writer can send patient a scheduling ticket. Please advise on how many months out you want to see her.

## 2025-07-16 NOTE — TELEPHONE ENCOUNTER
Prior authorization approved  Payer: [employer] Patient's Payer Case ID: [id number]    [phone]  Note from payer: Approved.  Approval Details    Authorized from June 17, 2025 to July 16, 2026  Electronic appeal: Not supported

## 2025-08-04 ENCOUNTER — PATIENT MESSAGE (OUTPATIENT)
Dept: DIABETES SERVICES | Age: 60
End: 2025-08-04

## 2025-08-07 ENCOUNTER — HOSPITAL ENCOUNTER (OUTPATIENT)
Dept: PHYSICAL THERAPY | Age: 60
Setting detail: THERAPIES SERIES
Discharge: HOME | End: 2025-08-07
Payer: COMMERCIAL

## 2025-08-07 PROCEDURE — 97162 PT EVAL MOD COMPLEX 30 MIN: CPT | Performed by: PHYSICAL THERAPIST

## 2025-08-07 ASSESSMENT — PAIN DESCRIPTION - ORIENTATION: ORIENTATION: LOWER

## 2025-08-07 ASSESSMENT — PAIN DESCRIPTION - DESCRIPTORS: DESCRIPTORS: BURNING;SHARP

## 2025-08-07 ASSESSMENT — PAIN SCALES - GENERAL: PAINLEVEL_OUTOF10: 7

## 2025-08-07 ASSESSMENT — PAIN DESCRIPTION - LOCATION: LOCATION: BACK

## 2025-08-07 ASSESSMENT — PAIN DESCRIPTION - PAIN TYPE: TYPE: CHRONIC PAIN

## 2025-08-13 ENCOUNTER — HOSPITAL ENCOUNTER (OUTPATIENT)
Dept: PHYSICAL THERAPY | Age: 60
Setting detail: THERAPIES SERIES
Discharge: HOME | End: 2025-08-13
Payer: COMMERCIAL

## 2025-08-13 PROCEDURE — 97110 THERAPEUTIC EXERCISES: CPT

## 2025-08-15 ENCOUNTER — HOSPITAL ENCOUNTER (OUTPATIENT)
Dept: PHYSICAL THERAPY | Age: 60
Setting detail: THERAPIES SERIES
Discharge: HOME | End: 2025-08-15
Payer: COMMERCIAL

## 2025-08-15 PROCEDURE — 97110 THERAPEUTIC EXERCISES: CPT

## 2025-08-20 ENCOUNTER — HOSPITAL ENCOUNTER (OUTPATIENT)
Dept: PHYSICAL THERAPY | Age: 60
Setting detail: THERAPIES SERIES
Discharge: HOME | End: 2025-08-20
Payer: COMMERCIAL

## 2025-08-20 PROCEDURE — 97110 THERAPEUTIC EXERCISES: CPT | Performed by: PHYSICAL THERAPIST

## 2025-08-22 ENCOUNTER — HOSPITAL ENCOUNTER (OUTPATIENT)
Dept: PHYSICAL THERAPY | Age: 60
Setting detail: THERAPIES SERIES
Discharge: HOME | End: 2025-08-22
Payer: COMMERCIAL

## 2025-08-27 ENCOUNTER — HOSPITAL ENCOUNTER (OUTPATIENT)
Dept: PHYSICAL THERAPY | Age: 60
Setting detail: THERAPIES SERIES
Discharge: HOME | End: 2025-08-27
Payer: COMMERCIAL

## 2025-08-27 PROCEDURE — 97110 THERAPEUTIC EXERCISES: CPT

## 2025-08-29 ENCOUNTER — APPOINTMENT (OUTPATIENT)
Dept: PHYSICAL THERAPY | Age: 60
End: 2025-08-29
Payer: COMMERCIAL

## 2025-08-29 ENCOUNTER — APPOINTMENT (OUTPATIENT)
Dept: GENERAL RADIOLOGY | Age: 60
End: 2025-08-29
Attending: PHYSICAL MEDICINE & REHABILITATION
Payer: COMMERCIAL

## 2025-08-29 ENCOUNTER — HOSPITAL ENCOUNTER (OUTPATIENT)
Age: 60
Setting detail: OUTPATIENT SURGERY
Discharge: HOME | End: 2025-08-29
Attending: PHYSICAL MEDICINE & REHABILITATION | Admitting: PHYSICAL MEDICINE & REHABILITATION
Payer: COMMERCIAL

## 2025-08-29 VITALS
DIASTOLIC BLOOD PRESSURE: 74 MMHG | HEIGHT: 62 IN | WEIGHT: 165 LBS | HEART RATE: 70 BPM | TEMPERATURE: 97.4 F | SYSTOLIC BLOOD PRESSURE: 144 MMHG | OXYGEN SATURATION: 100 % | RESPIRATION RATE: 14 BRPM | BODY MASS INDEX: 30.36 KG/M2

## 2025-08-29 PROCEDURE — 3600000057 HC PAIN LEVEL 4 ADDL 15 MIN: Performed by: PHYSICAL MEDICINE & REHABILITATION

## 2025-08-29 PROCEDURE — 6360000004 HC RX CONTRAST MEDICATION: Performed by: PHYSICAL MEDICINE & REHABILITATION

## 2025-08-29 PROCEDURE — 27096 INJECT SACROILIAC JOINT: CPT | Performed by: PHYSICAL MEDICINE & REHABILITATION

## 2025-08-29 PROCEDURE — 2709999900 HC NON-CHARGEABLE SUPPLY: Performed by: PHYSICAL MEDICINE & REHABILITATION

## 2025-08-29 PROCEDURE — 2500000003 HC RX 250 WO HCPCS: Performed by: PHYSICAL MEDICINE & REHABILITATION

## 2025-08-29 PROCEDURE — 3600000056 HC PAIN LEVEL 4 BASE: Performed by: PHYSICAL MEDICINE & REHABILITATION

## 2025-08-29 PROCEDURE — 7100000010 HC PHASE II RECOVERY - FIRST 15 MIN: Performed by: PHYSICAL MEDICINE & REHABILITATION

## 2025-08-29 PROCEDURE — 20552 NJX 1/MLT TRIGGER POINT 1/2: CPT | Performed by: PHYSICAL MEDICINE & REHABILITATION

## 2025-08-29 PROCEDURE — 6360000002 HC RX W HCPCS: Performed by: PHYSICAL MEDICINE & REHABILITATION

## 2025-08-29 RX ORDER — SODIUM CHLORIDE 9 MG/ML
INJECTION, SOLUTION INTRAVENOUS PRN
Status: CANCELLED | OUTPATIENT
Start: 2025-08-29

## 2025-08-29 RX ORDER — SODIUM CHLORIDE 0.9 % (FLUSH) 0.9 %
5-40 SYRINGE (ML) INJECTION PRN
Status: CANCELLED | OUTPATIENT
Start: 2025-08-29

## 2025-08-29 RX ORDER — ROPIVACAINE HYDROCHLORIDE 5 MG/ML
INJECTION, SOLUTION EPIDURAL; INFILTRATION; PERINEURAL PRN
Status: DISCONTINUED | OUTPATIENT
Start: 2025-08-29 | End: 2025-08-29 | Stop reason: ALTCHOICE

## 2025-08-29 RX ORDER — IOPAMIDOL 408 MG/ML
INJECTION, SOLUTION INTRATHECAL PRN
Status: DISCONTINUED | OUTPATIENT
Start: 2025-08-29 | End: 2025-08-29 | Stop reason: ALTCHOICE

## 2025-08-29 RX ORDER — SODIUM CHLORIDE 0.9 % (FLUSH) 0.9 %
5-40 SYRINGE (ML) INJECTION EVERY 12 HOURS SCHEDULED
Status: CANCELLED | OUTPATIENT
Start: 2025-08-29

## 2025-08-29 RX ORDER — DEXAMETHASONE SODIUM PHOSPHATE 10 MG/ML
INJECTION, SOLUTION INTRA-ARTICULAR; INTRALESIONAL; INTRAMUSCULAR; INTRAVENOUS; SOFT TISSUE PRN
Status: DISCONTINUED | OUTPATIENT
Start: 2025-08-29 | End: 2025-08-29 | Stop reason: ALTCHOICE

## 2025-08-29 RX ORDER — LIDOCAINE HYDROCHLORIDE 20 MG/ML
INJECTION, SOLUTION EPIDURAL; INFILTRATION; INTRACAUDAL; PERINEURAL PRN
Status: DISCONTINUED | OUTPATIENT
Start: 2025-08-29 | End: 2025-08-29 | Stop reason: ALTCHOICE

## 2025-08-29 ASSESSMENT — PAIN - FUNCTIONAL ASSESSMENT
PAIN_FUNCTIONAL_ASSESSMENT: PREVENTS OR INTERFERES WITH MANY ACTIVE NOT PASSIVE ACTIVITIES
PAIN_FUNCTIONAL_ASSESSMENT: PREVENTS OR INTERFERES SOME ACTIVE ACTIVITIES AND ADLS
PAIN_FUNCTIONAL_ASSESSMENT: 0-10

## 2025-08-29 ASSESSMENT — ENCOUNTER SYMPTOMS
RESPIRATORY NEGATIVE: 1
ALLERGIC/IMMUNOLOGIC NEGATIVE: 1
DIARRHEA: 0
BACK PAIN: 1
CONSTIPATION: 0
EYES NEGATIVE: 1

## 2025-08-29 ASSESSMENT — PAIN DESCRIPTION - DESCRIPTORS: DESCRIPTORS: DULL;ACHING;BURNING

## 2025-08-29 ASSESSMENT — PAIN DESCRIPTION - LOCATION: LOCATION: HIP;BACK

## 2025-08-29 ASSESSMENT — PAIN SCALES - GENERAL: PAINLEVEL_OUTOF10: 3

## 2025-08-29 ASSESSMENT — PAIN DESCRIPTION - ORIENTATION: ORIENTATION: RIGHT;LOWER;LEFT

## 2025-08-29 ASSESSMENT — PAIN DESCRIPTION - PAIN TYPE: TYPE: CHRONIC PAIN

## 2025-08-29 ASSESSMENT — PAIN DESCRIPTION - FREQUENCY: FREQUENCY: CONTINUOUS

## 2025-09-03 ENCOUNTER — HOSPITAL ENCOUNTER (OUTPATIENT)
Dept: PHYSICAL THERAPY | Age: 60
Setting detail: THERAPIES SERIES
Discharge: HOME | End: 2025-09-03
Payer: COMMERCIAL

## 2025-09-03 PROCEDURE — 97110 THERAPEUTIC EXERCISES: CPT

## (undated) DEVICE — NEEDLE, QUINCKE, 22GX5": Brand: MEDLINE

## (undated) DEVICE — Z DISCONTINUED USE 2272114 DRAPE SURG UTIL 26X15 IN W/ TAPE N INVASIVE MULTLYR DISP

## (undated) DEVICE — SPONGE,NEURO,0.5"X0.5",XR,STRL,10/PK: Brand: MEDLINE

## (undated) DEVICE — SYRINGE MED 7ML PLAS LUERSLIP LOSS OF RESISTANCE EPILOR

## (undated) DEVICE — C-ARMOR C-ARM EQUIPMENT COVERS CLEAR STERILE UNIVERSAL FIT 12 PER CASE: Brand: C-ARMOR

## (undated) DEVICE — BANDAGE ADH DIA7/8IN NAT FLEX-FABRIC WVN FOR WND PROTCT

## (undated) DEVICE — GLOVE ORANGE PI 8   MSG9080

## (undated) DEVICE — LINE SAMPLING ADVANCE ORAL NASAL MICROSTREAM O2 TUBING 6.5'

## (undated) DEVICE — SUTURE VCRL SZ 2-0 L18IN ABSRB UD CT-1 L36MM 1/2 CIR J839D

## (undated) DEVICE — GLOVE SURG SZ 8 L12IN THK91MIL BRN LTX FREE POLYCHLOROPRENE

## (undated) DEVICE — LINE SAMP O2 6.5FT W/FEMALE CONN F/ADULT CAPNOLINE PLUS

## (undated) DEVICE — STANDARD HYPODERMIC NEEDLE,POLYPROPYLENE HUB: Brand: MONOJECT

## (undated) DEVICE — NEEDLE FLTR 18GA L1.5IN MEM THK5UM BLNT DISP

## (undated) DEVICE — GLOVE SURG SZ 65 THK91MIL LTX FREE SYN POLYISOPRENE

## (undated) DEVICE — COVER,MAYO STAND,STERILE: Brand: MEDLINE

## (undated) DEVICE — ADHESIVE SKIN CLOSURE TOP 36 CC HI VISC DERMBND MINI

## (undated) DEVICE — YANKAUER,FLEXIBLE HANDLE,REGLR CAPACITY: Brand: MEDLINE INDUSTRIES, INC.

## (undated) DEVICE — BLADE CLIPPER GEN PURP NS

## (undated) DEVICE — DRESSING BORDERED ADH GZ UNIV GEN USE 8INX4IN AND 6INX2IN

## (undated) DEVICE — CANNULA SMART CAPNOLINE PLUS 02

## (undated) DEVICE — NEEDLE HYPO 25GA L1.5IN BLU POLYPR HUB S STL REG BVL STR

## (undated) DEVICE — TOWEL,OR,DSP,ST,NATURAL,DLX,4/PK,20PK/CS: Brand: MEDLINE

## (undated) DEVICE — Device

## (undated) DEVICE — GLOVE ORANGE PI 7   MSG9070

## (undated) DEVICE — CHLORAPREP 26ML CLEAR

## (undated) DEVICE — SHEET, T, LAPAROTOMY, STERILE: Brand: MEDLINE

## (undated) DEVICE — SET EXTN SM 0.5ML L13IN BOR W/O INJ SITE

## (undated) DEVICE — BLADE ES L4IN INSUL EDGE

## (undated) DEVICE — SUTURE MCRYL SZ 4-0 L18IN ABSRB UD L19MM PS-2 3/8 CIR PRIM Y496G

## (undated) DEVICE — SUTURE VCRL SZ 0 L18IN ABSRB UD L36MM CT-1 1/2 CIR J840D

## (undated) DEVICE — GAUZE,SPONGE,FLUFF,6"X6.75",STRL,5/TRAY: Brand: MEDLINE

## (undated) DEVICE — GOWN,AURORA,NONREINFORCED,LARGE: Brand: MEDLINE

## (undated) DEVICE — Device: Brand: SNAP ON SPHERZ

## (undated) DEVICE — TOTAL TRAY, 16FR 10ML SIL FOLEY, URN: Brand: MEDLINE

## (undated) DEVICE — TRAY PAIN CUST

## (undated) DEVICE — SOURCE LT 2 STR TIP SCINTILLANT

## (undated) DEVICE — PATIENT RETURN ELECTRODE, SINGLE-USE, CONTACT QUALITY MONITORING, ADULT, WITH 9FT CORD, FOR PATIENTS WEIGING OVER 33LBS. (15KG): Brand: MEGADYNE

## (undated) DEVICE — DISPOSABLE IRRIGATION BIPOLAR CORD, M1000 TYPE: Brand: KIRWAN

## (undated) DEVICE — SPONGE,NEURO,0.5"X3",XR,STRL,LF,10/PK: Brand: MEDLINE

## (undated) DEVICE — COUNTER NDL 40 COUNT HLD 70 FOAM BLK ADH W/ MAG

## (undated) DEVICE — TOOL 14MH30 LEGEND 14CM 3MM: Brand: MIDAS REX ™

## (undated) DEVICE — STERILE POLYISOPRENE POWDER-FREE SURGICAL GLOVES WITH EMOLLIENT COATING: Brand: PROTEXIS

## (undated) DEVICE — NEEDLE SPNL 22GA L5IN QNCKE

## (undated) DEVICE — DISSECTOR LAP DIA5MM BLNT TIP ENDOPATH

## (undated) DEVICE — DRAPE,LAP,CHOLE,W/TROUGHS,STERILE: Brand: MEDLINE

## (undated) DEVICE — COVER LT HNDL BLU PLAS

## (undated) DEVICE — AGENT HEMOSTATIC SURGIFLOW MATRIX KIT W/THROMBIN

## (undated) DEVICE — MARKER,SKIN,WI/RULER AND LABELS: Brand: MEDLINE

## (undated) DEVICE — GARMENT,MEDLINE,DVT,INT,CALF,MED, GEN2: Brand: MEDLINE

## (undated) DEVICE — GOWN,SIRUS,NONRNF,SETINSLV,XL,20/CS: Brand: MEDLINE

## (undated) DEVICE — PACK PROCEDURE SURG LUMBAR SPINE SVMMC

## (undated) DEVICE — POUCH INSTR W6.75XL11.5IN FRST 2 PKT ADH FOR ORTH AND

## (undated) DEVICE — CONNECTOR TBNG WHT PLAS SUCT STR 5IN1 LTWT W/ M CONN

## (undated) DEVICE — Z DISCONTINUED USE 2624850 GLOVE SURG SZ 6 L12IN THK91MIL BRN LTX FREE POLYCHLOROPRENE

## (undated) DEVICE — SYRINGE ANGIO 3ML W/ CLR POLYCARB BRL YEL PLUNG BLK MED

## (undated) DEVICE — SYRINGE IRRIG 60ML SFT PLIABLE BLB EZ TO GRP 1 HND USE W/

## (undated) DEVICE — 3M™ IOBAN™ 2 ANTIMICROBIAL INCISE DRAPE 6650EZ: Brand: IOBAN™ 2

## (undated) DEVICE — TUBING, SUCTION, 9/32" X 20', STRAIGHT: Brand: MEDLINE INDUSTRIES, INC.

## (undated) DEVICE — X-RAY CASSETTE COVER: Brand: CONVERTORS

## (undated) DEVICE — BLADE ES ELASTOMERIC COAT INSUL DURABLE BEND UPTO 90DEG

## (undated) DEVICE — SYRINGE,CONTROL,LL,FINGER,GRIP: Brand: MEDLINE INDUSTRIES, INC.

## (undated) DEVICE — SUTURE PERMAHAND SZ 4-0 L12X30IN NONABSORBABLE BLK SILK A303H

## (undated) DEVICE — C-ARM: Brand: UNBRANDED

## (undated) DEVICE — AVANOS* QUINCKE NEEDLE: Brand: AVANOS

## (undated) DEVICE — SUTURE VCRL SZ 3-0 L27IN ABSRB UD L26MM SH 1/2 CIR J416H

## (undated) DEVICE — CONNECTOR,TUBING,5-IN-1,NON-STERILE: Brand: MEDLINE INDUSTRIES, INC.

## (undated) DEVICE — SYRINGE MED 10ML TRNSLUC BRL PLUNG BLK MRK POLYPR CTRL

## (undated) DEVICE — INTENDED FOR TISSUE SEPARATION, AND OTHER PROCEDURES THAT REQUIRE A SHARP SURGICAL BLADE TO PUNCTURE OR CUT.: Brand: BARD-PARKER ® CARBON RIB-BACK BLADES

## (undated) DEVICE — PROTECTOR ULN NRV PUR FOAM HK LOOP STRP ANATOMICALLY

## (undated) DEVICE — Z INACTIVE USE 2423038 APPLICATOR MEDICATED 10.5 CC CHLORHEXIDINE GLUC 2%

## (undated) DEVICE — SPONGE LAP W18XL18IN WHT COT 4 PLY FLD STRUNG RADPQ DISP ST

## (undated) DEVICE — APPLICATOR MEDICATED 26 CC SOLUTION HI LT ORNG CHLORAPREP

## (undated) DEVICE — DRAPE,REIN 53X77,STERILE: Brand: MEDLINE

## (undated) DEVICE — NEPTUNE E-SEP SMOKE EVACUATION PENCIL, COATED, 70MM BLADE, PUSH BUTTON SWITCH: Brand: NEPTUNE E-SEP

## (undated) DEVICE — PIN, 9733235, 100MM, STERILE, PERC REF